# Patient Record
Sex: MALE | Race: WHITE | NOT HISPANIC OR LATINO | Employment: OTHER | URBAN - METROPOLITAN AREA
[De-identification: names, ages, dates, MRNs, and addresses within clinical notes are randomized per-mention and may not be internally consistent; named-entity substitution may affect disease eponyms.]

---

## 2023-03-06 DIAGNOSIS — E11.9 TYPE 2 DIABETES MELLITUS WITHOUT COMPLICATION, UNSPECIFIED WHETHER LONG TERM INSULIN USE (MULTI): ICD-10-CM

## 2023-03-07 RX ORDER — GLIMEPIRIDE 4 MG/1
4 TABLET ORAL 2 TIMES DAILY
Qty: 180 TABLET | Refills: 1 | Status: SHIPPED | OUTPATIENT
Start: 2023-03-07 | End: 2023-07-15 | Stop reason: SDUPTHER

## 2023-03-21 DIAGNOSIS — E03.9 HYPOTHYROIDISM, UNSPECIFIED TYPE: ICD-10-CM

## 2023-03-21 RX ORDER — LEVOTHYROXINE SODIUM 200 UG/1
1 TABLET ORAL DAILY
COMMUNITY
Start: 2016-10-06 | End: 2023-03-21 | Stop reason: SDUPTHER

## 2023-03-22 RX ORDER — LEVOTHYROXINE SODIUM 200 UG/1
200 TABLET ORAL DAILY
Qty: 90 TABLET | Refills: 0 | Status: SHIPPED | OUTPATIENT
Start: 2023-03-22 | End: 2023-07-15 | Stop reason: SDUPTHER

## 2023-03-29 DIAGNOSIS — Z79.4 OTHER SPECIFIED DIABETES MELLITUS WITH OTHER SPECIFIED COMPLICATION, WITH LONG-TERM CURRENT USE OF INSULIN (MULTI): ICD-10-CM

## 2023-03-29 DIAGNOSIS — E13.69 OTHER SPECIFIED DIABETES MELLITUS WITH OTHER SPECIFIED COMPLICATION, WITH LONG-TERM CURRENT USE OF INSULIN (MULTI): ICD-10-CM

## 2023-03-29 RX ORDER — METFORMIN HYDROCHLORIDE 1000 MG/1
1000 TABLET ORAL 2 TIMES DAILY
COMMUNITY
End: 2023-03-29 | Stop reason: SDUPTHER

## 2023-03-31 RX ORDER — METFORMIN HYDROCHLORIDE 1000 MG/1
1000 TABLET ORAL 2 TIMES DAILY
Qty: 180 TABLET | Refills: 0 | Status: SHIPPED | OUTPATIENT
Start: 2023-03-31 | End: 2023-07-15 | Stop reason: SDUPTHER

## 2023-04-03 DIAGNOSIS — R35.0 URINARY FREQUENCY: ICD-10-CM

## 2023-04-03 DIAGNOSIS — F41.9 ANXIETY: ICD-10-CM

## 2023-04-03 DIAGNOSIS — G47.10 HYPERSOMNIA: ICD-10-CM

## 2023-04-03 RX ORDER — SERTRALINE HYDROCHLORIDE 50 MG/1
50 TABLET, FILM COATED ORAL DAILY
COMMUNITY
End: 2023-04-03 | Stop reason: SDUPTHER

## 2023-04-03 RX ORDER — TAMSULOSIN HYDROCHLORIDE 0.4 MG/1
0.4 CAPSULE ORAL DAILY
Qty: 90 CAPSULE | Refills: 0 | Status: SHIPPED | OUTPATIENT
Start: 2023-04-03 | End: 2023-07-15 | Stop reason: SDUPTHER

## 2023-04-03 RX ORDER — TAMSULOSIN HYDROCHLORIDE 0.4 MG/1
0.4 CAPSULE ORAL DAILY
COMMUNITY
End: 2023-04-03 | Stop reason: SDUPTHER

## 2023-04-03 RX ORDER — TRAZODONE HYDROCHLORIDE 50 MG/1
50 TABLET ORAL NIGHTLY
Qty: 90 TABLET | Refills: 0 | Status: SHIPPED | OUTPATIENT
Start: 2023-04-03 | End: 2024-01-31 | Stop reason: HOSPADM

## 2023-04-03 RX ORDER — SERTRALINE HYDROCHLORIDE 50 MG/1
50 TABLET, FILM COATED ORAL DAILY
Qty: 90 TABLET | Refills: 0 | Status: SHIPPED | OUTPATIENT
Start: 2023-04-03 | End: 2023-07-15 | Stop reason: SDUPTHER

## 2023-04-03 RX ORDER — TRAZODONE HYDROCHLORIDE 50 MG/1
50 TABLET ORAL NIGHTLY
COMMUNITY
End: 2023-04-03 | Stop reason: SDUPTHER

## 2023-07-15 ENCOUNTER — OFFICE VISIT (OUTPATIENT)
Dept: PRIMARY CARE | Facility: CLINIC | Age: 73
End: 2023-07-15
Payer: MEDICARE

## 2023-07-15 VITALS
WEIGHT: 232 LBS | BODY MASS INDEX: 37.28 KG/M2 | HEIGHT: 66 IN | DIASTOLIC BLOOD PRESSURE: 64 MMHG | SYSTOLIC BLOOD PRESSURE: 144 MMHG

## 2023-07-15 DIAGNOSIS — J43.8 OTHER EMPHYSEMA (MULTI): ICD-10-CM

## 2023-07-15 DIAGNOSIS — E03.9 HYPOTHYROIDISM, UNSPECIFIED TYPE: ICD-10-CM

## 2023-07-15 DIAGNOSIS — E11.9 CONTROLLED TYPE 2 DIABETES MELLITUS WITHOUT COMPLICATION, WITHOUT LONG-TERM CURRENT USE OF INSULIN (MULTI): ICD-10-CM

## 2023-07-15 DIAGNOSIS — E11.9 TYPE 2 DIABETES MELLITUS WITHOUT COMPLICATION, UNSPECIFIED WHETHER LONG TERM INSULIN USE (MULTI): ICD-10-CM

## 2023-07-15 DIAGNOSIS — E23.0 HYPOPITUITARISM (MULTI): Primary | ICD-10-CM

## 2023-07-15 DIAGNOSIS — F41.9 ANXIETY: ICD-10-CM

## 2023-07-15 DIAGNOSIS — R35.0 URINARY FREQUENCY: ICD-10-CM

## 2023-07-15 DIAGNOSIS — Z79.4 OTHER SPECIFIED DIABETES MELLITUS WITH OTHER SPECIFIED COMPLICATION, WITH LONG-TERM CURRENT USE OF INSULIN (MULTI): ICD-10-CM

## 2023-07-15 DIAGNOSIS — E13.69 OTHER SPECIFIED DIABETES MELLITUS WITH OTHER SPECIFIED COMPLICATION, WITH LONG-TERM CURRENT USE OF INSULIN (MULTI): ICD-10-CM

## 2023-07-15 DIAGNOSIS — E78.00 ELEVATED LDL CHOLESTEROL LEVEL: ICD-10-CM

## 2023-07-15 PROBLEM — H47.293 OTHER OPTIC ATROPHY, BILATERAL: Status: ACTIVE | Noted: 2023-02-28

## 2023-07-15 PROBLEM — J20.9 ACUTE BRONCHITIS: Status: ACTIVE | Noted: 2023-07-15

## 2023-07-15 PROBLEM — M79.604 PAIN IN BOTH LOWER EXTREMITIES: Status: ACTIVE | Noted: 2023-07-15

## 2023-07-15 PROBLEM — M79.605 PAIN IN BOTH LOWER EXTREMITIES: Status: ACTIVE | Noted: 2023-07-15

## 2023-07-15 PROBLEM — K21.9 GERD (GASTROESOPHAGEAL REFLUX DISEASE): Status: ACTIVE | Noted: 2023-07-15

## 2023-07-15 PROBLEM — H53.47 BITEMPORAL HEMIANOPIA: Status: ACTIVE | Noted: 2023-02-28

## 2023-07-15 PROBLEM — Z86.39: Status: ACTIVE | Noted: 2023-07-15

## 2023-07-15 PROBLEM — R21 RASH: Status: ACTIVE | Noted: 2023-07-15

## 2023-07-15 PROBLEM — Z96.1 PSEUDOPHAKIA, BOTH EYES: Status: ACTIVE | Noted: 2023-02-28

## 2023-07-15 PROBLEM — M16.10 ARTHRITIS, HIP: Status: ACTIVE | Noted: 2023-07-15

## 2023-07-15 PROBLEM — F32.A DEPRESSION: Status: ACTIVE | Noted: 2023-07-15

## 2023-07-15 PROBLEM — H54.7 BLIND: Status: ACTIVE | Noted: 2023-07-15

## 2023-07-15 PROBLEM — Z86.711 HISTORY OF PULMONARY EMBOLUS (PE): Status: ACTIVE | Noted: 2019-10-24

## 2023-07-15 PROBLEM — E23.2 DIABETES INSIPIDUS (MULTI): Status: ACTIVE | Noted: 2017-03-07

## 2023-07-15 PROBLEM — T84.59XA: Status: ACTIVE | Noted: 2023-07-15

## 2023-07-15 PROBLEM — J01.90 ACUTE SINUSITIS: Status: ACTIVE | Noted: 2023-07-15

## 2023-07-15 PROBLEM — G47.00 INSOMNIA: Status: ACTIVE | Noted: 2023-07-15

## 2023-07-15 PROBLEM — Z96.649: Status: ACTIVE | Noted: 2023-07-15

## 2023-07-15 PROBLEM — R41.0 CONFUSION: Status: ACTIVE | Noted: 2023-07-15

## 2023-07-15 PROBLEM — I82.409 DVT (DEEP VENOUS THROMBOSIS) (MULTI): Status: ACTIVE | Noted: 2023-07-15

## 2023-07-15 PROBLEM — R06.02 SOB (SHORTNESS OF BREATH): Status: ACTIVE | Noted: 2023-07-15

## 2023-07-15 PROBLEM — M79.606 LEG PAIN: Status: ACTIVE | Noted: 2023-07-15

## 2023-07-15 PROBLEM — D64.9 ANEMIA: Status: ACTIVE | Noted: 2023-07-15

## 2023-07-15 PROBLEM — L85.3 DRY SKIN: Status: ACTIVE | Noted: 2023-07-15

## 2023-07-15 PROBLEM — E27.40: Status: ACTIVE | Noted: 2017-03-07

## 2023-07-15 PROBLEM — G47.30 APNEA, SLEEP: Status: ACTIVE | Noted: 2023-07-15

## 2023-07-15 PROBLEM — N40.0 BPH (BENIGN PROSTATIC HYPERPLASIA): Status: ACTIVE | Noted: 2023-07-15

## 2023-07-15 PROBLEM — R06.83 SNORING: Status: ACTIVE | Noted: 2023-07-15

## 2023-07-15 PROBLEM — R35.1 NOCTURIA: Status: ACTIVE | Noted: 2023-07-15

## 2023-07-15 PROBLEM — M25.559 JOINT PAIN, HIP: Status: ACTIVE | Noted: 2023-07-15

## 2023-07-15 PROBLEM — I10 HTN (HYPERTENSION): Status: ACTIVE | Noted: 2023-07-15

## 2023-07-15 PROBLEM — R53.83 FATIGUE: Status: ACTIVE | Noted: 2023-07-15

## 2023-07-15 PROBLEM — R42 DIZZINESS: Status: ACTIVE | Noted: 2023-07-15

## 2023-07-15 PROBLEM — E03.8 CENTRAL HYPOTHYROIDISM: Status: ACTIVE | Noted: 2017-05-31

## 2023-07-15 PROBLEM — R60.0 EDEMA LEG: Status: ACTIVE | Noted: 2023-07-15

## 2023-07-15 PROBLEM — R09.02 HYPOXIA: Status: ACTIVE | Noted: 2023-07-15

## 2023-07-15 PROCEDURE — 3078F DIAST BP <80 MM HG: CPT | Performed by: INTERNAL MEDICINE

## 2023-07-15 PROCEDURE — 99214 OFFICE O/P EST MOD 30 MIN: CPT | Performed by: INTERNAL MEDICINE

## 2023-07-15 PROCEDURE — 3044F HG A1C LEVEL LT 7.0%: CPT | Performed by: INTERNAL MEDICINE

## 2023-07-15 PROCEDURE — 1159F MED LIST DOCD IN RCRD: CPT | Performed by: INTERNAL MEDICINE

## 2023-07-15 PROCEDURE — 3077F SYST BP >= 140 MM HG: CPT | Performed by: INTERNAL MEDICINE

## 2023-07-15 RX ORDER — LEVOTHYROXINE SODIUM 200 UG/1
200 TABLET ORAL DAILY
Qty: 90 TABLET | Refills: 0 | Status: SHIPPED | OUTPATIENT
Start: 2023-07-15 | End: 2023-12-15 | Stop reason: SDUPTHER

## 2023-07-15 RX ORDER — GLIMEPIRIDE 4 MG/1
4 TABLET ORAL 2 TIMES DAILY
Qty: 180 TABLET | Refills: 0 | Status: SHIPPED | OUTPATIENT
Start: 2023-07-15 | End: 2023-12-15 | Stop reason: SDUPTHER

## 2023-07-15 RX ORDER — TAMSULOSIN HYDROCHLORIDE 0.4 MG/1
0.4 CAPSULE ORAL DAILY
Qty: 90 CAPSULE | Refills: 0 | Status: SHIPPED | OUTPATIENT
Start: 2023-07-15

## 2023-07-15 RX ORDER — SERTRALINE HYDROCHLORIDE 50 MG/1
50 TABLET, FILM COATED ORAL DAILY
Qty: 90 TABLET | Refills: 0 | Status: SHIPPED | OUTPATIENT
Start: 2023-07-15 | End: 2024-01-31 | Stop reason: HOSPADM

## 2023-07-15 RX ORDER — METFORMIN HYDROCHLORIDE 1000 MG/1
1000 TABLET ORAL 2 TIMES DAILY
Qty: 180 TABLET | Refills: 0 | Status: SHIPPED | OUTPATIENT
Start: 2023-07-15 | End: 2023-10-13 | Stop reason: SDUPTHER

## 2023-07-15 NOTE — PROGRESS NOTES
Subjective   Patient ID: Gopal Ness is a 72 y.o. male who presents for Follow-up and Med Refill.    Med Refill    Patient is here for follow-up  Did not do blood work for diabetes  Follow-up on high cholesterol hypertension  He saw the eye doctor few months ago no new recommendations  His family started noticing that few months ago his left eye is going up and the right eye is drooping  Since his last visit here his eye movement is definitely not doing well very pronounced  Continues to smoke    Patient here for follow-up  Follow-up on hypertension high cholesterol diabetes  Patient doing better on sertraline   Having prostate issues frequency of urine at night doing okay with the medications  He has history of pituitary tumor removed has not followed up with the endocrine doctor yet overdue  His vision never came back completely he has not followed up with an eye doctor  He bumps into things  Patient does not sleep well   Refused to do the sleep study  Per family he does snore a lot wakes up tired  He continues to smoke  He does get short of breath on exertion he does wheezes a lot     Patient is here to get handicap placard  It was given to him after the hip surgery  Not he says he needs it because his vision is not better  Is supposed to go for cataract surgery but not able to find the ride as daughter works a lot     past recap  Patient is here for follow-up   Patient here for follow-up on diabetes  He is off the steroids and blood sugars are doing much better  Doing better with his mood  In May it will be one year for anticoagulation for PE and DVT  Patient has appointment with endocrinologist in a month  Patient continues to smoke  Daughter is concerned about discoloration of the feet and the lower leg  So she would like to continue the blood thinners patient wants to take only half the dose  He is sometimes noncompliant with medication and follow-ups      Review of Systems    Objective   /64   Ht  "1.676 m (5' 6\")   Wt 105 kg (232 lb)   BMI 37.45 kg/m²     Physical Exam  Vitals reviewed.   Constitutional:       Appearance: Normal appearance.   HENT:      Head: Normocephalic and atraumatic.      Right Ear: Tympanic membrane, ear canal and external ear normal.      Left Ear: Tympanic membrane, ear canal and external ear normal.      Nose: Nose normal.      Mouth/Throat:      Pharynx: Oropharynx is clear.   Eyes:      Conjunctiva/sclera: Conjunctivae normal.      Pupils: Pupils are equal, round, and reactive to light.      Comments: Right eye ptosis, left eye and right eye movements are not synchronized   Cardiovascular:      Rate and Rhythm: Normal rate and regular rhythm.      Pulses: Normal pulses.      Heart sounds: Normal heart sounds.   Pulmonary:      Effort: Pulmonary effort is normal.      Breath sounds: Normal breath sounds.   Abdominal:      General: Abdomen is flat. Bowel sounds are normal.      Palpations: Abdomen is soft.   Musculoskeletal:      Cervical back: Normal range of motion and neck supple.   Skin:     General: Skin is warm and dry.   Neurological:      General: No focal deficit present.      Mental Status: He is alert and oriented to person, place, and time.   Psychiatric:         Mood and Affect: Mood normal.         Assessment/Plan   Problem List Items Addressed This Visit          Cardiac and Vasculature    Elevated LDL cholesterol level    Relevant Orders    CBC    Comprehensive Metabolic Panel    Hemoglobin A1C    Lipid Panel    Thyroid Stimulating Hormone    CBC    Comprehensive Metabolic Panel    Hemoglobin A1C    Lipid Panel    Thyroid Stimulating Hormone       Endocrine/Metabolic    Controlled type 2 diabetes mellitus without complication, without long-term current use of insulin (CMS/[facility])    Relevant Orders    CBC    Comprehensive Metabolic Panel    Hemoglobin A1C    Lipid Panel    Thyroid Stimulating Hormone    CBC    Comprehensive Metabolic Panel    Hemoglobin A1C    Lipid " Panel    Thyroid Stimulating Hormone    Diabetes mellitus (CMS/HCC)    Relevant Medications    glimepiride (AmaryL) 4 mg tablet    metFORMIN (Glucophage) 1,000 mg tablet    Hypopituitarism (CMS/HCC) - Primary    Relevant Orders    MR brain w and wo IV contrast    Hypothyroidism    Relevant Medications    levothyroxine (Synthroid, Levoxyl) 200 mcg tablet       Pulmonary and Pneumonias    COPD (chronic obstructive pulmonary disease) (CMS/[facility])     Other Visit Diagnoses       Anxiety        Relevant Medications    sertraline (Zoloft) 50 mg tablet    Urinary frequency        Relevant Medications    tamsulosin (Flomax) 0.4 mg 24 hr capsule             # 1 Depression  Doing well on sertraline  #2 PE/DVT  Since patient leads a very sedentary lifestyle family's concern about having another blood clot  Will continue eliquis for now reduce the dose  #3 COPD  Patient is feeling he is not even using the inhaler  Albuterol inhaler prescribed  He does have a nebulizer machine does not use it  #4 smoking  Advised to quit smoking  #5 diabetes  A1c improved to 6.1  #6 hyperlipidemia  under control  Medications refilled  Triglycerides little high and HDL low  Encouraged to walk more  #7 nocturia  Continue Flomax  #7 snoring  Check overnight pulse oximetry results which are not in the chart  Sleep study patient refused  #9 acute bronchitis  Treat with doxycycline and Medrol Dosepak  Follow-up blood work in 6 months  Refills given  Again encourage patient to see the endocrinologist to get his adrenal glands functioning checked       7/15/2023  Daughter says that she did make appointment with  And they recommended seeing a neurologist  She did see a virtual visit with the endocrine doctor and they were supposed to make appointment with the neurologist  We will get the MRI of the brain to evaluate recurrence of pituitary tumor  Blood work reviewed from last visit  Cholesterol okay  Thyroid suppressed because of his secondary  hypothyroidism  Cholesterol okay  Follow-up of the MRI  Medications refills  Blood work ordered

## 2023-07-18 ENCOUNTER — LAB (OUTPATIENT)
Dept: LAB | Facility: LAB | Age: 73
End: 2023-07-18
Payer: MEDICARE

## 2023-07-18 DIAGNOSIS — E78.00 ELEVATED LDL CHOLESTEROL LEVEL: ICD-10-CM

## 2023-07-18 DIAGNOSIS — E11.9 CONTROLLED TYPE 2 DIABETES MELLITUS WITHOUT COMPLICATION, WITHOUT LONG-TERM CURRENT USE OF INSULIN (MULTI): ICD-10-CM

## 2023-07-18 LAB
ALANINE AMINOTRANSFERASE (SGPT) (U/L) IN SER/PLAS: 28 U/L (ref 10–52)
ALBUMIN (G/DL) IN SER/PLAS: 4.1 G/DL (ref 3.4–5)
ALKALINE PHOSPHATASE (U/L) IN SER/PLAS: 59 U/L (ref 33–136)
ANION GAP IN SER/PLAS: 11 MMOL/L (ref 10–20)
ASPARTATE AMINOTRANSFERASE (SGOT) (U/L) IN SER/PLAS: 24 U/L (ref 9–39)
BILIRUBIN TOTAL (MG/DL) IN SER/PLAS: 0.5 MG/DL (ref 0–1.2)
CALCIUM (MG/DL) IN SER/PLAS: 9.4 MG/DL (ref 8.6–10.6)
CARBON DIOXIDE, TOTAL (MMOL/L) IN SER/PLAS: 33 MMOL/L (ref 21–32)
CHLORIDE (MMOL/L) IN SER/PLAS: 103 MMOL/L (ref 98–107)
CHOLESTEROL (MG/DL) IN SER/PLAS: 166 MG/DL (ref 0–199)
CHOLESTEROL IN HDL (MG/DL) IN SER/PLAS: 39 MG/DL
CHOLESTEROL/HDL RATIO: 4.3
CREATININE (MG/DL) IN SER/PLAS: 0.81 MG/DL (ref 0.5–1.3)
ERYTHROCYTE DISTRIBUTION WIDTH (RATIO) BY AUTOMATED COUNT: 13.3 % (ref 11.5–14.5)
ERYTHROCYTE MEAN CORPUSCULAR HEMOGLOBIN CONCENTRATION (G/DL) BY AUTOMATED: 32.8 G/DL (ref 32–36)
ERYTHROCYTE MEAN CORPUSCULAR VOLUME (FL) BY AUTOMATED COUNT: 98 FL (ref 80–100)
ERYTHROCYTES (10*6/UL) IN BLOOD BY AUTOMATED COUNT: 5.18 X10E12/L (ref 4.5–5.9)
ESTIMATED AVERAGE GLUCOSE FOR HBA1C: 126 MG/DL
GFR MALE: >90 ML/MIN/1.73M2
GLUCOSE (MG/DL) IN SER/PLAS: 87 MG/DL (ref 74–99)
HEMATOCRIT (%) IN BLOOD BY AUTOMATED COUNT: 50.9 % (ref 41–52)
HEMOGLOBIN (G/DL) IN BLOOD: 16.7 G/DL (ref 13.5–17.5)
HEMOGLOBIN A1C/HEMOGLOBIN TOTAL IN BLOOD: 6 %
LDL: 102 MG/DL (ref 0–99)
LEUKOCYTES (10*3/UL) IN BLOOD BY AUTOMATED COUNT: 9.2 X10E9/L (ref 4.4–11.3)
NRBC (PER 100 WBCS) BY AUTOMATED COUNT: 0 /100 WBC (ref 0–0)
PLATELETS (10*3/UL) IN BLOOD AUTOMATED COUNT: 159 X10E9/L (ref 150–450)
POTASSIUM (MMOL/L) IN SER/PLAS: 4.6 MMOL/L (ref 3.5–5.3)
PROTEIN TOTAL: 6.8 G/DL (ref 6.4–8.2)
SODIUM (MMOL/L) IN SER/PLAS: 142 MMOL/L (ref 136–145)
THYROTROPIN (MIU/L) IN SER/PLAS BY DETECTION LIMIT <= 0.05 MIU/L: <0.01 MIU/L (ref 0.44–3.98)
TRIGLYCERIDE (MG/DL) IN SER/PLAS: 124 MG/DL (ref 0–149)
UREA NITROGEN (MG/DL) IN SER/PLAS: 10 MG/DL (ref 6–23)
VLDL: 25 MG/DL (ref 0–40)

## 2023-07-18 PROCEDURE — 80053 COMPREHEN METABOLIC PANEL: CPT

## 2023-07-18 PROCEDURE — 85027 COMPLETE CBC AUTOMATED: CPT

## 2023-07-18 PROCEDURE — 84443 ASSAY THYROID STIM HORMONE: CPT

## 2023-07-18 PROCEDURE — 80061 LIPID PANEL: CPT

## 2023-07-18 PROCEDURE — 83036 HEMOGLOBIN GLYCOSYLATED A1C: CPT

## 2023-07-18 PROCEDURE — 36415 COLL VENOUS BLD VENIPUNCTURE: CPT

## 2023-09-27 ENCOUNTER — OFFICE VISIT (OUTPATIENT)
Dept: PRIMARY CARE | Facility: CLINIC | Age: 73
End: 2023-09-27
Payer: MEDICARE

## 2023-09-27 VITALS — BODY MASS INDEX: 38.57 KG/M2 | WEIGHT: 240 LBS | HEIGHT: 66 IN

## 2023-09-27 DIAGNOSIS — E03.8 OTHER SPECIFIED HYPOTHYROIDISM: ICD-10-CM

## 2023-09-27 DIAGNOSIS — E78.00 ELEVATED LDL CHOLESTEROL LEVEL: ICD-10-CM

## 2023-09-27 DIAGNOSIS — I10 ESSENTIAL HYPERTENSION: ICD-10-CM

## 2023-09-27 DIAGNOSIS — H49.9 OPHTHALMOPLEGIA: Primary | ICD-10-CM

## 2023-09-27 DIAGNOSIS — D33.2 BENIGN NEOPLASM OF BRAIN, UNSPECIFIED BRAIN REGION (MULTI): ICD-10-CM

## 2023-09-27 DIAGNOSIS — E23.0 HYPOPITUITARISM (MULTI): ICD-10-CM

## 2023-09-27 DIAGNOSIS — H02.401 PTOSIS OF RIGHT EYELID: ICD-10-CM

## 2023-09-27 DIAGNOSIS — H54.61 VISION LOSS OF RIGHT EYE: ICD-10-CM

## 2023-09-27 PROCEDURE — 99215 OFFICE O/P EST HI 40 MIN: CPT | Performed by: INTERNAL MEDICINE

## 2023-09-27 PROCEDURE — 1159F MED LIST DOCD IN RCRD: CPT | Performed by: INTERNAL MEDICINE

## 2023-09-27 PROCEDURE — 3044F HG A1C LEVEL LT 7.0%: CPT | Performed by: INTERNAL MEDICINE

## 2023-09-27 RX ORDER — APIXABAN 5 MG/1
5 TABLET, FILM COATED ORAL DAILY
COMMUNITY

## 2023-09-27 ASSESSMENT — ENCOUNTER SYMPTOMS
LOSS OF SENSATION IN FEET: 0
OCCASIONAL FEELINGS OF UNSTEADINESS: 0

## 2023-10-08 PROBLEM — D33.2 BENIGN NEOPLASM OF BRAIN (MULTI): Status: ACTIVE | Noted: 2023-10-08

## 2023-10-08 PROBLEM — H49.9 OPHTHALMOPLEGIA: Status: ACTIVE | Noted: 2023-10-08

## 2023-10-08 PROBLEM — H02.401 PTOSIS OF RIGHT EYELID: Status: ACTIVE | Noted: 2023-10-08

## 2023-10-08 PROBLEM — H54.61 VISION LOSS OF RIGHT EYE: Status: ACTIVE | Noted: 2023-10-08

## 2023-10-08 NOTE — PROGRESS NOTES
Subjective   Patient ID: Gopal Ness is a 73 y.o. male who presents for Follow-up.    HPI   Patient is here for follow-up on MRI which she did many months ago but never followed up.  Today he is coming because for last several months his eye muscles are getting weaker but now he cannot move the right eye and right eyelid is drooping more    Patient is here for follow-up  Did not do blood work for diabetes  Follow-up on high cholesterol hypertension  He saw the eye doctor few months ago no new recommendations  His family started noticing that few months ago his left eye is going up and the right eye is drooping  Since his last visit here his eye movement is definitely not doing well very pronounced  Continues to smoke     Patient here for follow-up  Follow-up on hypertension high cholesterol diabetes  Patient doing better on sertraline   Having prostate issues frequency of urine at night doing okay with the medications  He has history of pituitary tumor removed has not followed up with the endocrine doctor yet overdue  His vision never came back completely he has not followed up with an eye doctor  He bumps into things  Patient does not sleep well   Refused to do the sleep study  Per family he does snore a lot wakes up tired  He continues to smoke  He does get short of breath on exertion he does wheezes a lot     Patient is here to get handicap placard  It was given to him after the hip surgery  Not he says he needs it because his vision is not better  Is supposed to go for cataract surgery but not able to find the ride as daughter works a lot     past recap  Patient is here for follow-up   Patient here for follow-up on diabetes  He is off the steroids and blood sugars are doing much better  Doing better with his mood  In May it will be one year for anticoagulation for PE and DVT  Patient has appointment with endocrinologist in a month  Patient continues to smoke  Daughter is concerned about discoloration of the  "feet and the lower leg  So she would like to continue the blood thinners patient wants to take only half the dose  He is sometimes noncompliant with medication and follow-ups   Review of Systems    Objective   Ht 1.676 m (5' 6\")   Wt 109 kg (240 lb)   BMI 38.74 kg/m²     Physical Exam  Vitals reviewed.   Constitutional:       Appearance: Normal appearance.   HENT:      Head: Normocephalic and atraumatic.      Right Ear: Tympanic membrane, ear canal and external ear normal.      Left Ear: Tympanic membrane, ear canal and external ear normal.      Nose: Nose normal.      Mouth/Throat:      Pharynx: Oropharynx is clear.   Eyes:      Conjunctiva/sclera: Conjunctivae normal.      Pupils: Pupils are equal, round, and reactive to light.   Cardiovascular:      Rate and Rhythm: Normal rate and regular rhythm.      Pulses: Normal pulses.      Heart sounds: Normal heart sounds.   Pulmonary:      Effort: Pulmonary effort is normal.      Breath sounds: Normal breath sounds.   Abdominal:      General: Abdomen is flat. Bowel sounds are normal.      Palpations: Abdomen is soft.   Musculoskeletal:      Cervical back: Normal range of motion and neck supple.   Skin:     General: Skin is warm and dry.   Neurological:      General: No focal deficit present.      Mental Status: He is alert and oriented to person, place, and time.   Psychiatric:         Mood and Affect: Mood normal.         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    Relevant Orders    Referral to Neurosurgery     Other Visit Diagnoses         " Codes    Essential hypertension     I10          # 1 Depression  Doing well on sertraline  #2 PE/DVT  Since patient leads a very sedentary lifestyle family's concern about having another blood clot  Will continue eliquis for now reduce the dose  #3 COPD  Patient is feeling he is not even using the inhaler  Albuterol inhaler prescribed  He does have a nebulizer machine does not use it  #4 smoking  Advised to quit smoking  #5 diabetes  A1c improved to 6.1  #6 hyperlipidemia  under control  Medications refilled  Triglycerides little high and HDL low  Encouraged to walk more  #7 nocturia  Continue Flomax  #7 snoring  Check overnight pulse oximetry results which are not in the chart  Sleep study patient refused  #9 acute bronchitis  Treat with doxycycline and Medrol Dosepak  Follow-up blood work in 6 months  Refills given  Again encourage patient to see the endocrinologist to get his adrenal glands functioning checked     7/15/2023  Daughter says that she did make appointment with  And they recommended seeing a neurologist  She did see a virtual visit with the endocrine doctor and they were supposed to make appointment with the neurologist  We will get the MRI of the brain to evaluate recurrence of pituitary tumor  Blood work reviewed from last visit  Cholesterol okay  Thyroid suppressed because of his secondary hypothyroidism  Cholesterol okay  Follow-up of the MRI  Medications refills  Blood work ordered    9/27/2023  MRI done in July 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  New small dural based enhancing masses in right posterior fossa likely meningioma.  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

## 2023-10-13 DIAGNOSIS — E13.69 OTHER SPECIFIED DIABETES MELLITUS WITH OTHER SPECIFIED COMPLICATION, WITH LONG-TERM CURRENT USE OF INSULIN (MULTI): ICD-10-CM

## 2023-10-13 DIAGNOSIS — Z79.4 OTHER SPECIFIED DIABETES MELLITUS WITH OTHER SPECIFIED COMPLICATION, WITH LONG-TERM CURRENT USE OF INSULIN (MULTI): ICD-10-CM

## 2023-10-13 RX ORDER — METFORMIN HYDROCHLORIDE 1000 MG/1
1000 TABLET ORAL 2 TIMES DAILY
Qty: 180 TABLET | Refills: 0 | Status: SHIPPED | OUTPATIENT
Start: 2023-10-13 | End: 2023-12-15 | Stop reason: SDUPTHER

## 2023-12-15 DIAGNOSIS — E11.9 TYPE 2 DIABETES MELLITUS WITHOUT COMPLICATION, UNSPECIFIED WHETHER LONG TERM INSULIN USE (MULTI): ICD-10-CM

## 2023-12-15 DIAGNOSIS — E03.9 HYPOTHYROIDISM, UNSPECIFIED TYPE: ICD-10-CM

## 2023-12-15 DIAGNOSIS — E13.69 OTHER SPECIFIED DIABETES MELLITUS WITH OTHER SPECIFIED COMPLICATION, WITH LONG-TERM CURRENT USE OF INSULIN (MULTI): ICD-10-CM

## 2023-12-15 DIAGNOSIS — Z79.4 OTHER SPECIFIED DIABETES MELLITUS WITH OTHER SPECIFIED COMPLICATION, WITH LONG-TERM CURRENT USE OF INSULIN (MULTI): ICD-10-CM

## 2023-12-15 RX ORDER — METFORMIN HYDROCHLORIDE 1000 MG/1
1000 TABLET ORAL 2 TIMES DAILY
Qty: 180 TABLET | Refills: 0 | Status: SHIPPED | OUTPATIENT
Start: 2023-12-15 | End: 2024-03-14

## 2023-12-15 RX ORDER — GLIMEPIRIDE 4 MG/1
4 TABLET ORAL 2 TIMES DAILY
Qty: 180 TABLET | Refills: 0 | Status: SHIPPED | OUTPATIENT
Start: 2023-12-15 | End: 2024-01-31 | Stop reason: HOSPADM

## 2023-12-15 RX ORDER — LEVOTHYROXINE SODIUM 200 UG/1
200 TABLET ORAL DAILY
Qty: 90 TABLET | Refills: 0 | Status: SHIPPED | OUTPATIENT
Start: 2023-12-15 | End: 2024-01-31 | Stop reason: HOSPADM

## 2024-01-01 ENCOUNTER — APPOINTMENT (OUTPATIENT)
Dept: RADIOLOGY | Facility: HOSPITAL | Age: 74
DRG: 208 | End: 2024-01-01
Payer: MEDICARE

## 2024-01-01 ENCOUNTER — HOSPITAL ENCOUNTER (EMERGENCY)
Facility: HOSPITAL | Age: 74
Discharge: OTHER INSTITUTION | End: 2024-01-01
Attending: EMERGENCY MEDICINE
Payer: MEDICARE

## 2024-01-01 ENCOUNTER — APPOINTMENT (OUTPATIENT)
Dept: NEUROLOGY | Facility: HOSPITAL | Age: 74
DRG: 208 | End: 2024-01-01
Payer: MEDICARE

## 2024-01-01 ENCOUNTER — APPOINTMENT (OUTPATIENT)
Dept: RADIOLOGY | Facility: HOSPITAL | Age: 74
End: 2024-01-01
Payer: MEDICARE

## 2024-01-01 ENCOUNTER — HOSPITAL ENCOUNTER (INPATIENT)
Facility: HOSPITAL | Age: 74
LOS: 30 days | Discharge: NURSING, REHAB OR LONG-TERM CARE | DRG: 208 | End: 2024-01-31
Attending: PSYCHIATRY & NEUROLOGY | Admitting: PSYCHIATRY & NEUROLOGY
Payer: MEDICARE

## 2024-01-01 VITALS
DIASTOLIC BLOOD PRESSURE: 72 MMHG | BODY MASS INDEX: 28.49 KG/M2 | HEIGHT: 72 IN | RESPIRATION RATE: 20 BRPM | WEIGHT: 210.32 LBS | SYSTOLIC BLOOD PRESSURE: 124 MMHG | OXYGEN SATURATION: 92 % | HEART RATE: 113 BPM

## 2024-01-01 DIAGNOSIS — E23.2 DIABETES INSIPIDUS (MULTI): ICD-10-CM

## 2024-01-01 DIAGNOSIS — Z86.39 H/O DIABETES INSIPIDUS: ICD-10-CM

## 2024-01-01 DIAGNOSIS — E23.0 HYPOPITUITARISM (MULTI): ICD-10-CM

## 2024-01-01 DIAGNOSIS — H53.47 BITEMPORAL HEMIANOPIA: ICD-10-CM

## 2024-01-01 DIAGNOSIS — R55 ARRHYTHMIA, VAGAL: ICD-10-CM

## 2024-01-01 DIAGNOSIS — Z96.1 PSEUDOPHAKIA, BOTH EYES: ICD-10-CM

## 2024-01-01 DIAGNOSIS — G93.40 ACUTE ENCEPHALOPATHY: Primary | ICD-10-CM

## 2024-01-01 DIAGNOSIS — E63.9 NUTRITION DISORDER: ICD-10-CM

## 2024-01-01 DIAGNOSIS — E78.00 ELEVATED LDL CHOLESTEROL LEVEL: ICD-10-CM

## 2024-01-01 DIAGNOSIS — M79.605 PAIN IN BOTH LOWER EXTREMITIES: ICD-10-CM

## 2024-01-01 DIAGNOSIS — H54.61 VISION LOSS OF RIGHT EYE: ICD-10-CM

## 2024-01-01 DIAGNOSIS — I47.10 SVT (SUPRAVENTRICULAR TACHYCARDIA) (CMS-HCC): ICD-10-CM

## 2024-01-01 DIAGNOSIS — Z74.09 IMPAIRED MOBILITY: ICD-10-CM

## 2024-01-01 DIAGNOSIS — H02.401 PTOSIS OF RIGHT EYELID: ICD-10-CM

## 2024-01-01 DIAGNOSIS — H53.8 BLURRING OF VISUAL IMAGE: ICD-10-CM
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
## 2024-01-01 DIAGNOSIS — M79.604 PAIN IN BOTH LOWER EXTREMITIES: ICD-10-CM

## 2024-01-01 DIAGNOSIS — E27.40: ICD-10-CM

## 2024-01-01 DIAGNOSIS — R00.0 TACHYCARDIA: ICD-10-CM

## 2024-01-01 DIAGNOSIS — Z79.01 LONG TERM (CURRENT) USE OF ANTICOAGULANTS: ICD-10-CM

## 2024-01-01 DIAGNOSIS — K21.9 GASTROESOPHAGEAL REFLUX DISEASE, UNSPECIFIED WHETHER ESOPHAGITIS PRESENT: ICD-10-CM

## 2024-01-01 DIAGNOSIS — R09.02 HYPOXIA: ICD-10-CM

## 2024-01-01 DIAGNOSIS — R35.1 NOCTURIA: ICD-10-CM

## 2024-01-01 DIAGNOSIS — G93.41 ACUTE METABOLIC ENCEPHALOPATHY: ICD-10-CM

## 2024-01-01 DIAGNOSIS — J69.0: ICD-10-CM

## 2024-01-01 DIAGNOSIS — D35.2 PITUITARY ADENOMA (MULTI): ICD-10-CM

## 2024-01-01 DIAGNOSIS — R21 RASH: ICD-10-CM

## 2024-01-01 DIAGNOSIS — A41.9 SEPSIS, DUE TO UNSPECIFIED ORGANISM, UNSPECIFIED WHETHER ACUTE ORGAN DYSFUNCTION PRESENT (MULTI): ICD-10-CM

## 2024-01-01 DIAGNOSIS — J44.9 CHRONIC OBSTRUCTIVE PULMONARY DISEASE, UNSPECIFIED COPD TYPE (MULTI): ICD-10-CM

## 2024-01-01 DIAGNOSIS — L85.3 DRY SKIN: ICD-10-CM

## 2024-01-01 DIAGNOSIS — R41.82 AMS (ALTERED MENTAL STATUS): Primary | ICD-10-CM

## 2024-01-01 DIAGNOSIS — E11.9 CONTROLLED TYPE 2 DIABETES MELLITUS WITHOUT COMPLICATION, WITHOUT LONG-TERM CURRENT USE OF INSULIN (MULTI): ICD-10-CM

## 2024-01-01 DIAGNOSIS — E03.8 CENTRAL HYPOTHYROIDISM: ICD-10-CM

## 2024-01-01 DIAGNOSIS — M16.10 ARTHRITIS, HIP: ICD-10-CM

## 2024-01-01 DIAGNOSIS — R53.83 OTHER FATIGUE: ICD-10-CM

## 2024-01-01 DIAGNOSIS — H49.9 OPHTHALMOPLEGIA: ICD-10-CM

## 2024-01-01 DIAGNOSIS — R42 DIZZINESS: ICD-10-CM

## 2024-01-01 DIAGNOSIS — J96.00 ACUTE RESPIRATORY FAILURE, UNSPECIFIED WHETHER WITH HYPOXIA OR HYPERCAPNIA (MULTI): ICD-10-CM

## 2024-01-01 DIAGNOSIS — R41.0 CONFUSION: ICD-10-CM

## 2024-01-01 DIAGNOSIS — F32.A DEPRESSION, UNSPECIFIED DEPRESSION TYPE: ICD-10-CM

## 2024-01-01 DIAGNOSIS — R06.83 SNORING: ICD-10-CM

## 2024-01-01 DIAGNOSIS — R06.02 SOB (SHORTNESS OF BREATH): ICD-10-CM

## 2024-01-01 DIAGNOSIS — H47.293 OTHER OPTIC ATROPHY, BILATERAL: ICD-10-CM

## 2024-01-01 DIAGNOSIS — I95.9 HYPOTENSION, UNSPECIFIED HYPOTENSION TYPE: ICD-10-CM

## 2024-01-01 DIAGNOSIS — Z86.711 HISTORY OF PULMONARY EMBOLUS (PE): ICD-10-CM

## 2024-01-01 DIAGNOSIS — N40.1 BENIGN PROSTATIC HYPERPLASIA WITH LOWER URINARY TRACT SYMPTOMS, SYMPTOM DETAILS UNSPECIFIED: ICD-10-CM

## 2024-01-01 DIAGNOSIS — R60.0 EDEMA LEG: ICD-10-CM

## 2024-01-01 LAB
ABO GROUP (TYPE) IN BLOOD: NORMAL
ABO GROUP (TYPE) IN BLOOD: NORMAL
ALBUMIN SERPL BCP-MCNC: 3.6 G/DL (ref 3.4–5)
ALBUMIN SERPL-MCNC: 3.8 G/DL (ref 3.5–5)
ALP BLD-CCNC: 78 U/L (ref 35–125)
ALT SERPL-CCNC: 60 U/L (ref 5–40)
ANION GAP BLDA CALCULATED.4IONS-SCNC: 10 MMO/L (ref 10–25)
ANION GAP SERPL CALC-SCNC: 14 MMOL/L
ANION GAP SERPL CALC-SCNC: 17 MMOL/L (ref 10–20)
ANTIBODY SCREEN: NORMAL
ANTIBODY SCREEN: NORMAL
APPEARANCE UR: CLEAR
APTT PPP: 28 SECONDS (ref 27–38)
AST SERPL-CCNC: 142 U/L (ref 5–40)
BASE EXCESS BLDA CALC-SCNC: -1.6 MMOL/L (ref -2–3)
BASOPHILS # BLD AUTO: 0.03 X10*3/UL (ref 0–0.1)
BASOPHILS NFR BLD AUTO: 0.2 %
BILIRUB SERPL-MCNC: 0.5 MG/DL (ref 0.1–1.2)
BILIRUB UR STRIP.AUTO-MCNC: NEGATIVE MG/DL
BODY TEMPERATURE: ABNORMAL
BUN SERPL-MCNC: 23 MG/DL (ref 8–25)
BUN SERPL-MCNC: 26 MG/DL (ref 6–23)
CA-I BLDA-SCNC: 1.06 MMOL/L (ref 1.1–1.33)
CALCIUM SERPL-MCNC: 8.3 MG/DL (ref 8.6–10.6)
CALCIUM SERPL-MCNC: 9 MG/DL (ref 8.5–10.4)
CARDIAC TROPONIN I PNL SERPL HS: 73 NG/L (ref 0–53)
CHLORIDE BLDA-SCNC: 102 MMOL/L (ref 98–107)
CHLORIDE SERPL-SCNC: 103 MMOL/L (ref 97–107)
CHLORIDE SERPL-SCNC: 106 MMOL/L (ref 98–107)
CHLORIDE UR-SCNC: <15 MMOL/L
CHLORIDE/CREATININE (MMOL/G) IN URINE: NORMAL
CO2 SERPL-SCNC: 23 MMOL/L (ref 21–32)
CO2 SERPL-SCNC: 24 MMOL/L (ref 24–31)
COLOR UR: YELLOW
CREAT SERPL-MCNC: 0.9 MG/DL (ref 0.4–1.6)
CREAT SERPL-MCNC: 1.32 MG/DL (ref 0.5–1.3)
CREAT UR-MCNC: 27.5 MG/DL (ref 20–370)
EOSINOPHIL # BLD AUTO: 0.01 X10*3/UL (ref 0–0.4)
EOSINOPHIL NFR BLD AUTO: 0.1 %
ERYTHROCYTE [DISTWIDTH] IN BLOOD BY AUTOMATED COUNT: 13.8 % (ref 11.5–14.5)
ERYTHROCYTE [DISTWIDTH] IN BLOOD BY AUTOMATED COUNT: 14 % (ref 11.5–14.5)
EST. AVERAGE GLUCOSE BLD GHB EST-MCNC: 114 MG/DL
ETHANOL SERPL-MCNC: <0.01 G/DL
FSH SERPL-ACNC: <0.9 IU/L
GFR SERPL CREATININE-BSD FRML MDRD: 57 ML/MIN/1.73M*2
GFR SERPL CREATININE-BSD FRML MDRD: >90 ML/MIN/1.73M*2
GLUCOSE BLDA-MCNC: 156 MG/DL (ref 74–99)
GLUCOSE SERPL-MCNC: 81 MG/DL (ref 74–99)
GLUCOSE SERPL-MCNC: 83 MG/DL (ref 65–99)
GLUCOSE UR STRIP.AUTO-MCNC: NEGATIVE MG/DL
HBA1C MFR BLD: 5.6 %
HCO3 BLDA-SCNC: 23 MMOL/L (ref 22–26)
HCT VFR BLD AUTO: 51 % (ref 41–52)
HCT VFR BLD AUTO: 54.8 % (ref 41–52)
HCT VFR BLD EST: 48 % (ref 41–52)
HGB BLD-MCNC: 16.9 G/DL (ref 13.5–17.5)
HGB BLD-MCNC: 17.9 G/DL (ref 13.5–17.5)
HGB BLDA-MCNC: 16 G/DL (ref 13.5–17.5)
IMM GRANULOCYTES # BLD AUTO: 0.09 X10*3/UL (ref 0–0.5)
IMM GRANULOCYTES NFR BLD AUTO: 0.5 % (ref 0–0.9)
INHALED O2 CONCENTRATION: 97 %
INR PPP: 1 (ref 0.9–1.2)
INR PPP: 1.1 (ref 0.9–1.1)
KETONES UR STRIP.AUTO-MCNC: NEGATIVE MG/DL
LACTATE BLDA-SCNC: 1.1 MMOL/L (ref 0.4–2)
LACTATE SERPL-SCNC: 1.6 MMOL/L (ref 0.4–2)
LEUKOCYTE ESTERASE UR QL STRIP.AUTO: NEGATIVE
LH SERPL-ACNC: <0.1 IU/L
LIPASE SERPL-CCNC: 30 U/L (ref 16–63)
LYMPHOCYTES # BLD AUTO: 1.43 X10*3/UL (ref 0.8–3)
LYMPHOCYTES NFR BLD AUTO: 8.5 %
MAGNESIUM SERPL-MCNC: 2.32 MG/DL (ref 1.6–2.4)
MCH RBC QN AUTO: 30.7 PG (ref 26–34)
MCH RBC QN AUTO: 31.2 PG (ref 26–34)
MCHC RBC AUTO-ENTMCNC: 32.7 G/DL (ref 32–36)
MCHC RBC AUTO-ENTMCNC: 33.1 G/DL (ref 32–36)
MCV RBC AUTO: 93 FL (ref 80–100)
MCV RBC AUTO: 96 FL (ref 80–100)
MONOCYTES # BLD AUTO: 1.44 X10*3/UL (ref 0.05–0.8)
MONOCYTES NFR BLD AUTO: 8.6 %
MRSA DNA SPEC QL NAA+PROBE: NOT DETECTED
NEUTROPHILS # BLD AUTO: 13.84 X10*3/UL (ref 1.6–5.5)
NEUTROPHILS NFR BLD AUTO: 82.1 %
NITRITE UR QL STRIP.AUTO: NEGATIVE
NRBC BLD-RTO: 0 /100 WBCS (ref 0–0)
NRBC BLD-RTO: 0 /100 WBCS (ref 0–0)
OSMOLALITY SERPL: 339 MOSM/KG (ref 280–300)
OXYHGB MFR BLDA: 96.2 % (ref 94–98)
PCO2 BLDA: 38 MM HG (ref 38–42)
PH BLDA: 7.39 PH (ref 7.38–7.42)
PH UR STRIP.AUTO: 6 [PH]
PHOSPHATE SERPL-MCNC: 5.4 MG/DL (ref 2.5–4.9)
PLATELET # BLD AUTO: 146 X10*3/UL (ref 150–450)
PLATELET # BLD AUTO: 148 X10*3/UL (ref 150–450)
PO2 BLDA: 83 MM HG (ref 85–95)
POTASSIUM BLDA-SCNC: 4.6 MMOL/L (ref 3.5–5.3)
POTASSIUM SERPL-SCNC: 4.6 MMOL/L (ref 3.4–5.1)
POTASSIUM SERPL-SCNC: 4.9 MMOL/L (ref 3.5–5.3)
POTASSIUM UR-SCNC: 10 MMOL/L
POTASSIUM/CREAT UR-RTO: 36 MMOL/G CREAT
PROLACTIN SERPL-MCNC: 1.4 UG/L (ref 2–18)
PROT SERPL-MCNC: 7.3 G/DL (ref 5.9–7.9)
PROT UR STRIP.AUTO-MCNC: NEGATIVE MG/DL
PROTHROMBIN TIME: 10.6 SECONDS (ref 9.3–12.7)
PROTHROMBIN TIME: 12.2 SECONDS (ref 9.8–12.8)
RBC # BLD AUTO: 5.5 X10*6/UL (ref 4.5–5.9)
RBC # BLD AUTO: 5.74 X10*6/UL (ref 4.5–5.9)
RBC # UR STRIP.AUTO: ABNORMAL /UL
RBC #/AREA URNS AUTO: ABNORMAL /HPF
RH FACTOR (ANTIGEN D): NORMAL
RH FACTOR (ANTIGEN D): NORMAL
SAO2 % BLDA: 97 % (ref 94–100)
SODIUM BLDA-SCNC: 130 MMOL/L (ref 136–145)
SODIUM SERPL-SCNC: 141 MMOL/L (ref 133–145)
SODIUM SERPL-SCNC: 141 MMOL/L (ref 136–145)
SODIUM UR-SCNC: <10 MMOL/L
SODIUM/CREAT UR-RTO: NORMAL
SP GR UR STRIP.AUTO: 1.04
T3FREE SERPL-MCNC: 1.8 PG/ML (ref 2.3–4.2)
T4 FREE SERPL-MCNC: 1.03 NG/DL (ref 0.78–1.48)
TSH SERPL-ACNC: 0.02 MIU/L (ref 0.44–3.98)
UROBILINOGEN UR STRIP.AUTO-MCNC: <2 MG/DL
WBC # BLD AUTO: 16.8 X10*3/UL (ref 4.4–11.3)
WBC # BLD AUTO: 18.5 X10*3/UL (ref 4.4–11.3)
WBC #/AREA URNS AUTO: ABNORMAL /HPF

## 2024-01-01 PROCEDURE — 36415 COLL VENOUS BLD VENIPUNCTURE: CPT

## 2024-01-01 PROCEDURE — 87640 STAPH A DNA AMP PROBE: CPT | Mod: 91

## 2024-01-01 PROCEDURE — 2500000004 HC RX 250 GENERAL PHARMACY W/ HCPCS (ALT 636 FOR OP/ED)

## 2024-01-01 PROCEDURE — 85610 PROTHROMBIN TIME: CPT

## 2024-01-01 PROCEDURE — 70450 CT HEAD/BRAIN W/O DYE: CPT | Mod: RSC

## 2024-01-01 PROCEDURE — 36620 INSERTION CATHETER ARTERY: CPT

## 2024-01-01 PROCEDURE — 74018 RADEX ABDOMEN 1 VIEW: CPT

## 2024-01-01 PROCEDURE — 84146 ASSAY OF PROLACTIN: CPT

## 2024-01-01 PROCEDURE — 94799 UNLISTED PULMONARY SVC/PX: CPT | Mod: MUE

## 2024-01-01 PROCEDURE — 96375 TX/PRO/DX INJ NEW DRUG ADDON: CPT | Mod: 59

## 2024-01-01 PROCEDURE — 84484 ASSAY OF TROPONIN QUANT: CPT

## 2024-01-01 PROCEDURE — 93010 ELECTROCARDIOGRAM REPORT: CPT | Performed by: INTERNAL MEDICINE

## 2024-01-01 PROCEDURE — C9113 INJ PANTOPRAZOLE SODIUM, VIA: HCPCS

## 2024-01-01 PROCEDURE — 86900 BLOOD TYPING SEROLOGIC ABO: CPT | Mod: 91

## 2024-01-01 PROCEDURE — 2500000005 HC RX 250 GENERAL PHARMACY W/O HCPCS: Performed by: EMERGENCY MEDICINE

## 2024-01-01 PROCEDURE — 2500000005 HC RX 250 GENERAL PHARMACY W/O HCPCS

## 2024-01-01 PROCEDURE — 94002 VENT MGMT INPAT INIT DAY: CPT

## 2024-01-01 PROCEDURE — 83002 ASSAY OF GONADOTROPIN (LH): CPT

## 2024-01-01 PROCEDURE — 2020000001 HC ICU ROOM DAILY

## 2024-01-01 PROCEDURE — 85027 COMPLETE CBC AUTOMATED: CPT

## 2024-01-01 PROCEDURE — 84481 FREE ASSAY (FT-3): CPT

## 2024-01-01 PROCEDURE — 81001 URINALYSIS AUTO W/SCOPE: CPT

## 2024-01-01 PROCEDURE — 83605 ASSAY OF LACTIC ACID: CPT

## 2024-01-01 PROCEDURE — 85025 COMPLETE CBC W/AUTO DIFF WBC: CPT

## 2024-01-01 PROCEDURE — 31500 INSERT EMERGENCY AIRWAY: CPT

## 2024-01-01 PROCEDURE — 94681 O2 UPTK CO2 OUTP % O2 XTRC: CPT

## 2024-01-01 PROCEDURE — 71045 X-RAY EXAM CHEST 1 VIEW: CPT

## 2024-01-01 PROCEDURE — 82077 ASSAY SPEC XCP UR&BREATH IA: CPT

## 2024-01-01 PROCEDURE — 94760 N-INVAS EAR/PLS OXIMETRY 1: CPT

## 2024-01-01 PROCEDURE — 96374 THER/PROPH/DIAG INJ IV PUSH: CPT | Mod: 59

## 2024-01-01 PROCEDURE — 72125 CT NECK SPINE W/O DYE: CPT

## 2024-01-01 PROCEDURE — 83003 ASSAY GROWTH HORMONE (HGH): CPT

## 2024-01-01 PROCEDURE — 70498 CT ANGIOGRAPHY NECK: CPT

## 2024-01-01 PROCEDURE — 31500 INSERT EMERGENCY AIRWAY: CPT | Performed by: EMERGENCY MEDICINE

## 2024-01-01 PROCEDURE — 84305 ASSAY OF SOMATOMEDIN: CPT

## 2024-01-01 PROCEDURE — 83930 ASSAY OF BLOOD OSMOLALITY: CPT

## 2024-01-01 PROCEDURE — 83735 ASSAY OF MAGNESIUM: CPT

## 2024-01-01 PROCEDURE — 74018 RADEX ABDOMEN 1 VIEW: CPT | Performed by: RADIOLOGY

## 2024-01-01 PROCEDURE — 51702 INSERT TEMP BLADDER CATH: CPT

## 2024-01-01 PROCEDURE — 70450 CT HEAD/BRAIN W/O DYE: CPT

## 2024-01-01 PROCEDURE — G0390 TRAUMA RESPONS W/HOSP CRITI: HCPCS

## 2024-01-01 PROCEDURE — 83036 HEMOGLOBIN GLYCOSYLATED A1C: CPT

## 2024-01-01 PROCEDURE — 84402 ASSAY OF FREE TESTOSTERONE: CPT

## 2024-01-01 PROCEDURE — 83690 ASSAY OF LIPASE: CPT

## 2024-01-01 PROCEDURE — 2500000004 HC RX 250 GENERAL PHARMACY W/ HCPCS (ALT 636 FOR OP/ED): Performed by: EMERGENCY MEDICINE

## 2024-01-01 PROCEDURE — A4217 STERILE WATER/SALINE, 500 ML: HCPCS

## 2024-01-01 PROCEDURE — 71045 X-RAY EXAM CHEST 1 VIEW: CPT | Performed by: RADIOLOGY

## 2024-01-01 PROCEDURE — 99223 1ST HOSP IP/OBS HIGH 75: CPT | Performed by: STUDENT IN AN ORGANIZED HEALTH CARE EDUCATION/TRAINING PROGRAM

## 2024-01-01 PROCEDURE — 82570 ASSAY OF URINE CREATININE: CPT | Mod: 91

## 2024-01-01 PROCEDURE — 80053 COMPREHEN METABOLIC PANEL: CPT

## 2024-01-01 PROCEDURE — 82024 ASSAY OF ACTH: CPT

## 2024-01-01 PROCEDURE — 2550000001 HC RX 255 CONTRASTS: Performed by: EMERGENCY MEDICINE

## 2024-01-01 PROCEDURE — 84439 ASSAY OF FREE THYROXINE: CPT

## 2024-01-01 PROCEDURE — 85730 THROMBOPLASTIN TIME PARTIAL: CPT | Mod: 91

## 2024-01-01 PROCEDURE — 99285 EMERGENCY DEPT VISIT HI MDM: CPT | Performed by: EMERGENCY MEDICINE

## 2024-01-01 PROCEDURE — 80069 RENAL FUNCTION PANEL: CPT

## 2024-01-01 PROCEDURE — 84443 ASSAY THYROID STIM HORMONE: CPT

## 2024-01-01 PROCEDURE — 2550000001 HC RX 255 CONTRASTS: Performed by: PSYCHIATRY & NEUROLOGY

## 2024-01-01 PROCEDURE — 84132 ASSAY OF SERUM POTASSIUM: CPT | Performed by: STUDENT IN AN ORGANIZED HEALTH CARE EDUCATION/TRAINING PROGRAM

## 2024-01-01 PROCEDURE — 74177 CT ABD & PELVIS W/CONTRAST: CPT

## 2024-01-01 RX ORDER — POLYETHYLENE GLYCOL 3350 17 G/17G
17 POWDER, FOR SOLUTION ORAL DAILY
Status: CANCELLED | OUTPATIENT
Start: 2024-01-01

## 2024-01-01 RX ORDER — ROCURONIUM BROMIDE 10 MG/ML
100 INJECTION, SOLUTION INTRAVENOUS ONCE
Status: COMPLETED | OUTPATIENT
Start: 2024-01-01 | End: 2024-01-01

## 2024-01-01 RX ORDER — ETOMIDATE 2 MG/ML
INJECTION INTRAVENOUS
Status: COMPLETED | OUTPATIENT
Start: 2024-01-01 | End: 2024-01-01

## 2024-01-01 RX ORDER — LEVOTHYROXINE SODIUM 200 UG/1
200 TABLET ORAL DAILY
Status: DISCONTINUED | OUTPATIENT
Start: 2024-01-01 | End: 2024-01-06

## 2024-01-01 RX ORDER — INSULIN LISPRO 100 [IU]/ML
0-5 INJECTION, SOLUTION INTRAVENOUS; SUBCUTANEOUS
Status: DISCONTINUED | OUTPATIENT
Start: 2024-01-02 | End: 2024-01-03

## 2024-01-01 RX ORDER — PROPOFOL 10 MG/ML
5-20 INJECTION, EMULSION INTRAVENOUS CONTINUOUS
Status: DISCONTINUED | OUTPATIENT
Start: 2024-01-01 | End: 2024-01-03

## 2024-01-01 RX ORDER — NOREPINEPHRINE BITARTRATE/D5W 8 MG/250ML
.01-1 PLASTIC BAG, INJECTION (ML) INTRAVENOUS CONTINUOUS
Status: DISCONTINUED | OUTPATIENT
Start: 2024-01-01 | End: 2024-01-02

## 2024-01-01 RX ORDER — SERTRALINE HYDROCHLORIDE 50 MG/1
50 TABLET, FILM COATED ORAL DAILY
Status: DISCONTINUED | OUTPATIENT
Start: 2024-01-01 | End: 2024-01-12

## 2024-01-01 RX ORDER — DEXTROSE 50 % IN WATER (D50W) INTRAVENOUS SYRINGE
25
Status: DISCONTINUED | OUTPATIENT
Start: 2024-01-01 | End: 2024-01-31 | Stop reason: HOSPADM

## 2024-01-01 RX ORDER — PANTOPRAZOLE SODIUM 40 MG/10ML
40 INJECTION, POWDER, LYOPHILIZED, FOR SOLUTION INTRAVENOUS DAILY
Status: DISCONTINUED | OUTPATIENT
Start: 2024-01-01 | End: 2024-01-07

## 2024-01-01 RX ORDER — DEXTROSE MONOHYDRATE 100 MG/ML
0.3 INJECTION, SOLUTION INTRAVENOUS ONCE AS NEEDED
Status: DISCONTINUED | OUTPATIENT
Start: 2024-01-01 | End: 2024-01-31 | Stop reason: HOSPADM

## 2024-01-01 RX ORDER — POLYETHYLENE GLYCOL 3350 17 G/17G
17 POWDER, FOR SOLUTION ORAL DAILY
Status: DISCONTINUED | OUTPATIENT
Start: 2024-01-01 | End: 2024-01-31 | Stop reason: HOSPADM

## 2024-01-01 RX ORDER — SODIUM CHLORIDE 9 MG/ML
125 INJECTION, SOLUTION INTRAVENOUS CONTINUOUS
Status: DISCONTINUED | OUTPATIENT
Start: 2024-01-01 | End: 2024-01-01 | Stop reason: HOSPADM

## 2024-01-01 RX ORDER — MANNITOL 20 G/100ML
1 INJECTION, SOLUTION INTRAVENOUS ONCE
Status: COMPLETED | OUTPATIENT
Start: 2024-01-01 | End: 2024-01-01

## 2024-01-01 RX ORDER — LEVETIRACETAM 10 MG/ML
1000 INJECTION INTRAVASCULAR ONCE
Status: COMPLETED | OUTPATIENT
Start: 2024-01-01 | End: 2024-01-01

## 2024-01-01 RX ORDER — ENOXAPARIN SODIUM 100 MG/ML
40 INJECTION SUBCUTANEOUS EVERY 24 HOURS
Status: DISCONTINUED | OUTPATIENT
Start: 2024-01-01 | End: 2024-01-03

## 2024-01-01 RX ORDER — LEVETIRACETAM 10 MG/ML
1000 INJECTION INTRAVASCULAR EVERY 12 HOURS
Status: DISCONTINUED | OUTPATIENT
Start: 2024-01-01 | End: 2024-01-02

## 2024-01-01 RX ORDER — TAMSULOSIN HYDROCHLORIDE 0.4 MG/1
0.4 CAPSULE ORAL DAILY
Status: DISCONTINUED | OUTPATIENT
Start: 2024-01-01 | End: 2024-01-01

## 2024-01-01 RX ORDER — PROPOFOL 10 MG/ML
5-20 INJECTION, EMULSION INTRAVENOUS CONTINUOUS
Status: DISCONTINUED | OUTPATIENT
Start: 2024-01-01 | End: 2024-01-01 | Stop reason: HOSPADM

## 2024-01-01 RX ORDER — SODIUM CHLORIDE 9 MG/ML
75 INJECTION, SOLUTION INTRAVENOUS CONTINUOUS
Status: DISCONTINUED | OUTPATIENT
Start: 2024-01-01 | End: 2024-01-03

## 2024-01-01 RX ORDER — SENNOSIDES 8.6 MG/1
2 TABLET ORAL 2 TIMES DAILY
Status: DISCONTINUED | OUTPATIENT
Start: 2024-01-01 | End: 2024-01-31 | Stop reason: HOSPADM

## 2024-01-01 RX ORDER — MANNITOL 20 G/100ML
INJECTION, SOLUTION INTRAVENOUS
Status: COMPLETED
Start: 2024-01-01 | End: 2024-01-01

## 2024-01-01 RX ADMIN — LEVETIRACETAM 1000 MG: 10 INJECTION INTRAVENOUS at 14:23

## 2024-01-01 RX ADMIN — SODIUM CHLORIDE 125 ML/HR: 900 INJECTION, SOLUTION INTRAVENOUS at 15:15

## 2024-01-01 RX ADMIN — PROPOFOL 5 MCG/KG/MIN: 10 INJECTION, EMULSION INTRAVENOUS at 13:00

## 2024-01-01 RX ADMIN — Medication: at 13:00

## 2024-01-01 RX ADMIN — IOHEXOL 80 ML: 350 INJECTION, SOLUTION INTRAVENOUS at 18:09

## 2024-01-01 RX ADMIN — PANTOPRAZOLE SODIUM 40 MG: 40 INJECTION, POWDER, FOR SOLUTION INTRAVENOUS at 19:30

## 2024-01-01 RX ADMIN — CEFEPIME 2 G: 2 INJECTION, POWDER, FOR SOLUTION INTRAVENOUS at 15:10

## 2024-01-01 RX ADMIN — SODIUM CHLORIDE 75 ML/HR: 9 INJECTION, SOLUTION INTRAVENOUS at 20:00

## 2024-01-01 RX ADMIN — MANNITOL 100 G: 20 INJECTION, SOLUTION INTRAVENOUS at 17:45

## 2024-01-01 RX ADMIN — ROCURONIUM BROMIDE 100 MG: 10 INJECTION, SOLUTION INTRAVENOUS at 13:18

## 2024-01-01 RX ADMIN — ETOMIDATE 9.54 MG: 2 INJECTION, SOLUTION INTRAVENOUS at 13:17

## 2024-01-01 RX ADMIN — IOHEXOL 75 ML: 350 INJECTION, SOLUTION INTRAVENOUS at 13:57

## 2024-01-01 RX ADMIN — PROPOFOL 20 MCG/KG/MIN: 10 INJECTION, EMULSION INTRAVENOUS at 17:15

## 2024-01-01 RX ADMIN — VANCOMYCIN HYDROCHLORIDE 1500 MG: 5 INJECTION, POWDER, LYOPHILIZED, FOR SOLUTION INTRAVENOUS at 20:31

## 2024-01-01 RX ADMIN — PROPOFOL 20 MCG/KG/MIN: 10 INJECTION, EMULSION INTRAVENOUS at 15:54

## 2024-01-01 RX ADMIN — MEROPENEM 2 G: 1 INJECTION, POWDER, FOR SOLUTION INTRAVENOUS at 20:30

## 2024-01-01 RX ADMIN — Medication 0.05 MCG/KG/MIN: at 17:15

## 2024-01-01 RX ADMIN — SODIUM CHLORIDE 1000 ML: 9 INJECTION, SOLUTION INTRAVENOUS at 13:15

## 2024-01-01 SDOH — SOCIAL STABILITY: SOCIAL INSECURITY: WERE YOU ABLE TO COMPLETE ALL THE BEHAVIORAL HEALTH SCREENINGS?: NO

## 2024-01-01 SDOH — SOCIAL STABILITY: SOCIAL NETWORK: HOW OFTEN DO YOU ATTEND CHURCH OR RELIGIOUS SERVICES?: PATIENT UNABLE TO ANSWER

## 2024-01-01 SDOH — SOCIAL STABILITY: SOCIAL NETWORK: IN A TYPICAL WEEK, HOW MANY TIMES DO YOU TALK ON THE PHONE WITH FAMILY, FRIENDS, OR NEIGHBORS?: PATIENT UNABLE TO ANSWER

## 2024-01-01 SDOH — SOCIAL STABILITY: SOCIAL NETWORK: HOW OFTEN DO YOU GET TOGETHER WITH FRIENDS OR RELATIVES?: PATIENT UNABLE TO ANSWER

## 2024-01-01 SDOH — ECONOMIC STABILITY: TRANSPORTATION INSECURITY
IN THE PAST 12 MONTHS, HAS THE LACK OF TRANSPORTATION KEPT YOU FROM MEDICAL APPOINTMENTS OR FROM GETTING MEDICATIONS?: PATIENT UNABLE TO ANSWER

## 2024-01-01 SDOH — SOCIAL STABILITY: SOCIAL INSECURITY
WITHIN THE LAST YEAR, HAVE TO BEEN RAPED OR FORCED TO HAVE ANY KIND OF SEXUAL ACTIVITY BY YOUR PARTNER OR EX-PARTNER?: PATIENT UNABLE TO ANSWER

## 2024-01-01 SDOH — SOCIAL STABILITY: SOCIAL NETWORK
DO YOU BELONG TO ANY CLUBS OR ORGANIZATIONS SUCH AS CHURCH GROUPS UNIONS, FRATERNAL OR ATHLETIC GROUPS, OR SCHOOL GROUPS?: PATIENT UNABLE TO ANSWER

## 2024-01-01 SDOH — ECONOMIC STABILITY: INCOME INSECURITY
HOW HARD IS IT FOR YOU TO PAY FOR THE VERY BASICS LIKE FOOD, HOUSING, MEDICAL CARE, AND HEATING?: PATIENT UNABLE TO ANSWER

## 2024-01-01 SDOH — HEALTH STABILITY: MENTAL HEALTH
STRESS IS WHEN SOMEONE FEELS TENSE, NERVOUS, ANXIOUS, OR CAN'T SLEEP AT NIGHT BECAUSE THEIR MIND IS TROUBLED. HOW STRESSED ARE YOU?: PATIENT UNABLE TO ANSWER

## 2024-01-01 SDOH — SOCIAL STABILITY: SOCIAL NETWORK: ARE YOU MARRIED, WIDOWED, DIVORCED, SEPARATED, NEVER MARRIED, OR LIVING WITH A PARTNER?: PATIENT UNABLE TO ANSWER

## 2024-01-01 SDOH — HEALTH STABILITY: PHYSICAL HEALTH
ON AVERAGE, HOW MANY DAYS PER WEEK DO YOU ENGAGE IN MODERATE TO STRENUOUS EXERCISE (LIKE A BRISK WALK)?: PATIENT UNABLE TO ANSWER

## 2024-01-01 SDOH — HEALTH STABILITY: MENTAL HEALTH: HOW MANY STANDARD DRINKS CONTAINING ALCOHOL DO YOU HAVE ON A TYPICAL DAY?: PATIENT UNABLE TO ANSWER

## 2024-01-01 SDOH — ECONOMIC STABILITY: TRANSPORTATION INSECURITY
IN THE PAST 12 MONTHS, HAS LACK OF TRANSPORTATION KEPT YOU FROM MEETINGS, WORK, OR FROM GETTING THINGS NEEDED FOR DAILY LIVING?: PATIENT UNABLE TO ANSWER

## 2024-01-01 SDOH — HEALTH STABILITY: MENTAL HEALTH: HOW OFTEN DO YOU HAVE A DRINK CONTAINING ALCOHOL?: PATIENT UNABLE TO ANSWER

## 2024-01-01 SDOH — SOCIAL STABILITY: SOCIAL NETWORK: HOW OFTEN DO YOU ATTENT MEETINGS OF THE CLUB OR ORGANIZATION YOU BELONG TO?: PATIENT UNABLE TO ANSWER

## 2024-01-01 SDOH — SOCIAL STABILITY: SOCIAL INSECURITY
WITHIN THE LAST YEAR, HAVE YOU BEEN HUMILIATED OR EMOTIONALLY ABUSED IN OTHER WAYS BY YOUR PARTNER OR EX-PARTNER?: PATIENT UNABLE TO ANSWER

## 2024-01-01 SDOH — SOCIAL STABILITY: SOCIAL INSECURITY
WITHIN THE LAST YEAR, HAVE YOU BEEN KICKED, HIT, SLAPPED, OR OTHERWISE PHYSICALLY HURT BY YOUR PARTNER OR EX-PARTNER?: PATIENT UNABLE TO ANSWER

## 2024-01-01 SDOH — ECONOMIC STABILITY: INCOME INSECURITY
IN THE PAST 12 MONTHS, HAS THE ELECTRIC, GAS, OIL, OR WATER COMPANY THREATENED TO SHUT OFF SERVICE IN YOUR HOME?: PATIENT UNABLE TO ANSWER

## 2024-01-01 SDOH — HEALTH STABILITY: MENTAL HEALTH: HOW OFTEN DO YOU HAVE 6 OR MORE DRINKS ON ONE OCCASION?: PATIENT UNABLE TO ANSWER

## 2024-01-01 SDOH — SOCIAL STABILITY: SOCIAL INSECURITY: WITHIN THE LAST YEAR, HAVE YOU BEEN AFRAID OF YOUR PARTNER OR EX-PARTNER?: PATIENT UNABLE TO ANSWER

## 2024-01-01 SDOH — HEALTH STABILITY: PHYSICAL HEALTH: ON AVERAGE, HOW MANY MINUTES DO YOU ENGAGE IN EXERCISE AT THIS LEVEL?: PATIENT UNABLE TO ANSWER

## 2024-01-01 SDOH — ECONOMIC STABILITY: HOUSING INSECURITY
IN THE LAST 12 MONTHS, WAS THERE A TIME WHEN YOU DID NOT HAVE A STEADY PLACE TO SLEEP OR SLEPT IN A SHELTER (INCLUDING NOW)?: PATIENT UNABLE TO ANSWER

## 2024-01-01 SDOH — ECONOMIC STABILITY: INCOME INSECURITY
IN THE LAST 12 MONTHS, WAS THERE A TIME WHEN YOU WERE NOT ABLE TO PAY THE MORTGAGE OR RENT ON TIME?: PATIENT UNABLE TO ANSWER

## 2024-01-01 ASSESSMENT — ACTIVITIES OF DAILY LIVING (ADL)
DRESSING YOURSELF: DEPENDENT
HEARING - RIGHT EAR: UNABLE TO ASSESS
TOILETING: DEPENDENT
PATIENT'S MEMORY ADEQUATE TO SAFELY COMPLETE DAILY ACTIVITIES?: NO
GROOMING: DEPENDENT
LACK_OF_TRANSPORTATION: PATIENT UNABLE TO ANSWER
ADEQUATE_TO_COMPLETE_ADL: UNABLE TO ASSESS
FEEDING YOURSELF: DEPENDENT
BATHING: DEPENDENT
WALKS IN HOME: DEPENDENT
HEARING - LEFT EAR: UNABLE TO ASSESS
JUDGMENT_ADEQUATE_SAFELY_COMPLETE_DAILY_ACTIVITIES: NO

## 2024-01-01 ASSESSMENT — COGNITIVE AND FUNCTIONAL STATUS - GENERAL
STANDING UP FROM CHAIR USING ARMS: TOTAL
PATIENT BASELINE BEDBOUND: NO
DRESSING REGULAR UPPER BODY CLOTHING: TOTAL
MOBILITY SCORE: 6
DRESSING REGULAR LOWER BODY CLOTHING: TOTAL
EATING MEALS: TOTAL
WALKING IN HOSPITAL ROOM: TOTAL
HELP NEEDED FOR BATHING: TOTAL
TOILETING: TOTAL
DAILY ACTIVITIY SCORE: 6
MOVING TO AND FROM BED TO CHAIR: TOTAL
PERSONAL GROOMING: TOTAL
TURNING FROM BACK TO SIDE WHILE IN FLAT BAD: TOTAL
CLIMB 3 TO 5 STEPS WITH RAILING: TOTAL
MOVING FROM LYING ON BACK TO SITTING ON SIDE OF FLAT BED WITH BEDRAILS: TOTAL

## 2024-01-01 ASSESSMENT — LIFESTYLE VARIABLES
AUDIT-C TOTAL SCORE: -1
SKIP TO QUESTIONS 9-10: 0
AUDIT-C TOTAL SCORE: -1
SKIP TO QUESTIONS 9-10: 0
HOW MANY STANDARD DRINKS CONTAINING ALCOHOL DO YOU HAVE ON A TYPICAL DAY: PATIENT UNABLE TO ANSWER
HOW OFTEN DO YOU HAVE A DRINK CONTAINING ALCOHOL: PATIENT UNABLE TO ANSWER
AUDIT-C TOTAL SCORE: -1
HOW OFTEN DO YOU HAVE 6 OR MORE DRINKS ON ONE OCCASION: PATIENT UNABLE TO ANSWER

## 2024-01-01 ASSESSMENT — PAIN - FUNCTIONAL ASSESSMENT
PAIN_FUNCTIONAL_ASSESSMENT: 0-10
PAIN_FUNCTIONAL_ASSESSMENT: CPOT (CRITICAL CARE PAIN OBSERVATION TOOL)

## 2024-01-01 ASSESSMENT — COLUMBIA-SUICIDE SEVERITY RATING SCALE - C-SSRS
6. HAVE YOU EVER DONE ANYTHING, STARTED TO DO ANYTHING, OR PREPARED TO DO ANYTHING TO END YOUR LIFE?: NO
2. HAVE YOU ACTUALLY HAD ANY THOUGHTS OF KILLING YOURSELF?: NO
1. IN THE PAST MONTH, HAVE YOU WISHED YOU WERE DEAD OR WISHED YOU COULD GO TO SLEEP AND NOT WAKE UP?: NO

## 2024-01-01 ASSESSMENT — PAIN SCALES - GENERAL: PAINLEVEL_OUTOF10: 0 - NO PAIN

## 2024-01-01 NOTE — PROGRESS NOTES
Patient arrived via EMS- GCS 3. Pt intubated w BLBS and capnography and placed on Ventilator. Brought to CT scan [facility].

## 2024-01-01 NOTE — CONSULTS
Assessment/Plan     Inpatient consult to [facility])  Consult performed by: Natalie Ibrahim MD  Consult ordered by: Kerri Lynch PA-C  Reason for consult: Full trauma team alert-fall  Assessment/Recommendations: 73-year-old male on Eliquis with a fall yesterday and having seizures today.  He was intubated on arrival to the ER, now GCS 3.  Will plan for jurado scan including CT of the head C-spine chest abdomen and pelvis to evaluate for acute pathology.  Keppra to be given for seizures.  Currently in critical condition. Further recs pending CT scans        Subjective     Alert paged: [id number]  Called back: 1306  Arrived in ER: 1328    73-year-old male presents as a full trauma team alert after a fall.  He is on Eliquis.  He fell yesterday and was found to have seizure-like activity today and was brought to the ER.  In the ER, his GCS was 6 and he was intubated.  Upon my arrival, he was already intubated and getting ready for CT.  Unable to provide any additional history        Review of Systems  Review of Systems   Unable to perform ROS: Intubated       Objective     Vital signs for last 24 hours:  Heart Rate:  [128] 128  Resp:  [15] 15  BP: (158)/(98) 158/98  FiO2 (%):  [100 %] 100 %    Intake/Output this shift:  No intake/output data recorded.    Physical Exam  Physical Exam  Vitals and nursing note reviewed.   Constitutional:       Appearance: He is obese.      Comments: GCS3   HENT:      Head: Normocephalic and atraumatic.      Mouth/Throat:      Mouth: Mucous membranes are dry.      Pharynx: Oropharynx is clear.   Cardiovascular:      Rate and Rhythm: Tachycardia present.      Pulses: Normal pulses.   Pulmonary:      Comments: Intubated  Abdominal:      General: There is no distension.      Palpations: Abdomen is soft.   Genitourinary:     Comments: Moisture related skin changes to the scrotum and inner thighs, condyloma  Musculoskeletal:      Cervical back: Neck supple.       Comments: Abrasion to the right forearm, ecchymoses to bilateral knees   Skin:     General: Skin is warm and dry.         Labs  Results for orders placed or performed during the hospital encounter of 01/01/24 (from the past 24 hour(s))   CBC and Auto Differential   Result Value Ref Range    WBC 16.8 (H) 4.4 - 11.3 x10*3/uL    nRBC 0.0 0.0 - 0.0 /100 WBCs    RBC 5.74 4.50 - 5.90 x10*6/uL    Hemoglobin 17.9 (H) 13.5 - 17.5 g/dL    Hematocrit 54.8 (H) 41.0 - 52.0 %    MCV 96 80 - 100 fL    MCH 31.2 26.0 - 34.0 pg    MCHC 32.7 32.0 - 36.0 g/dL    RDW 14.0 11.5 - 14.5 %    Platelets 146 (L) 150 - 450 x10*3/uL    Neutrophils % 82.1 40.0 - 80.0 %    Immature Granulocytes %, Automated 0.5 0.0 - 0.9 %    Lymphocytes % 8.5 13.0 - 44.0 %    Monocytes % 8.6 2.0 - 10.0 %    Eosinophils % 0.1 0.0 - 6.0 %    Basophils % 0.2 0.0 - 2.0 %    Neutrophils Absolute 13.84 (H) 1.60 - 5.50 x10*3/uL    Immature Granulocytes Absolute, Automated 0.09 0.00 - 0.50 x10*3/uL    Lymphocytes Absolute 1.43 0.80 - 3.00 x10*3/uL    Monocytes Absolute 1.44 (H) 0.05 - 0.80 x10*3/uL    Eosinophils Absolute 0.01 0.00 - 0.40 x10*3/uL    Basophils Absolute 0.03 0.00 - 0.10 x10*3/uL     Imaging pending

## 2024-01-01 NOTE — PROGRESS NOTES
"Vancomycin Dosing by Pharmacy- INITIAL    Gopal Ness is a 73 y.o. year old male who Pharmacy has been consulted for vancomycin dosing for pneumonia. Based on the patient's indication and renal status this patient will be dosed based on a goal trough/random level of 15-20.     Renal function is currently declining.    There were no vitals taken for this visit.     Lab Results   Component Value Date    CREATININE 1.32 (H) 01/01/2024    CREATININE 0.90 01/01/2024    CREATININE 0.81 07/18/2023    CREATININE 0.82 01/24/2023    CREATININE 0.92 09/07/2022    CREATININE 0.88 01/14/2022        Patient weight is 95.4 kg    No intake/output data recorded.  [unfilled]    No results found for: \"PATIENTTEMP\"       Assessment/Plan     Patient will not be given a loading dose.  Will initiate vancomycin maintenance, a one time dose of 1500 mg.  Follow-up level will be ordered on 1/2 PM, 24H after dose is given, unless clinically indicated sooner.  Will continue to monitor renal function daily while on vancomycin and order serum creatinine at least every 48 hours if not already ordered.  Follow for continued vancomycin needs, clinical response, and signs/symptoms of toxicity.       Golden Syed, PharmD       "

## 2024-01-01 NOTE — H&P
History Of Present Illness  Gopal Ness is a 73 y.o. male with a h/o pituitary adenoma s/p resection at [facility] (2016), DVT/PE on Eliquis, COPD (noncompliant with medications)/tobacco use, depresssion, DMII, HLD presenting after being found down i/s/o fall on 12/31 (LKN 1100 on 12/31). Patient called his family to let them know he fell, family went to his house to help him up, and patient had reported some facial pain but refused to go to ER for evaluation. This morning, 1/1, family was unable to reach him, so they went to his house and found him lying on the couch around emesis. They called EMS, who found him only responsive to painful stimuli, with snoring respirations, placed on non-rebreather with SpO2 91%, tachycardia, and BG 60. Pt was brought to OSH, negative CTH, intubated for airway protection. Neurology and trauma surgery at OSH recommended starting Keppra and transferring pt for cvEEG monitoring as there was some concern for seizure. EMS reported witnessing possible seizure activity(?). At OSH, full trauma activation occurred d/t fall on apixaban, CT CAP showed R sided infiltrates and there was concern for aspiration pneumonia, with leukocytosis to 16.8 and persistent tachycardia in 100s, so patient started on vanc and cefepime. Patient then transferred to [facility] for cvEEG monitoring and further care.     Upon arrival to [facility], patient found to have fixed and dilated R pupil not previously documented at OSH. Momentarily started on mannitol, however CTH obtained stat which ruled out acute problems, including herniation. CTA H&N obtained to r/o dissection as patient recently had a fall with new onset, unexplained unilateral mydriasis. CTA H&N showed no LVO, dissection, or aneurysm, with mild atherosclerosis of cervical vessels. 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     EPILEPSY/SEIZURE  HISTORY:  Progression     Age and circumstances of first event:  74yo, after fall      History of generalized seizures? no     History of status epilepticus? no    Semiology  Description of Spells     Prodrome (if any): none     Description: no clear description     Post ictal: intubated and sedated      Frequency: once     Last episode:  at OSH     Age of onset: 74yo    Workup/Monitoring     Last MRI?: 2023 - MRI sella w/wo - residual enhancing tissue in the cavernous sinuses and suprasellar cistern, likely representing residual adenoma, with associated mass effect on the optic apparatus. New small dural based enhancing masses in the R posterior fossa     Last EEG?: none    Etiology     Presumptive Lesion: unclear    Comorbidities  Epilepsy Risk Factors     History of  hypoxia - No     History of birth complications - No     History of abnormal development or intellectual disability - No     History of CNS infection (meningitis/encephalitis) - No      History of febrile seizures - No     History of moderate/severe head trauma - No     History of stroke/ICH - No     Family history of seizures - No     History of drug / alcohol use - No     History of neurosurgical procedure - yes    Past Medical History  No past medical history on file.  Surgical History  Past Surgical History:   Procedure Laterality Date    TONSILLECTOMY  2014    Tonsillectomy    TOTAL HIP ARTHROPLASTY  2014    Hip Replacement     Social History  Social History     Tobacco Use    Smoking status: Every Day     Types: Cigarettes    Smokeless tobacco: Never   Substance Use Topics    Alcohol use: Never    Drug use: Never     Allergies  Codeine and Penicillins  Medications Prior to Admission   Medication Sig Dispense Refill Last Dose    Eliquis 5 mg tablet Take 1 tablet (5 mg) by mouth once daily.       glimepiride (AmaryL) 4 mg tablet Take 1 tablet (4 mg) by mouth 2 times a day. 180 tablet 0     levothyroxine (Synthroid,  Levoxyl) 200 mcg tablet Take 1 tablet (200 mcg) by mouth once daily. 90 tablet 0     metFORMIN (Glucophage) 1,000 mg tablet Take 1 tablet (1,000 mg) by mouth 2 times a day. 180 tablet 0     sertraline (Zoloft) 50 mg tablet Take 1 tablet (50 mg) by mouth once daily. 90 tablet 0     tamsulosin (Flomax) 0.4 mg 24 hr capsule Take 1 capsule (0.4 mg) by mouth once daily. 90 capsule 0     traZODone (Desyrel) 50 mg tablet Take 1 tablet (50 mg) by mouth once daily at bedtime. 90 tablet 0        Review of Systems   Unable to perform ROS: Acuity of condition     PHYSICAL EXAM:   CARDIOVASCULAR: Tachycardic rate and regular rhythm   ABDOMINAL: Soft abdomen, distended. No grimacing to palpation    Neurological Exam:  MENTAL STATUS:  General appearance: intubated and sedated with propofol. No acute distress.  Not following any commands. Not opening eyes to voice.     CRANIAL NERVES:  no BTT bilaterally  L pupil 3mm->2mm; R pupil 6mm and non-reactive  No obvious facial asymmetry, although limited with ETT  Cough and gag intact    MOTOR: Tone and bulk normal in all extremities  No fasciculations, tremor or other abnormal movements were present.   WD to nox stim in all 4 extremities     REFLEXES:  R  L  Biceps   2  2  Brachioradialis  2  2  Patellar   2  2  Achilles 1  1  Plantar  Down Down      Last Recorded Vitals  Blood pressure (!) 121/95, pulse 106, resp. rate 23, SpO2 99 %.    Relevant Results                                      I have personally reviewed the following imaging results CT head wo IV contrast    Result Date: 1/1/2024  Interpreted By:  Esau Avalos, STUDY: CT HEAD WO IV CONTRAST;  1/1/2024 6:06 pm   INDICATION: Signs/Symptoms:blown pupils.   COMPARISON: 01/01/2024 head CT   ACCESSION NUMBER(S): [id number]   ORDERING CLINICIAN: HEVER KYLE   TECHNIQUE: Noncontrast axial CT scan of head was performed. Angled reformats in brain and bone windows were generated. The images were reviewed in bone, brain, blood and  soft tissue windows.   FINDINGS: CSF Spaces: The sulci and ventricles are mildly prominent similar to the prior exam. There is no extraaxial fluid collection.   Parenchyma: Resection of pituitary adenoma has been performed with thin band of residual tissue up to 4 mm in thickness noted along the right and left sides of the sella turcica. The floor and anterior wall of the sella as well as the planum sphenoidale have been resected with ethmoidectomy and sphenoidotomy as well.     The white matter has a few vague hypodensities similar to the prior exam. No hemorrhage, mass or large acute infarct is noted.   Calvarium: The calvarium is otherwise unremarkable.       No acute infarct, hemorrhage or mass is noted.   The parenchymal hypodensities may be secondary to chronic microvascular ischemic changes among others.   Prior trans-sphenoidal pituitary tumor resection.   MACRO: None   Signed by: Esau Avalos 1/1/2024 6:20 PM Dictation workstation:   [id number]    XR chest 1 view    Result Date: 1/1/2024  Interpreted By:  Hermilo Mckeon, STUDY: XR CHEST 1 VIEW; 1/1/2024 3:02 pm   INDICATION: Post intubation.   COMPARISON: None.   ACCESSION NUMBER(S): [id number]   ORDERING CLINICIAN: RULA PEREZ   TECHNIQUE: AP view of the chest.   FINDINGS: Lines and tubes:ETT in adequate position. Cardiac silhouette: Normal. Mediastinum: Unremarkable. Pulmonary vascularity: Normal. Lungs: Multi segmental hazy-confluence opacification right lower lobe and scattered infiltrates in left upper lobe.. Pleura: No effusion. Bony structures: Unremarkable.       Right lower lobe consolidation, appreciated to better advantage on CT, compatible with pneumonia. There are also scattered infiltrates in the left upper lobe.   MACRO: None   Signed by: Hermilo Mckeon 1/1/2024 3:08 PM Dictation workstation:   [id number]    CT chest abdomen pelvis w IV contrast    Result Date: 1/1/2024  Interpreted By:  Hermilo Mckeon, STUDY: CT CHEST ABDOMEN PELVIS W IV  CONTRAST; 1/1/2024 2:09 pm   INDICATION: Signs/Symptoms:FULL TRAUMA. 72 y/o   M with  fall, on Eliquis.   COMPARISON: None.   ACCESSION NUMBER(S): [id number]   ORDERING CLINICIAN: RODNEY CEVALLOS   TECHNIQUE: Axial CT images through the chest, abdomen, and pelvis with intravenous contrast. Coronal and sagittal reconstruction images generated and submitted for review.   FINDINGS: Chest: Lung and Large Airways: Endotracheal tube in place. Frothy filling defect throughout the right lower lobe bronchi. Consolidation of multiple segments in right lower lobe and patchy confluence opacities and tree-in-bud opacities in dependent portions of right upper lobe. Pleura: within normal limits. Vessels: within normal limits. Heart: Normal size. No pericardial effusion. Mediastinum and Sveta: within normal limits. Chest Wall and Lower Neck: within normal limits.   Abdomen: Liver: Cirrhotic morphology. Bile Ducts: Normal caliber. Gallbladder: No calcified gallstones. Normal caliber wall. Pancreas: within normal limits. Spleen: within normal limits. Adrenals: within normal limits. Kidneys: Small calculi in both kidneys not sure 4.7 cm cyst on the right at 4.1 cm cyst on the left   Pelvis: Reproductive Organs: Obscured by streak artifact arising from bilateral hip replacement Bladder: within normal limits.   Bowel: Normal caliber. Mesenteric Lymph Nodes: No adenopathy. Peritoneum: No ascites or free air, no fluid collection. Vessels: Atherosclerosis abdominal aorta and major branches.. Retroperitoneum: within normal limits. Abdominal Wall: within normal limits. Bones: within normal limits.       Multi segmental consolidation right lower lobe and infiltrates in right upper lobe. Findings most compatible with pneumonia. Lung contusion/hemorrhage may have a similar appearance. Clinical correlation for hemoptysis requested.   Liver cirrhosis. Bilateral renal calculi.     Signed by: Hermilo Mckeon 1/1/2024 2:21 PM Dictation workstation:    [id number]    CT cervical spine wo IV contrast    Result Date: 1/1/2024  Interpreted By:  Junior Portillo, STUDY: CT CERVICAL SPINE WO IV CONTRAST; 1/1/2024 1:57 pm   INDICATION: Signs/Symptoms:fall injury   COMPARISON: None.   ACCESSION NUMBER(S): [id number]   ORDERING CLINICIAN: RODNEY CEVALLOS   TECHNIQUE: Axial unenhanced images were obtained through the cervical spine. Sagittal and coronal post processing reconstruction images were obtained.   All CT examinations are performed with one or more of the following dose reduction techniques: Automated Exposure Control, adjustment of mA and/or kV according to patient size, or use of iterative reconstruction techniques.   FINDINGS: Endotracheal and orogastric tubes are in position. No fracture is seen. The vertebral body heights are maintained. The vertebral alignment is anatomic; this is confirmed on the sagittal reconstruction images. Degenerative changes involve the articulation between the odontoid process and anterior arch of the C1 vertebra. There is narrowing of the C4-C5 and mostly the C5-C6, C6-C7 and C7-T1 discs with marginal osteophytes. There are also hypertrophic changes of facet joints bilaterally with resultant bilateral foraminal stenosis. Images from the thoracic inlet demonstrate atelectatic changes/infiltrates in the right upper lobe posteriorly.       Cervical spondylosis without acute fracture or facet subluxation.   Signed by: Junior Portillo 1/1/2024 2:05 PM Dictation workstation:   [id number]    CT head W O contrast trauma protocol    Result Date: 1/1/2024  Interpreted By:  Junior Portillo, STUDY: CT HEAD W/O CONTRAST TRAUMA PROTOCOL; 1/1/2024 1:57 pm   INDICATION: Signs/Symptoms:fall, head injury   COMPARISON: October 2016 and July 2023   ACCESSION NUMBER(S): [id number]   ORDERING CLINICIAN: RODNEY CEVALLOS   TECHNIQUE: Axial images were obtained through the brain. No IV contrast was administered.   All CT examinations are performed with  one or more of the following dose reduction techniques: Automated Exposure Control, adjustment of mA and/or kV according to patient size, or use of iterative reconstruction techniques.   FINDINGS: The ventricles are mildly prominent but midline in position, consistent with generalized atrophy. Chronic ischemic changes also again noted.. There is no  intracranial hemorrhage. There is interval trans-sphenoidal resection of the previously described large pituitary mass centrally and on the left side. No new masses are identified. The osseous structures are unremarkable.       Age related and postsurgical changes as above without acute intracranial process.   Signed by: Junior Portillo 1/1/2024 2:02 PM Dictation workstation:   [id number]       Assessment/Plan   Principal Problem:    AMS (altered mental status)    73M h/o pituitary adenoma s/p resection at [facility], DVT/PE on Eliquis, COPD (noncompliant with medications)/tobacco use, depresssion, DMII, HLD presenting after being found down i/s/o fall on 12/31 (LKN 1100 on 12/31). Patient's children checked on him after his fall on 12/31 and he was reportedly okay, however was found down and unresponsive the following morning with emesis around him. Brought to OSH, negative CTH, intubated for airway protection. EMS reported witnessing possible seizure activity(?). Also with persistent tachycardia, leukocytosis and infiltrate on chest CT, so treating presumed aspiration pneumonia with antibiotics. Started on Keppra 1g BID and transferred to [facility] for cvEEG and further monitoring.    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 New small dural based enhancing  masses in the R posterior fossa  - Q1h neuro checks  - cvEEG  - Continue Keppra 1000mg BID  - Maintain propofol until negative EEG, then can transition to precedex  - Hold home trazadone 50mg at bedtime  - Continue home sertraline 50mg daily     CV:   #Tachycardia, likely I/s/o infection  - Cardiac Rhythm: sinus tachycardia  - Tele monitoring    PULM:  #acute hypoxic respiratory failure  #COPD  :: CT chest (1/1) shows multi segmental consolidation right lower lobe and infiltrates in right upper lobe   - maintain ventilator overnight  - infectious plan below  - Continuous pulse ox   - Maintain O2>92%    RENAL:    #LEEANNE   - BUN/Creatinine on admission: 26/1.32 (from 23/0.90 earlier in day at OSH)  - Lomeli in   - Continue daily RFP  - Monitor UOP  - UA and urine electrolytes ordered  - Hold home tamsulosin 0.4mg daily while NPO    FEN/GI:  - No acute issues   - Last BM: PTA  - Bowel regimen: Colace/senna    ENDO:   #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  #hypothyroidism s/p adenoma resection ([facility])  #diabetes mellitus  - Baseline endocrine labs pending  - Glucose: 81  - A1C pending  - Mild corrective SSI   - Hypoglycemia protocol  - Continue home levothyroxine 200mcg daily  - Holding home glimepiride 4mg BID, metformin 1000mg BID    HEME:    #hx of DVT/PE  - H/H: 16.9/51  - PLT: 148  - Daily CBC    - Hold home Eliquis at this time pending stabilization    ID:   #Aspiration Pneumonia  #Leukocytosis   :: CT chest (1/1) shows multi segmental consolidation right lower lobe and infiltrates in right upper lobe   :: s/p 1 dose cefepime at OSH  - WBC: 18.5  - Vanc and Meropenem (1/1- )  - Sputum culture ordered  - Continue Daily CBC  - Pan culture for Temp>38.4C    MSK/SKIN:  ::moisture related skin changes to scrotum and inner thighs  ::abrasion to R forearm  ::ecchymoses to bl knees  - will consult wound care     F: 75ml/hr  E: Replete PRN  N: NPO  A: PIV  Lomeli: Yes  GI ppx: PPI  DVT ppx: Lovenox  Oxygen:  Ventilator  Abx: Vanc and Meropenem (1/1- )  Last BM: PTA  Bowel: Doc-Senna/Miralax  Dispo: pending further work-up  Pain: propofol  Med Drips: levophed, propofol    DNAR, confirmed on admission with daughter   NOK/Surrogate: Donya Emerson (Daughter) [phone]     Cheryl Rubalcava MD  Neurology PGY2

## 2024-01-01 NOTE — ED PROVIDER NOTES
EMERGENCY DEPARTMENT ENCOUNTER      Pt Name: Gopal Ness  MRN: [id number]  Birthdate 1950  Date of evaluation: 1/1/2024  ED Provider: Tiana Rollins DO     CHIEF COMPLAINT       Chief Complaint   Patient presents with    Altered Mental Status       HISTORY OF PRESENT ILLNESS    Gopal Ness is a 73 y.o. who presents to the emergency department via EMS unresponsive.  He was found laying on the sofa with emesis.  There was question of seizure-like activity.  EMS noted that he was breathing spontaneously and transported here for further evaluation.  Per the report there was a known fall yesterday.  The patient is on Eliquis.    After initial stabilization family presented at the bedside.  The patient was by himself and was last seen at 11 AM yesterday by the family.  The patient had called them saying that he had fallen around 9 AM.  They went to his house and assisted him up.  He complained of some facial pain after the fall but refused to go to the emergency department for evaluation.  They left the house around 11 and did not talk to him yesterday evening.  When they were unable to reach him today they went to the house where they found him laying on the sofa.  They note that it did not appear that he had eaten dinner or taken his evening medications.  They immediately called EMS when he did not awake.  He has a history remotely of a pituitary tumor and was recently diagnosed with 2 small peripheral tumors and they are waiting to be seen by neurology.  No history of seizure disorder.  No evidence of other apparent trauma.    Nursing Notes were reviewed.    Outside historians: EMS  REVIEW OF SYSTEMS     Focused ROS performed and negative other than as listed in HPI    PAST MEDICAL HISTORY   No past medical history on file.    SURGICAL HISTORY       Past Surgical History:   Procedure Laterality Date    TONSILLECTOMY  08/01/2014    Tonsillectomy    TOTAL HIP ARTHROPLASTY  08/01/2014    Hip Replacement        CURRENT MEDICATIONS       Discharge Medication List as of 1/1/2024  4:24 PM        CONTINUE these medications which have NOT CHANGED    Details   Eliquis 5 mg tablet Take 1 tablet (5 mg) by mouth once daily., Historical Med      glimepiride (AmaryL) 4 mg tablet Take 1 tablet (4 mg) by mouth 2 times a day., Starting Fri 12/15/2023, Until Wed 6/12/2024, Normal      levothyroxine (Synthroid, Levoxyl) 200 mcg tablet Take 1 tablet (200 mcg) by mouth once daily., Starting Fri 12/15/2023, Until Thu 3/14/2024, Normal      metFORMIN (Glucophage) 1,000 mg tablet Take 1 tablet (1,000 mg) by mouth 2 times a day., Starting Fri 12/15/2023, Until Thu 3/14/2024, Normal      sertraline (Zoloft) 50 mg tablet Take 1 tablet (50 mg) by mouth once daily., Starting Sat 7/15/2023, Normal      tamsulosin (Flomax) 0.4 mg 24 hr capsule Take 1 capsule (0.4 mg) by mouth once daily., Starting Sat 7/15/2023, Normal      traZODone (Desyrel) 50 mg tablet Take 1 tablet (50 mg) by mouth once daily at bedtime., Starting Mon 4/3/2023, Normal             ALLERGIES     Codeine and Penicillins    FAMILY HISTORY     No family history on file.     SOCIAL HISTORY       Social History     Socioeconomic History    Marital status:      Spouse name: Not on file    Number of children: Not on file    Years of education: Not on file    Highest education level: Not on file   Occupational History    Not on file   Tobacco Use    Smoking status: Every Day     Types: Cigarettes    Smokeless tobacco: Never   Substance and Sexual Activity    Alcohol use: Never    Drug use: Never    Sexual activity: Not on file   Other Topics Concern    Not on file   Social History Narrative    Not on file     Social Determinants of Health     Financial Resource Strain: Not on file   Food Insecurity: Not on file   Transportation Needs: Not on file   Physical Activity: Not on file   Stress: Not on file   Social Connections: Not on file   Intimate Partner Violence: Not on file    Housing Stability: Not on file       PHYSICAL EXAM       ED Triage Vitals   Temp Heart Rate Resp BP   -- 01/01/24 1321 01/01/24 1300 01/01/24 1310    (!) 128 15 (!) 158/98      SpO2 Temp src Heart Rate Source Patient Position   01/01/24 1300 -- -- --   100 %         BP Location FiO2 (%)     -- 01/01/24 1300      100 %        General: Appears obtunded, tachypneic with shallow respirations, emesis in airway  Head: Head atraumatic; normocephalic  Eyes: normal inspection; no icterus; pupils 5 mm and sluggish bilaterally  ENT: mucosa moist without lesion  Neck: Normal inspection, no meningeal signs  Resp: Rhonchorous on the right, shallow respirations  Chest Wall: no tenderness or deformity  Heart: Heart rate tachycardic and rhythm regular; No Murmurs  Abdomen: Soft, Non-tender; No distention, guarding, rigidity, or rebound  MSK: Normal appearance; Moves all extremities;   Neuro: Obtunded, GCS 6 (1 1 4),  nonpurposeful movement in all 4 extremities, withdraws to vigorous sternal rub but not to noxious stimuli on individual extremities, upward Babinski bilaterally  Skin: Color appropriate; warm; Dry    DIAGNOSTIC RESULTS   Lab and radiology results are independently interpreted unless noted below.  RADIOLOGY (Per Emergency Physician):     Interpretation per the Radiologist below, if available at the time of this note:  XR chest 1 view   Final Result   Right lower lobe consolidation, appreciated to better advantage on   CT, compatible with pneumonia. There are also scattered infiltrates   in the left upper lobe.        MACRO:   None        Signed by: Hermilo Mckeon 1/1/2024 3:08 PM   Dictation workstation:   [id number]      CT chest abdomen pelvis w IV contrast   Final Result   Multi segmental consolidation right lower lobe and infiltrates in   right upper lobe. Findings most compatible with pneumonia. Lung   contusion/hemorrhage may have a similar appearance. Clinical   correlation for hemoptysis requested.         Liver cirrhosis.   Bilateral renal calculi.             Signed by: Hermilo Mckeon 1/1/2024 2:21 PM   Dictation workstation:   [id number]      CT head W O contrast trauma protocol   Final Result   Age related and postsurgical changes as above without acute   intracranial process.        Signed by: Junior Portillo 1/1/2024 2:02 PM   Dictation workstation:   [id number]      CT cervical spine wo IV contrast   Final Result   Cervical spondylosis without acute fracture or facet subluxation.        Signed by: Junior Portillo 1/1/2024 2:05 PM   Dictation workstation:   [id number]          LABS:  Abnormal Labs Reviewed   CBC WITH AUTO DIFFERENTIAL - Abnormal; Notable for the following components:       Result Value    WBC 16.8 (*)     Hemoglobin 17.9 (*)     Hematocrit 54.8 (*)     Platelets 146 (*)     Neutrophils Absolute 13.84 (*)     Monocytes Absolute 1.44 (*)     All other components within normal limits   COMPREHENSIVE METABOLIC PANEL - Abnormal; Notable for the following components:     (*)     ALT 60 (*)     All other components within normal limits       All other labs were within normal range or not returned as of this dictation.    EKG:  Personally interpreted by Tiana Rollins, DO  1405: Sinus tachycardia with a ventricular rate 1/30/2018 normal axis and intervals no acute ischemic changes    EMERGENCY DEPARTMENT COURSE/MDM:   Patient presents emergency departments obtunded with report of a fall yesterday on Eliquis.  Given this unresponsive nature and known fall on anticoagulation full trauma activation is called.  Preparations were made at the bedside.  Upon arrival the patient was given a GCS of 6 with neuroexam as above.  Concern for significantly airway protection but as well as his respirations and therefore the patient was emergently intubated with etomidate and rocuronium.  He was successfully intubated on the first attempt however to became dislodged when the stylette was pulled, therefore  patient was bagged again and reintubated.  There were no oxygen desaturations during this.  Patient was taken emergently over to CT scan.  Workup is initiated and patient is initiated on propofol for sedation.      ED Course as of 01/01/24 1650 Mon Jan 01, 2024   1433 Head CT is personally reviewed at the time it was performed and shows no evidence of intracranial bleed.  This is confirmed on formal radiology review.  Case discussed with neurology Dr. Burden, who advised putting the patient on Keppra which had already been recommended per trauma surgery and transfer as the patient needs an emergent and continuous EEG with concern for seizures.  Calls placed to the transfer center and case discussed with trauma surgery Dr. Avina who noted that as the patient has no external evidence of trauma and has negative trauma imaging the patient did not need to be transferred to the trauma service but did agree with plan for transfer for likely neurology evaluation and EEG.  Case will be discussed with the [facility] attending.  Family including children, niece, sister present at the bedside and updated.  Chest abdomen pelvis did indicate right-sided infiltrates consistent with pneumonia.  This is consistent with clinical picture and concern for aspiration.  He does have a white count.  Therefore he will be initiated on antibiotics. [EF]   1445 Case discussed with Dr Zabala, [facility] attending, who accepted the patient in transfer.  Transfer patient will be transferred by critical critical care ground.  Family updated and agreeable [EF]   154 Critical care team presents for transfer.  Bedside report is given.  Patient has been starting to hernandez the ventilator, sedation is increased.  There is also noted to be secretions in the end-tidal and inline suction was utilized.  Patient is transferred in critical condition. [EF]      ED Course User Index  [EF] Tiana Rollins DO         Diagnoses as of 01/01/24 1650   Acute encephalopathy    Acute respiratory failure, unspecified whether with hypoxia or hypercapnia (CMS/[facility])   Fall, initial encounter   Long term (current) use of anticoagulants   Aspiration pneumonia of right lung due to vomit, unspecified part of lung (CMS/[facility])   Sepsis, due to unspecified organism, unspecified whether acute organ dysfunction present (CMS/[facility])     Meds Administered:  Medications   oxygen (O2) therapy ( inhalation Start 1/1/24 1300)   propofol (Diprivan) infusion (20 mcg/kg/min × 95.4 kg intravenous New Bag 1/1/24 1554)   oxygen (O2) therapy (has no administration in time range)   sodium chloride 0.9% infusion (125 mL/hr intravenous New Bag 1/1/24 1515)   sodium chloride 0.9 % bolus 1,000 mL (0 mL intravenous Stopped 1/1/24 1415)   rocuronium (ZeMuron) injection 100 mg (100 mg intravenous Given 1/1/24 1318)   levETIRAcetam in NaCl (iso-os) (Keppra) IV 1,000 mg (0 mg intravenous Stopped 1/1/24 1438)   iohexol (OMNIPaque) 350 mg iodine/mL solution 75 mL (75 mL intravenous Given 1/1/24 1357)   cefepime (Maxipime) 2 g in dextrose 5 % 100 mL IV (2 g intravenous New Bag 1/1/24 1510)   etomidate (Amidate) injection (9.54 mg intravenous New Bag 1/1/24 1317)     PROCEDURES:  Unless otherwise noted below, none  Intubation    Performed by: Tiana Rollins DO  Authorized by: Tiana Rollins DO    Consent:     Consent obtained:  Emergent situation  Pre-procedure details:     Indications: airway protection, altered consciousness and respiratory failure      Patient status:  Unresponsive    Look externally: facial hair and large tongue      Obstruction: none      Neck mobility: reduced      C-collar present: yes      Pharmacologic strategy: RSI      Induction agents:  Etomidate    Paralytics:  Rocuronium  Procedure details:     Preoxygenation:  Bag valve mask    CPR in progress: no      Number of attempts:  2  Successful intubation attempt details:     Intubation method:  Oral    Intubation technique: video assisted      Laryngoscope  blade:  Hypercurved    Bougie used: no      Grade view: I      Tube size (mm):  8.0    Tube type:  Cuffed    Tube visualized through cords: yes    First unsuccessful intubation attempt details:     Intubation method:  Oral    Intubation technique:  Video assisted    Laryngoscope blade:  Hypercurved    Grade view: I      Tube size (mm):  8.0    Tube type:  Cuffed    Ventilation between 1st and 2nd attempt: yes with mask      Tube visualized through cords: yes    Placement assessment:     ETT at teeth/gumline (cm):  23    Tube secured with:  ETT gomez    Breath sounds:  Equal and absent over the epigastrium    Placement verification: chest rise, direct visualization, endoscopic and equal breath sounds    Post-procedure details:     Procedure completion:  Tolerated  Comments:      Successful intubation on the first attempt, however tube dislodged after stylet removed. No oxygen desaturations between attempts.         FINAL IMPRESSION      1. Acute encephalopathy    2. Acute respiratory failure, unspecified whether with hypoxia or hypercapnia (CMS/HCC)    3. Fall, initial encounter    4. Long term (current) use of anticoagulants    5. Aspiration pneumonia of right lung due to vomit, unspecified part of lung (CMS/HCC)    6. Sepsis, due to unspecified organism, unspecified whether acute organ dysfunction present (CMS/[facility])          DISPOSITION    Transfer To Another Facility 01/01/2024 02:52:38 PM    Critical Care Time   TOTAL CRITICAL CARE TIME (EXCLUDING SEPARATE BILLABLE PROCEDURES): 67 min  CC time assessed for complex medical decision making, coordination of care between providers and specialists, discussion with family (if patient unable to contribute), documentation of findings, and interpretation of labwork and imaging.      (Comment: Please note this report has been produced using speech recognition software and may contain errors related to that system including errors in grammar, punctuation, and spelling, as  well as words and phrases that may be inappropriate.  If there are any questions or concerns please feel free to contact the dictating provider for clarification.)    Tiana Rollins DO (electronically signed)  Emergency Medicine Physician    Medical Decision Making             Tiana Rollins DO  01/01/24 8846

## 2024-01-02 ENCOUNTER — APPOINTMENT (OUTPATIENT)
Dept: RADIOLOGY | Facility: HOSPITAL | Age: 74
DRG: 208 | End: 2024-01-02
Payer: MEDICARE

## 2024-01-02 ENCOUNTER — APPOINTMENT (OUTPATIENT)
Dept: CARDIOLOGY | Facility: HOSPITAL | Age: 74
DRG: 208 | End: 2024-01-02
Payer: MEDICARE

## 2024-01-02 LAB
ALBUMIN SERPL BCP-MCNC: 3 G/DL (ref 3.4–5)
ALP SERPL-CCNC: 52 U/L (ref 33–136)
ALT SERPL W P-5'-P-CCNC: 40 U/L (ref 10–52)
ANION GAP BLDA CALCULATED.4IONS-SCNC: 13 MMO/L (ref 10–25)
ANION GAP BLDA CALCULATED.4IONS-SCNC: 13 MMO/L (ref 10–25)
ANION GAP SERPL CALC-SCNC: 15 MMOL/L (ref 10–20)
APTT PPP: 30 SECONDS (ref 27–38)
AST SERPL W P-5'-P-CCNC: 76 U/L (ref 9–39)
ATRIAL RATE: 103 BPM
BASE EXCESS BLDA CALC-SCNC: -3.8 MMOL/L (ref -2–3)
BASE EXCESS BLDA CALC-SCNC: -5.8 MMOL/L (ref -2–3)
BILIRUB SERPL-MCNC: 0.9 MG/DL (ref 0–1.2)
BODY TEMPERATURE: ABNORMAL
BODY TEMPERATURE: ABNORMAL
BUN SERPL-MCNC: 32 MG/DL (ref 6–23)
CA-I BLDA-SCNC: 1.07 MMOL/L (ref 1.1–1.33)
CA-I BLDA-SCNC: 1.07 MMOL/L (ref 1.1–1.33)
CALCIUM SERPL-MCNC: 7.9 MG/DL (ref 8.6–10.6)
CHLORIDE BLDA-SCNC: 103 MMOL/L (ref 98–107)
CHLORIDE BLDA-SCNC: 107 MMOL/L (ref 98–107)
CHLORIDE SERPL-SCNC: 104 MMOL/L (ref 98–107)
CO2 SERPL-SCNC: 22 MMOL/L (ref 21–32)
CORTIS AM PEAK SERPL-MSCNC: 67.6 UG/DL (ref 5–20)
CREAT SERPL-MCNC: 1.87 MG/DL (ref 0.5–1.3)
ERYTHROCYTE [DISTWIDTH] IN BLOOD BY AUTOMATED COUNT: 14.4 % (ref 11.5–14.5)
GFR SERPL CREATININE-BSD FRML MDRD: 38 ML/MIN/1.73M*2
GLUCOSE BLD MANUAL STRIP-MCNC: 157 MG/DL (ref 74–99)
GLUCOSE BLD MANUAL STRIP-MCNC: 164 MG/DL (ref 74–99)
GLUCOSE BLD MANUAL STRIP-MCNC: 165 MG/DL (ref 74–99)
GLUCOSE BLDA-MCNC: 147 MG/DL (ref 74–99)
GLUCOSE BLDA-MCNC: 188 MG/DL (ref 74–99)
GLUCOSE SERPL-MCNC: 144 MG/DL (ref 74–99)
HCO3 BLDA-SCNC: 20.8 MMOL/L (ref 22–26)
HCO3 BLDA-SCNC: 21.5 MMOL/L (ref 22–26)
HCT VFR BLD AUTO: 46.4 % (ref 41–52)
HCT VFR BLD EST: 46 % (ref 41–52)
HCT VFR BLD EST: 47 % (ref 41–52)
HGB BLD-MCNC: 15.2 G/DL (ref 13.5–17.5)
HGB BLDA-MCNC: 15.2 G/DL (ref 13.5–17.5)
HGB BLDA-MCNC: 15.6 G/DL (ref 13.5–17.5)
INHALED O2 CONCENTRATION: 60 %
INHALED O2 CONCENTRATION: 96 %
INR PPP: 1.1 (ref 0.9–1.1)
LACTATE BLDA-SCNC: 1 MMOL/L (ref 0.4–2)
LACTATE BLDA-SCNC: 1.2 MMOL/L (ref 0.4–2)
MAGNESIUM SERPL-MCNC: 2.04 MG/DL (ref 1.6–2.4)
MCH RBC QN AUTO: 31.1 PG (ref 26–34)
MCHC RBC AUTO-ENTMCNC: 32.8 G/DL (ref 32–36)
MCV RBC AUTO: 95 FL (ref 80–100)
NRBC BLD-RTO: 0 /100 WBCS (ref 0–0)
OXYHGB MFR BLDA: 96.5 % (ref 94–98)
OXYHGB MFR BLDA: 98.1 % (ref 94–98)
P AXIS: 9 DEGREES
P OFFSET: 192 MS
P ONSET: 104 MS
PCO2 BLDA: 36 MM HG (ref 38–42)
PCO2 BLDA: 48 MM HG (ref 38–42)
PH BLDA: 7.26 PH (ref 7.38–7.42)
PH BLDA: 7.37 PH (ref 7.38–7.42)
PHOSPHATE SERPL-MCNC: 3.6 MG/DL (ref 2.5–4.9)
PLATELET # BLD AUTO: 130 X10*3/UL (ref 150–450)
PO2 BLDA: 144 MM HG (ref 85–95)
PO2 BLDA: 88 MM HG (ref 85–95)
POTASSIUM BLDA-SCNC: 5 MMOL/L (ref 3.5–5.3)
POTASSIUM BLDA-SCNC: 5.5 MMOL/L (ref 3.5–5.3)
POTASSIUM SERPL-SCNC: 4.8 MMOL/L (ref 3.5–5.3)
PR INTERVAL: 236 MS
PROT SERPL-MCNC: 5.8 G/DL (ref 6.4–8.2)
PROTHROMBIN TIME: 12.5 SECONDS (ref 9.8–12.8)
Q ONSET: 222 MS
QRS COUNT: 17 BEATS
QRS DURATION: 72 MS
QT INTERVAL: 344 MS
QTC CALCULATION(BAZETT): 450 MS
QTC FREDERICIA: 412 MS
R AXIS: 27 DEGREES
RBC # BLD AUTO: 4.88 X10*6/UL (ref 4.5–5.9)
SAO2 % BLDA: 97 % (ref 94–100)
SAO2 % BLDA: 99 % (ref 94–100)
SODIUM BLDA-SCNC: 132 MMOL/L (ref 136–145)
SODIUM BLDA-SCNC: 136 MMOL/L (ref 136–145)
SODIUM SERPL-SCNC: 136 MMOL/L (ref 136–145)
T AXIS: 118 DEGREES
T OFFSET: 394 MS
VANCOMYCIN TROUGH SERPL-MCNC: 10.5 UG/ML (ref 5–20)
VENTRICULAR RATE: 103 BPM
WBC # BLD AUTO: 18.1 X10*3/UL (ref 4.4–11.3)

## 2024-01-02 PROCEDURE — 85027 COMPLETE CBC AUTOMATED: CPT

## 2024-01-02 PROCEDURE — 71045 X-RAY EXAM CHEST 1 VIEW: CPT

## 2024-01-02 PROCEDURE — 2500000004 HC RX 250 GENERAL PHARMACY W/ HCPCS (ALT 636 FOR OP/ED): Performed by: STUDENT IN AN ORGANIZED HEALTH CARE EDUCATION/TRAINING PROGRAM

## 2024-01-02 PROCEDURE — 83735 ASSAY OF MAGNESIUM: CPT

## 2024-01-02 PROCEDURE — 94640 AIRWAY INHALATION TREATMENT: CPT

## 2024-01-02 PROCEDURE — 84132 ASSAY OF SERUM POTASSIUM: CPT

## 2024-01-02 PROCEDURE — 5A1945Z RESPIRATORY VENTILATION, 24-96 CONSECUTIVE HOURS: ICD-10-PCS

## 2024-01-02 PROCEDURE — 2500000005 HC RX 250 GENERAL PHARMACY W/O HCPCS

## 2024-01-02 PROCEDURE — 95716 VEEG EA 12-26HR CONT MNTR: CPT

## 2024-01-02 PROCEDURE — 84132 ASSAY OF SERUM POTASSIUM: CPT | Performed by: REGISTERED NURSE

## 2024-01-02 PROCEDURE — 95700 EEG CONT REC W/VID EEG TECH: CPT

## 2024-01-02 PROCEDURE — 99223 1ST HOSP IP/OBS HIGH 75: CPT

## 2024-01-02 PROCEDURE — 2500000002 HC RX 250 W HCPCS SELF ADMINISTERED DRUGS (ALT 637 FOR MEDICARE OP, ALT 636 FOR OP/ED): Mod: MUE

## 2024-01-02 PROCEDURE — 93005 ELECTROCARDIOGRAM TRACING: CPT

## 2024-01-02 PROCEDURE — 96372 THER/PROPH/DIAG INJ SC/IM: CPT

## 2024-01-02 PROCEDURE — 80202 ASSAY OF VANCOMYCIN: CPT

## 2024-01-02 PROCEDURE — 2500000004 HC RX 250 GENERAL PHARMACY W/ HCPCS (ALT 636 FOR OP/ED)

## 2024-01-02 PROCEDURE — 85610 PROTHROMBIN TIME: CPT

## 2024-01-02 PROCEDURE — 94003 VENT MGMT INPAT SUBQ DAY: CPT

## 2024-01-02 PROCEDURE — 84100 ASSAY OF PHOSPHORUS: CPT

## 2024-01-02 PROCEDURE — 87205 SMEAR GRAM STAIN: CPT

## 2024-01-02 PROCEDURE — 2500000005 HC RX 250 GENERAL PHARMACY W/O HCPCS: Performed by: STUDENT IN AN ORGANIZED HEALTH CARE EDUCATION/TRAINING PROGRAM

## 2024-01-02 PROCEDURE — 71045 X-RAY EXAM CHEST 1 VIEW: CPT | Performed by: RADIOLOGY

## 2024-01-02 PROCEDURE — 2020000001 HC ICU ROOM DAILY

## 2024-01-02 PROCEDURE — C9113 INJ PANTOPRAZOLE SODIUM, VIA: HCPCS

## 2024-01-02 PROCEDURE — 99233 SBSQ HOSP IP/OBS HIGH 50: CPT

## 2024-01-02 PROCEDURE — 37799 UNLISTED PX VASCULAR SURGERY: CPT

## 2024-01-02 PROCEDURE — 95720 EEG PHY/QHP EA INCR W/VEEG: CPT | Performed by: STUDENT IN AN ORGANIZED HEALTH CARE EDUCATION/TRAINING PROGRAM

## 2024-01-02 PROCEDURE — 82947 ASSAY GLUCOSE BLOOD QUANT: CPT

## 2024-01-02 PROCEDURE — 82533 TOTAL CORTISOL: CPT

## 2024-01-02 PROCEDURE — 3E033XZ INTRODUCTION OF VASOPRESSOR INTO PERIPHERAL VEIN, PERCUTANEOUS APPROACH: ICD-10-PCS | Performed by: STUDENT IN AN ORGANIZED HEALTH CARE EDUCATION/TRAINING PROGRAM

## 2024-01-02 PROCEDURE — 82947 ASSAY GLUCOSE BLOOD QUANT: CPT | Mod: MUE

## 2024-01-02 PROCEDURE — 99291 CRITICAL CARE FIRST HOUR: CPT | Performed by: PSYCHIATRY & NEUROLOGY

## 2024-01-02 PROCEDURE — 2500000002 HC RX 250 W HCPCS SELF ADMINISTERED DRUGS (ALT 637 FOR MEDICARE OP, ALT 636 FOR OP/ED)

## 2024-01-02 RX ORDER — FUROSEMIDE 10 MG/ML
40 INJECTION INTRAMUSCULAR; INTRAVENOUS ONCE
Status: COMPLETED | OUTPATIENT
Start: 2024-01-02 | End: 2024-01-02

## 2024-01-02 RX ORDER — CEFTRIAXONE 2 G/50ML
2 INJECTION, SOLUTION INTRAVENOUS DAILY
Status: DISCONTINUED | OUTPATIENT
Start: 2024-01-02 | End: 2024-01-02

## 2024-01-02 RX ORDER — FUROSEMIDE 10 MG/ML
INJECTION INTRAMUSCULAR; INTRAVENOUS
Status: COMPLETED
Start: 2024-01-02 | End: 2024-01-02

## 2024-01-02 RX ORDER — IPRATROPIUM BROMIDE AND ALBUTEROL SULFATE 2.5; .5 MG/3ML; MG/3ML
3 SOLUTION RESPIRATORY (INHALATION)
Status: DISCONTINUED | OUTPATIENT
Start: 2024-01-02 | End: 2024-01-05

## 2024-01-02 RX ORDER — CEFTRIAXONE 2 G/50ML
2 INJECTION, SOLUTION INTRAVENOUS DAILY
Status: COMPLETED | OUTPATIENT
Start: 2024-01-02 | End: 2024-01-08

## 2024-01-02 RX ORDER — NOREPINEPHRINE BITARTRATE/D5W 8 MG/250ML
.01-1 PLASTIC BAG, INJECTION (ML) INTRAVENOUS CONTINUOUS
Status: DISCONTINUED | OUTPATIENT
Start: 2024-01-02 | End: 2024-01-03

## 2024-01-02 RX ORDER — VANCOMYCIN HYDROCHLORIDE 1 G/20ML
INJECTION, POWDER, LYOPHILIZED, FOR SOLUTION INTRAVENOUS DAILY PRN
Status: DISCONTINUED | OUTPATIENT
Start: 2024-01-02 | End: 2024-01-04

## 2024-01-02 RX ADMIN — VANCOMYCIN HYDROCHLORIDE 2000 MG: 5 INJECTION, POWDER, LYOPHILIZED, FOR SOLUTION INTRAVENOUS at 21:19

## 2024-01-02 RX ADMIN — FUROSEMIDE 40 MG: 10 INJECTION, SOLUTION INTRAMUSCULAR; INTRAVENOUS at 14:31

## 2024-01-02 RX ADMIN — SODIUM CHLORIDE 500 ML: 9 INJECTION, SOLUTION INTRAVENOUS at 00:43

## 2024-01-02 RX ADMIN — Medication 0.03 MCG/KG/MIN: at 12:25

## 2024-01-02 RX ADMIN — MEROPENEM 2 G: 1 INJECTION, POWDER, FOR SOLUTION INTRAVENOUS at 04:37

## 2024-01-02 RX ADMIN — LEVETIRACETAM 1000 MG: 10 INJECTION INTRAVENOUS at 09:28

## 2024-01-02 RX ADMIN — PROPOFOL 20 MCG/KG/MIN: 10 INJECTION, EMULSION INTRAVENOUS at 00:14

## 2024-01-02 RX ADMIN — ENOXAPARIN SODIUM 40 MG: 100 INJECTION SUBCUTANEOUS at 20:27

## 2024-01-02 RX ADMIN — IPRATROPIUM BROMIDE AND ALBUTEROL SULFATE 3 ML: .5; 3 SOLUTION RESPIRATORY (INHALATION) at 15:04

## 2024-01-02 RX ADMIN — INSULIN LISPRO 1 UNITS: 100 INJECTION, SOLUTION INTRAVENOUS; SUBCUTANEOUS at 12:00

## 2024-01-02 RX ADMIN — NOREPINEPHRINE BITARTRATE 0.05 MCG/KG/MIN: 8 INJECTION, SOLUTION INTRAVENOUS at 21:18

## 2024-01-02 RX ADMIN — INSULIN LISPRO 1 UNITS: 100 INJECTION, SOLUTION INTRAVENOUS; SUBCUTANEOUS at 17:00

## 2024-01-02 RX ADMIN — Medication 40 PERCENT: at 10:26

## 2024-01-02 RX ADMIN — PANTOPRAZOLE SODIUM 40 MG: 40 INJECTION, POWDER, FOR SOLUTION INTRAVENOUS at 09:28

## 2024-01-02 RX ADMIN — SODIUM CHLORIDE 1000 ML: 9 INJECTION, SOLUTION INTRAVENOUS at 12:22

## 2024-01-02 RX ADMIN — FUROSEMIDE 40 MG: 10 INJECTION INTRAMUSCULAR; INTRAVENOUS at 14:31

## 2024-01-02 RX ADMIN — CEFTRIAXONE SODIUM 2 G: 2 INJECTION, SOLUTION INTRAVENOUS at 14:31

## 2024-01-02 RX ADMIN — Medication 60 PERCENT: at 15:06

## 2024-01-02 SDOH — ECONOMIC STABILITY: HOUSING INSECURITY
IN THE LAST 12 MONTHS, WAS THERE A TIME WHEN YOU DID NOT HAVE A STEADY PLACE TO SLEEP OR SLEPT IN A SHELTER (INCLUDING NOW)?: NO

## 2024-01-02 SDOH — ECONOMIC STABILITY: INCOME INSECURITY: IN THE LAST 12 MONTHS, WAS THERE A TIME WHEN YOU WERE NOT ABLE TO PAY THE MORTGAGE OR RENT ON TIME?: NO

## 2024-01-02 SDOH — ECONOMIC STABILITY: INCOME INSECURITY: IN THE PAST 12 MONTHS, HAS THE ELECTRIC, GAS, OIL, OR WATER COMPANY THREATENED TO SHUT OFF SERVICE IN YOUR HOME?: NO

## 2024-01-02 SDOH — ECONOMIC STABILITY: FOOD INSECURITY: WITHIN THE PAST 12 MONTHS, THE FOOD YOU BOUGHT JUST DIDN'T LAST AND YOU DIDN'T HAVE MONEY TO GET MORE.: NEVER TRUE

## 2024-01-02 SDOH — ECONOMIC STABILITY: FOOD INSECURITY: WITHIN THE PAST 12 MONTHS, YOU WORRIED THAT YOUR FOOD WOULD RUN OUT BEFORE YOU GOT MONEY TO BUY MORE.: NEVER TRUE

## 2024-01-02 SDOH — ECONOMIC STABILITY: TRANSPORTATION INSECURITY
IN THE PAST 12 MONTHS, HAS LACK OF TRANSPORTATION KEPT YOU FROM MEETINGS, WORK, OR FROM GETTING THINGS NEEDED FOR DAILY LIVING?: NO

## 2024-01-02 SDOH — ECONOMIC STABILITY: TRANSPORTATION INSECURITY
IN THE PAST 12 MONTHS, HAS THE LACK OF TRANSPORTATION KEPT YOU FROM MEDICAL APPOINTMENTS OR FROM GETTING MEDICATIONS?: NO

## 2024-01-02 ASSESSMENT — COGNITIVE AND FUNCTIONAL STATUS - GENERAL
TURNING FROM BACK TO SIDE WHILE IN FLAT BAD: TOTAL
TOILETING: TOTAL
STANDING UP FROM CHAIR USING ARMS: TOTAL
CLIMB 3 TO 5 STEPS WITH RAILING: TOTAL
WALKING IN HOSPITAL ROOM: TOTAL
DRESSING REGULAR UPPER BODY CLOTHING: TOTAL
MOVING FROM LYING ON BACK TO SITTING ON SIDE OF FLAT BED WITH BEDRAILS: TOTAL
MOVING TO AND FROM BED TO CHAIR: TOTAL
EATING MEALS: TOTAL
HELP NEEDED FOR BATHING: TOTAL
DRESSING REGULAR LOWER BODY CLOTHING: TOTAL
MOBILITY SCORE: 6
DAILY ACTIVITIY SCORE: 6
PERSONAL GROOMING: TOTAL

## 2024-01-02 ASSESSMENT — PAIN - FUNCTIONAL ASSESSMENT
PAIN_FUNCTIONAL_ASSESSMENT: CPOT (CRITICAL CARE PAIN OBSERVATION TOOL)
PAIN_FUNCTIONAL_ASSESSMENT: 0-10
PAIN_FUNCTIONAL_ASSESSMENT: CPOT (CRITICAL CARE PAIN OBSERVATION TOOL)

## 2024-01-02 ASSESSMENT — PAIN SCALES - GENERAL: PAINLEVEL_OUTOF10: 0 - NO PAIN

## 2024-01-02 NOTE — PROGRESS NOTES
Communication Note    Patient Name: Gopal Ness  MRN: [id number]  Today's Date: 1/2/2024     Discipline: Occupational Therapy      Missed Visit: Yes  Missed Visit Reason:  (Per RN, pt intubated, sedated, and not following commands, will defer OT at this time and reattempt as appropriate/able.)      01/02/24 at 9:10 AM   Kemi Lopez OT   Rehab Office: [phone]

## 2024-01-02 NOTE — PROGRESS NOTES
72 yo M w h/o pituitary adenoma, DVT/PE on Eliquis, COPD, DM, HLD, found on couch by family, vomitted, unresponsive, after fall earlier in the day.    Hypotension, suspect sepsis, on levophed gtt, will give fluids.  MAP goal > 65.    Encephalopathy, brought here to rule out seizures, EEG negative.  Received keppra at OSH, will DC if EEG negative.    Considering MRI for focal weakness of R arm.    Possible metabolic encephalopathy due to infection.    Acute respiratory failure, intubated, [facility]. CPAP as tolerated.  RLL pneumonia, started on abx.    LEEANNE, monitoring.    ID: Will narrow to CTX for community/aspiration pna.  On Vanc until GPC spec/sens.    Statement of Acuity: I have reviewed and evaluated all relevant data of the last 24 hours, personally examined the patient and formulated the plan of care.  This patient was critically ill and required continued critical care treatment.  Total critical care time: 35min.

## 2024-01-02 NOTE — SIGNIFICANT EVENT
Preliminary EEG read showed severe diffuse encephalopathy.No epileptiform discharges or  seizures. This report is until 2045    Final report will be given tomorrow.Please call with questions.       Cesar Carpio MD   Epilepsy Fellow

## 2024-01-02 NOTE — CARE PLAN
Problem: Diabetes  Goal: Achieve decreasing blood glucose levels by end of shift  Outcome: Progressing  Goal: Increase stability of blood glucose readings by end of shift  Outcome: Progressing  Goal: Decrease in ketones present in urine by end of shift  Outcome: Progressing  Goal: Maintain electrolyte levels within acceptable range throughout shift  Outcome: Progressing  Goal: Maintain glucose levels >70mg/dl to <250mg/dl throughout shift  Outcome: Progressing  Goal: No changes in neurological exam by end of shift  Outcome: Progressing  Goal: Learn about and adhere to nutrition recommendations by end of shift  Outcome: Progressing  Goal: Vital signs within normal range for age by end of shift  Outcome: Progressing  Goal: Increase self care and/or family involovement by end of shift  Outcome: Progressing  Goal: Receive DSME education by end of shift  Outcome: Progressing   The patient's goals for the shift include      The clinical goals for the shift include remain neurologically stable through end of shift    Over the shift, the patient did not make progress toward the following goals. Barriers to progression include ***. Recommendations to address these barriers include ***.

## 2024-01-02 NOTE — PROGRESS NOTES
Subjective   HPI:  Gopal Ness is a 73 y.o. male with a h/o pituitary adenoma s/p resection at [facility] (2016), DVT/PE on Eliquis, COPD (noncompliant with medications)/tobacco use, depresssion, DMII, HLD presenting after being found down unresponsive i/s/o fall on 12/31 (LKN 1100 on 12/31) and concern for seizure. At OSH, CTH negative and patient intubated for airway protection. Admitted to NSU on 1/1/2024 with AMS (altered mental status) for cvEEG monitoring and further care. Upon arrival, patient noted to have fixed and dilated R pupil and started on mannitol.     Significant Events:  1/1: Preliminary EEG read showed severe diffuse encephalopathy. No epileptiform discharges or seizures. CTH negative, CTA H&N negative for LVO, dissection, or aneurysm.  1/2: Tolerating CPAP, plan to wean to extubate    Interval Events: No acute day events. Discontinued keppra - EEG negative. 1L bolus given. Tolerating CPAP. Extubated successfully around 1245.     Objective   Vitals 24 hour ranges:  Heart Rate:  []   Temp:  [35.9 °C (96.6 °F)-36.8 °C (98.2 °F)]   Resp:  [15-27]   BP: ()/()   Height:  [182.9 cm (6')]   Weight:  [95.4 kg (210 lb 5.1 oz)-99.1 kg (218 lb 7.6 oz)]   SpO2:  [90 %-100 %]         Intake/Output for last 24 hours:    Intake/Output Summary (Last 24 hours) at 1/2/2024 1201  Last data filed at 1/2/2024 1000  Gross per 24 hour   Intake 3197.45 ml   Output 1370 ml   Net 1827.45 ml     Physical Exam:  NEURO:  Opens eyes to voice, follows simple commands - sedation held for exam  Right pupil 6 mm and sluggish, left pupil 3 mm and brisk, left gaze preference  Right arm slightly weaker than left  CV:  RRR on telemetry, NSR-ST  Right radial arterial line in place  RESP:  Regular, unlabored  Oxygen: NC  :  Indwelling catheter in place  GI:  Abdomen NT/ND, soft  SKIN:  Intact    Medications  Scheduled:  cefTRIAXone, 2 g, intravenous, Daily  enoxaparin, 40 mg, subcutaneous, q24h  insulin lispro, 0-5 Units,  subcutaneous, TID with meals  levothyroxine, 200 mcg, oral, Daily  pantoprazole, 40 mg, intravenous, Daily  polyethylene glycol, 17 g, oral, Daily  sennosides, 2 tablet, oral, BID  sertraline, 50 mg, oral, Daily  sodium chloride, 1,000 mL, intravenous, Once    PRN:  PRN medications: dextrose 10 % in water (D10W), dextrose, glucagon, oxygen, vancomycin     Continuous:  norepinephrine, 0.01-1 mcg/kg/min  propofol, 5-20 mcg/kg/min, Last Rate: Stopped (01/02/24 1000)  sodium chloride 0.9%, 75 mL/hr, Last Rate: 75 mL/hr (01/02/24 0800)      Lab Results  Results from last 72 hours   Lab Units 01/02/24  0431 01/01/24  1726   GLUCOSE mg/dL 144* 81   SODIUM mmol/L 136 141   POTASSIUM mmol/L 4.8 4.9   CHLORIDE mmol/L 104 106   CO2 mmol/L 22 23   ANION GAP mmol/L 15 17   BUN mg/dL 32* 26*   CREATININE mg/dL 1.87* 1.32*   EGFR mL/min/1.73m*2 38* 57*   CALCIUM mg/dL 7.9* 8.3*   PHOSPHORUS mg/dL 3.6 5.4*   ALBUMIN g/dL 3.0* 3.6   MAGNESIUM mg/dL 2.04 2.32      Results from last 72 hours   Lab Units 01/02/24  0431 01/01/24  1726   WBC AUTO x10*3/uL 18.1* 18.5*   NRBC AUTO /100 WBCs 0.0 0.0   RBC AUTO x10*6/uL 4.88 5.50   HEMOGLOBIN g/dL 15.2 16.9   HEMATOCRIT % 46.4 51.0   MCV fL 95 93   MCH pg 31.1 30.7   MCHC g/dL 32.8 33.1   RDW % 14.4 13.8   PLATELETS AUTO x10*3/uL 130* 148*      Results from last 72 hours   Lab Units 01/02/24  0431 01/01/24  1726 01/01/24  1320   WBC AUTO x10*3/uL 18.1* 18.5* 16.8*   NRBC AUTO /100 WBCs 0.0 0.0 0.0   RBC AUTO x10*6/uL 4.88 5.50 5.74   HEMOGLOBIN g/dL 15.2 16.9 17.9*   HEMATOCRIT % 46.4 51.0 54.8*   MCV fL 95 93 96   MCH pg 31.1 30.7 31.2   MCHC g/dL 32.8 33.1 32.7   RDW % 14.4 13.8 14.0   PLATELETS AUTO x10*3/uL 130* 148* 146*   NEUTROS PCT AUTO %  --   --  82.1   IG PCT AUTO %  --   --  0.5   LYMPHS PCT AUTO %  --   --  8.5   MONOS PCT AUTO %  --   --  8.6   EOS PCT AUTO %  --   --  0.1   BASOS PCT AUTO %  --   --  0.2   NEUTROS ABS x10*3/uL  --   --  13.84*   IG AUTO x10*3/uL  --   --  0.09    LYMPHS ABS AUTO x10*3/uL  --   --  1.43   MONOS ABS AUTO x10*3/uL  --   --  1.44*   EOS ABS AUTO x10*3/uL  --   --  0.01   BASOS ABS AUTO x10*3/uL  --   --  0.03      Results from last 7 days   Lab Units 01/02/24  0441 01/01/24  2233   POCT PH, ARTERIAL pH 7.37* 7.39   POCT PCO2, ARTERIAL mm Hg 36* 38   POCT PO2, ARTERIAL mm Hg 88 83*   POCT SO2, ARTERIAL % 97 97   POCT OXY HEMOGLOBIN, ARTERIAL % 96.5 96.2   POCT BASE EXCESS, ARTERIAL mmol/L -3.8* -1.6   POCT HCO3 CALCULATED, ARTERIAL mmol/L 20.8* 23.0   POCT HEMATOCRIT CALCULATED, ARTERIAL % 47.0 48.0   POCT SODIUM, ARTERIAL mmol/L 132* 130*   POCT POTASSIUM, ARTERIAL mmol/L 5.0 4.6   POCT CHLORIDE, ARTERIAL mmol/L 103 102   POCT IONIZED CALCIUM, ARTERIAL mmol/L 1.07* 1.06*   POCT GLUCOSE, ARTERIAL mg/dL 147* 156*   POCT LACTATE, ARTERIAL mmol/L 1.2 1.1   POCT HEMOGLOBIN, ARTERIAL g/dL 15.6 16.0   POCT ANION GAP, ARTERIAL mmo/L 13 10        Imaging Results  XR abdomen 1 view   Final Result   1.  Slight interval worsening of multifocal patchy and consolidative   opacities throughout the right lung. Correlate with concern for   multifocal infection   2. Possible small right pleural effusion. No pneumothorax.   3.  Nonobstructive bowel gas pattern.   4. Medical devices as above.        I personally reviewed the images/study with Joaquín Benton MD (Radiology   Resident) and I agree with the findings as stated. This study was   interpreted at [place], [place].        MACRO:   None        Signed by: Carmelita Alas 1/2/2024 6:19 AM   Dictation workstation:   [id number]      XR chest 1 view   Final Result   1.  Slight interval worsening of multifocal patchy and consolidative   opacities throughout the right lung. Correlate with concern for   multifocal infection   2. Possible small right pleural effusion. No pneumothorax.   3.  Nonobstructive bowel gas pattern.   4. Medical devices as above.        I personally reviewed  the images/study with Joaquín Benton MD (Radiology   Resident) and I agree with the findings as stated. This study was   interpreted at [place], [place].        MACRO:   None        Signed by: Waylongage Da Silvamontana Alas 1/2/2024 6:19 AM   Dictation workstation:   [id number]      CT angio head and neck w and wo IV contrast   Final Result   Noncalcified atherosclerosis of the distal petrosal segment of the   left internal carotid artery resulting in mild stenosis. No evidence   for large branch vessel cut off, dissection, or pseudoaneurysm of the   intracranial vessels.        Mild atherosclerosis without significant stenosis of the cervical   vessels.        I personally reviewed the images/study and I agree with the findings   as stated by Gena Watts MD. This study was interpreted at   [place], [place].        MACRO:   None        Signed by: Esau Avalos 1/1/2024 6:47 PM   Dictation workstation:   [id number]      CT head wo IV contrast   Final Result   No acute infarct, hemorrhage or mass is noted.        The parenchymal hypodensities may be secondary to chronic   microvascular ischemic changes among others.        Prior trans-sphenoidal pituitary tumor resection.        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 No epileptiform discharges or seizures.  Plan:  NSU  Q1H neuro checks  Continue EEG - keppra discontinued  MR brain, MR  sella w/wo IV contrast to evaluate for weakness of right arm  Pain: none  Sedation: propofol on hold  Continue home sertraline 50mg daily    Nausea: none  PT/OT    CARDIOVASCULAR:  #tachycardia likely 2/2 to sepsis  Assessment:  1/2: HR   Plan:  Continue to monitor on telemetry  Maintain normotension  Wean levophed to MAP > 65, 1L bolus given for hypotension    RESPIRATORY:  #respiratory insufficiency  #COPD  Assessment:  CT chest (1/1): multi segmental consolidation right lower lobe and infiltrates in right upper lobe   Plan:  Continuous pulse oximetry   Wean oxygen as indicated  [x] Follow-up CXR    RENAL/:  #LEEANNE  Assessment:  Admission BUN/Cr: 26/1.32   Results from last 72 hours   Lab Units 01/02/24  0431 01/01/24  1726   BUN mg/dL 32* 26*   CREATININE mg/dL 1.87* 1.32*       Plan:  Monitor with daily RFP  Maintain mondragon catheter   [x] Follow-up UA and urine electrolytes  Hold home tamsulosin 0.4mg daily while NPO     FEN/GI:  #No acute issues  Assessment:  Last BM: PTA  Plan:  Monitor and replace electrolytes per protocol  IVF: NS @ 75 mL/hour  Diet: NPO  Bowel Regimen: Docusate-Senna, Miralax    ENDOCRINE:  #diabetes mellitus  #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  #hypothyroidism s/p adenoma resection  Assessment:  Results from last 7 days   Lab Units 01/02/24  0803 01/02/24  0431 01/01/24  1726   POCT GLUCOSE mg/dL 157*  --   --    GLUCOSE mg/dL  --  144* 81    Hemoglobin A1C 1/1: 5.6  Plan:  Accuchecks & ISS before meals and HS  Monitor for signs of hypoglycemia  Continue home levothyroxine 200mcg daily  Holding home glimepiride 4mg BID, metformin 1000mg BID    HEMATOLOGY:  #hx of DVT/PE  Assessment:  Results from last 7 days   Lab Units 01/02/24  0431 01/01/24  1726   HEMOGLOBIN g/dL 15.2 16.9   HEMATOCRIT % 46.4 51.0   PLATELETS AUTO x10*3/uL 130* 148*     Plan:  Continue to monitor with daily CBC and Coag panel  Holding home Eliquis during acute period    INFECTIOUS  DISEASE:  #Leukocytosis  #Aspiration PNA  Assessment:  Results from last 7 days   Lab Units 24  0431 24  1726   WBC AUTO x10*3/uL 18.1* 18.5*     Temp (24hrs), Av.4 °C (97.6 °F), Min:35.9 °C (96.6 °F), Max:36.8 °C (98.2 °F)   CT chest (): multi segmental consolidation right lower lobe and infiltrates in right upper lobe  1 dose of cefepime given at OSH  Plan:  Continue to monitor for s/sx of infection  Pan culture for temperature > 38.4 C  Started on ceftriaxone, continue vancomycin    MUSCULOSKELETAL:  No acute issues    SKIN:  No acute issues  Turns and skin care per NSU protocol    ACCESS:  PIVs    PROPHYLAXIS:  DVT/VTE: SCDs, lovenox  GI: PPI    RESTRAINTS:  I agree with nursing assessment of the patient's need for restraints to protect the patient from injury and facilitate healing. The patient is unable to cooperate with the plan of care and at risk for disrupting critical therapy (i.e., removing medical devices, lines, tubes and/or dressings).  Please see order for specifics. Restraints can be removed when the patient is able to cooperate with plan of care and allow healing to occur, or the medical devices at risk are discontinued by the medical team.     Pio Martinez, APRN-CNP  Neuroscience Intensive Care       Total critical care time of 60 minutes, with > 50% of time spent in direct contact with patient/family for education, counseling and coordination of care.

## 2024-01-02 NOTE — PROGRESS NOTES
Patient is discussed during interdisciplinary round today.     Plan per medical team : Pt is not medically ready to discharge currently.  Planned disposition : TBD  Discharge destination : TBD    SW met with pt's daughter at bedside to complete initial assessment for offering support and obtaining information for pt's discharge plan.  Pt is intubated and sedated.   Pt's daughter, Donya reported she is pt's HCPOA, and pt lives alone in his mobile house independently. Pt is not able to drive due to his vision, and Donya has been assisting pt with transportation. Pt utilizes a cane as needed, but independent with his ADLs. Donya stated pt has completed HCPOA paperwork years ago, but she does not know where it is now. Donya requests assist for HCPOA, and SW will assist when pt is alert and oriented. BUD made Donya aware that SW is available for issues or questions on social work. Donya denied any further issues or questions on social work at the moment of assessment. SW will continue to follow and assist with discharge plan.     Nusrat Rehman, SHAUNA, LSW

## 2024-01-02 NOTE — SIGNIFICANT EVENT
Spoke with patient's daughter, Donya (HCPOA), regarding code status. Patient's family was given inaccurate information at OSH regarding the patient's current condition and DNR was placed. After updating the family, Donya reported that she would like the patient to be full code.    Pio Martinez, CHEMA-CNP  Neuroscience Intensive Care Unit   ADMIT

## 2024-01-02 NOTE — HOSPITAL COURSE
Gopal Ness is a 73 y.o. male with a h/o pituitary adenoma s/p resection at [facility] (2016), DVT/PE on Eliquis, COPD (noncompliant with medications)/tobacco use, depresssion, DMII, HLD presenting after being found down unresponsive i/s/o fall on 12/31 (LKN 1100 on 12/31) and concern for seizure. At OSH, CTH negative and patient intubated for airway protection. Admitted to NSU on 1/1/2024 with AMS (altered mental status) for cvEEG monitoring and further care. Upon arrival, patient noted to have fixed and dilated R pupil and started on mannitol. Preliminary EEG read showed severe diffuse encephalopathy. No epileptiform discharges or seizures. CTH negative, CTA H&N negative for LVO, dissection, or aneurysm. He was extubated to 6L NC, episodes of tachypnea (30s)/desaturation (88-90%), 40 mg IV lasix given, CPAP 10/60%. Was started on hydrocortisone in the NSU. Given his history of pituitary adenoma s/p resection endocrinology was consulted on need for steroid replacement therapy. Was found to have active cushing disease and steroids were stopped with plan for outpatient follow up with Dr. Yvonne Lofton in 4 weeks. Dobhoff was placed on 1/4 by ENT due to prior transsphenoidal surgery. Was found to be COVID + on 1/4. Was started on decadron 6mg (1/5 -1/10) and remdesivir. Over his course, had several episodes of SVT, and thus cardiology was consulted and metoprolol 25 mg BID was recommended. RFP also showed gradually worsening hypernatremia, which was gradually corrected. Given his continuous RUE weakness that is worse distally, a MRI of the cervical spine was ordered which showed right-sided foraminal narrowing d/t uncovertebral joint hypertrophy at C4-C5 and C5-C6.  Will set up EMG as an outpatient. Throughout his hospital stay, he has as been persistently Aox1-2. Repeat CT head did not demonstate any acute processes. Extensive conversations were held with the family due to concern that this level of lethargy is his new  baseline given that all other causes of encephalopathy were corrected. Concern that because he was found down, he may have suffered anoxic brain injury, although exact underlying etiology for his encephalopathy has been uncertain. Code status was changed to DNR/DNI on 1/17 and palliative care was consulted. He remains with a dobhoff tube for nutrition. PEG was discussed with family, however, they wish to keep the dobhoff and re-evaluate need for PEG later on, likely while at LTAC in hopes of further neurologic recovery.

## 2024-01-02 NOTE — PROGRESS NOTES
Physical Therapy                 Therapy Communication Note    Patient Name: Gopal Ness  MRN: [id number]  Today's Date: 1/2/2024     Discipline: Physical Therapy    Missed Visit Reason: Missed Visit Reason:  (Per RN, pt presently intubated/sedated and not following commands. Plan to hold PT at this time.)    Missed Time: Attempt

## 2024-01-02 NOTE — CONSULTS
Wound Care Consult     Visit Date: 1/2/2024      Patient Name: Gopal Ness         MRN: [id number]           YOB: 1950     Reason for Consult: R knee and R lateral hip/buttock       Wound History: Patient found down at home prior to admission     Pertinent Labs:   Albumin   Date Value Ref Range Status   01/02/2024 3.0 (L) 3.4 - 5.0 g/dL Final       Wound Assessment:  Wound 01/02/24 Pressure Injury Knee Right;Anterior (Active)   Wound Image   01/02/24 1505   Site Assessment Non-blanchable erythema;Pink 01/02/24 1507   Pressure Injury Stage DTPI 01/02/24 1507   Wound Length (cm) 10 cm 01/02/24 1507   Wound Width (cm) 5 cm 01/02/24 1507   Wound Surface Area (cm^2) 50 cm^2 01/02/24 1507   Drainage Description None 01/02/24 1507   Drainage Amount None 01/02/24 1507   Dressing Foam 01/02/24 1507   Dressing Changed Changed 01/02/24 1507   Dressing Status Clean;Dry 01/02/24 1507       Wound 01/02/24 Pressure Injury Leg Dorsal;Proximal;Right;Upper (Active)   Wound Image   01/02/24 1506   Zaria-Wound Assessment [place] 01/02/24 1507   Pressure Injury Stage DTPI 01/02/24 1507   Wound Length (cm) 5 cm 01/02/24 1507   Wound Width (cm) 5 cm 01/02/24 1507   Wound Surface Area (cm^2) 25 cm^2 01/02/24 1507   Dressing Open to air 01/02/24 1507   Dressing Status Other (Comment) 01/02/24 1507       Wound Team Summary Assessment: Clean areas with normal saline.  Knee left open to air due to no risk of pressure to area at this time.  If knee starts to open up, xeroform and mepilex border dressing may be applied.  On R hip, mepilex border dressing applied.  Change daily and as needed.  If wound starts to evolve/open up, xeroform may be placed on wound, then covered with mepilex.  Turn and reposition at least every 2 hours.  Place an OB air mattress under patient.  Please limit linen layers to one fitted sheet, one draw sheet and 1 disposable chux.  No briefs while in bed.      Provider: If you agree with recommendations  above, please write orders for care.     Wound Team Plan: Wound care to sign off at this time.  Please reconsult wound care if current wounds worsen or new areas of concern occur.     Shirley Ag, MSN, RN, CWCN, COCN   1/2/2024  6:47 PM

## 2024-01-02 NOTE — PROGRESS NOTES
Gopal Ness is a 73 y.o. male on day 1 of admission presenting with AMS (altered mental status).      Subjective   Overnight: The patient was admitted  AM: The patient was intubated and sedated     Objective     Last Recorded Vitals  Blood pressure 117/76, pulse 99, temperature 36.6 °C (97.9 °F), temperature source Temporal, resp. rate 23, weight 99.1 kg (218 lb 7.6 oz), SpO2 93 %.    Physical Exam  Neurological Exam    Pertinent findings:  NEURO:  Opens eyes to nox stim, follows simple commands  Right pupil 6 mm and sluggish, left pupil 3 mm and brisk, exotropia Rt eye.   Follow command to move all limbs    Relevant Results  Results for orders placed or performed during the hospital encounter of 01/01/24 (from the past 24 hour(s))   Renal Function Panel   Result Value Ref Range    Glucose 81 74 - 99 mg/dL    Sodium 141 136 - 145 mmol/L    Potassium 4.9 3.5 - 5.3 mmol/L    Chloride 106 98 - 107 mmol/L    Bicarbonate 23 21 - 32 mmol/L    Anion Gap 17 10 - 20 mmol/L    Urea Nitrogen 26 (H) 6 - 23 mg/dL    Creatinine 1.32 (H) 0.50 - 1.30 mg/dL    eGFR 57 (L) >60 mL/min/1.73m*2    Calcium 8.3 (L) 8.6 - 10.6 mg/dL    Phosphorus 5.4 (H) 2.5 - 4.9 mg/dL    Albumin 3.6 3.4 - 5.0 g/dL   CBC   Result Value Ref Range    WBC 18.5 (H) 4.4 - 11.3 x10*3/uL    nRBC 0.0 0.0 - 0.0 /100 WBCs    RBC 5.50 4.50 - 5.90 x10*6/uL    Hemoglobin 16.9 13.5 - 17.5 g/dL    Hematocrit 51.0 41.0 - 52.0 %    MCV 93 80 - 100 fL    MCH 30.7 26.0 - 34.0 pg    MCHC 33.1 32.0 - 36.0 g/dL    RDW 13.8 11.5 - 14.5 %    Platelets 148 (L) 150 - 450 x10*3/uL   Magnesium   Result Value Ref Range    Magnesium 2.32 1.60 - 2.40 mg/dL   Coagulation Screen   Result Value Ref Range    Protime 12.2 9.8 - 12.8 seconds    INR 1.1 0.9 - 1.1    aPTT 28 27 - 38 seconds   TSH   Result Value Ref Range    Thyroid Stimulating Hormone 0.02 (L) 0.44 - 3.98 mIU/L   T4, free   Result Value Ref Range    Thyroxine, Free 1.03 0.78 - 1.48 ng/dL   Type and screen   Result Value Ref  Range    ABO TYPE A     Rh TYPE POS     ANTIBODY SCREEN NEG    Lactate   Result Value Ref Range    Lactate 1.6 0.4 - 2.0 mmol/L   Osmolality   Result Value Ref Range    Osmolality, Serum 339 (H) 280 - 300 mOsm/kg   Troponin I, High Sensitivity   Result Value Ref Range    Troponin I, High Sensitivity 73 (H) 0 - 53 ng/L   Prolactin   Result Value Ref Range    Prolactin 1.4 (L) 2.0 - 18.0 ug/L   FSH & LH   Result Value Ref Range    Follicle Stimulating Hormone <0.9 IU/L    Luteinizing Hormone <0.1 IU/L   Hemoglobin A1C   Result Value Ref Range    Hemoglobin A1C 5.6 see below %    Estimated Average Glucose 114 Not Established mg/dL   T3, free   Result Value Ref Range    Triiodothyronine, Free 1.8 (L) 2.3 - 4.2 pg/mL   Urinalysis with Reflex Microscopic   Result Value Ref Range    Color, Urine Yellow Straw, Yellow    Appearance, Urine Clear Clear    Specific Gravity, Urine 1.039 (N) 1.005 - 1.035    pH, Urine 6.0 5.0, 5.5, 6.0, 6.5, 7.0, 7.5, 8.0    Protein, Urine NEGATIVE NEGATIVE mg/dL    Glucose, Urine NEGATIVE NEGATIVE mg/dL    Blood, Urine MODERATE (2+) (A) NEGATIVE    Ketones, Urine NEGATIVE NEGATIVE mg/dL    Bilirubin, Urine NEGATIVE NEGATIVE    Urobilinogen, Urine <2.0 <2.0 mg/dL    Nitrite, Urine NEGATIVE NEGATIVE    Leukocyte Esterase, Urine NEGATIVE NEGATIVE   Urine electrolytes   Result Value Ref Range    Sodium, Urine Random <10 mmol/L    Sodium/Creatinine Ratio      Potassium, Urine Random 10 mmol/L    Potassium/Creatinine Ratio 36 Not established mmol/g Creat    Chloride, Urine Random <15 mmol/L    Chloride/Creatinine Ratio      Creatinine, Urine Random 27.5 20.0 - 370.0 mg/dL   Microscopic Only, Urine   Result Value Ref Range    WBC, Urine 1-5 1-5, NONE /HPF    RBC, Urine 6-10 (A) NONE, 1-2, 3-5 /HPF   MRSA Surveillance for Vancomycin De-escalation, PCR    Specimen: Anterior Nares; Swab   Result Value Ref Range    MRSA PCR Not Detected Not Detected   Blood Gas Arterial Full Panel   Result Value Ref Range     POCT pH, Arterial 7.39 7.38 - 7.42 pH    POCT pCO2, Arterial 38 38 - 42 mm Hg    POCT pO2, Arterial 83 (L) 85 - 95 mm Hg    POCT SO2, Arterial 97 94 - 100 %    POCT Oxy Hemoglobin, Arterial 96.2 94.0 - 98.0 %    POCT Hematocrit Calculated, Arterial 48.0 41.0 - 52.0 %    POCT Sodium, Arterial 130 (L) 136 - 145 mmol/L    POCT Potassium, Arterial 4.6 3.5 - 5.3 mmol/L    POCT Chloride, Arterial 102 98 - 107 mmol/L    POCT Ionized Calcium, Arterial 1.06 (L) 1.10 - 1.33 mmol/L    POCT Glucose, Arterial 156 (H) 74 - 99 mg/dL    POCT Lactate, Arterial 1.1 0.4 - 2.0 mmol/L    POCT Base Excess, Arterial -1.6 -2.0 - 3.0 mmol/L    POCT HCO3 Calculated, Arterial 23.0 22.0 - 26.0 mmol/L    POCT Hemoglobin, Arterial 16.0 13.5 - 17.5 g/dL    POCT Anion Gap, Arterial 10 10 - 25 mmo/L    Patient Temperature      FiO2 97 %   Respiratory Culture/Smear    Specimen: SPUTUM; Fluid   Result Value Ref Range    Gram Stain (A)      Gram stain indicates specimen consists of lower respiratory tract secretions.    Gram Stain Predominant organism = Gram positive cocci (A)    Coagulation Screen   Result Value Ref Range    Protime 12.5 9.8 - 12.8 seconds    INR 1.1 0.9 - 1.1    aPTT 30 27 - 38 seconds   CBC   Result Value Ref Range    WBC 18.1 (H) 4.4 - 11.3 x10*3/uL    nRBC 0.0 0.0 - 0.0 /100 WBCs    RBC 4.88 4.50 - 5.90 x10*6/uL    Hemoglobin 15.2 13.5 - 17.5 g/dL    Hematocrit 46.4 41.0 - 52.0 %    MCV 95 80 - 100 fL    MCH 31.1 26.0 - 34.0 pg    MCHC 32.8 32.0 - 36.0 g/dL    RDW 14.4 11.5 - 14.5 %    Platelets 130 (L) 150 - 450 x10*3/uL   Magnesium   Result Value Ref Range    Magnesium 2.04 1.60 - 2.40 mg/dL   Comprehensive metabolic panel   Result Value Ref Range    Glucose 144 (H) 74 - 99 mg/dL    Sodium 136 136 - 145 mmol/L    Potassium 4.8 3.5 - 5.3 mmol/L    Chloride 104 98 - 107 mmol/L    Bicarbonate 22 21 - 32 mmol/L    Anion Gap 15 10 - 20 mmol/L    Urea Nitrogen 32 (H) 6 - 23 mg/dL    Creatinine 1.87 (H) 0.50 - 1.30 mg/dL    eGFR 38  (L) >60 mL/min/1.73m*2    Calcium 7.9 (L) 8.6 - 10.6 mg/dL    Albumin 3.0 (L) 3.4 - 5.0 g/dL    Alkaline Phosphatase 52 33 - 136 U/L    Total Protein 5.8 (L) 6.4 - 8.2 g/dL    AST 76 (H) 9 - 39 U/L    Bilirubin, Total 0.9 0.0 - 1.2 mg/dL    ALT 40 10 - 52 U/L   Cortisol AM   Result Value Ref Range    Cortisol  A.M. 67.6 (H) 5.0 - 20.0 ug/dL   Phosphorus   Result Value Ref Range    Phosphorus 3.6 2.5 - 4.9 mg/dL   Blood Gas Arterial Full Panel   Result Value Ref Range    POCT pH, Arterial 7.37 (L) 7.38 - 7.42 pH    POCT pCO2, Arterial 36 (L) 38 - 42 mm Hg    POCT pO2, Arterial 88 85 - 95 mm Hg    POCT SO2, Arterial 97 94 - 100 %    POCT Oxy Hemoglobin, Arterial 96.5 94.0 - 98.0 %    POCT Hematocrit Calculated, Arterial 47.0 41.0 - 52.0 %    POCT Sodium, Arterial 132 (L) 136 - 145 mmol/L    POCT Potassium, Arterial 5.0 3.5 - 5.3 mmol/L    POCT Chloride, Arterial 103 98 - 107 mmol/L    POCT Ionized Calcium, Arterial 1.07 (L) 1.10 - 1.33 mmol/L    POCT Glucose, Arterial 147 (H) 74 - 99 mg/dL    POCT Lactate, Arterial 1.2 0.4 - 2.0 mmol/L    POCT Base Excess, Arterial -3.8 (L) -2.0 - 3.0 mmol/L    POCT HCO3 Calculated, Arterial 20.8 (L) 22.0 - 26.0 mmol/L    POCT Hemoglobin, Arterial 15.6 13.5 - 17.5 g/dL    POCT Anion Gap, Arterial 13 10 - 25 mmo/L    Patient Temperature      FiO2 96 %   POCT GLUCOSE   Result Value Ref Range    POCT Glucose 157 (H) 74 - 99 mg/dL   Electrocardiogram, 12-lead PRN ACS symptoms   Result Value Ref Range    Ventricular Rate 103 BPM    Atrial Rate 103 BPM    AR Interval 236 ms    QRS Duration 72 ms    QT Interval 344 ms    QTC Calculation(Bazett) 450 ms    P Axis 9 degrees    R Axis 27 degrees    T Axis 118 degrees    QRS Count 17 beats    Q Onset 222 ms    P Onset 104 ms    P Offset 192 ms    T Offset 394 ms    QTC Fredericia 412 ms   POCT GLUCOSE   Result Value Ref Range    POCT Glucose 164 (H) 74 - 99 mg/dL     Preliminary EEG (1/1/24) read showed severe diffuse encephalopathy. No  epileptiform discharges or  seizures.     Scheduled medications  cefTRIAXone, 2 g, intravenous, Daily  enoxaparin, 40 mg, subcutaneous, q24h  insulin lispro, 0-5 Units, subcutaneous, TID with meals  levothyroxine, 200 mcg, oral, Daily  pantoprazole, 40 mg, intravenous, Daily  polyethylene glycol, 17 g, oral, Daily  sennosides, 2 tablet, oral, BID  sertraline, 50 mg, oral, Daily      Continuous medications  norepinephrine, 0.01-1 mcg/kg/min, Last Rate: 0.02 mcg/kg/min (01/02/24 1130)  propofol, 5-20 mcg/kg/min, Last Rate: Stopped (01/02/24 1000)  sodium chloride 0.9%, 75 mL/hr, Last Rate: 75 mL/hr (01/02/24 0800)      PRN medications  PRN medications: dextrose 10 % in water (D10W), dextrose, glucagon, oxygen, vancomycin                [place] Coma Scale  Best Eye Response: To pain  Best Verbal Response: None  Best Motor Response: Follows commands  [place] Coma Scale Score: 9        Assessment/Plan      Principal Problem:    AMS (altered mental status)    73M h/o pituitary adenoma s/p resection at [facility], DVT/PE on Eliquis, COPD (noncompliant with medications)/tobacco use, depresssion, DMII, HLD presenting after being found down i/s/o fall on 12/31 (LKN 1100 on 12/31). Patient's children checked on him after his fall on 12/31 and he was reportedly okay, however was found down and unresponsive the following morning with emesis around him. Brought to OSH, negative CTH, intubated for airway protection. EMS reported witnessing possible seizure activity(?). Also with persistent tachycardia, leukocytosis and infiltrate on chest CT, so treating presumed aspiration pneumonia with antibiotics. Started on Keppra 1g BID and transferred to [facility] for cvEEG and further monitoring.    CTH (1/1/24) showed prior trans-sphenoidal pituitary tumor resection and concern for new meningiomas. Preliminary EEG showed no epileptic episode.     Given history of unclear unwitnessed seizure with laboratory c/f aspiration PNA and EEG showed no  epileptic episodes, we are concerning for metabolic encephalopathy more than epilepsy.    -----  Update 1/2/24  - Please discontinue Keppra  - Agree with MRI brain w/ and w/o contrast  - Will discuss the case with general neurology service  -----             Hiral Low MD

## 2024-01-02 NOTE — CARE PLAN
Problem: Diabetes  Goal: Vital signs within normal range for age by end of shift  1/2/2024 0634 by Ramón Sebastian RN  Outcome: Progressing  1/2/2024 0633 by Ramón Sebastian RN  Outcome: Progressing     Problem: Fall/Injury  Goal: Not fall by end of shift  1/2/2024 0634 by Ramón Sebastian RN  Outcome: Progressing  1/2/2024 0633 by Ramón Sebastian RN  Outcome: Progressing     Problem: Pain - Adult  Goal: Verbalizes/displays adequate comfort level or baseline comfort level  Outcome: Progressing     Problem: Diabetes  Goal: Achieve decreasing blood glucose levels by end of shift  Outcome: Progressing

## 2024-01-02 NOTE — PROCEDURES
Arterial Line Insertion    Date/Time: 1/1/2024 9:25 PM    Performed by: Adi Serrano MD  Authorized by: Gómez Zabala MD    Consent:     Consent obtained:  Written    Consent given by:  Guardian    Risks, benefits, and alternatives were discussed: yes      Risks discussed:  Bleeding, infection, pain and ischemia  Universal protocol:     Patient identity confirmed:  Arm band  Indications:     Indications: hemodynamic monitoring    Pre-procedure details:     Skin preparation:  Chlorhexidine  Procedure details:     Location:  R radial    Placement technique:  Seldinger    Number of attempts:  1    Transducer: waveform confirmed    Post-procedure details:     Procedure completion:  Tolerated  Comments:      The patient was prepped and draped in the usual sterile manner using chlorhexidine scrub. Approx 3cc of 1% lidocaine was infiltrated under the skin for local anesthesia. The right radial artery was palpated and a 20g angiocath was used to cannulate the vessel. Pulsatile, arterial blood was visualized and a wire was passed through the catheter into the lumen of the vessel. The angiocath was then advanced over the wire. A pressure transducer was then put in line that revealed a good wave-form. The patient tolerated the procedure well without any immediate complications. The area was cleaned and a transparent dressing was applied.    Adi Serrano MD  PGY-2 Neurosurgery  9:26 PM

## 2024-01-02 NOTE — CONSULTS
Nutrition Initial Assessment:   Nutrition Assessment    Reason for Assessment: Admission Nursing Screening    Patient is a 73-year-old male initially presenting to OSH via EMS after being found down with surrounding emesis. Patient had witnessed seizure by EMS, CT CAP at OSH with concern for aspiration PNA, started on Vanc + Cefepime. Patient was transferred to [facility] for cvEEG monitoring + further management.    RDN met with patient for MST Score of 2, indicated by unsure weight loss. Patient remains intubated on CPAP with OGT in place. Propofol hanging but not running, Norepinephrine running @ 5.4 mL/hour + IVF @ 75 mL/hour.     Nutrition History:  Energy Intake:  (unknown.)  Food and Nutrient History: unable to assess as patient is intubated.  Vitamin/Herbal Supplement Use: unknown.  Food Allergies/Intolerances:   NKFA per chart.  GI Symptoms:  unknown.  Oral Problems:  intubated.      Anthropometrics:      Weight: 99.1 kg (218 lb 7.6 oz)   BMI (Calculated): 29.62  IBW/kg (Dietitian Calculated): 80.9 kg  Percent of IBW: 118 % (assessed using weight from 1/1/2024 (95.4 kg))     Weight History:   Wt Readings from Last 6 Encounters:   01/02/24 99.1 kg (218 lb 7.6 oz)   01/01/24 95.4 kg (210 lb 5.1 oz)   09/28/23 108 kg (238 lb)   09/27/23 109 kg (240 lb)   07/15/23 105 kg (232 lb)   01/27/23 107 kg (236 lb)     Weight Change %:  Weight History / % Weight Change: 12.6% weight loss x 3 months  Significant Weight Loss: Yes  Interpretation of Weight Loss: >7.5% in 3 months    Nutrition Focused Physical Exam Findings:  defer: RRT assessing patient; visually no losses/wasting noted.   Subcutaneous Fat Loss:   Orbital Fat Pads: Well nourshed (slightly bulging fat pads)  Buccal Fat Pads: Well nourished (full, rounded cheeks)  Triceps: Well nourished (ample fat tissue)  Muscle Wasting:  Temporalis: Well nourished (well-defined muscle)  Pectoralis (Clavicular Region): Well nourished (clavicle not visible)  Edema:  Edema  [facility]: [facility]  Physical Findings:  Hair: Negative  Nails: Negative  Skin: Negative    Nutrition Significant Labs:  CBC Trend:   Results from last 7 days   Lab Units 01/02/24  0431 01/01/24  1726 01/01/24  1320   WBC AUTO x10*3/uL 18.1* 18.5* 16.8*   RBC AUTO x10*6/uL 4.88 5.50 5.74   HEMOGLOBIN g/dL 15.2 16.9 17.9*   HEMATOCRIT % 46.4 51.0 54.8*   MCV fL 95 93 96   PLATELETS AUTO x10*3/uL 130* 148* 146*   BMP Trend:   Results from last 7 days   Lab Units 01/02/24  0431 01/01/24  1726 01/01/24  1320   GLUCOSE mg/dL 144* 81 83   CALCIUM mg/dL 7.9* 8.3* 9.0   SODIUM mmol/L 136 141 141   POTASSIUM mmol/L 4.8 4.9 4.6   CO2 mmol/L 22 23 24   CHLORIDE mmol/L 104 106 103   BUN mg/dL 32* 26* 23   CREATININE mg/dL 1.87* 1.32* 0.90   A1C:  Lab Results   Component Value Date    HGBA1C 5.6 01/01/2024   , BG POCT trend:   Results from last 7 days   Lab Units 01/02/24  0803   POCT GLUCOSE mg/dL 157*   Renal Lab Trend:   Results from last 7 days   Lab Units 01/02/24 0431 01/01/24  1726 01/01/24  1320 01/01/24  1320   POTASSIUM mmol/L 4.8 4.9  --  4.6   PHOSPHORUS mg/dL 3.6 5.4*   < >  --    SODIUM mmol/L 136 141  --  141   MAGNESIUM mg/dL 2.04 2.32   < >  --    EGFR mL/min/1.73m*2 38* 57*  --  >90   BUN mg/dL 32* 26*  --  23   CREATININE mg/dL 1.87* 1.32*  --  0.90    < > = values in this interval not displayed.      Nutrition Specific Medications:  Scheduled Medications  enoxaparin, 40 mg, subcutaneous, q24h  insulin lispro, 0-5 Units, subcutaneous, TID with meals  levETIRAcetam, 1,000 mg, intravenous, q12h  levothyroxine, 200 mcg, oral, Daily  meropenem, 2 g, intravenous, q8h  pantoprazole, 40 mg, intravenous, Daily  polyethylene glycol, 17 g, oral, Daily  sennosides, 2 tablet, oral, BID  sertraline, 50 mg, oral, Daily  sodium chloride, 1,000 mL, intravenous, Once    Continuous Medications  norepinephrine, 0.01-1 mcg/kg/min  propofol, 5-20 mcg/kg/min, Last Rate: Stopped (01/02/24 1000)  sodium chloride 0.9%, 75 mL/hr, Last Rate:  75 mL/hr (01/02/24 0800)    I/O:    ;      Dietary Orders (From admission, onward)       Start     Ordered    01/01/24 1711  NPO Diet; Effective now  Diet effective now         01/01/24 1713                  Estimated Needs:   Total Energy Estimated Needs (calories): 3113-1984 calories/day (extubated); 8290-5247 calories/day (intubated)   Method for Estimating Needs: 28-30 calories/kg ideal body weight; [place] = 1871 calories (MVe = 9.63)  Total Protein Estimated Needs (g):  g/day  Method for Estimating Needs: 1.2-1.4 g/kg ideal body weight  Total Fluid Estimated Needs (mL): 1 mL/calorie or per team     Nutrition Diagnosis   Malnutrition Diagnosis  Patient has Malnutrition Diagnosis: No (patient with significant weight loss, but unable to fully determine malnutrition status at this time given limited intake information)    Nutrition Diagnosis  Patient has Nutrition Diagnosis: Yes  Diagnosis Status (1): New  Nutrition Diagnosis 1: Increased nutrient needs  Related to (1): increased metabolic demand  As Evidenced by (1): critical illness s/p being found down + PNA infection.     Nutrition Interventions/Recommendations         Nutrition Prescription:  Individualized Nutrition Prescription Provided for : PO vs. TF  If patient fails extubation trial, would recommend initiating enteral feeding.  Re-consult for TF recommendations.   If patient able to be extubated, consult SLP to assess tolerable diet for patient.       Nutrition Interventions:   Food and/or Nutrient Delivery Interventions  Interventions: Enteral intake, Meals and snacks    Coordination of Nutrition Care by a Nutrition Professional  Collaboration and Referral of Nutrition Care: Collaboration by nutrition professional with other providers    Nutrition Education:   Not applicable, patient intubated.      Nutrition Monitoring and Evaluation   Food/Nutrient Related History Monitoring  Monitoring and Evaluation Plan: Energy intake, Enteral and  parenteral nutrition intake    Body Composition/Growth/Weight History  Monitoring and Evaluation Plan: Weight  Criteria: maintain    Biochemical Data, Medical Tests and Procedures  Monitoring and Evaluation Plan: Electrolyte/renal panel, Glucose/endocrine profile  Electrolyte and Renal Panel: Magnesium, Phosphorus, Potassium  Glucose/Endocrine Profile: Glucose, casual  Criteria: ICU BG Control 140-180 mg/dL        Time Spent/Follow-up Reminder:   Time Spent (min): 90 minutes  Last Date of Nutrition Visit: 01/02/24  Nutrition Follow-Up Needed?: Dietitian to reassess per policy  Follow up Comment: TF vs PO pending extubation

## 2024-01-03 ENCOUNTER — APPOINTMENT (OUTPATIENT)
Dept: CARDIOLOGY | Facility: HOSPITAL | Age: 74
DRG: 208 | End: 2024-01-03
Payer: MEDICARE

## 2024-01-03 ENCOUNTER — APPOINTMENT (OUTPATIENT)
Dept: RADIOLOGY | Facility: HOSPITAL | Age: 74
DRG: 208 | End: 2024-01-03
Payer: MEDICARE

## 2024-01-03 LAB
ACTH PLAS-MCNC: 100 PG/ML (ref 7.2–63.3)
ALBUMIN SERPL BCP-MCNC: 2.9 G/DL (ref 3.4–5)
ALBUMIN SERPL BCP-MCNC: 2.9 G/DL (ref 3.4–5)
ANION GAP BLDA CALCULATED.4IONS-SCNC: 13 MMO/L (ref 10–25)
ANION GAP SERPL CALC-SCNC: 19 MMOL/L (ref 10–20)
ANION GAP SERPL CALC-SCNC: 20 MMOL/L (ref 10–20)
AORTIC VALVE PEAK VELOCITY: 1.02
APTT PPP: 30 SECONDS (ref 27–38)
AV PEAK GRADIENT: 4.2
AVA (PEAK VEL): 3.07
BASE EXCESS BLDA CALC-SCNC: -3.9 MMOL/L (ref -2–3)
BASE EXCESS BLDA CALC-SCNC: -5.4 MMOL/L (ref -2–3)
BASE EXCESS BLDA CALC-SCNC: -6.6 MMOL/L (ref -2–3)
BODY TEMPERATURE: 37 DEGREES CELSIUS
BODY TEMPERATURE: ABNORMAL
BODY TEMPERATURE: ABNORMAL
BUN SERPL-MCNC: 55 MG/DL (ref 6–23)
BUN SERPL-MCNC: 67 MG/DL (ref 6–23)
CA-I BLDA-SCNC: 1.04 MMOL/L (ref 1.1–1.33)
CALCIUM SERPL-MCNC: 7.5 MG/DL (ref 8.6–10.6)
CALCIUM SERPL-MCNC: 7.7 MG/DL (ref 8.6–10.6)
CHLORIDE BLDA-SCNC: 108 MMOL/L (ref 98–107)
CHLORIDE SERPL-SCNC: 105 MMOL/L (ref 98–107)
CHLORIDE SERPL-SCNC: 108 MMOL/L (ref 98–107)
CHLORIDE UR-SCNC: 21 MMOL/L
CHLORIDE/CREATININE (MMOL/G) IN URINE: 29 MMOL/G CREAT (ref 23–275)
CO2 SERPL-SCNC: 20 MMOL/L (ref 21–32)
CO2 SERPL-SCNC: 22 MMOL/L (ref 21–32)
CREAT SERPL-MCNC: 3.22 MG/DL (ref 0.5–1.3)
CREAT SERPL-MCNC: 3.93 MG/DL (ref 0.5–1.3)
CREAT UR-MCNC: 71.2 MG/DL (ref 20–370)
ERYTHROCYTE [DISTWIDTH] IN BLOOD BY AUTOMATED COUNT: 14.6 % (ref 11.5–14.5)
GFR SERPL CREATININE-BSD FRML MDRD: 15 ML/MIN/1.73M*2
GFR SERPL CREATININE-BSD FRML MDRD: 20 ML/MIN/1.73M*2
GH SERPL-MCNC: <0.05 NG/ML (ref 0.05–3)
GLUCOSE BLD MANUAL STRIP-MCNC: 101 MG/DL (ref 74–99)
GLUCOSE BLD MANUAL STRIP-MCNC: 102 MG/DL (ref 74–99)
GLUCOSE BLD MANUAL STRIP-MCNC: 125 MG/DL (ref 74–99)
GLUCOSE BLD MANUAL STRIP-MCNC: 126 MG/DL (ref 74–99)
GLUCOSE BLD MANUAL STRIP-MCNC: 162 MG/DL (ref 74–99)
GLUCOSE BLD MANUAL STRIP-MCNC: 168 MG/DL (ref 74–99)
GLUCOSE BLD MANUAL STRIP-MCNC: 231 MG/DL (ref 74–99)
GLUCOSE BLD MANUAL STRIP-MCNC: 231 MG/DL (ref 74–99)
GLUCOSE BLDA-MCNC: 153 MG/DL (ref 74–99)
GLUCOSE SERPL-MCNC: 234 MG/DL (ref 74–99)
GLUCOSE SERPL-MCNC: 98 MG/DL (ref 74–99)
HCO3 BLDA-SCNC: 20.2 MMOL/L (ref 22–26)
HCO3 BLDA-SCNC: 20.6 MMOL/L (ref 22–26)
HCO3 BLDA-SCNC: 20.8 MMOL/L (ref 22–26)
HCT VFR BLD AUTO: 45.8 % (ref 41–52)
HCT VFR BLD EST: 44 % (ref 41–52)
HGB BLD-MCNC: 14.3 G/DL (ref 13.5–17.5)
HGB BLDA-MCNC: 14.6 G/DL (ref 13.5–17.5)
IGF-I SERPL-MCNC: 31 NG/ML (ref 25–242)
IGF-I Z-SCORE SERPL: -2.8
INHALED O2 CONCENTRATION: 50 %
INHALED O2 CONCENTRATION: 50 %
INHALED O2 CONCENTRATION: 60 %
INR PPP: 1.1 (ref 0.9–1.1)
LACTATE BLDA-SCNC: 0.8 MMOL/L (ref 0.4–2)
LEFT VENTRICULAR OUTFLOW TRACT DIAMETER: 2.2
MAGNESIUM SERPL-MCNC: 2.31 MG/DL (ref 1.6–2.4)
MCH RBC QN AUTO: 30.9 PG (ref 26–34)
MCHC RBC AUTO-ENTMCNC: 31.2 G/DL (ref 32–36)
MCV RBC AUTO: 99 FL (ref 80–100)
MITRAL VALVE E/A RATIO: 0.83
MITRAL VALVE E/E' RATIO: 11.09
NRBC BLD-RTO: 0 /100 WBCS (ref 0–0)
OXYHGB MFR BLDA: 95.1 % (ref 94–98)
OXYHGB MFR BLDA: 95.8 % (ref 94–98)
OXYHGB MFR BLDA: 96.6 % (ref 94–98)
PCO2 BLDA: 36 MM HG (ref 38–42)
PCO2 BLDA: 41 MM HG (ref 38–42)
PCO2 BLDA: 44 MM HG (ref 38–42)
PH BLDA: 7.27 PH (ref 7.38–7.42)
PH BLDA: 7.31 PH (ref 7.38–7.42)
PH BLDA: 7.37 PH (ref 7.38–7.42)
PHOSPHATE SERPL-MCNC: 6.7 MG/DL (ref 2.5–4.9)
PHOSPHATE SERPL-MCNC: 6.9 MG/DL (ref 2.5–4.9)
PLATELET # BLD AUTO: 133 X10*3/UL (ref 150–450)
PO2 BLDA: 77 MM HG (ref 85–95)
PO2 BLDA: 78 MM HG (ref 85–95)
PO2 BLDA: 97 MM HG (ref 85–95)
POTASSIUM BLDA-SCNC: 5 MMOL/L (ref 3.5–5.3)
POTASSIUM SERPL-SCNC: 5 MMOL/L (ref 3.5–5.3)
POTASSIUM SERPL-SCNC: 5.2 MMOL/L (ref 3.5–5.3)
POTASSIUM UR-SCNC: 37 MMOL/L
POTASSIUM/CREAT UR-RTO: 52 MMOL/G CREAT
PROTHROMBIN TIME: 12.1 SECONDS (ref 9.8–12.8)
RBC # BLD AUTO: 4.63 X10*6/UL (ref 4.5–5.9)
RIGHT VENTRICLE FREE WALL PEAK S': 12.2
RIGHT VENTRICLE PEAK SYSTOLIC PRESSURE: 23.8
SAO2 % BLDA: 96 % (ref 94–100)
SAO2 % BLDA: 97 % (ref 94–100)
SAO2 % BLDA: 97 % (ref 94–100)
SODIUM BLDA-SCNC: 137 MMOL/L (ref 136–145)
SODIUM SERPL-SCNC: 140 MMOL/L (ref 136–145)
SODIUM SERPL-SCNC: 144 MMOL/L (ref 136–145)
SODIUM UR-SCNC: 18 MMOL/L
SODIUM/CREAT UR-RTO: 25 MMOL/G CREAT
TRICUSPID ANNULAR PLANE SYSTOLIC EXCURSION: 2.2
WBC # BLD AUTO: 13.5 X10*3/UL (ref 4.4–11.3)

## 2024-01-03 PROCEDURE — 2500000005 HC RX 250 GENERAL PHARMACY W/O HCPCS: Performed by: STUDENT IN AN ORGANIZED HEALTH CARE EDUCATION/TRAINING PROGRAM

## 2024-01-03 PROCEDURE — 82805 BLOOD GASES W/O2 SATURATION: CPT | Mod: 91 | Performed by: REGISTERED NURSE

## 2024-01-03 PROCEDURE — C9113 INJ PANTOPRAZOLE SODIUM, VIA: HCPCS

## 2024-01-03 PROCEDURE — 93306 TTE W/DOPPLER COMPLETE: CPT

## 2024-01-03 PROCEDURE — 95716 VEEG EA 12-26HR CONT MNTR: CPT

## 2024-01-03 PROCEDURE — 70553 MRI BRAIN STEM W/O & W/DYE: CPT

## 2024-01-03 PROCEDURE — 2500000004 HC RX 250 GENERAL PHARMACY W/ HCPCS (ALT 636 FOR OP/ED)

## 2024-01-03 PROCEDURE — 37799 UNLISTED PX VASCULAR SURGERY: CPT | Performed by: STUDENT IN AN ORGANIZED HEALTH CARE EDUCATION/TRAINING PROGRAM

## 2024-01-03 PROCEDURE — 2500000002 HC RX 250 W HCPCS SELF ADMINISTERED DRUGS (ALT 637 FOR MEDICARE OP, ALT 636 FOR OP/ED): Performed by: REGISTERED NURSE

## 2024-01-03 PROCEDURE — 93306 TTE W/DOPPLER COMPLETE: CPT | Performed by: INTERNAL MEDICINE

## 2024-01-03 PROCEDURE — 85730 THROMBOPLASTIN TIME PARTIAL: CPT

## 2024-01-03 PROCEDURE — 2500000004 HC RX 250 GENERAL PHARMACY W/ HCPCS (ALT 636 FOR OP/ED): Performed by: STUDENT IN AN ORGANIZED HEALTH CARE EDUCATION/TRAINING PROGRAM

## 2024-01-03 PROCEDURE — 94660 CPAP INITIATION&MGMT: CPT

## 2024-01-03 PROCEDURE — 95720 EEG PHY/QHP EA INCR W/VEEG: CPT | Performed by: STUDENT IN AN ORGANIZED HEALTH CARE EDUCATION/TRAINING PROGRAM

## 2024-01-03 PROCEDURE — 37799 UNLISTED PX VASCULAR SURGERY: CPT

## 2024-01-03 PROCEDURE — 2020000001 HC ICU ROOM DAILY

## 2024-01-03 PROCEDURE — 71045 X-RAY EXAM CHEST 1 VIEW: CPT

## 2024-01-03 PROCEDURE — 94640 AIRWAY INHALATION TREATMENT: CPT | Mod: MUE

## 2024-01-03 PROCEDURE — 71045 X-RAY EXAM CHEST 1 VIEW: CPT | Performed by: RADIOLOGY

## 2024-01-03 PROCEDURE — 84132 ASSAY OF SERUM POTASSIUM: CPT | Performed by: STUDENT IN AN ORGANIZED HEALTH CARE EDUCATION/TRAINING PROGRAM

## 2024-01-03 PROCEDURE — A9575 INJ GADOTERATE MEGLUMI 0.1ML: HCPCS | Performed by: PSYCHIATRY & NEUROLOGY

## 2024-01-03 PROCEDURE — 5A09357 ASSISTANCE WITH RESPIRATORY VENTILATION, LESS THAN 24 CONSECUTIVE HOURS, CONTINUOUS POSITIVE AIRWAY PRESSURE: ICD-10-PCS | Performed by: STUDENT IN AN ORGANIZED HEALTH CARE EDUCATION/TRAINING PROGRAM

## 2024-01-03 PROCEDURE — 83735 ASSAY OF MAGNESIUM: CPT

## 2024-01-03 PROCEDURE — 2550000001 HC RX 255 CONTRASTS: Performed by: PSYCHIATRY & NEUROLOGY

## 2024-01-03 PROCEDURE — 92610 EVALUATE SWALLOWING FUNCTION: CPT | Mod: GN

## 2024-01-03 PROCEDURE — 96372 THER/PROPH/DIAG INJ SC/IM: CPT | Performed by: STUDENT IN AN ORGANIZED HEALTH CARE EDUCATION/TRAINING PROGRAM

## 2024-01-03 PROCEDURE — 82570 ASSAY OF URINE CREATININE: CPT | Mod: 91 | Performed by: STUDENT IN AN ORGANIZED HEALTH CARE EDUCATION/TRAINING PROGRAM

## 2024-01-03 PROCEDURE — 82374 ASSAY BLOOD CARBON DIOXIDE: CPT

## 2024-01-03 PROCEDURE — 82947 ASSAY GLUCOSE BLOOD QUANT: CPT

## 2024-01-03 PROCEDURE — 85027 COMPLETE CBC AUTOMATED: CPT

## 2024-01-03 PROCEDURE — 70553 MRI BRAIN STEM W/O & W/DYE: CPT | Performed by: RADIOLOGY

## 2024-01-03 PROCEDURE — 2500000002 HC RX 250 W HCPCS SELF ADMINISTERED DRUGS (ALT 637 FOR MEDICARE OP, ALT 636 FOR OP/ED): Mod: MUE

## 2024-01-03 RX ORDER — NOREPINEPHRINE BITARTRATE/D5W 8 MG/250ML
.01-1 PLASTIC BAG, INJECTION (ML) INTRAVENOUS CONTINUOUS
Status: DISCONTINUED | OUTPATIENT
Start: 2024-01-03 | End: 2024-01-04

## 2024-01-03 RX ORDER — NOREPINEPHRINE BITARTRATE 1 MG/ML
INJECTION, SOLUTION INTRAVENOUS
Status: COMPLETED
Start: 2024-01-03 | End: 2024-01-03

## 2024-01-03 RX ORDER — NOREPINEPHRINE BITARTRATE 0.03 MG/ML
.01-1 INJECTION, SOLUTION INTRAVENOUS CONTINUOUS
Status: DISCONTINUED | OUTPATIENT
Start: 2024-01-03 | End: 2024-01-03

## 2024-01-03 RX ORDER — INSULIN LISPRO 100 [IU]/ML
0-10 INJECTION, SOLUTION INTRAVENOUS; SUBCUTANEOUS EVERY 4 HOURS
Status: DISCONTINUED | OUTPATIENT
Start: 2024-01-03 | End: 2024-01-03

## 2024-01-03 RX ORDER — LORAZEPAM 2 MG/ML
INJECTION INTRAMUSCULAR
Status: COMPLETED
Start: 2024-01-03 | End: 2024-01-03

## 2024-01-03 RX ORDER — HEPARIN SODIUM 5000 [USP'U]/ML
5000 INJECTION, SOLUTION INTRAVENOUS; SUBCUTANEOUS EVERY 8 HOURS SCHEDULED
Status: DISCONTINUED | OUTPATIENT
Start: 2024-01-03 | End: 2024-01-20

## 2024-01-03 RX ORDER — SODIUM CHLORIDE, SODIUM LACTATE, POTASSIUM CHLORIDE, CALCIUM CHLORIDE 600; 310; 30; 20 MG/100ML; MG/100ML; MG/100ML; MG/100ML
100 INJECTION, SOLUTION INTRAVENOUS CONTINUOUS
Status: DISCONTINUED | OUTPATIENT
Start: 2024-01-03 | End: 2024-01-04

## 2024-01-03 RX ORDER — INSULIN LISPRO 100 [IU]/ML
0-20 INJECTION, SOLUTION INTRAVENOUS; SUBCUTANEOUS EVERY 4 HOURS
Status: DISCONTINUED | OUTPATIENT
Start: 2024-01-03 | End: 2024-01-04

## 2024-01-03 RX ORDER — LORAZEPAM 2 MG/ML
1 INJECTION INTRAMUSCULAR ONCE
Status: COMPLETED | OUTPATIENT
Start: 2024-01-03 | End: 2024-01-03

## 2024-01-03 RX ORDER — GADOTERATE MEGLUMINE 376.9 MG/ML
20 INJECTION INTRAVENOUS
Status: COMPLETED | OUTPATIENT
Start: 2024-01-03 | End: 2024-01-03

## 2024-01-03 RX ADMIN — SODIUM CHLORIDE, POTASSIUM CHLORIDE, SODIUM LACTATE AND CALCIUM CHLORIDE 100 ML/HR: 600; 310; 30; 20 INJECTION, SOLUTION INTRAVENOUS at 12:37

## 2024-01-03 RX ADMIN — INSULIN LISPRO 4 UNITS: 100 INJECTION, SOLUTION INTRAVENOUS; SUBCUTANEOUS at 10:14

## 2024-01-03 RX ADMIN — PERFLUTREN 10 ML OF DILUTION: 6.52 INJECTION, SUSPENSION INTRAVENOUS at 09:32

## 2024-01-03 RX ADMIN — NOREPINEPHRINE BITARTRATE 0.05 MCG/KG/MIN: 0.03 INJECTION, SOLUTION INTRAVENOUS at 03:30

## 2024-01-03 RX ADMIN — Medication 0.03 MCG/KG/MIN: at 12:00

## 2024-01-03 RX ADMIN — NOREPINEPHRINE BITARTRATE: 1 INJECTION INTRAVENOUS at 03:30

## 2024-01-03 RX ADMIN — IPRATROPIUM BROMIDE AND ALBUTEROL SULFATE 3 ML: .5; 3 SOLUTION RESPIRATORY (INHALATION) at 12:33

## 2024-01-03 RX ADMIN — LORAZEPAM 1 MG: 2 INJECTION INTRAMUSCULAR at 22:15

## 2024-01-03 RX ADMIN — CEFTRIAXONE SODIUM 2 G: 2 INJECTION, SOLUTION INTRAVENOUS at 08:30

## 2024-01-03 RX ADMIN — INSULIN LISPRO 4 UNITS: 100 INJECTION, SOLUTION INTRAVENOUS; SUBCUTANEOUS at 02:30

## 2024-01-03 RX ADMIN — INSULIN LISPRO 4 UNITS: 100 INJECTION, SOLUTION INTRAVENOUS; SUBCUTANEOUS at 05:52

## 2024-01-03 RX ADMIN — GADOTERATE MEGLUMINE 20 ML: 376.9 INJECTION INTRAVENOUS at 22:21

## 2024-01-03 RX ADMIN — HYDROCORTISONE SODIUM SUCCINATE 50 MG: 100 INJECTION, POWDER, FOR SOLUTION INTRAMUSCULAR; INTRAVENOUS at 12:37

## 2024-01-03 RX ADMIN — IPRATROPIUM BROMIDE AND ALBUTEROL SULFATE 3 ML: .5; 3 SOLUTION RESPIRATORY (INHALATION) at 20:26

## 2024-01-03 RX ADMIN — HEPARIN SODIUM 5000 UNITS: 5000 INJECTION INTRAVENOUS; SUBCUTANEOUS at 16:14

## 2024-01-03 RX ADMIN — LORAZEPAM 1 MG: 2 INJECTION, SOLUTION INTRAMUSCULAR; INTRAVENOUS at 22:15

## 2024-01-03 RX ADMIN — Medication 6 L/MIN: at 20:26

## 2024-01-03 RX ADMIN — HYDROCORTISONE SODIUM SUCCINATE 50 MG: 100 INJECTION, POWDER, FOR SOLUTION INTRAMUSCULAR; INTRAVENOUS at 20:43

## 2024-01-03 RX ADMIN — HEPARIN SODIUM 5000 UNITS: 5000 INJECTION INTRAVENOUS; SUBCUTANEOUS at 21:00

## 2024-01-03 RX ADMIN — PANTOPRAZOLE SODIUM 40 MG: 40 INJECTION, POWDER, FOR SOLUTION INTRAVENOUS at 08:30

## 2024-01-03 ASSESSMENT — COGNITIVE AND FUNCTIONAL STATUS - GENERAL
EATING MEALS: TOTAL
TOILETING: TOTAL
DAILY ACTIVITIY SCORE: 6
HELP NEEDED FOR BATHING: TOTAL
PERSONAL GROOMING: TOTAL
DRESSING REGULAR LOWER BODY CLOTHING: TOTAL
STANDING UP FROM CHAIR USING ARMS: TOTAL
MOVING FROM LYING ON BACK TO SITTING ON SIDE OF FLAT BED WITH BEDRAILS: TOTAL
WALKING IN HOSPITAL ROOM: TOTAL
TURNING FROM BACK TO SIDE WHILE IN FLAT BAD: TOTAL
CLIMB 3 TO 5 STEPS WITH RAILING: TOTAL
MOBILITY SCORE: 6
DRESSING REGULAR UPPER BODY CLOTHING: TOTAL
MOVING TO AND FROM BED TO CHAIR: TOTAL

## 2024-01-03 ASSESSMENT — PAIN - FUNCTIONAL ASSESSMENT
PAIN_FUNCTIONAL_ASSESSMENT: CPOT (CRITICAL CARE PAIN OBSERVATION TOOL)

## 2024-01-03 NOTE — DOCUMENTATION CLARIFICATION NOTE
"    PATIENT:               GOYO BETANCOURT  ACCT #:                  [id number]  MRN:                       [id number]  :                       1950  ADMIT DATE:       2024 5:09 PM  DISCH DATE:  RESPONDING PROVIDER #:        [id number]          PROVIDER RESPONSE TEXT:    Initially intubated for airway protection and progressed to acute hypoxic respiratory failure as evidenced by other He was intubated for both airway protection and respiratory failure as evidenced by hypoxia in the field.    CDI QUERY TEXT:    [facility]_Airway Protection    Instruction:    Based on your assessment of the patient and the clinical information, please provide the requested documentation by clicking on the appropriate radio button and enter any additional information if prompted.    Question: Please further clarify the respiratory status related to airway protection    When answering this query, please exercise your independent professional judgment. The fact that a question is being asked, does not imply that any particular answer is desired or expected.    The patient's clinical indicators include:  Clinical Information: 73M h/o pituitary adenoma s/p resection at [facility], DVT/PE on Eliquis, COPD    Clinical Indicators: ABGs   7.37/36/88 FIO2 96      7.26/48/144 FIO2 60     1/3 7.31/41/77 FIO2 50     HP by Dr. Leyva \" found him lying on the couch around emesis. They called EMS, who found him only responsive to painful stimuli, with snoring respirations, placed on non-rebreather with SpO2 91%, tachycardia, and BG 60. Pt was brought to OSH, negative CTH, intubated for airway protection \"  \"acute hypoxic respiratory failure\"     1620 Nursing flow sheet   RR 23 SPO2 99 percent FIO2 100 percent     0411 Nursing flow sheet HR 95  RR 20 SPO2 98 percent on FIO2 70  Percent    Treatment: ABG, Mechanical Ventilation FIO2 100 percent, FIO2 70 percent    Risk Factors: Encephalopathy, Snoring respirations  Options provided:  -- " Initially intubated for airway protection and progressed to acute hypoxic respiratory failure as evidenced by prolonged mechanical ventilation or failure to wean  -- Initially intubated for airway protection and progressed to acute hypoxic respiratory failure as evidenced by other, Please specify additional information below  -- Intubated for airway protection with no progression to respiratory failure  -- Other - I will add my own diagnosis  -- Refer to Clinical Documentation Reviewer    Query created by: Lina Wright on 1/3/2024 11:10 AM      Electronically signed by:  SHILA PRAJAPATI MD 1/3/2024 12:25 PM

## 2024-01-03 NOTE — CARE PLAN
Interprofessional Rounds    Summary:  Metabolic encephalopathy s/p intubation.  Now extubated.  Daughter is his decision maker.    Participants: Advance Practice Provider, Ethicist, Occupational Therapist, Pharmacist, Physical Therapist, Physician, RN, and     Care Plan Reviewed with:  Interdisciplinary Team

## 2024-01-03 NOTE — PROGRESS NOTES
Pharmacy to Dose Vancomycin:  Gopal Ness is a 73 y.o. male ordered pharmacy to dose vancomycin for pneumonia. Today is day 2 of therapy.  Goal: Trough 15-20mg/L  Results from last 7 days   Lab Units 01/02/24  1850 01/02/24  0431 01/01/24  1726 01/01/24  1320   BUN mg/dL  --  32* 26* 23   CREATININE mg/dL  --  1.87* 1.32* 0.90   WBC AUTO x10*3/uL  --  18.1* 18.5* 16.8*   VANCOMYCIN TR ug/mL 10.5  --   --   --       Gram Stain   Date/Time Value Ref Range Status   01/02/2024 12:38 AM (A)  Preliminary    Gram stain indicates specimen consists of lower respiratory tract secretions.   01/02/2024 12:38 AM Predominant organism = Gram positive cocci (A)  Preliminary      Renal function is declining. Level 10.5 drawn roughly 23hrs post 1.5G dose.     Plan:  Give 2G x 1 dose today.  Follow-up level ordered for 1/3 @ 2000 (for roughly 24hr dose) unless clinically indicated sooner.  Pharmacy will continue daily vancomycin monitoring. Please contact the pharmacy at extension [phone] with any questions.    Thank you,  Magdiel Farooq [facility]

## 2024-01-03 NOTE — PROGRESS NOTES
Speech-Language Pathology        Inpatient Speech-Language Pathology Clinical Swallow Evaluation    Patient Name: Gopal Ness  MRN: [id number]  Today's Date: 1/3/2024   Time Calculation  Start Time: 1405  Stop Time: 1420  Time Calculation (min): 15 min       Recommendations:   NPO   May consider alternative means nutrition/hydration  Frequent oral care  May allow moist oral swabs when alert    Assessment:   Clinical swallow evaluation completed revealing high concern for pharyngeal dysphagia/aspiration d/t lethargy, compromised respiratory status, and altered mental status. Repeat assessment as mentation/alertness/respiratory status improves/stabilizes.     Plan:  SLP Plan: Skilled SLP  SLP Frequency: 1x per week  Duration: 3 weeks  SLP Discharge Recommendations:  (Unlcear, defer to updated notes)  Discussed POC: Patient, Nursing, Physician  Discussed Risks/Benefits: Yes  Patient/Caregiver Agreeable: Yes  SLP - OK to Discharge: Yes        Subjective     History and Physical:    Mr. Gopal Ness is a 74 y/o man with h/o pituitary adenoma s/p resection at [facility], DVT/PE on Eliquis, COPD (noncompliant with medications)/tobacco use, depresssion, DMII, HLD presenting after being found down i/s/o fall on 12/31 (LKN 1100 on 12/31). Patient's children checked on him after his fall on 12/31 and he was reportedly okay, however was found down and unresponsive the following morning with emesis around him. Brought to OSH, negative CTH, intubated for airway protection. EMS reported witnessing possible seizure activity(?). Also with persistent tachycardia, leukocytosis and infiltrate on chest CT, so treating presumed aspiration pneumonia with antibiotics. Started on Keppra 1g BID and transferred to [facility] for cvEEG and further monitoring. Extubated 1/2. Intermittent BiPAP.        Baseline Assessment:  Respiratory Status: Oxygen via nasal cannula  History of Intubation: Yes  Date extubated: 01/02/24  Behavior/Cognition:  (Lethargic,  oriented x2 requiring max cues to alert/participate)  Patient Positioning: Upright in Bed    Objective     Oral/Motor Assessment:  Oral Hygiene: xerostomia  Dentition: Adequate/Natural  Labial Symmetry: Within Functional Limits  Lingual ROM: Within Functional Limits  Vocal Quality: Exceptions to WFL  Vocal Quality Impairment: Hoarse  Intelligibility: Intelligibility reduced  Intelligibility Ratin%-50%      Clinical Observations:     Given max cues for alertness pt accepted x3 ice chip trials and x2 tsp of water. Pt required cues to attend to ice chips in mouth. Pt with immediate weak/wet coughing given second tsp of water requiring significant suctioning via yankauer; pt desatted from 92% to 88%, improving after suctioning and given time to stop coughing/take deep breaths. RN aware and present for portion of suctioning.     Inpatient Education:  Pt educated on result and recommendations. Pt did not indicated understanding and will likely benefit from repeat education when more alert.       Goals: Patient/Family will verbalize/demonstrate comprehension of dysphagia education, strategies, recommendations and POC.

## 2024-01-03 NOTE — PROGRESS NOTES
Communication Note    Patient Name: Gopal Ness  MRN: [id number]  Today's Date: 1/3/2024     Discipline: Occupational Therapy      Missed Visit: Yes  Missed Visit Reason:  (Per RN, pt with tenuous respiratory status with desaturation episodes and periods of tachycardia with hypotension with position changes in the bed. Will defer OT at this time and reattempt at a later time as appropriate/able.)      01/03/24 at 9:02 AM   Kemi Lopez OT   Rehab Office: [phone]

## 2024-01-03 NOTE — PROGRESS NOTES
Physical Therapy                 Therapy Communication Note    Patient Name: Gopal Ness  MRN: [id number]  Today's Date: 1/3/2024     Discipline: Physical Therapy    Missed Visit Reason: Missed Visit Reason:  (Per RN, pt with tachycardia/worsening respiratory status with any movement in bed. Plan to hold PT at this time.)    Missed Time: Attempt

## 2024-01-03 NOTE — PROGRESS NOTES
"Subjective   Extubated to BiPAP. No other complaints. Improving clinically.      Objective   Vitals 24 hour ranges:  Heart Rate:  []   Temp:  [36.1 °C (97 °F)-36.6 °C (97.9 °F)]   Resp:  [18-34]   BP: (102-138)/(59-79)   Weight:  [103 kg (227 lb 15.3 oz)]   SpO2:  [93 %-99 %]      Vent Mode: Pressure support  FiO2 (%):  [50 %-60 %] 50 %  S RR:  [20] 20     Intake/Output for last 24 hours:    Intake/Output Summary (Last 24 hours) at 1/3/2024 0842  Last data filed at 1/3/2024 0700  Gross per 24 hour   Intake 2510.78 ml   Output 1085 ml   Net 1425.78 ml        Physical Exam:  NEURO:  Alert, oriented to self, \"hospital\" but city or year.  EOS, PERRL, EOMI, VFF  RUE 1/5. LUE 3/5 and FC. BLE 2/5 and FC.  CV:  RRR on telemetry, NSR  Arterial line in place  RESP:  On BiPAP  :  Lomeli in place  :  Abdomen NT/ND, soft  SKIN:  Scattered ecchymoses    Medications  Scheduled:  cefTRIAXone, 2 g, intravenous, Daily  enoxaparin, 40 mg, subcutaneous, q24h  insulin lispro, 0-20 Units, subcutaneous, q4h  ipratropium-albuteroL, 3 mL, nebulization, q6h  levothyroxine, 200 mcg, oral, Daily  pantoprazole, 40 mg, intravenous, Daily  perflutren lipid microspheres, 0.5-10 mL of dilution, intravenous, Once in imaging  perflutren protein A microsphere, 0.5 mL, intravenous, Once in imaging  polyethylene glycol, 17 g, oral, Daily  sennosides, 2 tablet, oral, BID  sertraline, 50 mg, oral, Daily  sulfur hexafluoride microsphr, 2 mL, intravenous, Once in imaging    PRN:  PRN medications: dextrose 10 % in water (D10W), dextrose, glucagon, oxygen, vancomycin     Continuous:  norepinephrine, 0.01-1 mcg/kg/min, Last Rate: 0.05 mcg/kg/min (01/03/24 0330)  propofol, 5-20 mcg/kg/min, Last Rate: Stopped (01/02/24 1000)  sodium chloride 0.9%, 75 mL/hr, Last Rate: 75 mL/hr (01/02/24 0800)      Lab Results  Results from last 72 hours   Lab Units 01/03/24  0121 01/02/24  0431   GLUCOSE mg/dL 234* 144*   SODIUM mmol/L 140 136   POTASSIUM mmol/L 5.2 4.8 "   CHLORIDE mmol/L 105 104   CO2 mmol/L 20* 22   ANION GAP mmol/L 20 15   BUN mg/dL 55* 32*   CREATININE mg/dL 3.22* 1.87*   EGFR mL/min/1.73m*2 20* 38*   CALCIUM mg/dL 7.5* 7.9*   PHOSPHORUS mg/dL 6.7* 3.6   ALBUMIN g/dL 2.9* 3.0*   MAGNESIUM mg/dL 2.31 2.04      Results from last 72 hours   Lab Units 01/03/24  0121 01/02/24  0431   WBC AUTO x10*3/uL 13.5* 18.1*   NRBC AUTO /100 WBCs 0.0 0.0   RBC AUTO x10*6/uL 4.63 4.88   HEMOGLOBIN g/dL 14.3 15.2   HEMATOCRIT % 45.8 46.4   MCV fL 99 95   MCH pg 30.9 31.1   MCHC g/dL 31.2* 32.8   RDW % 14.6* 14.4   PLATELETS AUTO x10*3/uL 133* 130*        Imaging Results  XR chest 1 view   Final Result   1.  Status post extubation with interval improvement of multifocal   patchy and consolidative opacities throughout the right lung,   particularly within the right lower lung.   2. Left perihilar prominence of the basilar atelectasis, slightly   worsened when compared to prior.   3. There is interval enlargement of the superior cardiomediastinal   silhouette, which may be secondary to patient rotation/film technique.        I personally reviewed the images/study and I agree with the findings   as stated by Tyler Richard. This study was   interpreted at [place], [place].        MACRO:   None        Signed by: Saul Holt 1/2/2024 4:58 PM   Dictation workstation:   [id number]      XR abdomen 1 view   Final Result   1.  Slight interval worsening of multifocal patchy and consolidative   opacities throughout the right lung. Correlate with concern for   multifocal infection   2. Possible small right pleural effusion. No pneumothorax.   3.  Nonobstructive bowel gas pattern.   4. Medical devices as above.        I personally reviewed the images/study with Joaquín Benton MD (Radiology   Resident) and I agree with the findings as stated. This study was   interpreted at [facility],    [place], [place].        MACRO:   None        Signed by: Carmelita Alas 1/2/2024 6:19 AM   Dictation workstation:   [id number]      XR chest 1 view   Final Result   1.  Slight interval worsening of multifocal patchy and consolidative   opacities throughout the right lung. Correlate with concern for   multifocal infection   2. Possible small right pleural effusion. No pneumothorax.   3.  Nonobstructive bowel gas pattern.   4. Medical devices as above.        I personally reviewed the images/study with Joaquín Benton MD (Radiology   Resident) and I agree with the findings as stated. This study was   interpreted at [place], [place].        MACRO:   None        Signed by: Carmelita Alas 1/2/2024 6:19 AM   Dictation workstation:   [id number]      CT angio head and neck w and wo IV contrast   Final Result   Noncalcified atherosclerosis of the distal petrosal segment of the   left internal carotid artery resulting in mild stenosis. No evidence   for large branch vessel cut off, dissection, or pseudoaneurysm of the   intracranial vessels.        Mild atherosclerosis without significant stenosis of the cervical   vessels.        I personally reviewed the images/study and I agree with the findings   as stated by Gena Watts MD. This study was interpreted at   [place], [place].        MACRO:   None        Signed by: Esau Avalos 1/1/2024 6:47 PM   Dictation workstation:   [id number]      CT head wo IV contrast   Final Result   No acute infarct, hemorrhage or mass is noted.        The parenchymal hypodensities may be secondary to chronic   microvascular ischemic changes among others.        Prior trans-sphenoidal pituitary tumor resection.        MACRO:   None        Signed by: Esau Avalos 1/1/2024 6:20 PM   Dictation workstation:   [id number]      MR brain w and wo IV contrast    (Results Pending)   MR sella w and  wo IV contrast    (Results Pending)   Transthoracic Echo (TTE) Complete    (Results Pending)         Assessment/Plan   Mr. Gopal Ness is a 72 y/o man with h/o pituitary adenoma s/p resection at [facility], DVT/PE on Eliquis, COPD (noncompliant with medications)/tobacco use, depresssion, DMII, HLD presenting after being found down i/s/o fall on 12/31 (LKN 1100 on 12/31). Patient's children checked on him after his fall on 12/31 and he was reportedly okay, however was found down and unresponsive the following morning with emesis around him. Brought to OSH, negative CTH, intubated for airway protection. EMS reported witnessing possible seizure activity(?). Also with persistent tachycardia, leukocytosis and infiltrate on chest CT, so treating presumed aspiration pneumonia with antibiotics. Started on Keppra 1g BID and transferred to [facility] for cvEEG and further monitoring. Extubated 1/2. Intermittent BiPAP.     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 New small dural based enhancing masses in the R posterior fossa  - Q1h neuro checks  - cvEEG  - Keppra 1000mg BID discontinued  - Pending MRI Brain and MRI Sella  - Hold home trazadone 50mg at bedtime  - Continue home sertraline 50mg daily when taking PO     CV:   #Tachycardia, likely I/s/o infection  - Cardiac Rhythm: sinus tachycardia  - Tele monitoring  - Wean off of levo (currently 0.05)     PULM:  #Acute hypoxic respiratory failure  #COPD  :: CT chest (1/1) shows multi segmental consolidation right lower lobe and  infiltrates in right upper lobe   - Extubated to CPAP/BiPAP on 1/2  - Infectious plan below  - Continuous pulse ox   - Maintain O2>92%  - Wean off BiPAP as able  - rCXR today     RENAL:    #LEEANNE   - BUN/Creatinine on admission: 26/1.32 (from 23/0.90 earlier in day at OSH)  - Lomeli in   - Continue daily RFP  - Monitor UOP  - Cr 1.87-> 3.2  - BUN 32-> 55  - Starting fluids  - Renal ultrasound  - Urine electrolytes  - UA and urine electrolytes ordered  - Hold home tamsulosin 0.4mg daily while NPO     FEN/GI:  - No acute issues   - Last BM: PTA  - Bowel regimen: Colace/senna     ENDO:   #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  #hypothyroidism s/p adenoma resection ([facility])  #diabetes mellitus  - Baseline endocrine labs  - A1C pending  - Mild corrective SSI   - Hypoglycemia protocol  - Continue home levothyroxine 200mcg daily  - Holding home glimepiride 4mg BID, metformin 1000mg BID  - Starting Hydrocort given pituitary adenoma     HEME:    #hx of DVT/PE  - H/H: 16.9/51 -> 14.3/45.8  - PLT: 148 -> 133  - Daily CBC    - Hold home Eliquis at this time pending stabilization     ID:   #Aspiration Pneumonia  #Leukocytosis   :: CT chest (1/1) shows multi segmental consolidation right lower lobe and infiltrates in right upper lobe   :: s/p 1 dose cefepime at OSH  - WBC: 18.5 -> 13.5  - Vanc and Meropenem (1/1- ) narrowed to Vanc Ceftriaxone  - Sputum culture with gram positive cocci -> pending speciation  - Continue Daily CBC  - Pan culture for Temp>38.4C     MSK/SKIN:  ::moisture related skin changes to scrotum and inner thighs  ::abrasion to R forearm  ::ecchymoses to bl knees  - will consult wound care      F: 100cc LR  E: Replete PRN  N: NPO  A: PIVs  Lomeli: Yes  GI ppx: PPI  DVT ppx: SQH  Med Drips: levophed     Full Code  NOK/Surrogate: Donya Emerson (Daughter) [phone]     RESTRAINTS:  Not indicated/Patient does not meet criteria for restraints    Max Guerra DO  Neuroscience Intensive  Care    Attending Attestation:   I saw and evaluated the patient. I personally obtained the key and critical portions of the history and physical exam or was physically present for key and critical portions performed by the resident/fellow. I reviewed the resident/fellow's documentation and discussed the patient with the resident/fellow. I agree with the resident/fellow's medical decision making as documented in the note with the exception/addition of the followin yo M w h/o pituitary adenoma, DVT/PE on Eliquis, COPD, DM, HLD, found on couch by family, vomitted, unresponsive, after fall earlier in the day.     Encephalopathy, improved, brought here to rule out seizures, EEG negative.  Off keppra.     Plan MRI for focal weakness of R arm.  Possible metabolic encephalopathy due to infection.    Hypotension, suspect sepsis, on levophed gtt, will give fluids.  MAP goal > 65.  Start stress dose steroids given h;o pituitary resection.     Acute respiratory failure, intubated, on mech ventilation. CPAP as tolerated. RLL pneumonia, started on abx.     LEEANNE, will give maintenance fluids, check Urine lytes, renal US.     ID: Will narrow to CTX for community/aspiration pna.      SCHeparin.    Statement of Acuity: I have reviewed and evaluated all relevant data of the last 24 hours, personally examined the patient and formulated the plan of care.  This patient was critically ill and required continued critical care treatment.  Total critical care time: 35min.     Gómez Zabala M.D.

## 2024-01-04 ENCOUNTER — APPOINTMENT (OUTPATIENT)
Dept: RADIOLOGY | Facility: HOSPITAL | Age: 74
DRG: 208 | End: 2024-01-04
Payer: MEDICARE

## 2024-01-04 LAB
ALBUMIN SERPL BCP-MCNC: 2.8 G/DL (ref 3.4–5)
ALBUMIN SERPL BCP-MCNC: 3 G/DL (ref 3.4–5)
ANION GAP SERPL CALC-SCNC: 18 MMOL/L (ref 10–20)
ANION GAP SERPL CALC-SCNC: 19 MMOL/L (ref 10–20)
BUN SERPL-MCNC: 77 MG/DL (ref 6–23)
BUN SERPL-MCNC: 82 MG/DL (ref 6–23)
CALCIUM SERPL-MCNC: 7.8 MG/DL (ref 8.6–10.6)
CALCIUM SERPL-MCNC: 8 MG/DL (ref 8.6–10.6)
CHLORIDE SERPL-SCNC: 109 MMOL/L (ref 98–107)
CHLORIDE SERPL-SCNC: 113 MMOL/L (ref 98–107)
CO2 SERPL-SCNC: 20 MMOL/L (ref 21–32)
CO2 SERPL-SCNC: 23 MMOL/L (ref 21–32)
CREAT SERPL-MCNC: 3.56 MG/DL (ref 0.5–1.3)
CREAT SERPL-MCNC: 3.68 MG/DL (ref 0.5–1.3)
ERYTHROCYTE [DISTWIDTH] IN BLOOD BY AUTOMATED COUNT: 14.3 % (ref 11.5–14.5)
ERYTHROCYTE [DISTWIDTH] IN BLOOD BY AUTOMATED COUNT: 14.4 % (ref 11.5–14.5)
FSH SERPL-ACNC: <0.9 IU/L
GFR SERPL CREATININE-BSD FRML MDRD: 17 ML/MIN/1.73M*2
GFR SERPL CREATININE-BSD FRML MDRD: 17 ML/MIN/1.73M*2
GLUCOSE BLD MANUAL STRIP-MCNC: 105 MG/DL (ref 74–99)
GLUCOSE BLD MANUAL STRIP-MCNC: 116 MG/DL (ref 74–99)
GLUCOSE BLD MANUAL STRIP-MCNC: 137 MG/DL (ref 74–99)
GLUCOSE BLD MANUAL STRIP-MCNC: 160 MG/DL (ref 74–99)
GLUCOSE BLD MANUAL STRIP-MCNC: 170 MG/DL (ref 74–99)
GLUCOSE SERPL-MCNC: 131 MG/DL (ref 74–99)
GLUCOSE SERPL-MCNC: 144 MG/DL (ref 74–99)
HCT VFR BLD AUTO: 42.2 % (ref 41–52)
HCT VFR BLD AUTO: 42.4 % (ref 41–52)
HGB BLD-MCNC: 13.9 G/DL (ref 13.5–17.5)
HGB BLD-MCNC: 13.9 G/DL (ref 13.5–17.5)
LH SERPL-ACNC: <0.1 IU/L
MAGNESIUM SERPL-MCNC: 2.55 MG/DL (ref 1.6–2.4)
MCH RBC QN AUTO: 31 PG (ref 26–34)
MCH RBC QN AUTO: 32.5 PG (ref 26–34)
MCHC RBC AUTO-ENTMCNC: 32.8 G/DL (ref 32–36)
MCHC RBC AUTO-ENTMCNC: 32.9 G/DL (ref 32–36)
MCV RBC AUTO: 94 FL (ref 80–100)
MCV RBC AUTO: 99 FL (ref 80–100)
NRBC BLD-RTO: 0 /100 WBCS (ref 0–0)
NRBC BLD-RTO: 0 /100 WBCS (ref 0–0)
PHOSPHATE SERPL-MCNC: 6 MG/DL (ref 2.5–4.9)
PHOSPHATE SERPL-MCNC: 7.6 MG/DL (ref 2.5–4.9)
PLATELET # BLD AUTO: 101 X10*3/UL (ref 150–450)
PLATELET # BLD AUTO: 98 X10*3/UL (ref 150–450)
POTASSIUM SERPL-SCNC: 4.8 MMOL/L (ref 3.5–5.3)
POTASSIUM SERPL-SCNC: 5.1 MMOL/L (ref 3.5–5.3)
PROLACTIN SERPL-MCNC: 2.8 UG/L (ref 2–18)
RBC # BLD AUTO: 4.28 X10*6/UL (ref 4.5–5.9)
RBC # BLD AUTO: 4.49 X10*6/UL (ref 4.5–5.9)
SARS-COV-2 RNA RESP QL NAA+PROBE: DETECTED
SODIUM SERPL-SCNC: 143 MMOL/L (ref 136–145)
SODIUM SERPL-SCNC: 149 MMOL/L (ref 136–145)
T4 FREE SERPL-MCNC: 0.71 NG/DL (ref 0.78–1.48)
VANCOMYCIN SERPL-MCNC: 24.7 UG/ML (ref 5–20)
WBC # BLD AUTO: 5.2 X10*3/UL (ref 4.4–11.3)
WBC # BLD AUTO: 6.8 X10*3/UL (ref 4.4–11.3)

## 2024-01-04 PROCEDURE — 83001 ASSAY OF GONADOTROPIN (FSH): CPT

## 2024-01-04 PROCEDURE — 80202 ASSAY OF VANCOMYCIN: CPT

## 2024-01-04 PROCEDURE — 2500000001 HC RX 250 WO HCPCS SELF ADMINISTERED DRUGS (ALT 637 FOR MEDICARE OP): Performed by: REGISTERED NURSE

## 2024-01-04 PROCEDURE — 99232 SBSQ HOSP IP/OBS MODERATE 35: CPT

## 2024-01-04 PROCEDURE — 2500000002 HC RX 250 W HCPCS SELF ADMINISTERED DRUGS (ALT 637 FOR MEDICARE OP, ALT 636 FOR OP/ED)

## 2024-01-04 PROCEDURE — 2500000004 HC RX 250 GENERAL PHARMACY W/ HCPCS (ALT 636 FOR OP/ED)

## 2024-01-04 PROCEDURE — 92526 ORAL FUNCTION THERAPY: CPT | Mod: GN

## 2024-01-04 PROCEDURE — 74018 RADEX ABDOMEN 1 VIEW: CPT

## 2024-01-04 PROCEDURE — 37799 UNLISTED PX VASCULAR SURGERY: CPT

## 2024-01-04 PROCEDURE — 97530 THERAPEUTIC ACTIVITIES: CPT | Mod: GO

## 2024-01-04 PROCEDURE — 87635 SARS-COV-2 COVID-19 AMP PRB: CPT

## 2024-01-04 PROCEDURE — 97166 OT EVAL MOD COMPLEX 45 MIN: CPT | Mod: GO

## 2024-01-04 PROCEDURE — C9113 INJ PANTOPRAZOLE SODIUM, VIA: HCPCS

## 2024-01-04 PROCEDURE — 74018 RADEX ABDOMEN 1 VIEW: CPT | Performed by: STUDENT IN AN ORGANIZED HEALTH CARE EDUCATION/TRAINING PROGRAM

## 2024-01-04 PROCEDURE — 74018 RADEX ABDOMEN 1 VIEW: CPT | Performed by: RADIOLOGY

## 2024-01-04 PROCEDURE — 82024 ASSAY OF ACTH: CPT

## 2024-01-04 PROCEDURE — 94660 CPAP INITIATION&MGMT: CPT

## 2024-01-04 PROCEDURE — 94640 AIRWAY INHALATION TREATMENT: CPT

## 2024-01-04 PROCEDURE — 83735 ASSAY OF MAGNESIUM: CPT

## 2024-01-04 PROCEDURE — 85027 COMPLETE CBC AUTOMATED: CPT

## 2024-01-04 PROCEDURE — 71045 X-RAY EXAM CHEST 1 VIEW: CPT

## 2024-01-04 PROCEDURE — 99221 1ST HOSP IP/OBS SF/LOW 40: CPT | Performed by: OTOLARYNGOLOGY

## 2024-01-04 PROCEDURE — 94640 AIRWAY INHALATION TREATMENT: CPT | Mod: MUE

## 2024-01-04 PROCEDURE — 2060000001 HC INTERMEDIATE ICU ROOM DAILY

## 2024-01-04 PROCEDURE — 71045 X-RAY EXAM CHEST 1 VIEW: CPT | Performed by: RADIOLOGY

## 2024-01-04 PROCEDURE — 84439 ASSAY OF FREE THYROXINE: CPT

## 2024-01-04 PROCEDURE — 82947 ASSAY GLUCOSE BLOOD QUANT: CPT

## 2024-01-04 PROCEDURE — 84146 ASSAY OF PROLACTIN: CPT

## 2024-01-04 PROCEDURE — 84305 ASSAY OF SOMATOMEDIN: CPT

## 2024-01-04 PROCEDURE — 80069 RENAL FUNCTION PANEL: CPT | Performed by: STUDENT IN AN ORGANIZED HEALTH CARE EDUCATION/TRAINING PROGRAM

## 2024-01-04 PROCEDURE — 80069 RENAL FUNCTION PANEL: CPT | Mod: MUE

## 2024-01-04 PROCEDURE — 96372 THER/PROPH/DIAG INJ SC/IM: CPT | Performed by: STUDENT IN AN ORGANIZED HEALTH CARE EDUCATION/TRAINING PROGRAM

## 2024-01-04 PROCEDURE — 2500000004 HC RX 250 GENERAL PHARMACY W/ HCPCS (ALT 636 FOR OP/ED): Performed by: STUDENT IN AN ORGANIZED HEALTH CARE EDUCATION/TRAINING PROGRAM

## 2024-01-04 PROCEDURE — 97162 PT EVAL MOD COMPLEX 30 MIN: CPT | Mod: GP

## 2024-01-04 PROCEDURE — 99223 1ST HOSP IP/OBS HIGH 75: CPT

## 2024-01-04 RX ORDER — INSULIN LISPRO 100 [IU]/ML
0-5 INJECTION, SOLUTION INTRAVENOUS; SUBCUTANEOUS
Status: DISCONTINUED | OUTPATIENT
Start: 2024-01-04 | End: 2024-01-06

## 2024-01-04 RX ORDER — INSULIN GLARGINE 100 [IU]/ML
5 INJECTION, SOLUTION SUBCUTANEOUS NIGHTLY
Status: DISCONTINUED | OUTPATIENT
Start: 2024-01-04 | End: 2024-01-06

## 2024-01-04 RX ORDER — DICLOFENAC SODIUM 10 MG/G
4 GEL TOPICAL 2 TIMES DAILY PRN
Status: DISCONTINUED | OUTPATIENT
Start: 2024-01-04 | End: 2024-01-31 | Stop reason: HOSPADM

## 2024-01-04 RX ADMIN — INSULIN LISPRO 4 UNITS: 100 INJECTION, SOLUTION INTRAVENOUS; SUBCUTANEOUS at 14:24

## 2024-01-04 RX ADMIN — IPRATROPIUM BROMIDE AND ALBUTEROL SULFATE 3 ML: .5; 3 SOLUTION RESPIRATORY (INHALATION) at 21:12

## 2024-01-04 RX ADMIN — HEPARIN SODIUM 5000 UNITS: 5000 INJECTION INTRAVENOUS; SUBCUTANEOUS at 21:48

## 2024-01-04 RX ADMIN — HEPARIN SODIUM 5000 UNITS: 5000 INJECTION INTRAVENOUS; SUBCUTANEOUS at 05:00

## 2024-01-04 RX ADMIN — HEPARIN SODIUM 5000 UNITS: 5000 INJECTION INTRAVENOUS; SUBCUTANEOUS at 14:34

## 2024-01-04 RX ADMIN — HYDROCORTISONE SODIUM SUCCINATE 50 MG: 100 INJECTION, POWDER, FOR SOLUTION INTRAMUSCULAR; INTRAVENOUS at 13:17

## 2024-01-04 RX ADMIN — INSULIN GLARGINE 5 UNITS: 100 INJECTION, SOLUTION SUBCUTANEOUS at 21:49

## 2024-01-04 RX ADMIN — PHENOL 1 SPRAY: 1.4 SPRAY ORAL at 21:50

## 2024-01-04 RX ADMIN — HYDROCORTISONE SODIUM SUCCINATE 50 MG: 100 INJECTION, POWDER, FOR SOLUTION INTRAMUSCULAR; INTRAVENOUS at 04:50

## 2024-01-04 RX ADMIN — IPRATROPIUM BROMIDE AND ALBUTEROL SULFATE 3 ML: .5; 3 SOLUTION RESPIRATORY (INHALATION) at 07:54

## 2024-01-04 RX ADMIN — IPRATROPIUM BROMIDE AND ALBUTEROL SULFATE 3 ML: .5; 3 SOLUTION RESPIRATORY (INHALATION) at 16:00

## 2024-01-04 RX ADMIN — IPRATROPIUM BROMIDE AND ALBUTEROL SULFATE 3 ML: .5; 3 SOLUTION RESPIRATORY (INHALATION) at 01:34

## 2024-01-04 RX ADMIN — SODIUM CHLORIDE, POTASSIUM CHLORIDE, SODIUM LACTATE AND CALCIUM CHLORIDE 100 ML/HR: 600; 310; 30; 20 INJECTION, SOLUTION INTRAVENOUS at 02:17

## 2024-01-04 RX ADMIN — PANTOPRAZOLE SODIUM 40 MG: 40 INJECTION, POWDER, FOR SOLUTION INTRAVENOUS at 10:58

## 2024-01-04 RX ADMIN — CEFTRIAXONE SODIUM 2 G: 2 INJECTION, SOLUTION INTRAVENOUS at 10:58

## 2024-01-04 ASSESSMENT — COGNITIVE AND FUNCTIONAL STATUS - GENERAL
STANDING UP FROM CHAIR USING ARMS: TOTAL
DRESSING REGULAR LOWER BODY CLOTHING: TOTAL
DRESSING REGULAR UPPER BODY CLOTHING: A LOT
WALKING IN HOSPITAL ROOM: TOTAL
MOVING TO AND FROM BED TO CHAIR: TOTAL
TOILETING: TOTAL
HELP NEEDED FOR BATHING: A LOT
EATING MEALS: TOTAL
TURNING FROM BACK TO SIDE WHILE IN FLAT BAD: A LOT
CLIMB 3 TO 5 STEPS WITH RAILING: TOTAL
DAILY ACTIVITIY SCORE: 9
MOBILITY SCORE: 8
PERSONAL GROOMING: A LOT
MOVING FROM LYING ON BACK TO SITTING ON SIDE OF FLAT BED WITH BEDRAILS: A LOT

## 2024-01-04 ASSESSMENT — ACTIVITIES OF DAILY LIVING (ADL)
ADL_ASSISTANCE: INDEPENDENT
ADL_ASSISTANCE: INDEPENDENT
BATHING_ASSISTANCE: MAXIMAL

## 2024-01-04 ASSESSMENT — PAIN - FUNCTIONAL ASSESSMENT
PAIN_FUNCTIONAL_ASSESSMENT: CPOT (CRITICAL CARE PAIN OBSERVATION TOOL)
PAIN_FUNCTIONAL_ASSESSMENT: CPOT (CRITICAL CARE PAIN OBSERVATION TOOL)
PAIN_FUNCTIONAL_ASSESSMENT: 0-10
PAIN_FUNCTIONAL_ASSESSMENT: 0-10
PAIN_FUNCTIONAL_ASSESSMENT: CPOT (CRITICAL CARE PAIN OBSERVATION TOOL)

## 2024-01-04 ASSESSMENT — PAIN SCALES - GENERAL: PAINLEVEL_OUTOF10: 0 - NO PAIN

## 2024-01-04 NOTE — PROGRESS NOTES
"Subjective   NAEO, more alert and awake this AM. Mild dysarthric speech, but will respond to questions and follow simple commands. Denies any pain currently. On 5L NC.     Objective   Vitals 24 hour ranges:  Heart Rate:  []   Temp:  [36.1 °C (97 °F)-36.4 °C (97.5 °F)]   Resp:  [14-32]   BP: (110-130)/(65-78)   Weight:  [104 kg (229 lb 8 oz)]   SpO2:  [89 %-99 %]      FiO2 (%):  [35 %-50 %] 35 %  S RR:  [20] 20     Intake/Output for last 24 hours:    Intake/Output Summary (Last 24 hours) at 1/4/2024 0837  Last data filed at 1/4/2024 0700  Gross per 24 hour   Intake 1879.88 ml   Output 1250 ml   Net 629.88 ml        Physical Exam:  NEURO:  Alert, oriented to self, \"hospital\", but not city or year  R pupil 5mm and unreactive, L pupil 4mm -> 2mm  Hand  L 4/5, R 3/5  RUE flexion 3/5, extension 3+/5, LUE flexion 4-/5, extenion 4+/5,   BLE 2/5    CV:  RRR on telemetry  Arterial line in place  RESP:  On 6L NC  :  Lomeli in place  :  Abdomen NT/ND, soft  SKIN:  Scattered ecchymoses    Medications  Scheduled:  cefTRIAXone, 2 g, intravenous, Daily  heparin (porcine), 5,000 Units, subcutaneous, q8h KENDRA  hydrocortisone sodium succinate, 50 mg, intravenous, q8h  insulin lispro, 0-20 Units, subcutaneous, q4h  ipratropium-albuteroL, 3 mL, nebulization, q6h  levothyroxine, 200 mcg, oral, Daily  pantoprazole, 40 mg, intravenous, Daily  perflutren protein A microsphere, 0.5 mL, intravenous, Once in imaging  polyethylene glycol, 17 g, oral, Daily  sennosides, 2 tablet, oral, BID  sertraline, 50 mg, oral, Daily  sulfur hexafluoride microsphr, 2 mL, intravenous, Once in imaging    PRN:  PRN medications: dextrose 10 % in water (D10W), dextrose, glucagon, oxygen, phenoL     Continuous:  lactated Ringer's, 100 mL/hr, Last Rate: 100 mL/hr (01/04/24 0217)      Lab Results  Results from last 72 hours   Lab Units 01/04/24  0137 01/03/24  1617 01/03/24  0121   GLUCOSE mg/dL 131* 98 234*   SODIUM mmol/L 143 144 140   POTASSIUM mmol/L " 5.1 5.0 5.2   CHLORIDE mmol/L 109* 108* 105   CO2 mmol/L 20* 22 20*   ANION GAP mmol/L 19 19 20   BUN mg/dL 77* 67* 55*   CREATININE mg/dL 3.68* 3.93* 3.22*   EGFR mL/min/1.73m*2 17* 15* 20*   CALCIUM mg/dL 8.0* 7.7* 7.5*   PHOSPHORUS mg/dL 7.6* 6.9* 6.7*   ALBUMIN g/dL 3.0* 2.9* 2.9*   MAGNESIUM mg/dL 2.55*  --  2.31      Results from last 72 hours   Lab Units 01/04/24  0137 01/03/24  0121   WBC AUTO x10*3/uL 6.8 13.5*   NRBC AUTO /100 WBCs 0.0 0.0   RBC AUTO x10*6/uL 4.49* 4.63   HEMOGLOBIN g/dL 13.9 14.3   HEMATOCRIT % 42.4 45.8   MCV fL 94 99   MCH pg 31.0 30.9   MCHC g/dL 32.8 31.2*   RDW % 14.3 14.6*   PLATELETS AUTO x10*3/uL 98* 133*        Imaging Results  MR sella w and wo IV contrast         MR brain w and wo IV contrast         XR chest 1 view   Final Result   1.  Slightly increased multifocal patchy and consolidative opacities   throughout the right lung which may suggest worsening of pulmonary   edema or developing pneumonia. Correlate clinically.        I personally reviewed the images/study and I agree with the findings   as stated by Tyler Richard. This study was   interpreted at [place], [place].        MACRO:   None        Signed by: Saul Holt 1/3/2024 3:18 PM   Dictation workstation:   [id number]      Transthoracic Echo (TTE) Complete   Final Result      XR chest 1 view   Final Result   1.  Status post extubation with interval improvement of multifocal   patchy and consolidative opacities throughout the right lung,   particularly within the right lower lung.   2. Left perihilar prominence of the basilar atelectasis, slightly   worsened when compared to prior.   3. There is interval enlargement of the superior cardiomediastinal   silhouette, which may be secondary to patient rotation/film technique.        I personally reviewed the images/study and I agree with the findings   as stated by Tyler Richard. This  study was   interpreted at [employer], [place].        MACRO:   None        Signed by: Saul Holt 1/2/2024 4:58 PM   Dictation workstation:   [id number]      XR abdomen 1 view   Final Result   1.  Slight interval worsening of multifocal patchy and consolidative   opacities throughout the right lung. Correlate with concern for   multifocal infection   2. Possible small right pleural effusion. No pneumothorax.   3.  Nonobstructive bowel gas pattern.   4. Medical devices as above.        I personally reviewed the images/study with Joaquín Benton MD (Radiology   Resident) and I agree with the findings as stated. This study was   interpreted at [employer], [place].        MACRO:   None        Signed by: Carmelita Alas 1/2/2024 6:19 AM   Dictation workstation:   [id number]      XR chest 1 view   Final Result   1.  Slight interval worsening of multifocal patchy and consolidative   opacities throughout the right lung. Correlate with concern for   multifocal infection   2. Possible small right pleural effusion. No pneumothorax.   3.  Nonobstructive bowel gas pattern.   4. Medical devices as above.        I personally reviewed the images/study with Joaquín Benton MD (Radiology   Resident) and I agree with the findings as stated. This study was   interpreted at [employer], [place].        MACRO:   None        Signed by: Carmelita Alas 1/2/2024 6:19 AM   Dictation workstation:   [id number]      CT angio head and neck w and wo IV contrast   Final Result   Noncalcified atherosclerosis of the distal petrosal segment of the   left internal carotid artery resulting in mild stenosis. No evidence   for large branch vessel cut off, dissection, or pseudoaneurysm of the   intracranial vessels.        Mild atherosclerosis without significant stenosis of the cervical   vessels.        I  personally reviewed the images/study and I agree with the findings   as stated by Gena Watts MD. This study was interpreted at   [facility], [place], [place].        MACRO:   None        Signed by: Esau Avalos 1/1/2024 6:47 PM   Dictation workstation:   [id number]      CT head wo IV contrast   Final Result   No acute infarct, hemorrhage or mass is noted.        The parenchymal hypodensities may be secondary to chronic   microvascular ischemic changes among others.        Prior trans-sphenoidal pituitary tumor resection.        MACRO:   None        Signed by: Esau Avalos 1/1/2024 6:20 PM   Dictation workstation:   [id number]      XR chest 1 view    (Results Pending)         Assessment/Plan   Mr. Gopal Ness is a 74 y/o man with h/o pituitary adenoma s/p resection at [facility], DVT/PE on Eliquis, COPD (noncompliant with medications)/tobacco use, depresssion, DMII, HLD presenting after being found down i/s/o fall on 12/31 (LKN 1100 on 12/31). Patient's children checked on him after his fall on 12/31 and he was reportedly okay, however was found down and unresponsive the following morning with emesis around him. Brought to OSH, negative CTH, intubated for airway protection. EMS reported witnessing possible seizure activity(?). Also with persistent tachycardia, leukocytosis and infiltrate on chest CT, so treating presumed aspiration pneumonia with antibiotics. Started on Keppra 1g BID and transferred to [facility] for cvEEG and further monitoring. Extubated 1/2. CPAP at night.     Updates 1/4:   - Continue with CPAP at night  - Ceftriaxone for aspiration pneumonia until 1/6  - 1L bolus for LEEANNE   - ENT consulted for dobhoff placement given previous transphenoidal surgery.   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 New small dural based enhancing masses in the R posterior fossa  - Q1h neuro checks  - cvEEG  - Keppra 1000mg BID discontinued  - MRI Brain and MRI Sella - residual adenoma unchanged from prior examination and similar to slightly increased size of small dural-based enhancing masses of small dural-based enhancing masses within the R posterior fossa favored to represent meningiomas.  - Hold home trazadone 50mg at bedtime  - Continue home sertraline 50mg daily when taking PO     #Acute hypoxic respiratory failure  #Aspiration Pneumonia  #COPD  :: CT chest (1/1) shows multi segmental consolidation right lower lobe and infiltrates in right upper lobe   :: s/p 1 dose cefepime at OSH  - Extubated to CPAP/BiPAP on 1/2  - Infectious plan below  - Continuous pulse ox   - Maintain O2>92%  - Cpap at night  - rCXR - slightly increased multifocal patchy and consolidative opacities throughout the R lunch which may suggest worsening of pulm edema or developing pneumonia   - WBC: 18.5 -> 13.5 -> 6.8  - Vanc and Meropenem (1/1- ) narrowed to Ceftriaxone  - Sputum culture with gram positive cocci -> Strep pneumoniae  - Continue Daily CBC  - Pan culture for Temp>38.4C     #LEEANNE (baseline Cr 0.8-0.9)  - BUN/Creatinine on admission: 26/1.32 (from 23/0.90 earlier in day at OSH)  - Lomeli in   - Continue daily RFP  - Monitor UOP  - Hold home tamsulosin 0.4mg daily while NPO  - Baseline Cr 0.8-0.9, Cr peaked at 3.93 > 3.68  - 1L bolus LR ordered  - Renal ultrasound pending      #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  #hypothyroidism s/p adenoma resection ([facility])  #diabetes mellitus  - Baseline endocrine labs  - A1C pending  - Mild corrective SSI   - Hypoglycemia protocol  - Continue home levothyroxine 200mcg daily  - Holding home  glimepiride 4mg BID, metformin 1000mg BID  - Starting Hydrocort given pituitary adenoma, follow up endocrine consult    #hx of DVT/PE  - H/H: 16.9/51 -> 14.3/45.8  - PLT: 148 -> 133 -> 98  - 4T score: 3, low probability <5%  - Daily CBC    - Hold home Eliquis at this time pending stabilization    F: 1L LR  E: Replete PRN  N: NPO - dobhoff placed  A: PIVs  Lomeli: Yes  GI ppx: PPI  DVT ppx: SQH     Full Code  NOK/Surrogate: Donya Emerson (Daughter) [phone]     RESTRAINTS:  Not indicated/Patient does not meet criteria for restraints    Fatmata Kumar MD

## 2024-01-04 NOTE — CONSULTS
ENT DEPARTMENT CONSULTATION NOTE  Name: Gopal Ness  MRN: [id number]  : 1950  Consulting Team: NS ICU  Reason for Consult: Dobbhoff placement    History of Present Illness  The patient is a 73 y.o. male with a past medical history of  pituitary adenoma s/p resection at [facility], DVT/PE on Eliquis, COPD (noncompliant with medications)/tobacco use, depresssion, DMII, HLD who presented to [facility] on 23 after being found unconscious by family.      ENT was consulted for Dobbhoff placement.    Previous attempts: Nursing none because of previous COVID surgery  Imaging review for skull base fractures: None      Review of Systems  14 point review of systems completed and all negative except as noted in HPI.    Past Medical History  No past medical history on file.    Past Surgical History  Past Surgical History:   Procedure Laterality Date    TONSILLECTOMY  2014    Tonsillectomy    TOTAL HIP ARTHROPLASTY  2014    Hip Replacement       Allergies  Allergies   Allergen Reactions    Codeine Hives    Penicillins Hives       Medications    Current Facility-Administered Medications:     cefTRIAXone (Rocephin) 2 g IV in dextrose 5% 50 mL, 2 g, intravenous, Daily, CHEMA Darby-CNP, Stopped at 24 1128    dextrose 10 % in water (D10W) infusion, 0.3 g/kg/hr, intravenous, Once PRN, Cheryl Rubalcava MD    dextrose 50 % injection 25 g, 25 g, intravenous, q15 min PRN, Cheryl Rubalcava MD    glucagon (Glucagen) injection 1 mg, 1 mg, intramuscular, q15 min PRN, Cheryl Rubalcava MD    heparin (porcine) injection 5,000 Units, 5,000 Units, subcutaneous, q8h KENDRA, Tc Esquivel MD, 5,000 Units at 24 0500    hydrocortisone sod succ (PF) (Solu-CORTEF) injection 50 mg, 50 mg, intravenous, q8h, Tc Esquivel MD, 50 mg at 24 0450    insulin lispro (HumaLOG) injection 0-20 Units, 0-20 Units, subcutaneous, q4h, CHEMA Delgadillo-CNP, 4 Units at 24 1014    ipratropium-albuteroL (Duo-Neb) 0.5-2.5 mg/3  mL nebulizer solution 3 mL, 3 mL, nebulization, q6h, CHANTAL Darby, 3 mL at 01/04/24 0754    lactated Ringer's bolus 1,000 mL, 1,000 mL, intravenous, Once, Fatmata Kumar MD    levothyroxine (Synthroid, Levoxyl) tablet 200 mcg, 200 mcg, oral, Daily, Cheryl Rubalcava MD    oxygen (O2) therapy, , inhalation, Continuous PRN - O2/gases, Tc Esquivel MD, 5 L/min at 01/04/24 0754    pantoprazole (ProtoNix) injection 40 mg, 40 mg, intravenous, Daily, Cheryl Rubalcava MD, 40 mg at 01/04/24 1058    perflutren protein A microsphere (Optison) injection 0.5 mL, 0.5 mL, intravenous, Once in imaging, CHANTAL Darby    phenoL (Chloraseptic) 1.4 % mouth/throat spray 1 spray, 1 spray, Mouth/Throat, q2h PRN, CHANTAL Delgadillo    polyethylene glycol (Glycolax, Miralax) packet 17 g, 17 g, oral, Daily, Xander Cardenas MD    sennosides (Senokot) tablet 17.2 mg, 2 tablet, oral, BID, Xander Cardenas MD    sertraline (Zoloft) tablet 50 mg, 50 mg, oral, Daily, Cheryl Rubalcava MD    sulfur hexafluoride microsphr (Lumason) injection 24.28 mg, 2 mL, intravenous, Once in imaging, CHANTAL Darby    Family History  No family history on file.    Social History  Social History     Socioeconomic History    Marital status:      Spouse name: Not on file    Number of children: Not on file    Years of education: Not on file    Highest education level: Not on file   Occupational History    Not on file   Tobacco Use    Smoking status: Every Day     Types: Cigarettes    Smokeless tobacco: Never   Substance and Sexual Activity    Alcohol use: Never    Drug use: Never    Sexual activity: Not on file   Other Topics Concern    Not on file   Social History Narrative    Not on file     Social Determinants of Health     Financial Resource Strain: Patient Unable To Answer (1/1/2024)    Overall Financial Resource Strain (CARDIA)     Difficulty of Paying Living Expenses: Patient unable to answer   Food Insecurity: No Food  Insecurity (1/2/2024)    Hunger Vital Sign     Worried About Running Out of Food in the Last Year: Never true     Ran Out of Food in the Last Year: Never true   Transportation Needs: No Transportation Needs (1/2/2024)    PRAPARE - Transportation     Lack of Transportation (Medical): No     Lack of Transportation (Non-Medical): No   Physical Activity: Patient Unable To Answer (1/1/2024)    Exercise Vital Sign     Days of Exercise per Week: Patient unable to answer     Minutes of Exercise per Session: Patient unable to answer   Stress: Patient Unable To Answer (1/1/2024)    [employer] - Occupational Stress Questionnaire     Feeling of Stress : Patient unable to answer   Social Connections: Patient Unable To Answer (1/1/2024)    Social Connection and Isolation Panel [NHANES]     Frequency of Communication with Friends and Family: Patient unable to answer     Frequency of Social Gatherings with Friends and Family: Patient unable to answer     Attends [employer] Services: Patient unable to answer     Active Member of Clubs or Organizations: Patient unable to answer     Attends Club or Organization Meetings: Patient unable to answer     Marital Status: Patient unable to answer   Intimate Partner Violence: Patient Unable To Answer (1/1/2024)    Humiliation, Afraid, Rape, and Kick questionnaire     Fear of Current or Ex-Partner: Patient unable to answer     Emotionally Abused: Patient unable to answer     Physically Abused: Patient unable to answer     Sexually Abused: Patient unable to answer   Housing Stability: Low Risk  (1/2/2024)    Housing Stability Vital Sign     Unable to Pay for Housing in the Last Year: No     Number of Places Lived in the Last Year: 1     Unstable Housing in the Last Year: No       Vital Signs  Vitals:    01/04/24 1200   BP:    Pulse:    Resp:    Temp: 36 °C (96.8 °F)   SpO2:        Physical Examination  GEN: The patient appears stated age in no acute distress  VOICE:  No dysphonia, volume is appropriate, no pitch/voice breaks   RESP: Unlabored on 5L NC  EYES: EOM grossly intact with no scleral icterus with right sided exophthalmos  NEURO: Alert and oriented   HEAD: Scalp is normocephalic and atraumatic  FACE: No abrasions or lacerations, no maxillary or mandibular stepoffs  SAL: No parotid or submandibular masses or tenderness to palpation and clear saliva expressed from ducts  EARS: Normal external ears, normal hearing to spoken voice  NOSE: External nose appears normal  OC: Dry lips/mucosa, normal alveolar ridge, normal floor of mouth, normal tongue, normal hard palate, normal retromolar trigone  OP: normal pharyngeal walls, normal soft palate  NECK: Trachea midline, no significant lymphadenopathy    Laboratory and Data  Results for orders placed or performed during the hospital encounter of 01/01/24 (from the past 24 hour(s))   POCT GLUCOSE   Result Value Ref Range    POCT Glucose 102 (H) 74 - 99 mg/dL   Renal Function Panel   Result Value Ref Range    Glucose 98 74 - 99 mg/dL    Sodium 144 136 - 145 mmol/L    Potassium 5.0 3.5 - 5.3 mmol/L    Chloride 108 (H) 98 - 107 mmol/L    Bicarbonate 22 21 - 32 mmol/L    Anion Gap 19 10 - 20 mmol/L    Urea Nitrogen 67 (H) 6 - 23 mg/dL    Creatinine 3.93 (H) 0.50 - 1.30 mg/dL    eGFR 15 (L) >60 mL/min/1.73m*2    Calcium 7.7 (L) 8.6 - 10.6 mg/dL    Phosphorus 6.9 (H) 2.5 - 4.9 mg/dL    Albumin 2.9 (L) 3.4 - 5.0 g/dL   Urine electrolytes   Result Value Ref Range    Sodium, Urine Random 18 mmol/L    Sodium/Creatinine Ratio 25 Not established. mmol/g Creat    Potassium, Urine Random 37 mmol/L    Potassium/Creatinine Ratio 52 Not established mmol/g Creat    Chloride, Urine Random 21 mmol/L    Chloride/Creatinine Ratio 29 23 - 275 mmol/g creat    Creatinine, Urine Random 71.2 20.0 - 370.0 mg/dL   POCT GLUCOSE   Result Value Ref Range    POCT Glucose 101 (H) 74 - 99 mg/dL   POCT GLUCOSE   Result Value Ref Range    POCT Glucose 125 (H) 74 - 99  mg/dL   CBC   Result Value Ref Range    WBC 6.8 4.4 - 11.3 x10*3/uL    nRBC 0.0 0.0 - 0.0 /100 WBCs    RBC 4.49 (L) 4.50 - 5.90 x10*6/uL    Hemoglobin 13.9 13.5 - 17.5 g/dL    Hematocrit 42.4 41.0 - 52.0 %    MCV 94 80 - 100 fL    MCH 31.0 26.0 - 34.0 pg    MCHC 32.8 32.0 - 36.0 g/dL    RDW 14.3 11.5 - 14.5 %    Platelets 98 (L) 150 - 450 x10*3/uL   Magnesium   Result Value Ref Range    Magnesium 2.55 (H) 1.60 - 2.40 mg/dL   Vancomycin   Result Value Ref Range    Vancomycin 24.7 (H) 5.0 - 20.0 ug/mL   Renal function panel   Result Value Ref Range    Glucose 131 (H) 74 - 99 mg/dL    Sodium 143 136 - 145 mmol/L    Potassium 5.1 3.5 - 5.3 mmol/L    Chloride 109 (H) 98 - 107 mmol/L    Bicarbonate 20 (L) 21 - 32 mmol/L    Anion Gap 19 10 - 20 mmol/L    Urea Nitrogen 77 (H) 6 - 23 mg/dL    Creatinine 3.68 (H) 0.50 - 1.30 mg/dL    eGFR 17 (L) >60 mL/min/1.73m*2    Calcium 8.0 (L) 8.6 - 10.6 mg/dL    Phosphorus 7.6 (H) 2.5 - 4.9 mg/dL    Albumin 3.0 (L) 3.4 - 5.0 g/dL   POCT GLUCOSE   Result Value Ref Range    POCT Glucose 137 (H) 74 - 99 mg/dL   POCT GLUCOSE   Result Value Ref Range    POCT Glucose 160 (H) 74 - 99 mg/dL       PROCEDURE NOTE:  Nasopharyngolaryngoscopy and placement of Dobbhoff tube    For better visualization because of need for Dobbhoff tube placement, a flexible fiberoptic nasopharyngolaryngoscopy was performed. Patient was correctly identified and verbal consent obtained.  After topical anesthesia with atomized 4% Lidocaine with Afrin, the flexible scope was introduced into the right naris.  The Dobbhoff tube was then placed in the left naris and visualized entering the upper esophageal inlet and advanced to 55 cm and bridled in place.    The following areas were visualized:  Nasal septum, turbinates, nasopharynx, oropharynx, hypopharynx, soft palate, base of tongue, epiglottis, and larynx.    These structures were found to be normal with the following notable findings:     -Evidence of posterior septum  resection      Assessment  The patient is a 73 y.o. male who presented to [facility] p[facility] 12/31/23 after being found unconscious by family..  ENT was consulted for Dobbhoff tube placement and is status post placement.     Recommendations  -Primary team to follow-up KUB and determine proper placement in the stomach/postpyloric  -Primary team to advance as needed  -Primary team to determine when starting tube feeds or medications through feeding tube  -Patient will be staffed by ENT attending      Staffing update: 1/4/24 @ 1500  - Staffed with Dr. Causey. He agrees with the plan above. NG tube was advanced to 70cm. All other NG management is to be completed by the primary team    Note not final until signed by an attending.     Kobe Quiñonez, DO PGY-2

## 2024-01-04 NOTE — PROGRESS NOTES
Vancomycin Dosing by Pharmacy- Cessation of Therapy    Consult to pharmacy for vancomycin dosing has been discontinued by the prescriber, pharmacy will sign off at this time.    Please call pharmacy if there are further questions or re-enter a consult if vancomycin is resumed.     Sachin Calle, PharmD, WES

## 2024-01-04 NOTE — PROGRESS NOTES
Physical Therapy    Physical Therapy Evaluation    Patient Name: Gopal Ness  MRN: [id number]  Today's Date: 1/4/2024   Time Calculation  Start Time: 0916  Stop Time: 0942  Time Calculation (min): 26 min    Assessment/Plan   PT Assessment  PT Assessment Results: Decreased strength, Decreased endurance, Impaired balance, Decreased mobility, Decreased coordination, Decreased cognition  Rehab Prognosis: Good  Evaluation/Treatment Tolerance: Patient tolerated treatment well  End of Session Communication: Bedside nurse  Assessment Comment: Patient to benefit from ongoing PT services at this time and at discharge to continue addressing the above limitations and to prepare patient for safe and timely return to prior level of function.  End of Session Patient Position: Bed, 3 rail up, Alarm on  IP OR SWING BED PT PLAN  Inpatient or Swing Bed: Inpatient  PT Plan  Treatment/Interventions: Bed mobility, Transfer training, Gait training, Balance training, Neuromuscular re-education, Stair training, Neurodevelopmental intervention, Strengthening, Endurance training, Therapeutic exercise, Therapeutic activity, Home exercise program, Positioning, Postural re-education  PT Plan: Skilled PT  PT Frequency: 5 times per week  PT Discharge Recommendations: High intensity level of continued care  Equipment Recommended upon Discharge: Wheeled walker  PT Recommended Transfer Status: Assist x2  PT - OK to Discharge: Yes (PT evaluation has been completed and discharge recommendations have been made.)      Subjective   General Visit Information:  General  Reason for Referral: Initially presented after family found pt down and unresponsive i/s/o fall on 12/31. Negative CTH, intubated for airway protection. Transferred to [facility] for cvEEG monitoring after EMS reported witnessing possible seizure activity. Extubated 1/2, now on 4L via NC. EEG negative.  Past Medical History Relevant to Rehab: Pituitary adenoma s/p resection at [facility], DVT/PE on  Eliquis, COPD (noncompliant with medications)/tobacco use, depresssion, DMII, HLD  Missed Visit: No  Missed Visit Reason:  (n/a)  Family/Caregiver Present: No  Caregiver Feedback: n/a  Co-Treatment:  (Utilized rehab aide during session for line management and increased safety with mobility activities requiring assist of 2.)  Prior to Session Communication: Bedside nurse  Patient Position Received: Bed, 3 rail up, Alarm on  Home Living:  Home Living  Type of Home: House  Lives With: Alone  Home Adaptive Equipment: None  Home Layout: One level  Home Access: Level entry  Bathroom Shower/Tub: Tub/shower unit  Bathroom Equipment: Grab bars in shower  Prior Level of Function:  Prior Function Per Pt/Caregiver Report  Level of [place]: Independent with ADLs and functional transfers, Independent with homemaking with ambulation  ADL Assistance: Independent  Homemaking Assistance: Independent  Ambulatory Assistance: Independent  Vocational:  (Not presently working)  Leisure: Denies leisure activities  Hand Dominance: Right  Prior Function Comments: Denies history of falling. (-) drives.  Precautions:  Precautions  Hearing/Visual Limitations: Hearing WFL, vision difficult to assess 2/2 limited command following.  Medical Precautions: Fall precautions  Precautions Comment: BP goal normotensive per RN.  Vital Signs:  Vital Signs  Heart Rate: 79 (87 during, 85 end)  SpO2: 91 % (90-93% during, 92% end)  BP: 137/64 (162/79 (107) during, 143/66 (94) end)  MAP (mmHg): 91  BP Method: Arterial line    Objective   Pain:  Pain Assessment  Pain Assessment: 0-10  Pain Score: 0 - No pain  Cognition:  Cognition  Overall Cognitive Status: Impaired  Arousal/Alertness:  (Eyes closed throughout most of session. Able to open fully with verbal cues.)  Orientation Level:  (Oriented to person, place, and time. Cues and options for situation.)  Following Commands: Follows one step commands with repetition (75% accuracy)  Insight: Mild  Impulsive:  Mildly  Processing Speed: Delayed    General Assessments:      Activity Tolerance  Endurance: Tolerates 10 - 20 min exercise with multiple rests  Early Mobility/Exercise Safety Screen: Proceed with mobilization - No exclusion criteria met    Sensation  Light Touch: No apparent deficits        Perception  Inattention/Neglect: Appears intact      Coordination  Movements are Fluid and Coordinated: No (Resting tremor observed BLE; Dissipates with active movement.)    Postural Control  Postural Control: Impaired  Head Control: WFL  Trunk Control: Heavy posterior lean  Righting Reactions: Intact  Protective Responses: Intact  Posture Comment: Rounded shoulders, forward head posture    Static Sitting Balance  Static Sitting-Balance Support: Bilateral upper extremity supported, Feet supported  Static Sitting-Level of Assistance:  (Initially MOD A x1 progressing to MIN A x1)  Static Sitting-Comment/Number of Minutes: 10 min; Verbal/tactile cues for anterior pelvic tilt, isometric trunk muscle activation, and proper posture .  Dynamic Sitting Balance  Dynamic Sitting-Balance Support: Bilateral upper extremity supported, Feet supported  Dynamic Sitting-Comments: MAX A x1 to scoot toward edge of bed.    Static Standing Balance  Static Standing-Balance Support: Bilateral upper extremity supported  Static Standing-Comment/Number of Minutes: MOD A x2  Dynamic Standing Balance  Dynamic Standing-Balance Support:  (Deferred dynamic out of bed mobility 2/2 limited command following.)  Dynamic Standing-Comments: MOD A x2  Functional Assessments:  Bed Mobility  Bed Mobility: Yes  Bed Mobility 1  Bed Mobility 1:  (scooting/boosting)  Level of Assistance 1: Maximum assistance (x2)  Bed Mobility Comments 1: HOB flat, used draw sheet. Verbal cues for proper hand placement.  Bed Mobility 2  Bed Mobility  2: Supine to sitting  Level of Assistance 2: Maximum assistance (x1)  Bed Mobility Comments 2: HOB elevated, used draw sheet.  Verbal/tactile cues for proper task sequencing.  Bed Mobility 3  Bed Mobility 3: Sitting to supine  Level of Assistance 3: Moderate assistance (x2)  Bed Mobility Comments 3: HOB flat, verbal/tactile cues for proper task sequencing.    Transfers  Transfer: Yes  Transfer 1  Technique 1: Sit to stand, Stand to sit  Transfer Device 1:  (B arm in arm assist, R knee blocked)  Transfer Level of Assistance 1: Moderate assistance (x2)  Trials/Comments 1: Verbal/tactile cues for proper hand placement and using trunk momentum to facilitate task.  Extremity/Trunk Assessments:  RUE   RUE :  (Increased edema volume compared to RUE. ROM WFL, strength >/= 3-/5 based on observation.)  LUE   LUE:  (ROM WFL, strength >/= 3/5 based on observation.)  RLE   RLE : Exceptions to WFL (ROM WFL)  Strength RLE  R Hip Flexion: 3-/5  R Knee Flexion: 3/5  R Knee Extension: 3+/5  R Ankle Dorsiflexion: 3/5  R Ankle Plantar Flexion: 3/5  LLE   LLE : Exceptions to WFL (ROM WFL)  Strength LLE  L Hip Flexion: 3-/5  L Knee Flexion: 4/5  L Knee Extension: 4/5  L Ankle Dorsiflexion: 4/5  L Ankle Plantar Flexion: 4/5  Outcome Measures:  [facility] Basic Mobility  Turning from your back to your side while in a flat bed without using bedrails: A lot  Moving from lying on your back to sitting on the side of a flat bed without using bedrails: A lot  Moving to and from bed to chair (including a wheelchair): Total  Standing up from a chair using your arms (e.g. wheelchair or bedside chair): Total  To walk in hospital room: Total  Climbing 3-5 steps with railing: Total  Basic Mobility - Total Score: 8    Confusion Assessment Method-ICU (CAM-ICU)  Feature 1: Acute Onset or Fluctuating Course: Positive  Feature 2: Inattention: Positive  Feature 4: Disorganized Thinking: Positive  Overall CAM-ICU: Positive    FSS-ICU  Ambulation: Unable to attempt due to weakness  Rolling: Moderate assistance (performs 50 - 74% of task)  Sitting: Minimal assistance (performs 75% or more  of task)  Transfer Sit-to-Stand: Total assistance (performs 25% or requires another person)  Transfer Supine-to-Sit: Total assistance (performs 25% or requires another person)  Total Score: 9      ICU Mobility Screen  Early Mobility/Exercise Safety Screen: Proceed with mobilization - No exclusion criteria met  E = Exercise and Early Mobility  Early Mobility/Exercise Safety Screen: Proceed with mobilization - No exclusion criteria met  Current Activity: Standing  Documentation of an Acceptable Level of Exercise/Mobilization Performed: Stand at side of bed    Encounter Problems       Encounter Problems (Active)       Balance       Patient will stand with UE support of LRD and MOD A x1 at least 5 min to improve balance required for self-care tasks.        Start:  01/04/24    Expected End:  01/18/24               Mobility       Patient will ambulate at least 10 ft. with </= MAX A x1 and LRD to improve tolerance of household distances.        Start:  01/04/24    Expected End:  01/18/24            Patient will perform bed mobility with </= MIN A x1 to reduce risk of developing decubitus ulcers.        Start:  01/04/24    Expected End:  01/18/24             LLE >/= 4+/5, RLE >/= 3+/5        Start:  01/04/24    Expected End:  01/18/24               Pain - Adult          Transfers       Patient will perform sit to stand and stand to sit transfers with </= MAX A x1 and LRD to increase functional strength.        Start:  01/04/24    Expected End:  01/18/24                   Education Documentation  Precautions, taught by Madeleine Meza PT at 1/4/2024 10:06 AM.  Learner: Patient  Readiness: Acceptance  Method: Explanation  Response: Needs Reinforcement, Verbalizes Understanding    Body Mechanics, taught by Madeleine Meza PT at 1/4/2024 10:06 AM.  Learner: Patient  Readiness: Acceptance  Method: Explanation  Response: Needs Reinforcement, Verbalizes Understanding    Mobility Training, taught by Madeleine  Jayleen Meza, PT at 1/4/2024 10:06 AM.  Learner: Patient  Readiness: Acceptance  Method: Explanation  Response: Needs Reinforcement, Verbalizes Understanding    Education Comments  No comments found.

## 2024-01-04 NOTE — PROGRESS NOTES
Occupational Therapy    Evaluation/Treatment    Patient Name: Gopal Ness  MRN: [id number]  : 1950  Today's Date: 24  Time Calculation  Start Time: 1122  Stop Time: 1151  Time Calculation (min): 29 min       Assessment:  Prognosis: Good  Evaluation/Treatment Tolerance: Patient tolerated treatment well  Medical Staff Made Aware: Yes  End of Session Communication: Bedside nurse  End of Session Patient Position: Bed, 3 rail up, Alarm on  OT Assessment Results: Decreased ADL status, Decreased upper extremity range of motion, Decreased upper extremity strength, Decreased safe judgment during ADL, Decreased cognition, Decreased endurance, Visual deficit, Decreased fine motor control, Decreased functional mobility, Decreased gross motor control, Decreased IADLs  Prognosis: Good  Evaluation/Treatment Tolerance: Patient tolerated treatment well  Medical Staff Made Aware: Yes  Plan:  Treatment Interventions: ADL retraining, Visual perceptual retraining, Functional transfer training, UE strengthening/ROM, Endurance training, Cognitive reorientation, Equipment evaluation/education, Neuromuscular reeducation, Fine motor coordination activities, Compensatory technique education  OT Frequency: 3 times per week  OT Discharge Recommendations: High intensity level of continued care  Equipment Recommended upon Discharge: Wheeled walker  OT - OK to Discharge: Yes  Treatment Interventions: ADL retraining, Visual perceptual retraining, Functional transfer training, UE strengthening/ROM, Endurance training, Cognitive reorientation, Equipment evaluation/education, Neuromuscular reeducation, Fine motor coordination activities, Compensatory technique education    Subjective   Current Problem:  1. AMS (altered mental status)  EEG    Insert arterial line    Arterial Line Insertion      2. Tachycardia        3. Acute metabolic encephalopathy  Transthoracic Echo (TTE) Complete    Transthoracic Echo (TTE) Complete      4.  Hypotension, unspecified hypotension type  Transthoracic Echo (TTE) Complete    Transthoracic Echo (TTE) Complete      5. Impaired mobility        6. Pituitary adenoma (CMS/HCC)  Referral to Neurosurgery        General:   OT Received On: 01/04/24  General  Reason for Referral: Initially presented after family found pt down and unresponsive i/s/o fall on 12/31. Negative CTH, intubated for airway protection. Transferred to [facility] for cvEEG monitoring after EMS reported witnessing possible seizure activity. Extubated 1/2, now on 4L via NC. EEG negative.  Past Medical History Relevant to Rehab: Pituitary adenoma s/p resection at [facility], DVT/PE on Eliquis, COPD (noncompliant with medications)/tobacco use, depresssion, DMII, HLD  Missed Visit: No  Family/Caregiver Present: No  Prior to Session Communication: Bedside nurse  Patient Position Received: Bed, 3 rail up, Alarm on  General Comment: Pt lethargic but arousable with stimulation, required repeated stimulation to maintain arousal. Pt completed sitting EOB x 10 min with Mod A-CGA.  Precautions:  Hearing/Visual Limitations: Hearing WFL, vision appears impaired, pt unable to read clock on wall. dysconjugate gaze  Medical Precautions: Fall precautions  Precautions Comment: BP goal normotensive per RN.  Vital Signs:  Heart Rate: 78 (post 85)  Resp: 19 (post 20)  SpO2: 94 % (post 93)  BP: 127/62 (post 133/62)  MAP (mmHg): 84 (post 87)  Pain:  Pain Assessment  Pain Assessment: 0-10  Pain Score:  (pt expressed pain with R UE with movement but did not rate. RN made aware.)    Objective   Cognition:  Overall Cognitive Status: Impaired  Orientation Level:  (oriented to self and hospital only)  Following Commands:  (pt followed 50-75% simple 1-step commands with repetition and cueing)  Safety Judgment: Decreased awareness of need for assistance  Problem Solving: Assistance required to identify errors made  Cognition Comments: Pt confused but pleasant during session. Difficulty  maintaining arousal, requiring repeated stimulation to maintain arousal for participation in session.  Attention:  (impaired; distractible, perseverating on wanting to go to the bathroom despite being told repeatedly that he had a mondragon)  Problem Solving:  (impaired)  Safety/Judgement:  (poor safety awareness)        [place] Agitation Sedation Scale  Maguire Agitation Sedation Scale (RASS): Drowsy  Home Living:  Type of Home: House  Lives With: Alone  Home Adaptive Equipment: None  Home Layout: One level  Home Access:  (pt reported 2-3 ANNABELLA)  Bathroom Shower/Tub: Tub/shower unit  Bathroom Equipment: Grab bars in shower  Prior Function:  Level of [place]: Independent with ADLs and functional transfers, Independent with homemaking with ambulation  ADL Assistance: Independent  Homemaking Assistance: Independent  Ambulatory Assistance: Independent  Leisure: watching TV  Hand Dominance: Right  Prior Function Comments: Denies history of falling. (-) drives.  IADL History:     ADL:  Eating Assistance: Total  Grooming Assistance: Maximal  Bathing Assistance: Maximal  UE Dressing Assistance: Maximal  LE Dressing Assistance: Total  Toileting Assistance with Device: Total  Activities of Daily Living:                                  Activity Tolerance:  Endurance: Tolerates 10 - 20 min exercise with multiple rests  Early Mobility/Exercise Safety Screen: Proceed with mobilization - No exclusion criteria met  Functional Standing Tolerance:     Bed Mobility/Transfers: Bed Mobility  Bed Mobility: Yes  Bed Mobility 1  Bed Mobility 1: Supine to sitting, Sitting to supine  Level of Assistance 1: Maximum assistance (x 1)  Bed Mobility Comments 1: HOB elevated, use of drawsheet, cues for initiaiton, sequencing and UE/LE placement         Sitting Balance:  Static Sitting Balance  Static Sitting-Level of Assistance:  (Min-Mod A, progressed briefly to CGA)       Therapy/Activity: Therapeutic Activity  Therapeutic Activity Performed:  Yes  Therapeutic Activity 1: Pt sat EOB x 10-12 min with Mod A initially with R posterior lean, progressed to Min A with brief periods of CGA with cues for proper hand placement and for upright, midline trunk positioning. Pt used L UE for support at EOB, cues for safety.    Vision:Vision - Basic Assessment  Current Vision: Wears glasses all the time (per pt report, glasses not present)   and Vision - Complex Assessment  Vision Comments: pt with dysconjugate gaze with exotropia noted in B eyes. Pt unable to track appropriately and unable to read clock on wall.  Sensation:  Light Touch: No apparent deficits  Strength:     Other Activity:     Home Environment:     Perception:  Inattention/Neglect: Appears intact  Coordination:  Movements are Fluid and Coordinated: No   Hand Function:  Hand Function  Gross Grasp:  (R  3-/5, L  4/5)  Extremities: RUE   RUE : Exceptions to WFL  RUE AROM (degrees)  RUE AROM Comment: R shld ~0-20 degrees AROM, R elbow AAROM WFL, forearm sup/pro ~ 1/2 AAROM  RUE Strength  RUE Overall Strength:  (R shld 2-/5, R elbow flex 2/5, ext 2-/5, R forearm sup/pro 2-/5), LUE   LUE: Exceptions to WFL  LUE AROM (degrees)  LUE AROM Comment: L UE AROM WFL  LUE Strength  LUE Overall Strength:  (L shld 4/5, L elbow flex/ext 5/5), RLE   RLE :  (R LE AROM WFL, strength >/= 3-/5), and LLE   LLE :  (L LE AROM WFL, strength >/= 3-/5)      Outcome Measures: [facility] Daily Activity  Putting on and taking off regular lower body clothing: Total  Bathing (including washing, rinsing, drying): A lot  Putting on and taking off regular upper body clothing: A lot  Toileting, which includes using toilet, bedpan or urinal: Total  Taking care of personal grooming such as brushing teeth: A lot  Eating Meals: Total  Daily Activity - Total Score: 9         and Confusion Assessment Method-ICU (CAM-ICU)  Feature 1: Acute Onset or Fluctuating Course: Positive  Feature 2: Inattention: Positive  Feature 3: Altered Level of  Consciousness: Positive  Feature 4: Disorganized Thinking: Positive  Overall CAM-ICU: Positive    Education Documentation  Precautions, taught by Kemi Lopez OT at 1/4/2024  2:04 PM.  Learner: Patient  Readiness: Nonacceptance  Method: Explanation, Demonstration  Response: Needs Reinforcement    ADL Training, taught by Kemi Lopez OT at 1/4/2024  2:04 PM.  Learner: Patient  Readiness: Nonacceptance  Method: Explanation, Demonstration  Response: Needs Reinforcement    Education Comments  No comments found.        OP EDUCATION:       Goals:  Encounter Problems       Encounter Problems (Active)       ADLs       Patient will perform UB and LB bathing with Mod A using AE and adaptive tech as needed.       Start:  01/04/24    Expected End:  01/18/24            Patient with complete upper body dressing with Min A.       Start:  01/04/24    Expected End:  01/18/24            Patient with complete lower body dressing with Mod A using AE and adaptive tech as needed.       Start:  01/04/24    Expected End:  01/18/24            Patient will complete daily grooming tasks with Min A using AE as needed.       Start:  01/04/24    Expected End:  01/18/24            Patient will complete toileting including hygiene clothing management/hygiene with Mod A.       Start:  01/04/24    Expected End:  01/18/24               BALANCE       Pt will maintain tolerate sitting EOB >15 min with SBA without LOB during ADLs.       Start:  01/04/24    Expected End:  01/18/24                       COGNITION/SAFETY       Pt will be alert and oriented x 4 and follow 100% multi-step commands and be CAM (-).       Start:  01/04/24    Expected End:  01/18/24                       TRANSFERS       Patient will perform bed mobility with Mod A.       Start:  01/04/24    Expected End:  01/18/24            Patient will complete functional transfers with Mod A using LRAD.       Start:  01/04/24    Expected End:  01/18/24

## 2024-01-04 NOTE — CONSULTS
Reason For Consult  Endocrinology consulted given history of pituitary adenoma resection and whether patient needed to be on steroid replacement therapy.     Patient history:  When seen at bedside, patient was very sleepy and had to continually try to wake him up to answer questions. Did follow commands and answered limited questions though noted to have slurring. History limited and patient unable to provide much information. Per nursing, patient very sleepy after therapy sessions today and she confirmed slurring of voice was not new change. Patient confirmed vision poor though endorsed that his vision has been unchanged for the past several months. He denied headaches.     Attempted to call daughter for collateral given unable to obtain much information from patient regarding recent symptoms however when called number listed in chart states that “subscriber you have dialed is not in service.”    History Of Present Illness (from chart)  Gopal Ness is a 73 y.o. male with a h/o pituitary adenoma s/p transphenoidal resection at [facility] (2016) c/b panhypopituitarism (adrenal insufficiency-resolved, hypogonadism, diabetes insipidis reportedly), hypothyroidism on levothyroxine);  DVT/PE on Eliquis, COPD (noncompliant with medications)/tobacco use, depresssion, DMII, HLD presenting after being found down i/s/o fall on 12/31 (LKN 1100 on 12/31). Patient's children checked on him after his fall on 12/31 and he was reportedly okay, however was found down and unresponsive the following morning with emesis around him. Brought to OSH, negative CTH, intubated for airway protection. EMS reported witnessing possible seizure activity(?). Also with persistent tachycardia, leukocytosis and infiltrate on chest CT, so treating presumed aspiration pneumonia with antibiotics. Started on Keppra 1g BID and transferred to [facility] for cvEEG and further monitoring. Extubated 1/2. Intermittent BiPAP. Keppra discontinued. Hypotensive on levophed  drip, weaned off pressor on 1/3 afternoon. Course complicated by LEEANNE.     Endocrine history (from chart):  He has a history of a sellar mass on MRI 10/28/11, and later presented In 2016 with vision changes and found to have progression to involve sellar/suprasellar/third ventricular region on MRI 10/4/16. Endocrine eval revealed central hypothyroidism and hypogonadism. He had high ACTH and high cortisol Pre op and 2 LDDST where the cortisol level did not suppress. Seen by Dr. Ramos and it was though that the high ACTH was either due to the macroadenoma overproduction of ACTH or from necrosis of the mass and spilling of ACTH from tumor. He is s/p expanded endoscopic endonasal approach for resection on 10/13/16 (pathology pituitary adenoma, ACTH cell-type, Ki-67 focally 12%). Then with MRI evidence of tumor recurrence (the right greater than left parasellar regions and involving the right cavernous sinus )with hypercortisolism urine free cortisol from 8/8/18 of 116.6 now s/p 5220 cGy in 29 fx, completed 10/29/2018 with Radiation oncology Dr. Diogo Hill. No imaging between 2019 and 2023.     Diagnosed with diabetes in September 2017 and has an HbA1c of 14.9%. His PCP started him on medication metformin 1000 mg twice daily, glimeperide 4 mg twice daily and januvia 100 mg once daily (but Januvia was too expensive to continue).  Diabetes now under good control.    He continued to follow with Dr. Everardo Hood of endocrinology at [facility] for panhypopituitarism, adrenal insufficiency (not on treatment), hypothyroidism (taking Synthroid 200mcg six days a week), and secondary hypogonadism with ongoing fatigue (declined testosterone), but at last visit Dr Mcgee indicated that leaving the clinic.  No brain imaging in the interim from 0722-8143.     Patient with progression of right eye ptosis and underwent repeat MRI on 07/31/23 which demonstrated changes of the transsphenoidal mass resection with residual enhancing tissue  in the cavernous sinuses and suprasellar cistern consistent with residual adenoma. He has mass effect on the OC. There is a new small dural-based enhancing lesion of the right posterior fossa measuring 1 x 1.2cm.     He was seen on 9/28/2023 by neurosurgery who confirmed residual however no comparison images available.  Neurosurgery felt that grossly by report the residual is similar in size or perhaps slightly increased - on reports approximately 1.5 cm in maximal dimension, and on the current imaging measuring measuring similar. Neurosurgery discussed that progressive ophthalmoplegia would be consistent with the location of the right cavernous tumor. Given he has had prior surgery and radiation, and given the location of the residual, neurosurgery discussed with him that additional surgery is not warranted/feasible, and would not result in improvement of the ophthalmoplegia.     Neurosurgery commented that the additional likely meningiomas which are the dural based lesions in the posterior fossa warrant monitoring, and are not mentioned on prior imaging. For monitoring of both the residual disease and these findings neurosurgery recommended an MRI in nomor that 6 months since the last, formal neuro-ophthalmological evaluation, and referral to neuroendocrinologist.     Current inpatient diabetes regimen: lispro corrective scale #4  Current inpatient thyroid regimen: levothyroxine 200mcg daily (not currently receiving as NPO)    Current home diabetes regimen: glimepiride 4mg BID, metformin 100 BID  Current home thyroid regimen: levothyroxine 200mcg daily  Current home steroid use: none except for short course most recently 01/2023 (methylprednisolon therapy pack)     Past Medical History  Per above    Surgical History  He has a past surgical history that includes Total hip arthroplasty (08/01/2014) and Tonsillectomy (08/01/2014). Per above.      Social History  He reports that he has been smoking cigarettes. He has  never used smokeless tobacco. He reports that he does not drink alcohol and does not use drugs.    Family History  No family history on file.     Allergies  Codeine and Penicillins    Review of Systems  Unable to obtain due to patient clinical status     Physical Exam  General appearance: NAD. Able to state he is in NSU. Following commands. Able to  fingers, wiggle toes, open and close eyes.  Eyes: Intermittently opens. Right eye no pupillary response to light. Grossly able to count fingers at least a foot from his face. Unable to count fingers peripherally bilaterally.   Cardiac: Regular rhythm  Abdomen: Soft, distended, NT +BS. Scratches across abdomen. No striae.   Extremities: scattered ecchymoses, has full facial beard     Last Recorded Vitals  Blood pressure 137/64, pulse 79, temperature 36 °C (96.8 °F), resp. rate 20, weight 104 kg (229 lb 8 oz), SpO2 91 %.    Relevant Results    Labs (started on hydrocortisone 50mg injections 01/03 noon, no other steroids charted even from OSH admission)  01/01/2024:  FSH <0.9 wnl  HgbA1c 5.6  Free T4 1.03 wnl  LH <0.1 wnl  Prolactin 1.4L  TSH 0.02L  Growth hormone <0.05L  T3 1.8L    01/02/23:  AM cortisol 67.6H   ACTH 100.0H  IGF 31 wnl    03/01/2023:  Prolactin 4.5 wnl    10/14/2020:  Cortisol 4.7  ACTH 38.5  T4 8.5  T4 uptake 1.12  FTI 7.6  Free T4 1.3  ILGF1 28L  HgbA1c 6.0      Imaging:    MRI sella/MRI brain 1/3/24 WET READ:  Similar changes of trans-sphenoidal mass resection with residual enhancing soft tissue within the lateral sella with extension into the cavernous sinuses and suprasellar cisterns bilaterally again favored to represent residual adenoma. This residual tumor is unchanged compared to prior examination and results in mass effect on the bilateral optic nerves as described above.  2. Similar to slightly increased size of small dural-based enhancing masses within the right posterior fossa again favored to represent meningiomas.    MRI sella  7/31/2023:  1. Changes of transsphenoidal mass resection with residual enhancing tissue in the cavernous sinuses and suprasellar cistern, likely residual adenoma. There is associated mass effect on the optic  apparatus as described above.  2. New small dural-based enhancing masses in the right posterior fossa, likely meningiomas.    Pathology:    10/13/2016    FINAL DIAGNOSIS  1. Sellar region, mass, biopsy and resection (A, C) - Pituitary adenoma,  ACTH-cell-type; see comment.  2. Sellar region mass #2, biopsy (B) - Anterior pituitary tissue.    COMMENT  1. The tumor consists of monotonous cells with neuroendocrine nuclei,  occasional nucleoli and rare mitotic activity. Immunohistochemistry to  further characterize the tumor (C1) shows the following in tumor cells:  ACTH positive at the exclusion of other hormone markers (GH, prolactin,  FSH, TSH, LH); CAM5.2 strong diffuse cytoplasmic positivity; Ki-67  proliferative index 6%.    (Part A) Additional review of slides for brain tumor board on 10/24/2016 is  performed. In some areas, the Ki-67 reaches an elevated proliferative  index of 12%, and there are 3 mitotic figures per 10 high power fields.    Scheduled medications  cefTRIAXone, 2 g, intravenous, Daily  heparin (porcine), 5,000 Units, subcutaneous, q8h KENDRA  hydrocortisone sodium succinate, 50 mg, intravenous, q8h  insulin lispro, 0-20 Units, subcutaneous, q4h  ipratropium-albuteroL, 3 mL, nebulization, q6h  lactated Ringer's, 1,000 mL, intravenous, Once  levothyroxine, 200 mcg, oral, Daily  pantoprazole, 40 mg, intravenous, Daily  perflutren protein A microsphere, 0.5 mL, intravenous, Once in imaging  polyethylene glycol, 17 g, oral, Daily  sennosides, 2 tablet, oral, BID  sertraline, 50 mg, oral, Daily  sulfur hexafluoride microsphr, 2 mL, intravenous, Once in imaging      Continuous medications     PRN medications  PRN medications: dextrose 10 % in water (D10W), dextrose, glucagon, oxygen, phenoL        Assessment/Plan   Gopal Ness is a 73 y.o. male with a h/o pituitary adenoma s/p transphenoidal resection at [facility] (2016) c/b panhypopituitarism (adrenal insufficiency-resolved, hypogonadism, diabetes insipidis reportedly), hypothyroidism on levothyroxine);  DVT/PE on Eliquis, COPD (noncompliant with medications)/tobacco use, depresssion, DMII, HLD presenting after being found down unresponsive on 1/1/24 and subsequently transferred to [facility] on 1/2 with course complicated by intubation for airway protection (now extubated on intermittent BiPAP), concern for possible seizure activity (initially placed on keppra, now off keppra), presumed aspiration pneumonia on antibiotics, hypotension initially on pressors- now resolved, and LEEANNE.     Endocrinology consulted given history of pituitary adenoma resection and whether patient needed to be on steroid replacement therapy.     Patient with known history Cushing disease confirmed biochemically and with biopsy with prior recurrence. Now patient presenting with active Cushing disease again with high AM cortisol and ACTH likely 2/2 residual adenoma. He has panhypopituitarism s/p transphenoidal resection of pituitary adenoma (2016) and radiation (2018), and no recovery expected of pituitary based on current labs and s/p radiation. Per neurosurgery in 09/2023, additional surgery of residual is not warranted/feasible, and would not result in improvement of ophthalmoplegia. Residual on 01/2024 MRI appears grossly the same as residual on 07/2023 MRI. Unlikely to be cause of seizures.     #Cushing disease  #Panhypopituitarism  ::Patient with sellar mass dx 2011 progressing to vision changes 2016 with Cushing disease now s/p resection 10/2016 (pathology pituitary adenoma, ACTH cell-type, Ki-67 focally 12%) with MRI and biochemical recurrence of Cushing in 2018 now s/p 5220 cGy in 29 fx now with recent worsening ophthalmoplegia with repeat MRI 07/2023 thought to be likely unchanged  from prior in size based on report (known mass effect on OC). Had been followed at [facility] by endocrinology (see consult note for additional endocrine history).   ::1/1/24 FSH <0.9, LH <0.1, prolactin 1.4L, Growth hormone <0.05, IGF1 31 (Z score -2.8)  ::1/1/24 ACTH 100.0H, 1/2/24 AM cortisol 67.6  ::Per chart review, did not start inpatient steroids until 1/3/2024  Recommendations:  -stop steroids given active Cushing disease  -recheck ACTH and AM cortisol tomorrow 1/5/2024  -no further inpatient treatment of Cushing disease  -follow up outpatient with endocrinologist Dr. Yvonne Lofton in 4 weeks (our coordinator will schedule this appointment)    #Diabetes  ::HgbA1c 5.6 on 1/1/24  ::home metformin 1000 mg twice daily, glimeperide 4 mg twice daily   Recommendations:  -stop lispro sliding scale #4  -start glargine 5 units daily  -start lispro sliding scale #1  -POCT glucose q4hrs while NPO  -hypoglycemia protocol    #Hypothyroidism  ::1/1/24 TSH 0.02L, free T4 0.71L, free T3 1.8L  Recommendations:  -if not able to give po medication, please give IV levothyroxine 100mcg daily (once able to take po meds, please return to home oral levothyroxine 200 mcg daily)      Irene Wang MD  Plan was communicated to the primary team.   Pager [phone]  Patient seen by, examined by, and discussed with Dr. Brandon

## 2024-01-04 NOTE — PROGRESS NOTES
Speech-Language Pathology    Inpatient SLP Swallow Treatment     Patient Name: Gopal Ness  MRN: [id number]  Today's Date: 1/4/2024  Time Calculation  Start Time: 1043  Stop Time: 1100  Time Calculation (min): 17 min         Recommendations:   NPO   May consider alternative means nutrition/hydration  Frequent oral care  May allow moist oral swabs when alert    SLP Assessment:   Repeat clinical swallow evaluation completed revealing continued high concern for pharyngeal dysphagia/aspiration d/t lethargy, compromised respiratory status, and altered mental status. Repeat assessment as mentation/alertness/respiratory status improves/stabilizes.         Plan:  SLP Frequency: 1x per week  Duration: 3 weeks  SLP Discharge Recommendations:  (Unlcear, defer to updated notes)  Discussed POC: Patient, Nursing, Physician  Discussed Risks/Benefits: Yes  Patient/Caregiver Agreeable: Yes  SLP - OK to Discharge: Yes      Subjective   Respiratory Status: Oxygen via nasal cannula  Behavior/Cognition:  Lethargic, slightly more alert than previous date requiring only mod cues to alert/participate (as compared to max previous date), oriented x2 independently.   Patient Positioning: Upright in Bed   Speech severely dysarthric, noted mild lingual deviation to Rt as identified by RN      Objective       Therapeutic Swallow Therapy:   Given mod cues for alertness pt accepted x2 ice chip trials, x4 tsp of water and x1 straw sip of water. No s/s aspiration noted with ice chips and tsps of water this date however pt with immediate weak/wet coughing given single straw sip, requiring significant suctioning via yankauer; pt desatted to 89%, improving after suctioning and given time to stop coughing/take deep breaths. RN aware and assisted with suctioning.        Inpatient Education:  Pt educated on result and recommendations. Pt did not indicated understanding and will likely benefit from repeat education when more alert.       Goals:  Patient/Family will verbalize/demonstrate comprehension of dysphagia education, strategies, recommendations and POC.

## 2024-01-04 NOTE — CONSULTS
Nutrition Note:   Nutrition Assessment    Reason for Assessment: Tube feeding recommendations    This service reconsulted for tube feed recommendations.  Pt extubated 1/2.  Noted LEEANNE.    Nutrition Significant Labs:  CBC Trend:   Results from last 7 days   Lab Units 01/04/24 0137 01/03/24 0121 01/02/24  0431 01/01/24  1726   WBC AUTO x10*3/uL 6.8 13.5* 18.1* 18.5*   RBC AUTO x10*6/uL 4.49* 4.63 4.88 5.50   HEMOGLOBIN g/dL 13.9 14.3 15.2 16.9   HEMATOCRIT % 42.4 45.8 46.4 51.0   MCV fL 94 99 95 93   PLATELETS AUTO x10*3/uL 98* 133* 130* 148*   BMP Trend:   Results from last 7 days   Lab Units 01/04/24 0137 01/03/24 1617 01/03/24 0121 01/02/24  0431   GLUCOSE mg/dL 131* 98 234* 144*   CALCIUM mg/dL 8.0* 7.7* 7.5* 7.9*   SODIUM mmol/L 143 144 140 136   POTASSIUM mmol/L 5.1 5.0 5.2 4.8   CO2 mmol/L 20* 22 20* 22   CHLORIDE mmol/L 109* 108* 105 104   BUN mg/dL 77* 67* 55* 32*   CREATININE mg/dL 3.68* 3.93* 3.22* 1.87*    , Renal Lab Trend:   Results from last 7 days   Lab Units 01/04/24 0137 01/03/24 1617 01/03/24 0121 01/02/24  0431   POTASSIUM mmol/L 5.1 5.0 5.2 4.8   PHOSPHORUS mg/dL 7.6* 6.9* 6.7* 3.6   SODIUM mmol/L 143 144 140 136   MAGNESIUM mg/dL 2.55*  --  2.31 2.04   EGFR mL/min/1.73m*2 17* 15* 20* 38*   BUN mg/dL 77* 67* 55* 32*   CREATININE mg/dL 3.68* 3.93* 3.22* 1.87*        Nutrition Specific Medications:  Scheduled medications  cefTRIAXone, 2 g, intravenous, Daily  hydrocortisone sodium succinate, 50 mg, intravenous, q8h  insulin lispro, 0-20 Units, subcutaneous, q4h  levothyroxine, 200 mcg, oral, Daily  pantoprazole, 40 mg, intravenous, Daily  polyethylene glycol, 17 g, oral, Daily  sennosides, 2 tablet, oral, BID        Dietary Orders (From admission, onward)       Start     Ordered    01/01/24 1711  NPO Diet; Effective now  Diet effective now         01/01/24 1713                     Estimated Needs:   Total Energy Estimated Needs (calories): 4968-8262 calories/day  Method for Estimating Needs:  28-30 calories/kg ideal body weight  Total Protein Estimated Needs (g):  g/day  Method for Estimating Needs: 1.2-1.4 g/kg ideal body weight  Total Fluid Estimated Needs (mL): 1 mL/calorie or per team         Nutrition Interventions/Recommendations         Nutrition Prescription:  Individualized Nutrition Prescription Provided for : TF recs: Start Nepro @ 10ml/hr, increase by 10mls every 8hrs to reach goal of 50ml/hr        Nutrition Interventions:   Food and/or Nutrient Delivery Interventions  Interventions: Enteral intake  Goal: Nepro @ 50ml/hr = 1200mls, 2160kcals, 97g pro, 827mls free H2O       Nutrition Monitoring and Evaluation   Food/Nutrient Related History Monitoring  Monitoring and Evaluation Plan: Enteral and parenteral nutrition intake    Body Composition/Growth/Weight History  Monitoring and Evaluation Plan: Weight    Biochemical Data, Medical Tests and Procedures  Monitoring and Evaluation Plan: Electrolyte/renal panel, Glucose/endocrine profile            Time Spent/Follow-up Reminder:   Time Spent (min): 30 minutes  Last Date of Nutrition Visit: 01/04/24  Nutrition Follow-Up Needed?: Dietitian to reassess per policy  Follow up Comment: Provided TF recs now that pt is extubated

## 2024-01-05 ENCOUNTER — APPOINTMENT (OUTPATIENT)
Dept: CARDIOLOGY | Facility: HOSPITAL | Age: 74
DRG: 208 | End: 2024-01-05
Payer: MEDICARE

## 2024-01-05 ENCOUNTER — APPOINTMENT (OUTPATIENT)
Dept: RADIOLOGY | Facility: HOSPITAL | Age: 74
DRG: 208 | End: 2024-01-05
Payer: MEDICARE

## 2024-01-05 LAB
ALBUMIN SERPL BCP-MCNC: 2.6 G/DL (ref 3.4–5)
ALBUMIN SERPL BCP-MCNC: 2.8 G/DL (ref 3.4–5)
ALBUMIN SERPL BCP-MCNC: 2.9 G/DL (ref 3.4–5)
ALBUMIN SERPL BCP-MCNC: 3 G/DL (ref 3.4–5)
ALP SERPL-CCNC: 48 U/L (ref 33–136)
ALT SERPL W P-5'-P-CCNC: 24 U/L (ref 10–52)
ANION GAP SERPL CALC-SCNC: 13 MMOL/L (ref 10–20)
ANION GAP SERPL CALC-SCNC: 13 MMOL/L (ref 10–20)
ANION GAP SERPL CALC-SCNC: 17 MMOL/L (ref 10–20)
AST SERPL W P-5'-P-CCNC: 35 U/L (ref 9–39)
ATRIAL RATE: 87 BPM
ATRIAL RATE: 99 BPM
BACTERIA SPEC RESP CULT: ABNORMAL
BASE EXCESS BLDA CALC-SCNC: -1.3 MMOL/L (ref -2–3)
BILIRUB DIRECT SERPL-MCNC: 0.1 MG/DL (ref 0–0.3)
BILIRUB SERPL-MCNC: 0.3 MG/DL (ref 0–1.2)
BODY TEMPERATURE: ABNORMAL
BUN SERPL-MCNC: 60 MG/DL (ref 6–23)
BUN SERPL-MCNC: 67 MG/DL (ref 6–23)
BUN SERPL-MCNC: 82 MG/DL (ref 6–23)
CALCIUM SERPL-MCNC: 7.9 MG/DL (ref 8.6–10.6)
CALCIUM SERPL-MCNC: 8.2 MG/DL (ref 8.6–10.6)
CALCIUM SERPL-MCNC: 8.3 MG/DL (ref 8.6–10.6)
CARDIAC TROPONIN I PNL SERPL HS: 21 NG/L (ref 0–53)
CARDIAC TROPONIN I PNL SERPL HS: 22 NG/L (ref 0–53)
CHLORIDE SERPL-SCNC: 115 MMOL/L (ref 98–107)
CHLORIDE SERPL-SCNC: 117 MMOL/L (ref 98–107)
CHLORIDE SERPL-SCNC: 120 MMOL/L (ref 98–107)
CHLORIDE UR-SCNC: 70 MMOL/L
CHLORIDE/CREATININE (MMOL/G) IN URINE: 194 MMOL/G CREAT (ref 23–275)
CO2 SERPL-SCNC: 24 MMOL/L (ref 21–32)
CO2 SERPL-SCNC: 26 MMOL/L (ref 21–32)
CO2 SERPL-SCNC: 26 MMOL/L (ref 21–32)
CORTIS AM PEAK SERPL-MSCNC: 58.2 UG/DL (ref 5–20)
CREAT SERPL-MCNC: 2.25 MG/DL (ref 0.5–1.3)
CREAT SERPL-MCNC: 2.62 MG/DL (ref 0.5–1.3)
CREAT SERPL-MCNC: 3.27 MG/DL (ref 0.5–1.3)
CREAT UR-MCNC: 36 MG/DL (ref 20–370)
CRP SERPL-MCNC: 6.53 MG/DL
D DIMER PPP FEU-MCNC: 1360 NG/ML FEU
ERYTHROCYTE [DISTWIDTH] IN BLOOD BY AUTOMATED COUNT: 14.2 % (ref 11.5–14.5)
FERRITIN SERPL-MCNC: 354 NG/ML (ref 20–300)
GFR SERPL CREATININE-BSD FRML MDRD: 19 ML/MIN/1.73M*2
GFR SERPL CREATININE-BSD FRML MDRD: 25 ML/MIN/1.73M*2
GFR SERPL CREATININE-BSD FRML MDRD: 30 ML/MIN/1.73M*2
GLUCOSE BLD MANUAL STRIP-MCNC: 101 MG/DL (ref 74–99)
GLUCOSE BLD MANUAL STRIP-MCNC: 122 MG/DL (ref 74–99)
GLUCOSE BLD MANUAL STRIP-MCNC: 123 MG/DL (ref 74–99)
GLUCOSE BLD MANUAL STRIP-MCNC: 125 MG/DL (ref 74–99)
GLUCOSE BLD MANUAL STRIP-MCNC: 188 MG/DL (ref 74–99)
GLUCOSE SERPL-MCNC: 116 MG/DL (ref 74–99)
GLUCOSE SERPL-MCNC: 127 MG/DL (ref 74–99)
GLUCOSE SERPL-MCNC: 168 MG/DL (ref 74–99)
GRAM STN SPEC: ABNORMAL
GRAM STN SPEC: ABNORMAL
HCO3 BLDA-SCNC: 23.7 MMOL/L (ref 22–26)
HCT VFR BLD AUTO: 43.8 % (ref 41–52)
HGB BLD-MCNC: 13.9 G/DL (ref 13.5–17.5)
INHALED O2 CONCENTRATION: 35 %
LDH SERPL L TO P-CCNC: 297 U/L (ref 84–246)
MAGNESIUM SERPL-MCNC: 2.68 MG/DL (ref 1.6–2.4)
MCH RBC QN AUTO: 30.7 PG (ref 26–34)
MCHC RBC AUTO-ENTMCNC: 31.7 G/DL (ref 32–36)
MCV RBC AUTO: 97 FL (ref 80–100)
NRBC BLD-RTO: 0 /100 WBCS (ref 0–0)
OSMOLALITY SERPL: 343 MOSM/KG (ref 280–300)
OSMOLALITY UR: 355 MOSM/KG (ref 200–1200)
OXYHGB MFR BLDA: 92.7 % (ref 94–98)
P AXIS: 52 DEGREES
P AXIS: 62 DEGREES
P OFFSET: 186 MS
P OFFSET: 187 MS
P ONSET: 138 MS
P ONSET: 142 MS
PCO2 BLDA: 40 MM HG (ref 38–42)
PH BLDA: 7.38 PH (ref 7.38–7.42)
PHOSPHATE SERPL-MCNC: 3.3 MG/DL (ref 2.5–4.9)
PHOSPHATE SERPL-MCNC: 3.5 MG/DL (ref 2.5–4.9)
PHOSPHATE SERPL-MCNC: 5.2 MG/DL (ref 2.5–4.9)
PLATELET # BLD AUTO: 100 X10*3/UL (ref 150–450)
PO2 BLDA: 67 MM HG (ref 85–95)
POTASSIUM SERPL-SCNC: 4.2 MMOL/L (ref 3.5–5.3)
POTASSIUM SERPL-SCNC: 4.4 MMOL/L (ref 3.5–5.3)
POTASSIUM SERPL-SCNC: 4.4 MMOL/L (ref 3.5–5.3)
POTASSIUM UR-SCNC: 16 MMOL/L
POTASSIUM/CREAT UR-RTO: 44 MMOL/G CREAT
PR INTERVAL: 170 MS
PR INTERVAL: 176 MS
PROT SERPL-MCNC: 5.6 G/DL (ref 6.4–8.2)
Q ONSET: 226 MS
Q ONSET: 227 MS
QRS COUNT: 15 BEATS
QRS COUNT: 16 BEATS
QRS DURATION: 72 MS
QRS DURATION: 72 MS
QT INTERVAL: 340 MS
QT INTERVAL: 364 MS
QTC CALCULATION(BAZETT): 436 MS
QTC CALCULATION(BAZETT): 438 MS
QTC FREDERICIA: 401 MS
QTC FREDERICIA: 411 MS
R AXIS: -2 DEGREES
R AXIS: 13 DEGREES
RBC # BLD AUTO: 4.53 X10*6/UL (ref 4.5–5.9)
SAO2 % BLDA: 94 % (ref 94–100)
SODIUM SERPL-SCNC: 152 MMOL/L (ref 136–145)
SODIUM SERPL-SCNC: 152 MMOL/L (ref 136–145)
SODIUM SERPL-SCNC: 155 MMOL/L (ref 136–145)
SODIUM UR-SCNC: 66 MMOL/L
SODIUM/CREAT UR-RTO: 183 MMOL/G CREAT
T AXIS: 36 DEGREES
T AXIS: 60 DEGREES
T OFFSET: 397 MS
T OFFSET: 408 MS
TESTOSTERONE FREE (CHAN): 11.3 PG/ML (ref 30–135)
TESTOSTERONE,TOTAL,LC-MS/MS: 73 NG/DL (ref 250–1100)
VENTRICULAR RATE: 87 BPM
VENTRICULAR RATE: 99 BPM
WBC # BLD AUTO: 5 X10*3/UL (ref 4.4–11.3)

## 2024-01-05 PROCEDURE — 2500000004 HC RX 250 GENERAL PHARMACY W/ HCPCS (ALT 636 FOR OP/ED)

## 2024-01-05 PROCEDURE — 99222 1ST HOSP IP/OBS MODERATE 55: CPT | Performed by: PHYSICAL MEDICINE & REHABILITATION

## 2024-01-05 PROCEDURE — 83935 ASSAY OF URINE OSMOLALITY: CPT

## 2024-01-05 PROCEDURE — 83735 ASSAY OF MAGNESIUM: CPT

## 2024-01-05 PROCEDURE — 82436 ASSAY OF URINE CHLORIDE: CPT

## 2024-01-05 PROCEDURE — 84484 ASSAY OF TROPONIN QUANT: CPT

## 2024-01-05 PROCEDURE — 3E0G76Z INTRODUCTION OF NUTRITIONAL SUBSTANCE INTO UPPER GI, VIA NATURAL OR ARTIFICIAL OPENING: ICD-10-PCS

## 2024-01-05 PROCEDURE — 84484 ASSAY OF TROPONIN QUANT: CPT | Performed by: STUDENT IN AN ORGANIZED HEALTH CARE EDUCATION/TRAINING PROGRAM

## 2024-01-05 PROCEDURE — 80069 RENAL FUNCTION PANEL: CPT | Mod: CCI,MUE

## 2024-01-05 PROCEDURE — 82728 ASSAY OF FERRITIN: CPT

## 2024-01-05 PROCEDURE — 86140 C-REACTIVE PROTEIN: CPT

## 2024-01-05 PROCEDURE — 36415 COLL VENOUS BLD VENIPUNCTURE: CPT

## 2024-01-05 PROCEDURE — 94640 AIRWAY INHALATION TREATMENT: CPT

## 2024-01-05 PROCEDURE — 99232 SBSQ HOSP IP/OBS MODERATE 35: CPT

## 2024-01-05 PROCEDURE — 74018 RADEX ABDOMEN 1 VIEW: CPT

## 2024-01-05 PROCEDURE — 85379 FIBRIN DEGRADATION QUANT: CPT

## 2024-01-05 PROCEDURE — 2500000001 HC RX 250 WO HCPCS SELF ADMINISTERED DRUGS (ALT 637 FOR MEDICARE OP)

## 2024-01-05 PROCEDURE — 85027 COMPLETE CBC AUTOMATED: CPT

## 2024-01-05 PROCEDURE — 2060000001 HC INTERMEDIATE ICU ROOM DAILY

## 2024-01-05 PROCEDURE — 82533 TOTAL CORTISOL: CPT

## 2024-01-05 PROCEDURE — 2500000002 HC RX 250 W HCPCS SELF ADMINISTERED DRUGS (ALT 637 FOR MEDICARE OP, ALT 636 FOR OP/ED)

## 2024-01-05 PROCEDURE — 37799 UNLISTED PX VASCULAR SURGERY: CPT

## 2024-01-05 PROCEDURE — 74018 RADEX ABDOMEN 1 VIEW: CPT | Performed by: STUDENT IN AN ORGANIZED HEALTH CARE EDUCATION/TRAINING PROGRAM

## 2024-01-05 PROCEDURE — 93005 ELECTROCARDIOGRAM TRACING: CPT

## 2024-01-05 PROCEDURE — C9113 INJ PANTOPRAZOLE SODIUM, VIA: HCPCS

## 2024-01-05 PROCEDURE — 82040 ASSAY OF SERUM ALBUMIN: CPT

## 2024-01-05 PROCEDURE — 80069 RENAL FUNCTION PANEL: CPT

## 2024-01-05 PROCEDURE — 2500000004 HC RX 250 GENERAL PHARMACY W/ HCPCS (ALT 636 FOR OP/ED): Mod: MUE

## 2024-01-05 PROCEDURE — 84075 ASSAY ALKALINE PHOSPHATASE: CPT

## 2024-01-05 PROCEDURE — 74018 RADEX ABDOMEN 1 VIEW: CPT | Performed by: RADIOLOGY

## 2024-01-05 PROCEDURE — 99222 1ST HOSP IP/OBS MODERATE 55: CPT

## 2024-01-05 PROCEDURE — 82947 ASSAY GLUCOSE BLOOD QUANT: CPT

## 2024-01-05 PROCEDURE — 2500000005 HC RX 250 GENERAL PHARMACY W/O HCPCS

## 2024-01-05 PROCEDURE — 82024 ASSAY OF ACTH: CPT

## 2024-01-05 PROCEDURE — 37799 UNLISTED PX VASCULAR SURGERY: CPT | Performed by: STUDENT IN AN ORGANIZED HEALTH CARE EDUCATION/TRAINING PROGRAM

## 2024-01-05 PROCEDURE — 83930 ASSAY OF BLOOD OSMOLALITY: CPT

## 2024-01-05 PROCEDURE — 5A0955A ASSISTANCE WITH RESPIRATORY VENTILATION, GREATER THAN 96 CONSECUTIVE HOURS, HIGH NASAL FLOW/VELOCITY: ICD-10-PCS

## 2024-01-05 PROCEDURE — 2500000004 HC RX 250 GENERAL PHARMACY W/ HCPCS (ALT 636 FOR OP/ED): Performed by: STUDENT IN AN ORGANIZED HEALTH CARE EDUCATION/TRAINING PROGRAM

## 2024-01-05 PROCEDURE — 82805 BLOOD GASES W/O2 SATURATION: CPT

## 2024-01-05 PROCEDURE — 83615 LACTATE (LD) (LDH) ENZYME: CPT

## 2024-01-05 PROCEDURE — 94660 CPAP INITIATION&MGMT: CPT

## 2024-01-05 RX ORDER — LEVOTHYROXINE SODIUM ANHYDROUS 200 UG/5ML
100 INJECTION, POWDER, LYOPHILIZED, FOR SOLUTION INTRAVENOUS ONCE
Status: DISCONTINUED | OUTPATIENT
Start: 2024-01-06 | End: 2024-01-05

## 2024-01-05 RX ORDER — DEXTROSE MONOHYDRATE 50 MG/ML
100 INJECTION, SOLUTION INTRAVENOUS CONTINUOUS
Status: DISCONTINUED | OUTPATIENT
Start: 2024-01-05 | End: 2024-01-08

## 2024-01-05 RX ORDER — IPRATROPIUM BROMIDE AND ALBUTEROL SULFATE 2.5; .5 MG/3ML; MG/3ML
3 SOLUTION RESPIRATORY (INHALATION) EVERY 6 HOURS PRN
Status: DISCONTINUED | OUTPATIENT
Start: 2024-01-05 | End: 2024-01-05

## 2024-01-05 RX ORDER — DEXAMETHASONE SODIUM PHOSPHATE 100 MG/10ML
6 INJECTION INTRAMUSCULAR; INTRAVENOUS ONCE
Status: COMPLETED | OUTPATIENT
Start: 2024-01-05 | End: 2024-01-05

## 2024-01-05 RX ORDER — LEVOTHYROXINE SODIUM ANHYDROUS 200 UG/5ML
100 INJECTION, POWDER, LYOPHILIZED, FOR SOLUTION INTRAVENOUS DAILY
Status: DISCONTINUED | OUTPATIENT
Start: 2024-01-06 | End: 2024-01-06

## 2024-01-05 RX ORDER — DEXTROSE MONOHYDRATE 50 MG/ML
75 INJECTION, SOLUTION INTRAVENOUS CONTINUOUS
Status: DISCONTINUED | OUTPATIENT
Start: 2024-01-05 | End: 2024-01-05

## 2024-01-05 RX ORDER — LEVOTHYROXINE SODIUM ANHYDROUS 200 UG/5ML
100 INJECTION, POWDER, LYOPHILIZED, FOR SOLUTION INTRAVENOUS ONCE
Status: COMPLETED | OUTPATIENT
Start: 2024-01-05 | End: 2024-01-05

## 2024-01-05 RX ORDER — METOPROLOL TARTRATE 25 MG/1
12.5 TABLET, FILM COATED ORAL 2 TIMES DAILY
Status: DISCONTINUED | OUTPATIENT
Start: 2024-01-05 | End: 2024-01-06

## 2024-01-05 RX ADMIN — STANDARDIZED SENNA CONCENTRATE 17.2 MG: 8.6 TABLET ORAL at 08:17

## 2024-01-05 RX ADMIN — POLYETHYLENE GLYCOL 3350 17 G: 17 POWDER, FOR SOLUTION ORAL at 08:17

## 2024-01-05 RX ADMIN — PANTOPRAZOLE SODIUM 40 MG: 40 INJECTION, POWDER, FOR SOLUTION INTRAVENOUS at 08:17

## 2024-01-05 RX ADMIN — SERTRALINE HYDROCHLORIDE 50 MG: 50 TABLET ORAL at 08:17

## 2024-01-05 RX ADMIN — DEXTROSE MONOHYDRATE 75 ML/HR: 50 INJECTION, SOLUTION INTRAVENOUS at 04:39

## 2024-01-05 RX ADMIN — HEPARIN SODIUM 5000 UNITS: 5000 INJECTION INTRAVENOUS; SUBCUTANEOUS at 06:12

## 2024-01-05 RX ADMIN — SODIUM CHLORIDE, POTASSIUM CHLORIDE, SODIUM LACTATE AND CALCIUM CHLORIDE 1000 ML: 600; 310; 30; 20 INJECTION, SOLUTION INTRAVENOUS at 08:44

## 2024-01-05 RX ADMIN — DICLOFENAC SODIUM TOPICAL GEL, 1% 1 APPLICATION: 10 GEL TOPICAL at 00:54

## 2024-01-05 RX ADMIN — METOPROLOL TARTRATE 12.5 MG: 25 TABLET, FILM COATED ORAL at 08:17

## 2024-01-05 RX ADMIN — DEXTROSE MONOHYDRATE 500 ML: 50 INJECTION, SOLUTION INTRAVENOUS at 12:33

## 2024-01-05 RX ADMIN — LEVOTHYROXINE SODIUM ANHYDROUS 100 MCG: 200 INJECTION, POWDER, LYOPHILIZED, FOR SOLUTION INTRAVENOUS at 04:29

## 2024-01-05 RX ADMIN — HEPARIN SODIUM 5000 UNITS: 5000 INJECTION INTRAVENOUS; SUBCUTANEOUS at 14:00

## 2024-01-05 RX ADMIN — DEXAMETHASONE SODIUM PHOSPHATE 6 MG: 10 INJECTION INTRAMUSCULAR; INTRAVENOUS at 16:58

## 2024-01-05 RX ADMIN — SODIUM CHLORIDE, POTASSIUM CHLORIDE, SODIUM LACTATE AND CALCIUM CHLORIDE 500 ML: 600; 310; 30; 20 INJECTION, SOLUTION INTRAVENOUS at 06:21

## 2024-01-05 RX ADMIN — IPRATROPIUM BROMIDE AND ALBUTEROL SULFATE 3 ML: .5; 3 SOLUTION RESPIRATORY (INHALATION) at 00:45

## 2024-01-05 RX ADMIN — SODIUM CHLORIDE, POTASSIUM CHLORIDE, SODIUM LACTATE AND CALCIUM CHLORIDE 500 ML: 600; 310; 30; 20 INJECTION, SOLUTION INTRAVENOUS at 05:00

## 2024-01-05 RX ADMIN — HEPARIN SODIUM 5000 UNITS: 5000 INJECTION INTRAVENOUS; SUBCUTANEOUS at 21:35

## 2024-01-05 RX ADMIN — CEFTRIAXONE SODIUM 2 G: 2 INJECTION, SOLUTION INTRAVENOUS at 08:43

## 2024-01-05 ASSESSMENT — PAIN - FUNCTIONAL ASSESSMENT
PAIN_FUNCTIONAL_ASSESSMENT: CPOT (CRITICAL CARE PAIN OBSERVATION TOOL)

## 2024-01-05 NOTE — CARE PLAN
Problem: Diabetes  Goal: Achieve decreasing blood glucose levels by end of shift  Outcome: Progressing  Goal: Increase stability of blood glucose readings by end of shift  Outcome: Progressing  Goal: Maintain electrolyte levels within acceptable range throughout shift  Outcome: Progressing  Goal: Maintain glucose levels >70mg/dl to <250mg/dl throughout shift  Outcome: Progressing  Goal: No changes in neurological exam by end of shift  Outcome: Progressing  Goal: Learn about and adhere to nutrition recommendations by end of shift  Outcome: Progressing  Goal: Vital signs within normal range for age by end of shift  Outcome: Progressing  Goal: Increase self care and/or family involovement by end of shift  Outcome: Progressing  Goal: Receive DSME education by end of shift  Outcome: Progressing     Problem: Fall/Injury  Goal: Not fall by end of shift  Outcome: Progressing  Goal: Be free from injury by end of the shift  Outcome: Progressing  Goal: Verbalize understanding of personal risk factors for fall in the hospital  Outcome: Progressing  Goal: Verbalize understanding of risk factor reduction measures to prevent injury from fall in the home  Outcome: Progressing  Goal: Use assistive devices by end of the shift  Outcome: Progressing  Goal: Pace activities to prevent fatigue by end of the shift  Outcome: Progressing     Problem: Skin  Goal: Decreased wound size/increased tissue granulation at next dressing change  Outcome: Progressing  Goal: Participates in plan/prevention/treatment measures  Outcome: Progressing  Goal: Prevent/manage excess moisture  Outcome: Progressing  Goal: Prevent/minimize sheer/friction injuries  Outcome: Progressing  Goal: Promote/optimize nutrition  Outcome: Progressing  Goal: Promote skin healing  Outcome: Progressing     Problem: Pain - Adult  Goal: Verbalizes/displays adequate comfort level or baseline comfort level  Outcome: Progressing     Problem: Safety - Adult  Goal: Free from fall  injury  Outcome: Progressing     Problem: Discharge Planning  Goal: Discharge to home or other facility with appropriate resources  Outcome: Progressing     Problem: Chronic Conditions and Co-morbidities  Goal: Patient's chronic conditions and co-morbidity symptoms are monitored and maintained or improved  Outcome: Progressing     Problem: Nutrition  Goal: BG  mg/dL  Outcome: Progressing  Goal: Lab values WNL  Outcome: Progressing  Goal: Electrolytes WNL  Outcome: Progressing     Problem: Knowledge Deficit  Goal: Patient/family/caregiver demonstrates understanding of disease process, treatment plan, medications, and discharge instructions  Outcome: Progressing

## 2024-01-05 NOTE — PROGRESS NOTES
Patient is discussed during interdisciplinary round today.     Plan per medical team : Pt is not medically ready discharge currently. Pt has more complexed medical issues that expected.   Planned disposition : [place]  Discharge destination : TBD    BUD consult discharge plan with pt's daughter, Donya vis phone, and she made a choice to  [facility]. Referral was made to [facility], they are reviewing.     [UPDATE] 1:30pm, Per medical team, pt is anticipated to be ready on next Monday. BUD discussed with team that [facility] requests updated PT/OT notes on Sunday or Monday, and PM&R consult would be helpful for pt's admission.   SW and PCN will continue to follow and assist with discharge plan.     Nusrat Rehman, MSSA, LSW

## 2024-01-05 NOTE — PROGRESS NOTES
"Subjective   See clinical event note from Dr. Torre for full details:     Overnight, had multiple episodes of SVT, initially 15 seconds then later on lasting 1-2 minutes. Has had episodes of SVT during the previous days, in the setting in of respiratory discomfort. HR in the 190s with BP dropping to 50/30s. SpO2 ranging from 90-95 while on CPAP. Dobhoff was placed yesterday, but requires repositioning. IV levothyroxine 100 mcg was given this AM since he did not receive levothyroxine while intubated/NPO. ABG was notable for pO2 67, CPAP increased to 45%. Troponin 21. Cardiology did bedside echo showing hyperdynamic EF, recommended fluids. He received 500mL LR x2 overnight and 1 L LR in the AM. Metoprolol 12.5mg BID was recommended once dobhoff is appropriately adjusted.     Objective   Vitals 24 hour ranges:  Heart Rate:  []   Temp:  [36.3 °C (97.3 °F)-36.8 °C (98.2 °F)]   Resp:  [12-24]   BP: ()/(49-89)   Weight:  [101 kg (223 lb 12.3 oz)]   SpO2:  [89 %-100 %]      FiO2 (%):  [35 %-45 %] 45 %     Intake/Output for last 24 hours:    Intake/Output Summary (Last 24 hours) at 1/5/2024 1513  Last data filed at 1/5/2024 1400  Gross per 24 hour   Intake 676.25 ml   Output 2825 ml   Net -2148.75 ml        Physical Exam:  NEURO:  Alert, oriented to self, \"hospital\", but not city or year  R pupil 5mm and unreactive, L pupil 4mm -> 2mm  Hand  L 4/5, R 2/5  RUE flexion 2/5, extension 3/5, LUE flexion 4-/5, extension 4+/5,   BL hip flexors 2/5  Dorsiflexion 4/5, plantarflexion 4/5    CV:  RRR on telemetry    RESP:  On 8L NC, mild crackles in anterior lung fields  :  Lomeli in place  :  Abdomen NT/ND, soft  SKIN:  Scattered ecchymoses    Medications  Scheduled:  cefTRIAXone, 2 g, intravenous, Daily  heparin (porcine), 5,000 Units, subcutaneous, q8h KENDRA  insulin glargine, 5 Units, subcutaneous, Nightly  insulin lispro, 0-5 Units, subcutaneous, TID with meals  [Held by provider] levothyroxine, 200 mcg, oral, " Daily  metoprolol tartrate, 12.5 mg, nasogastric tube, BID  pantoprazole, 40 mg, intravenous, Daily  perflutren protein A microsphere, 0.5 mL, intravenous, Once in imaging  polyethylene glycol, 17 g, oral, Daily  sennosides, 2 tablet, oral, BID  sertraline, 50 mg, oral, Daily  sulfur hexafluoride microsphr, 2 mL, intravenous, Once in imaging    PRN:  PRN medications: dextrose 10 % in water (D10W), dextrose, diclofenac sodium, glucagon, oxygen, phenoL     Continuous:  dextrose 5 % in water (D5W), 100 mL/hr, Last Rate: 100 mL/hr (01/05/24 1445)        Lab Results  Results from last 72 hours   Lab Units 01/05/24  1102 01/05/24  0055 01/04/24  1519 01/04/24  0137   GLUCOSE mg/dL 116* 127*   < > 131*   SODIUM mmol/L 155* 152*   < > 143   POTASSIUM mmol/L 4.2 4.4   < > 5.1   CHLORIDE mmol/L 120* 115*   < > 109*   CO2 mmol/L 26 24   < > 20*   ANION GAP mmol/L 13 17   < > 19   BUN mg/dL 67* 82*   < > 77*   CREATININE mg/dL 2.62* 3.27*   < > 3.68*   EGFR mL/min/1.73m*2 25* 19*   < > 17*   CALCIUM mg/dL 8.3* 8.2*   < > 8.0*   PHOSPHORUS mg/dL 3.5 5.2*   < > 7.6*   ALBUMIN g/dL 2.8* 3.0*   < > 3.0*   MAGNESIUM mg/dL  --  2.68*  --  2.55*    < > = values in this interval not displayed.      Results from last 72 hours   Lab Units 01/05/24 0055 01/04/24  1519   WBC AUTO x10*3/uL 5.0 5.2   NRBC AUTO /100 WBCs 0.0 0.0   RBC AUTO x10*6/uL 4.53 4.28*   HEMOGLOBIN g/dL 13.9 13.9   HEMATOCRIT % 43.8 42.2   MCV fL 97 99   MCH pg 30.7 32.5   MCHC g/dL 31.7* 32.9   RDW % 14.2 14.4   PLATELETS AUTO x10*3/uL 100* 101*        Imaging Results  XR abdomen 1 view   Final Result   1.  Dobbhoff enteric tube again overlies the expected position of the   gastric cardia/proximal body; consider advancement.   2. Bibasilar atelectasis.        I personally reviewed the images/study with Joaquín Benton MD (Radiology   Resident) and I agree with the findings as stated. This study was   interpreted at [facility],    [place], [place].        MACRO:   None        Signed by: Jamaal Cooper 1/5/2024 8:10 AM   Dictation workstation:   [id number]      XR abdomen 1 view   Final Result   1.  Dobbhoff tube overlies the GE junction.        Signed by: Saul Holt 1/4/2024 5:05 PM   Dictation workstation:   [id number]      XR abdomen 1 view   Final Result   1.  Enteric tube coursing below level of the diaphragm with the   internal tip projecting in the expected region of the gastric fundus.        I personally reviewed the images/study and I agree with the findings   as stated by Tyler Richard MD (Radiology Resident).   This study was interpreted at [place], [place].        MACRO:   None        Signed by: Saul Holt 1/4/2024 3:44 PM   Dictation workstation:   [id number]      XR chest 1 view   Final Result   1.  Continued right perihilar and retrocardiac infiltrate/pneumonia.   Continued follow-up to resolution recommended.             Signed by: Saul Holt 1/4/2024 11:29 AM   Dictation workstation:   [id number]      MR sella w and wo IV contrast   Final Result   Addendum (preliminary) 1 of 1   Interpreted By:  Deion Oates,    ADDENDUM:   The limited extracranial images again demonstrate postoperative   changes compatible with a previous right parotidectomy.        Signed by: Deion Oates 1/4/2024 1:46 PM        -------- ORIGINAL REPORT --------   Dictation workstation:   [id number]      Final   Postoperative changes are again identified compatible with a previous   trans-sphenoidal surgery.        There is again evidence of irregular abnormal enhancing soft tissue   along the lateral margins of the sella most suspicious for residual   neoplasm without significant interval change in size and   configuration when compared with the prior study dated 07/31/2023 as   described above.        There is again evidence of lobulated foci of extra-axial  dural-based   enhancement noted along the inferior right cerebellopontine angle   cistern similar in appearance when compared with the prior study   dated 07/31/2023 as described above. The findings remain suspicious   for underlying meningioma.        I personally reviewed the images/study with Joaquín Benton MD (Radiology   Resident) and I agree with the findings as stated. This study was   interpreted at [place], [place].        MACRO:   None.        Signed by: Deion Oates 1/4/2024 1:43 PM   Dictation workstation:   [id number]      MR brain w and wo IV contrast   Final Result   Addendum (preliminary) 1 of 1   Interpreted By:  Deion Oates,    ADDENDUM:   The limited extracranial images again demonstrate postoperative   changes compatible with a previous right parotidectomy.        Signed by: Deion Oates 1/4/2024 1:46 PM        -------- ORIGINAL REPORT --------   Dictation workstation:   [id number]      Final   Postoperative changes are again identified compatible with a previous   trans-sphenoidal surgery.        There is again evidence of irregular abnormal enhancing soft tissue   along the lateral margins of the sella most suspicious for residual   neoplasm without significant interval change in size and   configuration when compared with the prior study dated 07/31/2023 as   described above.        There is again evidence of lobulated foci of extra-axial dural-based   enhancement noted along the inferior right cerebellopontine angle   cistern similar in appearance when compared with the prior study   dated 07/31/2023 as described above. The findings remain suspicious   for underlying meningioma.        I personally reviewed the images/study with Joaquín Benton MD (Radiology   Resident) and I agree with the findings as stated. This study was   interpreted at [place], [place].        MACRO:   None.        Signed by:  Deion Oates 1/4/2024 1:43 PM   Dictation workstation:   [id number]      XR chest 1 view   Final Result   1.  Slightly increased multifocal patchy and consolidative opacities   throughout the right lung which may suggest worsening of pulmonary   edema or developing pneumonia. Correlate clinically.        I personally reviewed the images/study and I agree with the findings   as stated by Tyler Richard. This study was   interpreted at [place], [place].        MACRO:   None        Signed by: Saul Holt 1/3/2024 3:18 PM   Dictation workstation:   [id number]      Transthoracic Echo (TTE) Complete   Final Result      XR chest 1 view   Final Result   1.  Status post extubation with interval improvement of multifocal   patchy and consolidative opacities throughout the right lung,   particularly within the right lower lung.   2. Left perihilar prominence of the basilar atelectasis, slightly   worsened when compared to prior.   3. There is interval enlargement of the superior cardiomediastinal   silhouette, which may be secondary to patient rotation/film technique.        I personally reviewed the images/study and I agree with the findings   as stated by Tyler Richard. This study was   interpreted at [place], [place].        MACRO:   None        Signed by: Saul Hlot 1/2/2024 4:58 PM   Dictation workstation:   [id number]      XR abdomen 1 view   Final Result   1.  Slight interval worsening of multifocal patchy and consolidative   opacities throughout the right lung. Correlate with concern for   multifocal infection   2. Possible small right pleural effusion. No pneumothorax.   3.  Nonobstructive bowel gas pattern.   4. Medical devices as above.        I personally reviewed the images/study with Joaquín Benton MD (Radiology   Resident) and I agree with the findings as stated. This study  was   interpreted at [place], [place].        MACRO:   None        Signed by: Carmelita Alas 1/2/2024 6:19 AM   Dictation workstation:   [id number]      XR chest 1 view   Final Result   1.  Slight interval worsening of multifocal patchy and consolidative   opacities throughout the right lung. Correlate with concern for   multifocal infection   2. Possible small right pleural effusion. No pneumothorax.   3.  Nonobstructive bowel gas pattern.   4. Medical devices as above.        I personally reviewed the images/study with Joaquín Benton MD (Radiology   Resident) and I agree with the findings as stated. This study was   interpreted at [place], [place].        MACRO:   None        Signed by: Carmelita Alas 1/2/2024 6:19 AM   Dictation workstation:   [id number]      CT angio head and neck w and wo IV contrast   Final Result   Noncalcified atherosclerosis of the distal petrosal segment of the   left internal carotid artery resulting in mild stenosis. No evidence   for large branch vessel cut off, dissection, or pseudoaneurysm of the   intracranial vessels.        Mild atherosclerosis without significant stenosis of the cervical   vessels.        I personally reviewed the images/study and I agree with the findings   as stated by Gena Watts MD. This study was interpreted at   [place], [place].        MACRO:   None        Signed by: Esau Avalos 1/1/2024 6:47 PM   Dictation workstation:   [id number]      CT head wo IV contrast   Final Result   No acute infarct, hemorrhage or mass is noted.        The parenchymal hypodensities may be secondary to chronic   microvascular ischemic changes among others.        Prior trans-sphenoidal pituitary tumor resection.        MACRO:   None        Signed by: Esau Avalos 1/1/2024 6:20 PM   Dictation workstation:   [id number]       MR cervical spine wo IV contrast    (Results Pending)   XR abdomen 1 view    (Results Pending)         Assessment/Plan   Mr. Gopal Ness is a 72 y/o man with h/o pituitary adenoma s/p resection at [facility], DVT/PE on Eliquis, COPD (noncompliant with medications)/tobacco use, depresssion, DMII, HLD presenting after being found down i/s/o fall on 12/31 (LKN 1100 on 12/31). Patient's children checked on him after his fall on 12/31 and he was reportedly okay, however was found down and unresponsive the following morning with emesis around him. Brought to OSH, negative CTH, intubated for airway protection. EMS reported witnessing possible seizure activity(?). Also with persistent tachycardia, leukocytosis and infiltrate on chest CT, so treating presumed aspiration pneumonia with antibiotics. Started on Keppra 1g BID and transferred to [facility] for cvEEG and further monitoring. Extubated 1/2. cvEEG did not demonstrate any seizures. Course complicated by covid pnuemonia, LEEANNE, SVT likely in the setting of infection, and RUE weakness in the setting of a fall.     Updates 1/5:   - Continue with CPAP at night  - Ceftriaxone for aspiration pneumonia until 1/6  - Covid positive - will start on remdesivir and dexamethasone   - s/p 2L LR and D5W 75ml/hr D5W -> Switched to 500mL bolus of D5W, then maintenance 100ml/hr   - Cervical MRI given R arm weakness  - Dobhoff readjusted, will start on nepro   - Follow up with cardiology recommendations given SVT overnight, start metoprolol 12.5 BID after dobhoff is established    #Acute hypoxic respiratory failure  #CAP  #COVID pneumonia   #COPD  :: CT chest (1/1) shows multi segmental consolidation right lower lobe and infiltrates in right upper lobe   :: s/p 1 dose cefepime at OSH  - rCXR - slightly increased multifocal patchy and consolidative opacities throughout the R lunch which may suggest worsening of pulm edema or developing pneumonia   - Extubated to CPAP/BiPAP on 1/2  Plan:  -  Continuous pulse ox   - Maintain O2- 88-92%, currently on 8L HFNC  - Cpap at night  - WBC: 18.5 -> 13.5 -> 6.8  - Sputum culture with gram positive cocci -> Strep pneumoniae  - Continue Daily CBC  - Pan culture for Temp>38.4C  - Vanc and Meropenem (1/1-1/6) narrowed to Ceftriaxone  - Ordered CRP, LDH, Ferritin, d-dimer, LFTs  - Will plan to start remdesivir, dexamethasone   - Consider baricitinib if oxygen requirements do not improve     #LEEANNE (baseline Cr 0.8-0.9)  #Hypernatremia   #Hx of DI   - BUN/Creatinine on admission: 26/1.32 (from 23/0.90 earlier in day at OSH) >> 3.93  - Lomeli in, strict I/Os  - Renal ultrasound pending   - Hold home tamsulosin 0.4mg daily while NPO  - Cr improving, 2.62  - S/p 2L LR, 500ml bolus D5W, will start D5W 100ml/hr   - RFP BID    #SVT  - Several episodes of SVT overnight, terminated spontaneously, several associated with hypotension down to 80/50s.  - Likely in the setting of infection   - Consulted cardiology, pending recs.   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 New small dural based enhancing masses in the R posterior fossa  - MRI Brain and MRI Sella - residual adenoma unchanged from prior examination and similar to slightly increased size of small dural-based enhancing masses of small dural-based enhancing masses within the R posterior fossa favored to represent meningiomas.  - EEG - mod diffuse encephalopathy. Improvement in degree of encephalopathy compared to the previous day  - Keppra 1000mg BID discontinued  Plan:  - Q4h neuro checks  - Hold home trazadone 50mg at bedtime  -  Continue home sertraline 50mg daily when taking PO  - Outpatient neurosurgery/neuro-ophthalmology follow up ordered  - Ordered MRI cervical spine given isolated RUE weakness. Can consider EMG in the future due to possible brachial plexus injury in the setting of a fall     #panhypothyroidism s/p adenoma resection ([facility])  #Cushing disease  #diabetes mellitus  - Holding home glimepiride 4mg BID, metformin 1000mg BID  - A1C 5.6  Plan:  - Mild corrective SSI #1  - Start glargine 5u daily  - POCT glucose q4hrs while NPO, Hypoglycemia protocol  - Continue home levothyroxine 200mcg daily -> IV 100mcg while NPO  - AM cortisol, ACTH   - follow up outpatient with endocrinologist Dr. Yvonne Lofton in 4 weeks (scheduled by endocrine)    #hx of DVT/PE  - H/H: 16.9/51 -> 14.3/45.8  - PLT: 148 -> 133 -> 98 > 100  - 4T score: 3, low probability <5%  - Daily CBC    - Hold home Eliquis at this time pending stabilization  - Follow up on d-dimer for anticoagulation in the setting of covid    F: LR, D5W  E: Replete PRN  N: NPO - dobhoff placed  A: PIVs  Lomeli: Yes  GI ppx: PPI  DVT ppx: SQH     Full Code  NOK/Surrogate: Donya Emerson (Daughter) [phone]     RESTRAINTS:  Not indicated/Patient does not meet criteria for restraints    Fatmata Kumar MD

## 2024-01-05 NOTE — SIGNIFICANT EVENT
Neurology night team updates: SVT runs with hypotension  -Overnight, the patient was noted to have multiple episodes (5 total at the time of this writing) of SVT lasting from 1 to 2 minutes, with a heart rate in the 190s, accompanied by blood pressure drops as low as 50/30 each time.  These would happen while the patient was resting in bed without movement, with normal SpO2 ranging from 90-95 while on CPAP.  -Since the patient continued to have ongoing issues with his Dobbhoff placement, with ENT reportedly deferring to GI (who will be called in the morning), his levothyroxine was administered as a 100 mcg dose intravenously.  Additionally, an ABG was obtained and notable for a low pO2 of 67, with normal CO2 levels, for which his FiO2 settings on his CPAP were increased from 35% to 45%.,  And troponin levels have downtrended to 21  -Cardiology was called who performed a bedside echo which was notable for a hyperdynamic EF, and recommended optimizing his hydration status in addition to starting metoprolol 12.5 mg twice daily (will need Dobhoff adjustment first).  He was given an initial slow bolus of 500 cc of LR over 2 hours due to a reported concern of flash pulmonary edema earlier in his hospital course, followed by additional bolus of 500 cc over 1 hour (for a total of 1 L overnight) after he had his fifth episode of SVT this morning around 6 AM  -His neurological examination continues to remain unchanged.  He later pulled off his CPAP mask and refused to keep it on, however continues to saturate well in the low 90s (within his COPD oxygenation close) on 5 to 6 L of nasal cannula  -Upon confirming with the NICU team who have been taking care of him over the past few days, he has had similar episodes of SVT runs, particularly in the setting of respiratory discomfort.

## 2024-01-05 NOTE — NURSING NOTE
Page received to advance feeding tube. Enteral feeding tube placed 1/4/24 by ENT and primary team asking for advancement-post pyloric. Pt with dobhoff/Iris technology. Wire re inserted and tube attached to Iris machine- pulled back and readvanced with addition of air bolus. Appears to be post pyloric per imaging. Tube bridled at 83 cm L nostril. Wire removed. KUB ordered and pending for final confirmation.

## 2024-01-05 NOTE — CONSULTS
Reason For Consult  Assessment for acute rehab    History Of Present Illness  Gopal Ness is a 73 y.o. male with past medical history of pituitary adenoma s/p resection at [facility], DVT/PE on Eliquis, COPD, DMII, HLD, who presented after being found down after a fall 2/2 seizures vs new meningiomas. Hospital course was c/b aspiration pneumonia d/t initial LOC. Currently on ceftriaxone. Patient was also COVID positive. Patient developed multiple episodes of SVTs overnight. PM&R was consulted for acute rehab appropriateness.      SLP (1/4): NPO  PT (1/4): Follows one step commands with repetition (75% accuracy), Max A bed mobility, Mod A transfers    OT (1/4): Max A bed mobility, Min to mod A static sitting   Social: Lives in house alone  PLOF: Independent of all ADLs    Patient was seen and examined at bedside. His eyes are closed, but he occasionally responds to some commands. Patient was unable to provide any additional history.      Past Medical History  He has no past medical history on file.    Surgical History  He has a past surgical history that includes Total hip arthroplasty (08/01/2014) and Tonsillectomy (08/01/2014).     Social History  He reports that he has been smoking cigarettes. He has never used smokeless tobacco. He reports that he does not drink alcohol and does not use drugs.    Family History  No family history on file.     Allergies  Codeine and Penicillins    Review of Systems  Unable to obtain        Physical Exam  GENERAL:  No apparent distress, eyes closed, follows commands intermittently  EAR, NOSE, THROAT:  Normocephalic, atraumatic, moist membranes, anicteric sclera, NG tube in place  LUNG: Nonlabored breathing, on 6L NC  HEART: No clubbing or cyanosis  ABDOMEN: Soft, nontender, distended   SKIN: No increased erythema, warmth, rashes, or concerning skin lesions  NEURO: Altered mental status, 4/5 strength in LUE, unable to properly assess other extremities, but patient was able to move  spontaneously          Last Recorded Vitals  Blood pressure 137/89, pulse 99, temperature 36.4 °C (97.5 °F), temperature source Temporal, resp. rate 19, weight 101 kg (223 lb 12.3 oz), SpO2 90 %.         Assessment/Plan   Gopal Ness is a 73 y.o. male with past medical history of pituitary adenoma s/p resection at [facility], DVT/PE on Eliquis, COPD, DMII, HLD, who presented after being found down after a fall 2/2 seizures vs new meningiomas. Hospital course was c/b aspiration pneumonia d/t initial LOC. Currently on ceftriaxone. Patient was also COVID positive. Patient developed multiple episodes of SVTs overnight. PM&R was consulted for acute rehab appropriateness.      #Acute encephalopathy  #c/f seizure  - Neurology following  - MRI Brain and MRI Sella - residual adenoma unchanged from prior examination and similar to slightly increased size of small dural-based enhancing masses of small dural-based enhancing masses within the R posterior fossa favored to represent meningiomas.     # Immobility   - Prevent secondary complications   - Skin protection: low air loss mattress, turn q 2 hours in bed, maintain bowel and bladder continence/containment  - Prevention of pulmonary complications: incentive spirometry and pulmonary toileting  - Maintain adequate nutrition and hydration  - Q shift PROM of upper and lower extremities to prevent contracture    # Impaired mobility and Impaired independence with ADLs and I/ADLs  - Continue PT, working on bed mobility, transfers, balance, endurance, strength, gait, eval for appropriate assistive device  - Continue OT, working on functional cognition, functional mobility, upper limb strength/coordination, balance, endurance, eval for appropriate adaptive equipment, ADLs, and I/ADLs.    #Dispo  - Patient was confused and on followed some commands when repeated multiple times.   Patient is early on in acute hospital course. Will need rehabilitation upon discharge, but at this time we are  unable to formally recommend the level of rehab that is most appropriate. We will continue to follow and adjust recommendations as her clinical condition changes.  - For questions, please contact via Micha Clayton DO  Physical Medicine and Rehabilitation PGY-4  [facility]     Supervisory note:  Seen with Dr Clayton, resident.  I saw and evaluated the patient. I personally obtained the key and critical portions of the history and physical exam. I reviewed the resident's documentation and discussed the patient with the resident. I agree with the resident's medical decision making as documented in the resident's note.       Lexus Blanco MD     Division of PM&R

## 2024-01-05 NOTE — PROGRESS NOTES
Gopal Ness is a 73 y.o. male on day 4 of admission presenting with AMS (altered mental status).    Subjective   Overnight patient tested positive for COVID. ENT consulted for dobhoff placement however having difficulty with dobhoff placement reportedly. started on lantus 5 nightly and lispro SSI #1. Overnight patient had multiple episodes of SVT with HR 190s at a time with hypotension (BP 50/30) while on CPAP. Pt having issues with dobhoff placement so ENT deferred to GI who will be called in 1/5 AM.  Cardiology was called to [facility] for echo and noted hyperdynamc EF so he was given slow bolus of 500cc LR over 2 hours then additional dose 500cc over 1 hour. Metoprolol will be started once dobhoff adjusted. Neuro status unchanged and patient refusing to keep CPAP (saturating well in low 90s on 5-6L NC). He was ordered a single dose of levothyroxine 100mcg which was given 1/5 at 0430.     Developing hypernatremia.     Per primary team later in the afternoon, planning on starting steroids for Covid.        Objective     Physical Exam  General appearance: NAD. Sleeping  Eyes: Sleeping  Cardiac: regular rate on telemetry  Resp: on nasal cannula, appears comfortable  Extremities: scattered ecchymoses, has full facial beard    Last Recorded Vitals  Blood pressure 137/89, pulse 99, temperature 36.4 °C (97.5 °F), temperature source Temporal, resp. rate 19, weight 101 kg (223 lb 12.3 oz), SpO2 90 %.  Intake/Output last 3 Shifts:  I/O last 3 completed shifts:  In: 2551.3 (25.1 mL/kg) [I.V.:2001.3 (19.7 mL/kg); IV Piggyback:550]  Out: 2575 (25.4 mL/kg) [Urine:2575 (0.7 mL/kg/hr)]  Weight: 101.5 kg     Scheduled medications  cefTRIAXone, 2 g, intravenous, Daily  [MAR Hold] heparin (porcine), 5,000 Units, subcutaneous, q8h KENDRA  [MAR Hold] insulin glargine, 5 Units, subcutaneous, Nightly  [MAR Hold] insulin lispro, 0-5 Units, subcutaneous, TID with meals  [MAR Hold] levothyroxine, 100 mcg, intravenous, Once  [Held by provider]  levothyroxine, 200 mcg, oral, Daily  [MAR Hold] metoprolol tartrate, 12.5 mg, nasogastric tube, BID  [MAR Hold] pantoprazole, 40 mg, intravenous, Daily  [MAR Hold] perflutren protein A microsphere, 0.5 mL, intravenous, Once in imaging  [MAR Hold] polyethylene glycol, 17 g, oral, Daily  [MAR Hold] sennosides, 2 tablet, oral, BID  [MAR Hold] sertraline, 50 mg, oral, Daily  [MAR Hold] sulfur hexafluoride microsphr, 2 mL, intravenous, Once in imaging      Continuous medications  dextrose 5 % in water (D5W), 100 mL/hr, Last Rate: 100 mL/hr (01/05/24 1445)      PRN medications  PRN medications: dextrose 10 % in water (D10W), dextrose, [MAR Hold] diclofenac sodium, glucagon, oxygen, [MAR Hold] phenoL     Relevant Results  Results for orders placed or performed during the hospital encounter of 01/01/24 (from the past 24 hour(s))   POCT GLUCOSE   Result Value Ref Range    POCT Glucose 105 (H) 74 - 99 mg/dL   POCT GLUCOSE   Result Value Ref Range    POCT Glucose 101 (H) 74 - 99 mg/dL   CBC   Result Value Ref Range    WBC 5.0 4.4 - 11.3 x10*3/uL    nRBC 0.0 0.0 - 0.0 /100 WBCs    RBC 4.53 4.50 - 5.90 x10*6/uL    Hemoglobin 13.9 13.5 - 17.5 g/dL    Hematocrit 43.8 41.0 - 52.0 %    MCV 97 80 - 100 fL    MCH 30.7 26.0 - 34.0 pg    MCHC 31.7 (L) 32.0 - 36.0 g/dL    RDW 14.2 11.5 - 14.5 %    Platelets 100 (L) 150 - 450 x10*3/uL   Magnesium   Result Value Ref Range    Magnesium 2.68 (H) 1.60 - 2.40 mg/dL   Renal Function Panel   Result Value Ref Range    Glucose 127 (H) 74 - 99 mg/dL    Sodium 152 (H) 136 - 145 mmol/L    Potassium 4.4 3.5 - 5.3 mmol/L    Chloride 115 (H) 98 - 107 mmol/L    Bicarbonate 24 21 - 32 mmol/L    Anion Gap 17 10 - 20 mmol/L    Urea Nitrogen 82 (H) 6 - 23 mg/dL    Creatinine 3.27 (H) 0.50 - 1.30 mg/dL    eGFR 19 (L) >60 mL/min/1.73m*2    Calcium 8.2 (L) 8.6 - 10.6 mg/dL    Phosphorus 5.2 (H) 2.5 - 4.9 mg/dL    Albumin 3.0 (L) 3.4 - 5.0 g/dL   Cortisol AM   Result Value Ref Range    Cortisol  A.M. 58.2 (H)  5.0 - 20.0 ug/dL   Troponin I, High Sensitivity   Result Value Ref Range    Troponin I, High Sensitivity 22 0 - 53 ng/L   POCT GLUCOSE   Result Value Ref Range    POCT Glucose 122 (H) 74 - 99 mg/dL   Blood Gas Arterial   Result Value Ref Range    POCT pH, Arterial 7.38 7.38 - 7.42 pH    POCT pCO2, Arterial 40 38 - 42 mm Hg    POCT pO2, Arterial 67 (L) 85 - 95 mm Hg    POCT SO2, Arterial 94 94 - 100 %    POCT Oxy Hemoglobin, Arterial 92.7 (L) 94.0 - 98.0 %    POCT Base Excess, Arterial -1.3 -2.0 - 3.0 mmol/L    POCT HCO3 Calculated, Arterial 23.7 22.0 - 26.0 mmol/L    Patient Temperature      FiO2 35 %   Troponin I, High Sensitivity   Result Value Ref Range    Troponin I, High Sensitivity 21 0 - 53 ng/L   Electrocardiogram, 12-lead PRN ACS symptoms   Result Value Ref Range    Ventricular Rate 99 BPM    Atrial Rate 99 BPM    VA Interval 170 ms    QRS Duration 72 ms    QT Interval 340 ms    QTC Calculation(Bazett) 436 ms    P Axis 62 degrees    R Axis 13 degrees    T Axis 60 degrees    QRS Count 16 beats    Q Onset 227 ms    P Onset 142 ms    P Offset 187 ms    T Offset 397 ms    QTC Fredericia 401 ms   Electrocardiogram, 12-lead PRN ACS symptoms   Result Value Ref Range    Ventricular Rate 87 BPM    Atrial Rate 87 BPM    VA Interval 176 ms    QRS Duration 72 ms    QT Interval 364 ms    QTC Calculation(Bazett) 438 ms    P Axis 52 degrees    R Axis -2 degrees    T Axis 36 degrees    QRS Count 15 beats    Q Onset 226 ms    P Onset 138 ms    P Offset 186 ms    T Offset 408 ms    QTC Fredericia 411 ms   Renal function panel   Result Value Ref Range    Glucose 116 (H) 74 - 99 mg/dL    Sodium 155 (H) 136 - 145 mmol/L    Potassium 4.2 3.5 - 5.3 mmol/L    Chloride 120 (H) 98 - 107 mmol/L    Bicarbonate 26 21 - 32 mmol/L    Anion Gap 13 10 - 20 mmol/L    Urea Nitrogen 67 (H) 6 - 23 mg/dL    Creatinine 2.62 (H) 0.50 - 1.30 mg/dL    eGFR 25 (L) >60 mL/min/1.73m*2    Calcium 8.3 (L) 8.6 - 10.6 mg/dL    Phosphorus 3.5 2.5 - 4.9  mg/dL    Albumin 2.8 (L) 3.4 - 5.0 g/dL   POCT GLUCOSE   Result Value Ref Range    POCT Glucose 125 (H) 74 - 99 mg/dL   C-reactive protein   Result Value Ref Range    C-Reactive Protein 6.53 (H) <1.00 mg/dL   D-dimer, Non VTE   Result Value Ref Range    D-Dimer Non VTE, Quant (ng/mL FEU) 1,360 (H) <=500 ng/mL FEU   Lactate Dehydrogenase   Result Value Ref Range     (H) 84 - 246 U/L   Ferritin   Result Value Ref Range    Ferritin 354 (H) 20 - 300 ng/mL   Hepatic function panel   Result Value Ref Range    Albumin 2.9 (L) 3.4 - 5.0 g/dL    Bilirubin, Total 0.3 0.0 - 1.2 mg/dL    Bilirubin, Direct 0.1 0.0 - 0.3 mg/dL    Alkaline Phosphatase 48 33 - 136 U/L    ALT 24 10 - 52 U/L    AST 35 9 - 39 U/L    Total Protein 5.6 (L) 6.4 - 8.2 g/dL   POCT GLUCOSE   Result Value Ref Range    POCT Glucose 123 (H) 74 - 99 mg/dL          Assessment/Plan   Principal Problem:    AMS (altered mental status)    Gopal Ness is a 73 y.o. male with a h/o pituitary adenoma s/p transphenoidal resection at [facility] (2016) c/b panhypopituitarism (adrenal insufficiency-resolved, hypogonadism, diabetes insipidis reportedly), hypothyroidism on levothyroxine);  DVT/PE on Eliquis, COPD (noncompliant with medications)/tobacco use, depresssion, DMII, HLD presenting after being found down unresponsive on 1/1/24 and subsequently transferred to [facility] on 1/2 with course complicated by intubation for airway protection (now extubated on intermittent BiPAP), concern for possible seizure activity (initially placed on keppra, now off keppra), presumed aspiration pneumonia on antibiotics, hypotension initially on pressors- now resolved, and LEEANNE.      Endocrinology consulted given history of pituitary adenoma resection and whether patient needed to be on steroid replacement therapy.      Patient with known history Cushing disease confirmed biochemically and with biopsy with prior recurrence. Now patient presenting with active Cushing disease again with high  AM cortisol and ACTH likely 2/2 residual adenoma. He has panhypopituitarism s/p transphenoidal resection of pituitary adenoma (2016) and radiation (2018), and no recovery expected of pituitary based on current labs and s/p radiation. Per neurosurgery in 2023, additional surgery of residual is not warranted/feasible, and would not result in improvement of ophthalmoplegia. Residual on 2024 MRI appears grossly the same as residual on 2023 MRI. Unlikely to be cause of seizures.      #Cushing disease  #Panhypopituitarism  ::Patient with sellar mass dx  progressing to vision changes  with Cushing disease now s/p resection 10/2016 (pathology pituitary adenoma, ACTH cell-type, Ki-67 focally 12%) with MRI and biochemical recurrence of Cushing in 2018 now s/p 5220 cGy in 29 fx now with recent worsening ophthalmoplegia with repeat MRI 2023 thought to be likely unchanged from prior in size based on report (known mass effect on OC). Had been followed at [facility] by endocrinology (see consult note for additional endocrine history).   ::24 FSH <0.9, LH <0.1, prolactin 1.4L, Growth hormone <0.05, IGF1 31 (Z score -2.8)  ::24 LH <0.1, prolactin 2.8,   ::24 ACTH 100.0H, 24 AM cortisol 67.6  ::Per chart review, did not start inpatient steroids until 1/3/2024  ::24 AM cortisol 58.2 confirms cushing disease as >10+ hours from prior steroid dose  ::Collected labs pendin24 testosterone (free, total),  ACTH and ILGF    Recommendations:  -no steroids indicated from endocrinology standpoint (active Cushing disease), okay for steroids for other indications  -hypernatremia most likely 2/2 dehydration given that urine still light yellow in color (per medicine consult team)and mostly low urine output up until today, however given history, to exclude DI please obtain 2024 serum osm, urine osm, and urine electrolytes.   -no further inpatient treatment of Cushing disease  -follow up outpatient with  endocrinologist Dr. Yvonne Lofton in 4 weeks (our coordinator will schedule this appointment)     #Diabetes  ::HgbA1c 5.6 on 1/1/24  ::home metformin 1000 mg twice daily, glimeperide 4 mg twice daily   Recommendations:  -continue glargine 5 units daily  -continue lispro sliding scale #1  -POCT glucose q4hrs while NPO  -hypoglycemia protocol  -anticipate needing to increase regimen once starts tube feeds and steroids for Covid     #Hypothyroidism  ::1/4/24 TSH 0.02L, free T4 0.71L, free T3 1.8L  ::1/4/24 free T4 0.71L  Recommendations:  -if not able to give po medication, please give IV levothyroxine 100mcg daily (once able to take po meds, please return to home oral levothyroxine 200 mcg daily)       Irene Wang MD  Plan was communicated to the primary team.   Pager [phone]  Patient discussed with Dr. Brandon

## 2024-01-05 NOTE — CONSULTS
Reason For Consult  SVT    History Of Present Illness  Gopal Ness is a 73 y.o. male with PMH of pituitary adenoma s/p resection at [facility], DVT/PE on Eliquis, COPD, DMII, HLD  presenting after being found down after a fall 2/2 seizures vs new meningiomas. Cardiology consulted for runs of SVT with HD instability which started last night.     Per chart review, patient was noted to have 5 runs of SVT lasting approximately 1-2 minutes each with heart rates in the 190s accompanied by blood pressures as low as 50/30. Cardiology saw the patient overnight, and bedside ECHO showed hyperdynamic EF. Patient was given 1 L total overnight and started on metoprolol 12.5 mg BID, with recurrent runs of SVT. Of note, patient has had runs of SVTs earlier during admission, most commonly in the setting of respiratory distress 2/2 COVID PNA. Unable to obtain history from patient as he remains altered.     Cardiac Hx:     EKG 01/05/24:  Normal sinus rhythm  Low voltage QRS  Borderline ECG  When compared with ECG of 01-JAN-2024 17:40,  PA interval has decreased  T wave inversion no longer evident in Lateral leads    ECHO 1/02/24:   1. Poorly visualized anatomical structures due to suboptimal image quality.   2. Left ventricular systolic function is normal with a 55-60% estimated ejection fraction.    ECHO 09/01/21:   1. The left ventricular systolic function is normal with a 60-65% estimated ejection fraction.   2. Spectral Doppler shows an impaired relaxation pattern of left ventricular diastolic filling.    ECHO 11/04/16:   IMPRESSION:         The left ventricular chamber size is normal.   Global left ventricular wall motion and contractility are within  normal limits.   There is normal left ventricular systolic function.   Estimated ejection fraction is 55-59%.   The right ventricular cavity size is mild to moderately dilated.   The right ventricular global systolic function is mildly reduced.   Aortic root is mildly enlarged.    Ascending aorta is mildly enlarged.   There is mild aortic root calcification noted.   Inferior vena cava is severely enlarged.   A trace of tricuspid regurgitation.   The right ventricular systolic pressure is calculated at 30mmHg.   There is no pulmonary hypertension.     Past Medical History  He has no past medical history on file.    Surgical History  He has a past surgical history that includes Total hip arthroplasty (08/01/2014) and Tonsillectomy (08/01/2014).     Social History  He reports that he has been smoking cigarettes. He has never used smokeless tobacco. He reports that he does not drink alcohol and does not use drugs.    Family History  No family history on file.     Allergies  Codeine and Penicillins    Review of Systems  Unable to obtain 2/2 AMS      Physical Exam  Physical Exam  Constitutional:       General: He is not in acute distress.     Appearance: He is ill-appearing.   HENT:      Head: Normocephalic and atraumatic.   Cardiovascular:      Rate and Rhythm: Normal rate and regular rhythm.      Pulses: Normal pulses.      Heart sounds: Normal heart sounds.   Pulmonary:      Effort: Pulmonary effort is normal.      Breath sounds: Normal breath sounds.   Musculoskeletal:      Right lower leg: No edema.      Left lower leg: No edema.   Neurological:      Mental Status: He is disoriented.        Last Recorded Vitals  Blood pressure 137/89, pulse 99, temperature 36.8 °C (98.2 °F), temperature source Temporal, resp. rate 19, weight 101 kg (223 lb 12.3 oz), SpO2 90 %.    Relevant Results  Results for orders placed or performed during the hospital encounter of 01/01/24 (from the past 24 hour(s))   CBC   Result Value Ref Range    WBC 5.2 4.4 - 11.3 x10*3/uL    nRBC 0.0 0.0 - 0.0 /100 WBCs    RBC 4.28 (L) 4.50 - 5.90 x10*6/uL    Hemoglobin 13.9 13.5 - 17.5 g/dL    Hematocrit 42.2 41.0 - 52.0 %    MCV 99 80 - 100 fL    MCH 32.5 26.0 - 34.0 pg    MCHC 32.9 32.0 - 36.0 g/dL    RDW 14.4 11.5 - 14.5 %     Platelets 101 (L) 150 - 450 x10*3/uL   Renal function panel   Result Value Ref Range    Glucose 144 (H) 74 - 99 mg/dL    Sodium 149 (H) 136 - 145 mmol/L    Potassium 4.8 3.5 - 5.3 mmol/L    Chloride 113 (H) 98 - 107 mmol/L    Bicarbonate 23 21 - 32 mmol/L    Anion Gap 18 10 - 20 mmol/L    Urea Nitrogen 82 (H) 6 - 23 mg/dL    Creatinine 3.56 (H) 0.50 - 1.30 mg/dL    eGFR 17 (L) >60 mL/min/1.73m*2    Calcium 7.8 (L) 8.6 - 10.6 mg/dL    Phosphorus 6.0 (H) 2.5 - 4.9 mg/dL    Albumin 2.8 (L) 3.4 - 5.0 g/dL   SARS-CoV-2 RT PCR   Result Value Ref Range    Coronavirus 2019, PCR Detected (A) Not Detected   POCT GLUCOSE   Result Value Ref Range    POCT Glucose 116 (H) 74 - 99 mg/dL   POCT GLUCOSE   Result Value Ref Range    POCT Glucose 105 (H) 74 - 99 mg/dL   POCT GLUCOSE   Result Value Ref Range    POCT Glucose 101 (H) 74 - 99 mg/dL   CBC   Result Value Ref Range    WBC 5.0 4.4 - 11.3 x10*3/uL    nRBC 0.0 0.0 - 0.0 /100 WBCs    RBC 4.53 4.50 - 5.90 x10*6/uL    Hemoglobin 13.9 13.5 - 17.5 g/dL    Hematocrit 43.8 41.0 - 52.0 %    MCV 97 80 - 100 fL    MCH 30.7 26.0 - 34.0 pg    MCHC 31.7 (L) 32.0 - 36.0 g/dL    RDW 14.2 11.5 - 14.5 %    Platelets 100 (L) 150 - 450 x10*3/uL   Magnesium   Result Value Ref Range    Magnesium 2.68 (H) 1.60 - 2.40 mg/dL   Renal Function Panel   Result Value Ref Range    Glucose 127 (H) 74 - 99 mg/dL    Sodium 152 (H) 136 - 145 mmol/L    Potassium 4.4 3.5 - 5.3 mmol/L    Chloride 115 (H) 98 - 107 mmol/L    Bicarbonate 24 21 - 32 mmol/L    Anion Gap 17 10 - 20 mmol/L    Urea Nitrogen 82 (H) 6 - 23 mg/dL    Creatinine 3.27 (H) 0.50 - 1.30 mg/dL    eGFR 19 (L) >60 mL/min/1.73m*2    Calcium 8.2 (L) 8.6 - 10.6 mg/dL    Phosphorus 5.2 (H) 2.5 - 4.9 mg/dL    Albumin 3.0 (L) 3.4 - 5.0 g/dL   Cortisol AM   Result Value Ref Range    Cortisol  A.M. 58.2 (H) 5.0 - 20.0 ug/dL   Troponin I, High Sensitivity   Result Value Ref Range    Troponin I, High Sensitivity 22 0 - 53 ng/L   POCT GLUCOSE   Result Value Ref  Range    POCT Glucose 122 (H) 74 - 99 mg/dL   Blood Gas Arterial   Result Value Ref Range    POCT pH, Arterial 7.38 7.38 - 7.42 pH    POCT pCO2, Arterial 40 38 - 42 mm Hg    POCT pO2, Arterial 67 (L) 85 - 95 mm Hg    POCT SO2, Arterial 94 94 - 100 %    POCT Oxy Hemoglobin, Arterial 92.7 (L) 94.0 - 98.0 %    POCT Base Excess, Arterial -1.3 -2.0 - 3.0 mmol/L    POCT HCO3 Calculated, Arterial 23.7 22.0 - 26.0 mmol/L    Patient Temperature      FiO2 35 %   Troponin I, High Sensitivity   Result Value Ref Range    Troponin I, High Sensitivity 21 0 - 53 ng/L   Electrocardiogram, 12-lead PRN ACS symptoms   Result Value Ref Range    Ventricular Rate 99 BPM    Atrial Rate 99 BPM    SD Interval 170 ms    QRS Duration 72 ms    QT Interval 340 ms    QTC Calculation(Bazett) 436 ms    P Axis 62 degrees    R Axis 13 degrees    T Axis 60 degrees    QRS Count 16 beats    Q Onset 227 ms    P Onset 142 ms    P Offset 187 ms    T Offset 397 ms    QTC Fredericia 401 ms   Electrocardiogram, 12-lead PRN ACS symptoms   Result Value Ref Range    Ventricular Rate 87 BPM    Atrial Rate 87 BPM    SD Interval 176 ms    QRS Duration 72 ms    QT Interval 364 ms    QTC Calculation(Bazett) 438 ms    P Axis 52 degrees    R Axis -2 degrees    T Axis 36 degrees    QRS Count 15 beats    Q Onset 226 ms    P Onset 138 ms    P Offset 186 ms    T Offset 408 ms    QTC Fredericia 411 ms   Renal function panel   Result Value Ref Range    Glucose 116 (H) 74 - 99 mg/dL    Sodium 155 (H) 136 - 145 mmol/L    Potassium 4.2 3.5 - 5.3 mmol/L    Chloride 120 (H) 98 - 107 mmol/L    Bicarbonate 26 21 - 32 mmol/L    Anion Gap 13 10 - 20 mmol/L    Urea Nitrogen 67 (H) 6 - 23 mg/dL    Creatinine 2.62 (H) 0.50 - 1.30 mg/dL    eGFR 25 (L) >60 mL/min/1.73m*2    Calcium 8.3 (L) 8.6 - 10.6 mg/dL    Phosphorus 3.5 2.5 - 4.9 mg/dL    Albumin 2.8 (L) 3.4 - 5.0 g/dL   POCT GLUCOSE   Result Value Ref Range    POCT Glucose 125 (H) 74 - 99 mg/dL         Assessment/Plan   Gopal R Kopasz  is a 73 y.o. male with PMH of pituitary adenoma s/p resection at [facility], DVT/PE on Eliquis, COPD, DMII, HLD  presenting after being found down after a fall 2/2 seizures vs new meningiomas. Cardiology consulted for runs of SVT with HD instability which started last night. Runs of SVT could be sinus tachycardia related to infection vs hypovolemia vs respiratory distress vs underlying arrhythmia. Unclear underlying SVT as rhythm was not captured on telemetry or EKG. Recent ECHO without reduced EF or acute wall motion abnormalities. Patient's HR are currently well controlled on metoprolol 12.5 mg BID with normal blood pressures.     Recommendations:   - Continue metoprolol 12.5 mg BID, ok to up titrtate to 25 mg BID if recurrent runs of SVT and BP > 100 systolic's   - Continue patient on telemetry  - Obtain EKG if SVT returns     Patient was seen and staffed with Dr. Winter. Cardiology will continue to follow.       Thao Oconnor MD

## 2024-01-05 NOTE — CONSULTS
Consults    Reason For Consult  Transfer to medicine for management of multiple comorbidities    History Of Present Illness  Gopal Ness is a 73 y.o. male with PMH of Pituitary adenoma (s/p resection in 2016), panhypopituitarism, Diabetes insipidus, DVT/PE (on Eliquis), COPD, Depression, type 2 DM, HLD, HTN, presenting after an unwitnessed LOC (c/b aspiration) on 1/1/24. Pt currently admitted under neurology. Medicine transfer requested for management of his medical comobidities. Patient's current hospital stay c/b SVT episodes, acute hypernatremia, LEEANNE and respiratory failure. COVID +ve    Pt initially presented to the OSH ED, brought I by EMS after his family found him unconscious in at home. CT head negative for bleed at OSH. Transferred to [facility] [place] int[place]ated for airway protection after loading with Keppra for possible seizure activity. EEG at [facility] did not show any seizure activity but severe diffuse encephalopathy. MRI brain suspicious for CP angle meningioma. Pt is currently under the care of neurology and has no active neurological issues as per the team.     Aspiration pneumonia d/t the initial LOC responding to antibiotics ( currently on Ceftriaxone), endocrinology involved for the management of panhypopituitarism following resection of pituitary adenoma, LEEANNE responding to fluids, improving S.creat. Pt developed multiple episodes of SVTs overnight, started on metoprolol. Pt has also developed acute hypernatremia which may be due to his underlying DI.    Pt was seen ane examined in the [facility]. Responds to commands occasionally. Unable to obtain history due to altered mental status. Hist obtained from EMR and discussion with primary team.     As per EMR, pt has h/o medication noncompliance.    Home Meds:  Eliquis 5mg once  Glimiperide 4mg BID  Levothyroxine 200mcg daily  Metformin 1000mg BID  Sertraline 50 mg daily  Tamsulosin 0.4mg daily  Trazadone 50 mg daily     Past Medical History  He has no past medical  history on file.    Surgical History  He has a past surgical history that includes Total hip arthroplasty (08/01/2014) and Tonsillectomy (08/01/2014).     Social History  He reports that he has been smoking cigarettes. He has never used smokeless tobacco. He reports that he does not drink alcohol and does not use drugs.    Family History  No family history on file.     Allergies  Codeine and Penicillins    Review of Systems  As noted above    Physical Exam  Vitals reviewed.   Constitutional:       General: He is not in acute distress.     Appearance: He is obese. He is not diaphoretic.   HENT:      Head: Normocephalic and atraumatic.      Nose:      Comments: NG tube in situ     Mouth/Throat:      Mouth: Mucous membranes are dry.   Eyes:      Comments: Unable to perform as pt did not open eyes   Cardiovascular:      Rate and Rhythm: Normal rate and regular rhythm.      Pulses: Normal pulses.      Heart sounds: No murmur heard.  Pulmonary:      Effort: Pulmonary effort is normal. No respiratory distress.      Breath sounds: No wheezing.      Comments: Diffuse crackles +    SpO2 935 on 6L NC  Abdominal:      General: There is distension.      Palpations: Abdomen is soft.   Genitourinary:     Comments: Lomeli in situ  Skin:     General: Skin is warm and dry.   Neurological:      Comments: Altered mental status, occasiaonnaly responds to questions, unclear speech so difficult to understand     Last Recorded Vitals  Blood pressure 137/89, pulse 99, temperature 36.8 °C (98.2 °F), temperature source Temporal, resp. rate 19, weight 101 kg (223 lb 12.3 oz), SpO2 90 %.    Relevant Results  Scheduled medications  cefTRIAXone, 2 g, intravenous, Daily  heparin (porcine), 5,000 Units, subcutaneous, q8h KENDRA  insulin glargine, 5 Units, subcutaneous, Nightly  insulin lispro, 0-5 Units, subcutaneous, TID with meals  [Held by provider] levothyroxine, 200 mcg, oral, Daily  metoprolol tartrate, 12.5 mg, nasogastric tube,  BID  pantoprazole, 40 mg, intravenous, Daily  perflutren protein A microsphere, 0.5 mL, intravenous, Once in imaging  polyethylene glycol, 17 g, oral, Daily  sennosides, 2 tablet, oral, BID  sertraline, 50 mg, oral, Daily  sulfur hexafluoride microsphr, 2 mL, intravenous, Once in imaging      Continuous medications  dextrose 5 % in water (D5W), 100 mL/hr      PRN medications  PRN medications: dextrose 10 % in water (D10W), dextrose, diclofenac sodium, glucagon, oxygen, phenoL    Results for orders placed or performed during the hospital encounter of 01/01/24 (from the past 24 hour(s))   CBC   Result Value Ref Range    WBC 5.2 4.4 - 11.3 x10*3/uL    nRBC 0.0 0.0 - 0.0 /100 WBCs    RBC 4.28 (L) 4.50 - 5.90 x10*6/uL    Hemoglobin 13.9 13.5 - 17.5 g/dL    Hematocrit 42.2 41.0 - 52.0 %    MCV 99 80 - 100 fL    MCH 32.5 26.0 - 34.0 pg    MCHC 32.9 32.0 - 36.0 g/dL    RDW 14.4 11.5 - 14.5 %    Platelets 101 (L) 150 - 450 x10*3/uL   Renal function panel   Result Value Ref Range    Glucose 144 (H) 74 - 99 mg/dL    Sodium 149 (H) 136 - 145 mmol/L    Potassium 4.8 3.5 - 5.3 mmol/L    Chloride 113 (H) 98 - 107 mmol/L    Bicarbonate 23 21 - 32 mmol/L    Anion Gap 18 10 - 20 mmol/L    Urea Nitrogen 82 (H) 6 - 23 mg/dL    Creatinine 3.56 (H) 0.50 - 1.30 mg/dL    eGFR 17 (L) >60 mL/min/1.73m*2    Calcium 7.8 (L) 8.6 - 10.6 mg/dL    Phosphorus 6.0 (H) 2.5 - 4.9 mg/dL    Albumin 2.8 (L) 3.4 - 5.0 g/dL   SARS-CoV-2 RT PCR   Result Value Ref Range    Coronavirus 2019, PCR Detected (A) Not Detected   POCT GLUCOSE   Result Value Ref Range    POCT Glucose 116 (H) 74 - 99 mg/dL   POCT GLUCOSE   Result Value Ref Range    POCT Glucose 105 (H) 74 - 99 mg/dL   POCT GLUCOSE   Result Value Ref Range    POCT Glucose 101 (H) 74 - 99 mg/dL   CBC   Result Value Ref Range    WBC 5.0 4.4 - 11.3 x10*3/uL    nRBC 0.0 0.0 - 0.0 /100 WBCs    RBC 4.53 4.50 - 5.90 x10*6/uL    Hemoglobin 13.9 13.5 - 17.5 g/dL    Hematocrit 43.8 41.0 - 52.0 %    MCV 97 80 -  100 fL    MCH 30.7 26.0 - 34.0 pg    MCHC 31.7 (L) 32.0 - 36.0 g/dL    RDW 14.2 11.5 - 14.5 %    Platelets 100 (L) 150 - 450 x10*3/uL   Magnesium   Result Value Ref Range    Magnesium 2.68 (H) 1.60 - 2.40 mg/dL   Renal Function Panel   Result Value Ref Range    Glucose 127 (H) 74 - 99 mg/dL    Sodium 152 (H) 136 - 145 mmol/L    Potassium 4.4 3.5 - 5.3 mmol/L    Chloride 115 (H) 98 - 107 mmol/L    Bicarbonate 24 21 - 32 mmol/L    Anion Gap 17 10 - 20 mmol/L    Urea Nitrogen 82 (H) 6 - 23 mg/dL    Creatinine 3.27 (H) 0.50 - 1.30 mg/dL    eGFR 19 (L) >60 mL/min/1.73m*2    Calcium 8.2 (L) 8.6 - 10.6 mg/dL    Phosphorus 5.2 (H) 2.5 - 4.9 mg/dL    Albumin 3.0 (L) 3.4 - 5.0 g/dL   Cortisol AM   Result Value Ref Range    Cortisol  A.M. 58.2 (H) 5.0 - 20.0 ug/dL   Troponin I, High Sensitivity   Result Value Ref Range    Troponin I, High Sensitivity 22 0 - 53 ng/L   POCT GLUCOSE   Result Value Ref Range    POCT Glucose 122 (H) 74 - 99 mg/dL   Blood Gas Arterial   Result Value Ref Range    POCT pH, Arterial 7.38 7.38 - 7.42 pH    POCT pCO2, Arterial 40 38 - 42 mm Hg    POCT pO2, Arterial 67 (L) 85 - 95 mm Hg    POCT SO2, Arterial 94 94 - 100 %    POCT Oxy Hemoglobin, Arterial 92.7 (L) 94.0 - 98.0 %    POCT Base Excess, Arterial -1.3 -2.0 - 3.0 mmol/L    POCT HCO3 Calculated, Arterial 23.7 22.0 - 26.0 mmol/L    Patient Temperature      FiO2 35 %   Troponin I, High Sensitivity   Result Value Ref Range    Troponin I, High Sensitivity 21 0 - 53 ng/L   Electrocardiogram, 12-lead PRN ACS symptoms   Result Value Ref Range    Ventricular Rate 99 BPM    Atrial Rate 99 BPM    MT Interval 170 ms    QRS Duration 72 ms    QT Interval 340 ms    QTC Calculation(Bazett) 436 ms    P Axis 62 degrees    R Axis 13 degrees    T Axis 60 degrees    QRS Count 16 beats    Q Onset 227 ms    P Onset 142 ms    P Offset 187 ms    T Offset 397 ms    QTC Fredericia 401 ms   Electrocardiogram, 12-lead PRN ACS symptoms   Result Value Ref Range    Ventricular  Rate 87 BPM    Atrial Rate 87 BPM    OH Interval 176 ms    QRS Duration 72 ms    QT Interval 364 ms    QTC Calculation(Bazett) 438 ms    P Axis 52 degrees    R Axis -2 degrees    T Axis 36 degrees    QRS Count 15 beats    Q Onset 226 ms    P Onset 138 ms    P Offset 186 ms    T Offset 408 ms    QTC Fredericia 411 ms   Renal function panel   Result Value Ref Range    Glucose 116 (H) 74 - 99 mg/dL    Sodium 155 (H) 136 - 145 mmol/L    Potassium 4.2 3.5 - 5.3 mmol/L    Chloride 120 (H) 98 - 107 mmol/L    Bicarbonate 26 21 - 32 mmol/L    Anion Gap 13 10 - 20 mmol/L    Urea Nitrogen 67 (H) 6 - 23 mg/dL    Creatinine 2.62 (H) 0.50 - 1.30 mg/dL    eGFR 25 (L) >60 mL/min/1.73m*2    Calcium 8.3 (L) 8.6 - 10.6 mg/dL    Phosphorus 3.5 2.5 - 4.9 mg/dL    Albumin 2.8 (L) 3.4 - 5.0 g/dL   POCT GLUCOSE   Result Value Ref Range    POCT Glucose 125 (H) 74 - 99 mg/dL     Electrocardiogram, 12-lead PRN ACS symptoms  Result Date: 1/5/2024  Normal sinus rhythm Low voltage QRS Borderline ECG When compared with ECG of 04-JAN-2024 20:58, No significant change was found Confirmed by Vel Nevarez ([id number]) on 1/5/2024 1:13:31 PM    XR abdomen 1 view  Result Date: 1/5/2024  FINDINGS: Single AP radiograph of abdomen.   Similar positioning of Dobbhoff enteric tube with tip overlying the expected position of the gastric cardia/proximal body; consider advancement.   Nonobstructive bowel gas pattern. Limited evaluation of pneumoperitoneum on supine imaging, however no gross evidence of free air is noted.   Redemonstration of hazy bibasilar opacities again favored to represent atelectasis.   Osseous structures demonstrate no acute bony changes.       1.  Dobbhoff enteric tube again overlies the expected position of the gastric cardia/proximal body; consider advancement. 2. Bibasilar atelectasis.       MR brain w and wo IV contrast  Addendum Date: 1/4/2024    Interpreted By:  Deion Oates, ADDENDUM: The limited extracranial images again demonstrate  postoperative changes compatible with a previous right parotidectomy.   Signed by: Deion Oates 1/4/2024 1:46 PM   -------- ORIGINAL REPORT -------- Dictation workstation:   [id number]    Result Date: 1/4/2024  FINDINGS: Postoperative changes are again identified compatible with a previous trans-sphenoidal surgery.   There is again evidence of irregular abnormal enhancing soft tissue along the lateral margins of the sella most suspicious for residual neoplasm without significant interval change in size and configuration when compared with the prior study dated 07/31/2023. On the right, there is again evidence of lateral extension into the right cavernous sinus. On the left there is again evidence of lateral extension abutting the medial margin of the cavernous segment of the left internal carotid artery and inferior margin of the ophthalmic segment of the left internal carotid artery. 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 There is again evidence of diffuse atrophy of the optic apparatus. Again, no abnormal narrowing of the flow voids of the carotid siphons is noted.   There is again evidence of lobulated foci of extra-axial dural-based enhancement noted along the inferior right cerebellopontine angle cistern similar in appearance when compared with the prior study dated 07/31/2023. The findings remain suspicious for underlying meningioma. Using similar points of measurement on the current and prior study, the more dorsal dominant nodular extra-axial enhancing lesion measures approximately 12 mm x 11 mm in transaxial dimensions by 14 mm in craniocaudal dimension on both the current and prior exam. The smaller nodular lesion more anterior/medially again measures approximately 9 mm x 6 mm in  transaxial dimensions on both the current and prior study.   The diffusion-weighted images fail to demonstrate evidence of abnormal diffusion restriction to suggest acute infarction.   The above findings are superimposed upon moderate brain parenchymal volume loss.   Mild nonspecific white matter changes are noted within the cerebral hemispheres bilaterally which while nonspecific, given the patient's age, likely represent sequelae of more remote small-vessel ischemic change.   There is minimal mucosal thickening within the paranasal sinuses.   There is opacification/partial opacification of scattered mastoid air cells right greater than left.       Postoperative changes are again identified compatible with a previous trans-sphenoidal surgery.   There is again evidence of irregular abnormal enhancing soft tissue along the lateral margins of the sella most suspicious for residual neoplasm without significant interval change in size and configuration when compared with the prior study dated 07/31/2023 as described above.   There is again evidence of lobulated foci of extra-axial dural-based enhancement noted along the inferior right cerebellopontine angle cistern similar in appearance when compared with the prior study dated 07/31/2023 as described above. The findings remain suspicious for underlying meningioma.       XR chest 1 view  Result Date: 1/4/2024  FINDINGS:     CARDIOMEDIASTINAL SILHOUETTE: Cardiomediastinal silhouette is normal in size and configuration.   LUNGS: Right midlung and retrocardiac infiltrate/atelectasis again noted. No pneumothorax.   ABDOMEN: No remarkable upper abdominal findings.   BONES: No acute osseous changes.       1.  Continued right perihilar and retrocardiac infiltrate/pneumonia. Continued follow-up to resolution recommended.         EEG  IMPRESSION This vEEG is indicative of moderate diffuse encephalopathy. No epileptiform discharges or lateralizing signs are seen. There is  improvement in the degree of encephalopathy compared to the previous day. A full report will be scanned into the patient's chart at a later time. This report has been interpreted and electronically signed by     Assessment/Plan     Gopal Ness is a 73 y.o. male with PMH of Pituitary adenoma (s/p resection in 2016), panhypopituitarism, Diabetes insipidus, DVT/PE (on Eliquis), COPD, Depression, type 2 DM, HLD, HTN, presenting after an unwitnessed LOC (c/b aspiration) on 1/1/24. Pt currently admitted under neurology. Medicine transfer requested for management of his medical comobidities. Patient's current hospital stay c/b SVT episodes, acute hypernatremia, LEEANNE and respiratory failure. COVID +    Pt initially presented to the OSH ED, brought I by EMS after his family found him unconscious in at home. CT head negative for bleed at OSH. Transferred to [facility] [place] intubated for airway protection after loading with Keppra for possible seizure activity. EEG at [facility] did not show any seizure activity but severe diffuse encephalopathy. MRI brain suspicious for CP angle meningioma. Pt is currently under the care of neurology and has no active neurological issues as per the team.     Aspiration pneumonia d/t the initial LOC responding to antibiotics ( currently on Ceftriaxone), endocrinology involved for the management of panhypopituitarism following resection of pituitary adenoma, LEEANNE responding to fluids, improving S.creat. Pt developed multiple episodes of SVTs overnight, started on metoprolol. Pt has also developed acute hypernatremia which may be due to his underlying DI.    Please repeat RFP and Mg to reassess hypernatremia. Pt can be accepted to medicine if stable or improving hypernatremia with no s/s neurological deterioration indicating continued ICU needs. Endocrine and cardiology following.    Continue management as per the primary till transfer accepted.     Pt seen and discussed with Dr. Reagan.    Thao  Gibson  PGY-1  Anesthesiology

## 2024-01-05 NOTE — PROGRESS NOTES
SW called pt's daughter to offer support and discuss pt's discharge plan. Pt is awake and alert, but not able to discuss discharge plan and verbalize his needs per pt's bednurse. Pt is recommended to high intensity, and anticipated to be medically ready to discharge early next week. Pt's daughter is agreeable with pt's discharge recommendation, and prefers facility which is close to pt's home. SW offered [facility], [facility], and [facility] in order of distance. Daughter likes to have time to discuss with family and pt, then will be back to SW with choices. Daughter denied any further issue at this time. SW will continue to follow and assist with discharge plan.     JOLANTA Willard, LSW

## 2024-01-06 ENCOUNTER — APPOINTMENT (OUTPATIENT)
Dept: RADIOLOGY | Facility: HOSPITAL | Age: 74
DRG: 208 | End: 2024-01-06
Payer: MEDICARE

## 2024-01-06 LAB
ACANTHOCYTES BLD QL SMEAR: ABNORMAL
ALBUMIN SERPL BCP-MCNC: 2.8 G/DL (ref 3.4–5)
ALBUMIN SERPL BCP-MCNC: 2.8 G/DL (ref 3.4–5)
ANION GAP SERPL CALC-SCNC: 15 MMOL/L (ref 10–20)
ANION GAP SERPL CALC-SCNC: 18 MMOL/L
BASOPHILS # BLD MANUAL: 0 X10*3/UL (ref 0–0.1)
BASOPHILS NFR BLD MANUAL: 0 %
BUN SERPL-MCNC: 59 MG/DL (ref 6–23)
BUN SERPL-MCNC: 60 MG/DL (ref 6–23)
BURR CELLS BLD QL SMEAR: ABNORMAL
CALCIUM SERPL-MCNC: 8.3 MG/DL (ref 8.6–10.6)
CALCIUM SERPL-MCNC: 8.4 MG/DL (ref 8.6–10.6)
CHLORIDE SERPL-SCNC: 115 MMOL/L (ref 98–107)
CHLORIDE SERPL-SCNC: 116 MMOL/L (ref 98–107)
CO2 SERPL-SCNC: 23 MMOL/L (ref 21–32)
CO2 SERPL-SCNC: 26 MMOL/L (ref 21–32)
CREAT SERPL-MCNC: 1.72 MG/DL (ref 0.5–1.3)
CREAT SERPL-MCNC: 1.73 MG/DL (ref 0.5–1.3)
EOSINOPHIL # BLD MANUAL: 0 X10*3/UL (ref 0–0.4)
EOSINOPHIL NFR BLD MANUAL: 0 %
ERYTHROCYTE [DISTWIDTH] IN BLOOD BY AUTOMATED COUNT: 13.9 % (ref 11.5–14.5)
GFR SERPL CREATININE-BSD FRML MDRD: 41 ML/MIN/1.73M*2
GFR SERPL CREATININE-BSD FRML MDRD: 42 ML/MIN/1.73M*2
GLUCOSE BLD MANUAL STRIP-MCNC: 175 MG/DL (ref 74–99)
GLUCOSE BLD MANUAL STRIP-MCNC: 178 MG/DL (ref 74–99)
GLUCOSE BLD MANUAL STRIP-MCNC: 228 MG/DL (ref 74–99)
GLUCOSE SERPL-MCNC: 184 MG/DL (ref 74–99)
GLUCOSE SERPL-MCNC: 233 MG/DL (ref 74–99)
HCT VFR BLD AUTO: 48 % (ref 41–52)
HGB BLD-MCNC: 15.4 G/DL (ref 13.5–17.5)
IMM GRANULOCYTES # BLD AUTO: 0.25 X10*3/UL (ref 0–0.5)
IMM GRANULOCYTES NFR BLD AUTO: 6.4 % (ref 0–0.9)
LYMPHOCYTES # BLD MANUAL: 0.13 X10*3/UL (ref 0.8–3)
LYMPHOCYTES NFR BLD MANUAL: 3.4 %
MAGNESIUM SERPL-MCNC: 2.27 MG/DL (ref 1.6–2.4)
MCH RBC QN AUTO: 31.6 PG (ref 26–34)
MCHC RBC AUTO-ENTMCNC: 32.1 G/DL (ref 32–36)
MCV RBC AUTO: 98 FL (ref 80–100)
MONOCYTES # BLD MANUAL: 0.37 X10*3/UL (ref 0.05–0.8)
MONOCYTES NFR BLD MANUAL: 9.5 %
MYELOCYTES # BLD MANUAL: 0.1 X10*3/UL
MYELOCYTES NFR BLD MANUAL: 2.6 %
NEUTROPHILS # BLD MANUAL: 3.29 X10*3/UL (ref 1.6–5.5)
NEUTS BAND # BLD MANUAL: 0.2 X10*3/UL (ref 0–0.5)
NEUTS BAND NFR BLD MANUAL: 5.2 %
NEUTS SEG # BLD MANUAL: 3.09 X10*3/UL (ref 1.6–5)
NEUTS SEG NFR BLD MANUAL: 79.3 %
NRBC BLD-RTO: 0 /100 WBCS (ref 0–0)
PHOSPHATE SERPL-MCNC: 3.1 MG/DL (ref 2.5–4.9)
PHOSPHATE SERPL-MCNC: 4.1 MG/DL (ref 2.5–4.9)
PLATELET # BLD AUTO: 87 X10*3/UL (ref 150–450)
POTASSIUM SERPL-SCNC: 4.6 MMOL/L (ref 3.5–5.3)
POTASSIUM SERPL-SCNC: 6.9 MMOL/L (ref 3.5–5.3)
RBC # BLD AUTO: 4.88 X10*6/UL (ref 4.5–5.9)
RBC MORPH BLD: ABNORMAL
SODIUM SERPL-SCNC: 149 MMOL/L (ref 136–145)
SODIUM SERPL-SCNC: 152 MMOL/L (ref 136–145)
SODIUM SERPL-SCNC: 152 MMOL/L (ref 136–145)
SODIUM SERPL-SCNC: 153 MMOL/L (ref 136–145)
SODIUM SERPL-SCNC: 153 MMOL/L (ref 136–145)
TOTAL CELLS COUNTED BLD: 116
WBC # BLD AUTO: 3.9 X10*3/UL (ref 4.4–11.3)

## 2024-01-06 PROCEDURE — 85007 BL SMEAR W/DIFF WBC COUNT: CPT

## 2024-01-06 PROCEDURE — 2500000004 HC RX 250 GENERAL PHARMACY W/ HCPCS (ALT 636 FOR OP/ED)

## 2024-01-06 PROCEDURE — 82947 ASSAY GLUCOSE BLOOD QUANT: CPT

## 2024-01-06 PROCEDURE — 2500000001 HC RX 250 WO HCPCS SELF ADMINISTERED DRUGS (ALT 637 FOR MEDICARE OP)

## 2024-01-06 PROCEDURE — 99233 SBSQ HOSP IP/OBS HIGH 50: CPT | Performed by: INTERNAL MEDICINE

## 2024-01-06 PROCEDURE — 71045 X-RAY EXAM CHEST 1 VIEW: CPT

## 2024-01-06 PROCEDURE — 2500000002 HC RX 250 W HCPCS SELF ADMINISTERED DRUGS (ALT 637 FOR MEDICARE OP, ALT 636 FOR OP/ED)

## 2024-01-06 PROCEDURE — XW033E5 INTRODUCTION OF REMDESIVIR ANTI-INFECTIVE INTO PERIPHERAL VEIN, PERCUTANEOUS APPROACH, NEW TECHNOLOGY GROUP 5: ICD-10-PCS | Performed by: STUDENT IN AN ORGANIZED HEALTH CARE EDUCATION/TRAINING PROGRAM

## 2024-01-06 PROCEDURE — 2500000002 HC RX 250 W HCPCS SELF ADMINISTERED DRUGS (ALT 637 FOR MEDICARE OP, ALT 636 FOR OP/ED): Mod: MUE

## 2024-01-06 PROCEDURE — 2500000005 HC RX 250 GENERAL PHARMACY W/O HCPCS

## 2024-01-06 PROCEDURE — 99232 SBSQ HOSP IP/OBS MODERATE 35: CPT | Performed by: STUDENT IN AN ORGANIZED HEALTH CARE EDUCATION/TRAINING PROGRAM

## 2024-01-06 PROCEDURE — 2500000004 HC RX 250 GENERAL PHARMACY W/ HCPCS (ALT 636 FOR OP/ED): Mod: MUE

## 2024-01-06 PROCEDURE — 2500000005 HC RX 250 GENERAL PHARMACY W/O HCPCS: Mod: JZ

## 2024-01-06 PROCEDURE — 84295 ASSAY OF SERUM SODIUM: CPT

## 2024-01-06 PROCEDURE — 99232 SBSQ HOSP IP/OBS MODERATE 35: CPT

## 2024-01-06 PROCEDURE — 1200000002 HC GENERAL ROOM WITH TELEMETRY DAILY

## 2024-01-06 PROCEDURE — 71045 X-RAY EXAM CHEST 1 VIEW: CPT | Performed by: RADIOLOGY

## 2024-01-06 PROCEDURE — 85027 COMPLETE CBC AUTOMATED: CPT

## 2024-01-06 PROCEDURE — C9113 INJ PANTOPRAZOLE SODIUM, VIA: HCPCS

## 2024-01-06 PROCEDURE — 83735 ASSAY OF MAGNESIUM: CPT

## 2024-01-06 PROCEDURE — 36415 COLL VENOUS BLD VENIPUNCTURE: CPT

## 2024-01-06 PROCEDURE — 80069 RENAL FUNCTION PANEL: CPT | Mod: MUE

## 2024-01-06 RX ORDER — INSULIN GLARGINE 100 [IU]/ML
5 INJECTION, SOLUTION SUBCUTANEOUS NIGHTLY
Status: DISCONTINUED | OUTPATIENT
Start: 2024-01-06 | End: 2024-01-06

## 2024-01-06 RX ORDER — INSULIN GLARGINE 100 [IU]/ML
10 INJECTION, SOLUTION SUBCUTANEOUS NIGHTLY
Status: DISCONTINUED | OUTPATIENT
Start: 2024-01-06 | End: 2024-01-08

## 2024-01-06 RX ORDER — INSULIN LISPRO 100 [IU]/ML
0-5 INJECTION, SOLUTION INTRAVENOUS; SUBCUTANEOUS EVERY 6 HOURS
Status: DISCONTINUED | OUTPATIENT
Start: 2024-01-06 | End: 2024-01-07

## 2024-01-06 RX ORDER — METOPROLOL TARTRATE 25 MG/1
12.5 TABLET, FILM COATED ORAL EVERY 6 HOURS
Status: DISCONTINUED | OUTPATIENT
Start: 2024-01-06 | End: 2024-01-08

## 2024-01-06 RX ADMIN — STANDARDIZED SENNA CONCENTRATE 17.2 MG: 8.6 TABLET ORAL at 20:22

## 2024-01-06 RX ADMIN — HEPARIN SODIUM 5000 UNITS: 5000 INJECTION INTRAVENOUS; SUBCUTANEOUS at 14:29

## 2024-01-06 RX ADMIN — HEPARIN SODIUM 5000 UNITS: 5000 INJECTION INTRAVENOUS; SUBCUTANEOUS at 06:21

## 2024-01-06 RX ADMIN — CEFTRIAXONE SODIUM 2 G: 2 INJECTION, SOLUTION INTRAVENOUS at 10:03

## 2024-01-06 RX ADMIN — REMDESIVIR 100 MG: 100 INJECTION, POWDER, LYOPHILIZED, FOR SOLUTION INTRAVENOUS at 20:15

## 2024-01-06 RX ADMIN — HEPARIN SODIUM 5000 UNITS: 5000 INJECTION INTRAVENOUS; SUBCUTANEOUS at 21:18

## 2024-01-06 RX ADMIN — INSULIN GLARGINE 10 UNITS: 100 INJECTION, SOLUTION SUBCUTANEOUS at 20:23

## 2024-01-06 RX ADMIN — DEXAMETHASONE 6 MG: 2 TABLET ORAL at 08:01

## 2024-01-06 RX ADMIN — PANTOPRAZOLE SODIUM 40 MG: 40 INJECTION, POWDER, FOR SOLUTION INTRAVENOUS at 08:01

## 2024-01-06 RX ADMIN — METOPROLOL TARTRATE 12.5 MG: 25 TABLET, FILM COATED ORAL at 08:01

## 2024-01-06 RX ADMIN — INSULIN LISPRO 1 UNITS: 100 INJECTION, SOLUTION INTRAVENOUS; SUBCUTANEOUS at 08:32

## 2024-01-06 RX ADMIN — DEXTROSE MONOHYDRATE 100 ML/HR: 50 INJECTION, SOLUTION INTRAVENOUS at 14:33

## 2024-01-06 RX ADMIN — INSULIN LISPRO 2 UNITS: 100 INJECTION, SOLUTION INTRAVENOUS; SUBCUTANEOUS at 17:27

## 2024-01-06 RX ADMIN — REMDESIVIR 200 MG: 100 INJECTION, POWDER, LYOPHILIZED, FOR SOLUTION INTRAVENOUS at 01:01

## 2024-01-06 RX ADMIN — SERTRALINE HYDROCHLORIDE 50 MG: 50 TABLET ORAL at 08:01

## 2024-01-06 RX ADMIN — METOPROLOL TARTRATE 12.5 MG: 25 TABLET, FILM COATED ORAL at 20:22

## 2024-01-06 RX ADMIN — DEXTROSE MONOHYDRATE 100 ML/HR: 50 INJECTION, SOLUTION INTRAVENOUS at 00:00

## 2024-01-06 RX ADMIN — LEVOTHYROXINE SODIUM ANHYDROUS 100 MCG: 200 INJECTION, POWDER, LYOPHILIZED, FOR SOLUTION INTRAVENOUS at 08:01

## 2024-01-06 RX ADMIN — INSULIN LISPRO 1 UNITS: 100 INJECTION, SOLUTION INTRAVENOUS; SUBCUTANEOUS at 23:18

## 2024-01-06 RX ADMIN — METOPROLOL TARTRATE 12.5 MG: 25 TABLET, FILM COATED ORAL at 14:29

## 2024-01-06 ASSESSMENT — PAIN SCALES - PAIN ASSESSMENT IN ADVANCED DEMENTIA (PAINAD)
BREATHING: NORMAL
CONSOLABILITY: DISTRACTED OR REASSURED BY VOICE/TOUCH
BODYLANGUAGE: RELAXED
TOTALSCORE: 1
FACIALEXPRESSION: SMILING OR INEXPRESSIVE

## 2024-01-06 ASSESSMENT — COGNITIVE AND FUNCTIONAL STATUS - GENERAL
PERSONAL GROOMING: TOTAL
DRESSING REGULAR LOWER BODY CLOTHING: TOTAL
CLIMB 3 TO 5 STEPS WITH RAILING: TOTAL
HELP NEEDED FOR BATHING: TOTAL
DRESSING REGULAR UPPER BODY CLOTHING: TOTAL
EATING MEALS: TOTAL
WALKING IN HOSPITAL ROOM: TOTAL
TURNING FROM BACK TO SIDE WHILE IN FLAT BAD: A LOT
MOVING FROM LYING ON BACK TO SITTING ON SIDE OF FLAT BED WITH BEDRAILS: A LOT
MOVING TO AND FROM BED TO CHAIR: A LOT
DAILY ACTIVITIY SCORE: 6
STANDING UP FROM CHAIR USING ARMS: A LOT
MOBILITY SCORE: 10
TOILETING: TOTAL

## 2024-01-06 ASSESSMENT — PAIN - FUNCTIONAL ASSESSMENT
PAIN_FUNCTIONAL_ASSESSMENT: CPOT (CRITICAL CARE PAIN OBSERVATION TOOL)
PAIN_FUNCTIONAL_ASSESSMENT: 0-10

## 2024-01-06 ASSESSMENT — PAIN SCALES - GENERAL
PAINLEVEL_OUTOF10: 0 - NO PAIN
PAINLEVEL_OUTOF10: 0 - NO PAIN

## 2024-01-06 NOTE — PROGRESS NOTES
Gopal Ness is a 73 y.o. male on day 5 of admission presenting with AMS (altered mental status).    Subjective   Gopal Ness is a 73 y.o. male with PMH of Pituitary adenoma (s/p resection at [facility] in 2016), panhypopituitarism, Diabetes insipidus (per records), DVT/PE (on Eliquis), COPD, Depression, type 2 DM (HBA1c 5.6% on 1/1/2024), HLD, HTN, presenting after an unwitnessed LOC (c/b aspiration).     Pt initially presented to the OSH ED, brought by EMS after his family found him unconscious in at home. CT head negative for bleed at OSH. Transferred to [facility] intubated for airway protection after loading with Keppra for possible seizure activity. EEG at [facility] did not show any seizure activity but severe diffuse encephalopathy. MRI brain suspicious for CP angle meningioma. Pt was initially admitted to Neurology service on 1/1/2024 and has no active neurological issues as per the team. Now being transferred to Medicine for continued management.     Patient's current hospital stay c/b multiple episodes of SVT with HR 190s at a time with hypotension (BP 50/30) while on CPAP on 1/4/2024 (seen by Cardiology and started on metop 12.5mg BID) acute hypernatremia to Na 150's, LEEANNE and respiratory failure currently on 8L HFNC. COVID +ve (1/4/2024).    Aspiration pneumonia d/t the initial LOC responding to antibiotics (currently on Ceftriaxone), endocrinology involved for the management of panhypopituitarism, LEEANNE responding to fluids, improving Cr. Acute hypernatremia likely due to combination of dehydration with ongoing infection (COVID and Strep pneumo pneumonia) as well as underlying DI.     As per EMR, pt has h/o medication noncompliance.     Home Meds:  Eliquis 5mg once daily  Glimiperide 4mg BID  Levothyroxine 200mcg daily  Metformin 1000mg BID  Sertraline 50 mg daily  Tamsulosin 0.4mg daily  Trazadone 50 mg daily    Per H&P on 1/1/2024:   ------------------------------------------  The patient was found down i/s/o fall on  12/31 (LKN 1100 on 12/31). Patient called his family to let them know he fell, family went to his house to help him up, and patient had reported some facial pain but refused to go to ER for evaluation. This morning, 1/1, family was unable to reach him, so they went to his house and found him lying on the couch around emesis. They called EMS, who found him only responsive to painful stimuli, with snoring respirations, placed on non-rebreather with SpO2 91%, tachycardia, and BG 60. Pt was brought to OSH, negative CTH, intubated for airway protection. Neurology and trauma surgery at OSH recommended starting Keppra and transferring pt for cvEEG monitoring as there was some concern for seizure. EMS reported witnessing possible seizure activity(?). At OSH, full trauma activation occurred d/t fall on apixaban, CT CAP showed R sided infiltrates and there was concern for aspiration pneumonia, with leukocytosis to 16.8 and persistent tachycardia in 100s, so patient started on vanc and cefepime. Patient then transferred to [facility] for cvEEG monitoring and further care.      Upon arrival to [facility], patient found to have fixed and dilated R pupil not previously documented at OSH. Momentarily started on mannitol, however CTH obtained stat which ruled out acute problems, including herniation. CTA H&N obtained to r/o dissection as patient recently had a fall with new onset, unexplained unilateral mydriasis. CTA H&N showed no LVO, dissection, or aneurysm, with mild atherosclerosis of cervical vessels. 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   ----------------------------------------------------    Objective     Physical Exam  Constitutional:       General: He is not in acute distress.     Appearance: He is not toxic-appearing or diaphoretic.   HENT:      Head: Normocephalic and atraumatic.   Eyes:      Extraocular  "Movements: Extraocular movements intact.      Conjunctiva/sclera: Conjunctivae normal.   Cardiovascular:      Rate and Rhythm: Normal rate and regular rhythm.      Heart sounds: No murmur heard.  Pulmonary:      Effort: Pulmonary effort is normal.      Breath sounds: Rhonchi and rales present.   Musculoskeletal:      Right lower leg: No edema.      Left lower leg: No edema.   Skin:     General: Skin is warm and dry.   Neurological:      General: No focal deficit present.      Mental Status: He is disoriented.      Comments: AAO x2 (oriented to self and place - \"hospital\" and \"[place]\")     Last Recorded Vitals  Blood pressure 132/79, pulse 67, temperature 36.1 °C (97 °F), temperature source Temporal, resp. rate 15, weight 101 kg (223 lb 12.3 oz), SpO2 94 %.  Intake/Output last 3 Shifts:  I/O last 3 completed shifts:  In: 2681.3 (26.4 mL/kg) [I.V.:1701.3 (16.8 mL/kg); NG/GT:230; IV Piggyback:750]  Out: 4875 (48 mL/kg) [Urine:4875 (1.3 mL/kg/hr)]  Weight: 101.5 kg     Relevant Results    Scheduled medications  cefTRIAXone, 2 g, intravenous, Daily  [START ON 1/7/2024] dexAMETHasone, 6 mg, nasogastric tube, Daily  heparin (porcine), 5,000 Units, subcutaneous, q8h KENDRA  insulin glargine, 10 Units, subcutaneous, Nightly  insulin lispro, 0-5 Units, subcutaneous, q6h  [START ON 1/7/2024] levothyroxine, 200 mcg, oral, Daily  metoprolol tartrate, 12.5 mg, nasogastric tube, q6h  pantoprazole, 40 mg, intravenous, Daily  polyethylene glycol, 17 g, oral, Daily  remdesivir, 100 mg, intravenous, q24h  sennosides, 2 tablet, oral, BID  sertraline, 50 mg, oral, Daily    Continuous medications  dextrose 5 % in water (D5W), 100 mL/hr, Last Rate: 100 mL/hr (01/06/24 1433)    PRN medications  PRN medications: dextrose 10 % in water (D10W), dextrose, diclofenac sodium, glucagon, oxygen, phenoL    Results for orders placed or performed during the hospital encounter of 01/01/24 (from the past 24 hour(s))   POCT GLUCOSE   Result Value Ref Range "    POCT Glucose 188 (H) 74 - 99 mg/dL   Sodium   Result Value Ref Range    Sodium 153 (H) 136 - 145 mmol/L   Sodium   Result Value Ref Range    Sodium 153 (H) 136 - 145 mmol/L   POCT GLUCOSE   Result Value Ref Range    POCT Glucose 175 (H) 74 - 99 mg/dL   Sodium   Result Value Ref Range    Sodium 152 (H) 136 - 145 mmol/L   CBC and Auto Differential   Result Value Ref Range    WBC 3.9 (L) 4.4 - 11.3 x10*3/uL    nRBC 0.0 0.0 - 0.0 /100 WBCs    RBC 4.88 4.50 - 5.90 x10*6/uL    Hemoglobin 15.4 13.5 - 17.5 g/dL    Hematocrit 48.0 41.0 - 52.0 %    MCV 98 80 - 100 fL    MCH 31.6 26.0 - 34.0 pg    MCHC 32.1 32.0 - 36.0 g/dL    RDW 13.9 11.5 - 14.5 %    Platelets 87 (L) 150 - 450 x10*3/uL    Immature Granulocytes %, Automated 6.4 (H) 0.0 - 0.9 %    Immature Granulocytes Absolute, Automated 0.25 0.00 - 0.50 x10*3/uL   Renal Function Panel   Result Value Ref Range    Glucose 184 (H) 74 - 99 mg/dL    Sodium 152 (H) 136 - 145 mmol/L    Potassium 4.6 3.5 - 5.3 mmol/L    Chloride 116 (H) 98 - 107 mmol/L    Bicarbonate 26 21 - 32 mmol/L    Anion Gap 15 10 - 20 mmol/L    Urea Nitrogen 60 (H) 6 - 23 mg/dL    Creatinine 1.72 (H) 0.50 - 1.30 mg/dL    eGFR 42 (L) >60 mL/min/1.73m*2    Calcium 8.3 (L) 8.6 - 10.6 mg/dL    Phosphorus 3.1 2.5 - 4.9 mg/dL    Albumin 2.8 (L) 3.4 - 5.0 g/dL   Magnesium   Result Value Ref Range    Magnesium 2.27 1.60 - 2.40 mg/dL   Manual Differential   Result Value Ref Range    Neutrophils %, Manual 79.3 40.0 - 80.0 %    Bands %, Manual 5.2 0.0 - 5.0 %    Lymphocytes %, Manual 3.4 13.0 - 44.0 %    Monocytes %, Manual 9.5 2.0 - 10.0 %    Eosinophils %, Manual 0.0 0.0 - 6.0 %    Basophils %, Manual 0.0 0.0 - 2.0 %    Myelocytes %, Manual 2.6 0.0 - 0.0 %    Seg Neutrophils Absolute, Manual 3.09 1.60 - 5.00 x10*3/uL    Bands Absolute, Manual 0.20 0.00 - 0.50 x10*3/uL    Lymphocytes Absolute, Manual 0.13 (L) 0.80 - 3.00 x10*3/uL    Monocytes Absolute, Manual 0.37 0.05 - 0.80 x10*3/uL    Eosinophils Absolute, Manual  0.00 0.00 - 0.40 x10*3/uL    Basophils Absolute, Manual 0.00 0.00 - 0.10 x10*3/uL    Myelocytes Absolute, Manual 0.10 0.00 - 0.00 x10*3/uL    Total Cells Counted 116     Neutrophils Absolute, Manual 3.29 1.60 - 5.50 x10*3/uL    RBC Morphology See Below     [place] Cells Few     Acanthocytes Few    POCT GLUCOSE   Result Value Ref Range    POCT Glucose 228 (H) 74 - 99 mg/dL     XR chest 1 view  Result Date: 1/6/2024  1.  Hypoinflated lungs with superimposed findings suggestive of mild pulmonary edema. Correlate with patient fluid volume status. 2. Interval improvement in right mid lung opacity.           XR abdomen 1 view  Result Date: 1/6/2024  1. Enteric tube tip projects over the expected location of the 2nd portion of the duodenum, retracted compared to prior exam. 2. Nonobstructive visualized bowel gas pattern.        This patient has a urinary catheter   Reason for the urinary catheter remaining today? critically ill patient who need accurate urinary output measurements       Assessment/Plan   Principal Problem:    AMS (altered mental status)    Gopal Ness is a 73 y.o. male with PMH of Pituitary adenoma (s/p resection at [facility] in 2016), panhypopituitarism, Diabetes insipidus (per records), DVT/PE (on Eliquis), COPD, Depression, type 2 DM (HBA1c 5.6% on 1/1/2024), HLD, HTN, presenting after an unwitnessed LOC (c/b aspiration) on 1/1/24. Being transferred from Neurology to Medicine service for continued care. rPatient's current hospital stay c/b SVT episodes, acute hypernatremia, LEEANNE and respiratory failure. COVID +ve as of 1/4/2024 on dexamethasone 6mg and remdesivir (1/6-1/10) given increased O2 requirement and currently on 8L HFNC.      Aspiration pneumonia d/t the initial LOC responding to antibiotics (currently on Ceftriaxone), endocrinology involved for the management of panhypopituitarism, adrenal insufficiency and DM2. LEEANNE responding to fluids with improving Cr. Pt developed multiple episodes of SVTs  overnight on 1/4, started on metoprolol 12.5mg BID. Cardiology following. Pt has also developed acute hypernatremia which may be due to his underlying DI and dehydration, currently on D5W at 100mL/hr and FWF 200mL q4h.    Updates 1/6:   - c/w ceftriaxone  - Continue with remdesivir (1/6-1/10) and steroids for covid  - Increased evening glargine to 10u  - Will continue with D5W 100 ml/hr, increased FWF from 200mL q6h to q4h  - Cervical MRI given R arm weakness and recent fall   - On Nepro with goal 50ml/hr  - Increase metoprolol to 12.5mg q6h     #Acute hypoxic respiratory failure  #COVID pneumonia, CAP  #COPD  - Maintain O2- 88-92%, currently on 8L HFNC  - CPAP at night  - Elevated CRP, LDH, Ferritin, D-dimer (CRP 6.53, Ferritin 354)  - s/p 1x cefepime 2g on 1/1, meropenem (1/1-1/2), Vanc 1.5g (1/1-1/2) then ceftriaxone 2g (1/2- )  - Sputum culture from 1/2/2024 positive for S. pneumo sensitive to Ceftriaxone  - on Remdesivir for COVID, started today for 5 total doses (1/6-1/10), s/p 200mg loading dose this AM  - c/w dexamethasone 6mg daily   - Consider baricitinib if oxygen requirements do not improve      #Hypernatremia - component of dehydration and DI  #LEEANNE (baseline Cr 0.8-0.9) - improving  #Hx of DI   - (1/5) Serum Osm 343, Urine Osm 355, Urine Na 66 - hypertonic hypernatremia   - Lomeli in, strict I/Os  - Renal ultrasound pending   - Hold home tamsulosin 0.4mg daily while NPO  - D5W 100ml/hr with 200ml q4h FWF  - RFP q6h  - Follow up Endocrine recs     #SVT  - Several episodes of SVT, terminated spontaneously, some associated with hypotension down to 80/50s.  - Consulted cardiology, appreciate recs.         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 New small dural based enhancing masses in the R posterior fossa  - MRI Brain and MRI Sella - residual adenoma unchanged from prior examination and similar to slightly increased size of small dural-based enhancing masses of small dural-based enhancing masses within the R posterior fossa favored to represent meningiomas.  - EEG - mod diffuse encephalopathy. Improvement in degree of encephalopathy compared to the previous day  - s/p Keppra loading; Keppra 1000mg BID now discontinued  Plan:  - Q2h neuro checks  - Hold home trazadone 50mg at bedtime  - Continue home sertraline 50mg daily when taking PO  - Outpatient neurosurgery/neuro-ophthalmology follow up ordered  - Ordered MRI cervical spine given isolated RUE weakness. Can consider EMG in the future due to possible brachial plexus injury in the setting of a fall     #Panhypothyroidism s/p adenoma resection  #Cushing disease  #Diabetes mellitus (A1c 5.6% on 1/1/2024)  - Holding home glimepiride 4mg BID, metformin 1000mg BID  Plan:  - Mild corrective SSI #1  - Was started on glargine 5u daily inpatient, increased to 10u on 1/6/2024  - POCT glucose q4hrs while NPO, Hypoglycemia protocol  - Hydrocortisone 50mg q8h (1/3- )   - Continue home levothyroxine 200mcg daily -> IV 100mcg while NPO  - 1/5: AM cortisol 58.2 , ACTH pending  - Follow up outpatient with endocrinologist Dr. Yvonne Lofton in 4 weeks (scheduled by Endocrine)    #hx of DVT/PE  - H/H: 16.9/51 -> 14.3/45.8  - PLT: 148 -> 133 -> 98 > 100, likely related to COVID  - 4T score: 3, low probability <5%  - Daily CBC     - Hold home Eliquis at this time pending stabilization     F: D5W @ 100mL/hr  E: Replete PRN, K >4, Mg>2  N: TF @ 50mL/hr and  q6hr  A: PIV x2  DVT ppx: Heparin subcu  GI ppx: pantoprazole IV 40mg  Code status: Full code  NOK/Surrogate: Donya Emerson (Daughter) [phone]     Marlena Bhat, PGY-3  DACR/Flex Team/Medicine Consult

## 2024-01-06 NOTE — PROGRESS NOTES
Subjective Data:  Reports no new symptoms    Overnight Events:    Brief runs of SVT yesterday evening     Objective Data:  Last Recorded Vitals:  Vitals:    01/06/24 0600 01/06/24 0700 01/06/24 0800 01/06/24 1000   BP: 135/72 (!) 133/103 (!) 133/103 136/81   BP Location:  Left arm     Patient Position:  Lying     Pulse: 71 68 65 62   Resp: 16 18 13 15   Temp:   36.3 °C (97.3 °F)    TempSrc:  Temporal     SpO2: 95% 95% 96% 95%   Weight:           Last Labs:  CBC - 1/5/2024: 12:55 AM  5.0 13.9 100    43.8      CMP - 1/5/2024:  6:12 PM  7.9 5.6 35 --- 0.3   3.3 2.6 24 48      PTT - 1/3/2024:  1:21 AM  1.1   12.1 30     TROPHS   Date/Time Value Ref Range Status   01/05/2024 04:28 AM 21 0 - 53 ng/L Final   01/05/2024 12:55 AM 22 0 - 53 ng/L Final   01/01/2024 07:43 PM 73 0 - 53 ng/L Final     HGBA1C   Date/Time Value Ref Range Status   01/01/2024 07:43 PM 5.6 see below % Final   07/18/2023 08:45 AM 6.0 % Final     Comment:          Diagnosis of Diabetes-Adults   Non-Diabetic: < or = 5.6%   Increased risk for developing diabetes: 5.7-6.4%   Diagnostic of diabetes: > or = 6.5%  .       Monitoring of Diabetes                Age (y)     Therapeutic Goal (%)   Adults:          >18           <7.0   Pediatrics:    13-18           <7.5                   7-12           <8.0                   0- 6            7.5-8.5   American Diabetes Association. Diabetes Care 33(S1), Jan 2010.   01/24/2023 08:46 AM 5.8 % Final     Comment:          Diagnosis of Diabetes-Adults   Non-Diabetic: < or = 5.6%   Increased risk for developing diabetes: 5.7-6.4%   Diagnostic of diabetes: > or = 6.5%  .       Monitoring of Diabetes                Age (y)     Therapeutic Goal (%)   Adults:          >18           <7.0   Pediatrics:    13-18           <7.5                   7-12           <8.0                   0- 6            7.5-8.5   American Diabetes Association. Diabetes Care 33(S1), Jan 2010.     10/14/2020 09:17 AM 6.0 4.3 - 5.6 % Final     Comment:  "    American Diabetes Association guidelines indicate that patients with HgbA1c in   the range 5.7-6.4% are at increased risk for development of diabetes, and   intervention by lifestyle modification may be beneficial. HgbA1c greater or   equal to 6.5% is considered diagnostic of diabetes.     VLDL   Date/Time Value Ref Range Status   07/18/2023 08:45 AM 25 0 - 40 mg/dL Final   01/24/2023 08:46 AM 23 0 - 40 mg/dL Final   09/07/2022 09:38 AM 30 0 - 40 mg/dL Final      Last I/O:  I/O last 3 completed shifts:  In: 2681.3 (26.4 mL/kg) [I.V.:1701.3 (16.8 mL/kg); NG/GT:230; IV Piggyback:750]  Out: 4875 (48 mL/kg) [Urine:4875 (1.3 mL/kg/hr)]  Weight: 101.5 kg     Past Cardiology Tests (Last 3 Years):  EKG:  Electrocardiogram, 12-lead PRN ACS symptoms 01/05/2024      Electrocardiogram, 12-lead PRN ACS symptoms 01/05/2024      Electrocardiogram, 12-lead PRN ACS symptoms 01/02/2024    Echo:  Transthoracic Echo (TTE) Complete 01/03/2024    Ejection Fractions:  No results found for: \"EF\"  Cath:  No results found for this or any previous visit from the past 1095 days.    Stress Test:  No results found for this or any previous visit from the past 1095 days.    Cardiac Imaging:  No results found for this or any previous visit from the past 1095 days.      Inpatient Medications:  Scheduled medications   Medication Dose Route Frequency    cefTRIAXone  2 g intravenous Daily    [START ON 1/7/2024] dexAMETHasone  6 mg nasogastric tube Daily    heparin (porcine)  5,000 Units subcutaneous q8h KENDRA    insulin glargine  5 Units subcutaneous Nightly    insulin lispro  0-5 Units subcutaneous TID with meals    levothyroxine  200 mcg oral Daily    metoprolol tartrate  12.5 mg nasogastric tube BID    pantoprazole  40 mg intravenous Daily    polyethylene glycol  17 g oral Daily    remdesivir  100 mg intravenous q24h    sennosides  2 tablet oral BID    sertraline  50 mg oral Daily     PRN medications   Medication    dextrose 10 % in water (D10W) "    dextrose    diclofenac sodium    glucagon    oxygen    phenoL     Continuous Medications   Medication Dose Last Rate    dextrose 5 % in water (D5W)  100 mL/hr 100 mL/hr (01/06/24 1037)       Physical Exam:  Gen: awake  HEENT: normocephalic. Trachea midline  CV: RRR. No murmurs, gallops, rubs  Pulm: mild coarse lung sounds  Abd: soft, non-distended. Normal active bowel sounds  Ext: warm and well-perfused  Psych: intermittently responsive to questions  Neuro: unable to fully assess     Assessment/Plan   Gopal Ness is a 73 y.o. male with PMH of pituitary adenoma s/p resection at [facility], DVT/PE on Eliquis, COPD, DMII, HLD  presenting after being found down after a fall 2/2 seizures vs new meningiomas. Cardiology consulted for runs of SVT with HD instability which started a few nights ago. 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 Recent ECHO without reduced EF or acute wall motion abnormalities.     Tele from overnight showed brief runs episodes of SVT however patient was otherwise hemodynamically stable.      Recommendations:   -Please increase Metoprolol 12.5 mg from BID to q6h  -If recurrent of persistent SVT, start with vagal maneuvers (carotid sinus massage), if unsuccessful, may consider Adenosine dose and/or IV Metop  -Please continue to monitor patient on telemetry  -Please obtain EKG if there's recurrence of SVT  -Optimal lytes: K>4, Mag>2    Thank you for the consult. Please page cardiology [phone]) with any other concerns.     Peripheral IV 01/01/24 20 G Right Hand (Active)   Site Assessment Dry;Clean;Intact 01/06/24 0758   Dressing Status Clean;Dry 01/06/24 0758   Number of days: 5       Peripheral IV 01/01/24 18 G Right;Anterior Forearm (Active)   Site Assessment Dry;Intact;Clean 01/06/24 0758   Dressing Status Clean;Dry 01/06/24 0758   Number of days: 5       NG/OG/Feeding Tube Other (Comment) Left nostril 12 Fr. (Active)    Intake (mL) 10 mL 01/06/24 0600   Number of days: 2       Urethral Catheter (Active)   Site Assessment Clean;Skin intact 01/06/24 1022   Output (mL) 400 mL 01/06/24 1022   Number of days: 5       Code Status:  Full Code    I spent 30 minutes in the professional and overall care of this patient.        Saul Cardozo MD

## 2024-01-06 NOTE — PROGRESS NOTES
Subjective   No acute events overnight. Patient refused to answer questions this morning. Repeatedly stated that he wanted to be left alone. Several brief runs of SVT yesterday evening, pt remained HDS.    Objective   Vitals 24 hour ranges:  Heart Rate:  [60-99]   Temp:  [36.3 °C (97.3 °F)-36.8 °C (98.2 °F)]   Resp:  [13-19]   BP: (119-141)/()   SpO2:  [90 %-96 %]          Intake/Output for last 24 hours:    Intake/Output Summary (Last 24 hours) at 1/6/2024 1024  Last data filed at 1/6/2024 0600  Gross per 24 hour   Intake 2005 ml   Output 3150 ml   Net -1145 ml        Physical Exam:  GENERAL: Repeatedly stated that he wanted to be left alone this morning. Did not want to answer any questions. Refused to follow instructions    NEURO:  Will awaken to voice, oriented to self,  says that he is in the hospital   Unable to assess cranial nerves, Nasolabial folds symmetric. Hearing intact to voice.  Will spontaneously lift LUE, LLE, RLE.   CV:  RRR on telemetry  RESP:  On 8L NC, rhonchi present, no wheezing   :  Abdomen NT/ND, soft  SKIN:  Scattered ecchymoses    Medications  Scheduled:  cefTRIAXone, 2 g, intravenous, Daily  [START ON 1/7/2024] dexAMETHasone, 6 mg, nasogastric tube, Daily  heparin (porcine), 5,000 Units, subcutaneous, q8h KENDRA  insulin glargine, 5 Units, subcutaneous, Nightly  insulin lispro, 0-5 Units, subcutaneous, TID with meals  levothyroxine, 200 mcg, oral, Daily  metoprolol tartrate, 12.5 mg, nasogastric tube, BID  pantoprazole, 40 mg, intravenous, Daily  polyethylene glycol, 17 g, oral, Daily  remdesivir, 100 mg, intravenous, q24h  sennosides, 2 tablet, oral, BID  sertraline, 50 mg, oral, Daily    PRN:  PRN medications: dextrose 10 % in water (D10W), dextrose, diclofenac sodium, glucagon, oxygen, phenoL     Continuous:  dextrose 5 % in water (D5W), 100 mL/hr, Last Rate: 100 mL/hr (01/06/24 0400)        Lab Results  Results from last 72 hours   Lab Units 01/06/24  0611 01/06/24  0009  01/05/24  1812 01/05/24  1351 01/05/24  1102 01/05/24  0055 01/04/24  1519 01/04/24  0137   GLUCOSE mg/dL  --   --  168*  --  116* 127*   < > 131*   SODIUM mmol/L 153* 153* 152*  --  155* 152*   < > 143   POTASSIUM mmol/L  --   --  4.4  --  4.2 4.4   < > 5.1   CHLORIDE mmol/L  --   --  117*  --  120* 115*   < > 109*   CO2 mmol/L  --   --  26  --  26 24   < > 20*   ANION GAP mmol/L  --   --  13  --  13 17   < > 19   BUN mg/dL  --   --  60*  --  67* 82*   < > 77*   CREATININE mg/dL  --   --  2.25*  --  2.62* 3.27*   < > 3.68*   EGFR mL/min/1.73m*2  --   --  30*  --  25* 19*   < > 17*   CALCIUM mg/dL  --   --  7.9*  --  8.3* 8.2*   < > 8.0*   PHOSPHORUS mg/dL  --   --  3.3  --  3.5 5.2*   < > 7.6*   ALBUMIN g/dL  --   --  2.6* 2.9* 2.8* 3.0*   < > 3.0*   MAGNESIUM mg/dL  --   --   --   --   --  2.68*  --  2.55*    < > = values in this interval not displayed.      Results from last 72 hours   Lab Units 01/05/24 0055 01/04/24  1519   WBC AUTO x10*3/uL 5.0 5.2   NRBC AUTO /100 WBCs 0.0 0.0   RBC AUTO x10*6/uL 4.53 4.28*   HEMOGLOBIN g/dL 13.9 13.9   HEMATOCRIT % 43.8 42.2   MCV fL 97 99   MCH pg 30.7 32.5   MCHC g/dL 31.7* 32.9   RDW % 14.2 14.4   PLATELETS AUTO x10*3/uL 100* 101*      Assessment/Plan   Mr. Gopal Ness is a 72 y/o man with h/o pituitary adenoma s/p resection at [facility], DVT/PE on Eliquis, COPD (noncompliant with medications)/tobacco use, depresssion, DMII, HLD presenting after being found down i/s/o fall on 12/31 (LKN 1100 on 12/31). Patient's children checked on him after his fall on 12/31 and he was reportedly okay, however was found down and unresponsive the following morning with emesis around him. Brought to OSH, negative CTH, intubated for airway protection. EMS reported witnessing possible seizure activity(?). Also with persistent tachycardia, leukocytosis and infiltrate on chest CT, so treating presumed aspiration pneumonia with antibiotics. Started on Keppra 1g BID and transferred to [facility] for cvEEG  and further monitoring. Extubated 1/2. cvEEG did not demonstrate any seizures. Course complicated by covid pnuemonia, LEEANNE, SVT likely in the setting of infection, and RUE weakness in the setting of a fall.     Updates 1/6:   - C/w ceftriaxone  - Continue with remdesivir and steroids for covid  - Increased evening glargine to 10u  - Will continue with D5W 100 ml/hr, fwf q6h - follow up medicine recommendations  - Cervical MRI given R arm weakness and recent fall   - On Nepro with goal 50ml/hr  - Increase metoprolol to 12.5 q6h given several episodes of SVT overnight    #Acute hypoxic respiratory failure  #COVID pneumonia, CAP  #COPD  - Maintain O2- 88-92%, currently on 8L HFNC  - Cpap at night  - Elevated CRP, LDH, Ferritin, d-dimer  - Sputum culture - Strep pneumoniae  - s/p 1x cefepime 2g on 1/1, meropenem (1/1-1/2), Vanc 1.5g (1/1-1/2) then ceftriaxone 2g (1/2- )   - Started remdesivir (1/6-1/10), dexamethasone 6mg daily  - Consider baricitinib if oxygen requirements do not improve     #Hypernatremia   #LEEANNE (baseline Cr 0.8-0.9) - improving  #Hx of DI   - Lomeli in, strict I/Os  - Renal ultrasound pending   - Hold home tamsulosin 0.4mg daily while NPO  - D5W 100ml/hr with 200ml q6h FWF  - RFP BID  - Follow up endocrine recs    #SVT  - Several episodes of SVT, terminated spontaneously, some associated with hypotension down to 80/50s.  - Consulted cardiology, pending recs.         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 New small dural based enhancing masses in the R posterior fossa  - MRI Brain and MRI Sella - residual adenoma unchanged from prior examination and similar to slightly increased size of small dural-based enhancing masses of small dural-based enhancing masses within the R posterior fossa favored to represent meningiomas.  - EEG - mod diffuse encephalopathy. Improvement in degree of encephalopathy compared to the previous day  - Keppra 1000mg BID discontinued  Plan:  - Q2h neuro checks  - Hold home trazadone 50mg at bedtime  - Continue home sertraline 50mg daily when taking PO  - Outpatient neurosurgery/neuro-ophthalmology follow up ordered  - Ordered MRI cervical spine given isolated RUE weakness. Can consider EMG in the future due to possible brachial plexus injury in the setting of a fall     #panhypothyroidism s/p adenoma resection  #Cushing disease  #diabetes mellitus  - Holding home glimepiride 4mg BID, metformin 1000mg BID  - A1C 5.6  Plan:  - Mild corrective SSI #1  - Start glargine 5u daily  - POCT glucose q4hrs while NPO, Hypoglycemia protocol  - Continue home levothyroxine 200mcg daily -> IV 100mcg while NPO  - AM cortisol, ACTH   - follow up outpatient with endocrinologist Dr. Yvonne Lofton in 4 weeks (scheduled by endocrine)    #hx of DVT/PE  - H/H: 16.9/51 -> 14.3/45.8  - PLT: 148 -> 133 -> 98 > 100  - 4T score: 3, low probability <5%  - Daily CBC    - Hold home Eliquis at this time pending stabilization    F: LR, D5W  E: Replete PRN  N: Nepro  A: PIVs  Lomeli: Yes  GI ppx: PPI  DVT ppx: SQH     Full Code  NOK/Surrogate: Donya Emerson (Daughter) [phone]     RESTRAINTS:  Not indicated/Patient does not meet criteria for restraints    Fatmata Kumar MD

## 2024-01-06 NOTE — CARE PLAN
The patient's goals for the shift include      The clinical goals for the shift include Patient remains hemodynamically stable through the shift      Problem: Diabetes  Goal: Achieve decreasing blood glucose levels by end of shift  Outcome: Progressing  Goal: Increase stability of blood glucose readings by end of shift  Outcome: Progressing  Goal: Decrease in ketones present in urine by end of shift  Outcome: Progressing  Goal: Maintain electrolyte levels within acceptable range throughout shift  Outcome: Progressing  Goal: Maintain glucose levels >70mg/dl to <250mg/dl throughout shift  Outcome: Progressing  Goal: No changes in neurological exam by end of shift  Outcome: Progressing  Goal: Learn about and adhere to nutrition recommendations by end of shift  Outcome: Progressing  Goal: Vital signs within normal range for age by end of shift  Outcome: Progressing  Goal: Increase self care and/or family involovement by end of shift  Outcome: Progressing  Goal: Receive DSME education by end of shift  Outcome: Progressing     Problem: Fall/Injury  Goal: Not fall by end of shift  Outcome: Progressing  Goal: Be free from injury by end of the shift  Outcome: Progressing  Goal: Verbalize understanding of personal risk factors for fall in the hospital  Outcome: Progressing  Goal: Verbalize understanding of risk factor reduction measures to prevent injury from fall in the home  Outcome: Progressing  Goal: Use assistive devices by end of the shift  Outcome: Progressing  Goal: Pace activities to prevent fatigue by end of the shift  Outcome: Progressing     Problem: Skin  Goal: Decreased wound size/increased tissue granulation at next dressing change  Outcome: Progressing  Flowsheets (Taken 1/6/2024 4886)  Decreased wound size/increased tissue granulation at next dressing change:   Promote sleep for wound healing   Protective dressings over bony prominences   Utilize specialty bed per algorithm  Goal: Participates in  plan/prevention/treatment measures  Outcome: Progressing  Flowsheets (Taken 1/6/2024 0346)  Participates in plan/prevention/treatment measures:   Discuss with provider PT/OT consult   Elevate heels   Increase activity/out of bed for meals  Goal: Prevent/manage excess moisture  Outcome: Progressing  Flowsheets (Taken 1/6/2024 0346)  Prevent/manage excess moisture:   Cleanse incontinence/protect with barrier cream   Monitor for/manage infection if present   Follow provider orders for dressing changes   Use wicking fabric (obtain order)   Moisturize dry skin  Goal: Prevent/minimize sheer/friction injuries  Outcome: Progressing  Flowsheets (Taken 1/6/2024 0346)  Prevent/minimize sheer/friction injuries:   Complete micro-shifts as needed if patient unable. Adjust patient position to relieve pressure points, not a full turn   Increase activity/out of bed for meals   Use pull sheet   HOB 30 degrees or less   Turn/reposition every 2 hours/use positioning/transfer devices   Utilize specialty bed per algorithm  Goal: Promote/optimize nutrition  Outcome: Progressing  Flowsheets (Taken 1/6/2024 0346)  Promote/optimize nutrition:   Monitor/record intake including meals   Assist with feeding   Discuss with provider if NPO > 2 days  Goal: Promote skin healing  Outcome: Progressing  Flowsheets (Taken 1/6/2024 0346)  Promote skin healing:   Assess skin/pad under line(s)/device(s)   Protective dressings over bony prominences   Turn/reposition every 2 hours/use positioning/transfer devices   Ensure correct size (line/device) and apply per  instructions   Rotate device position/do not position patient on device     Problem: Pain - Adult  Goal: Verbalizes/displays adequate comfort level or baseline comfort level  Outcome: Progressing     Problem: Safety - Adult  Goal: Free from fall injury  Outcome: Progressing     Problem: Discharge Planning  Goal: Discharge to home or other facility with appropriate resources  Outcome:  Progressing     Problem: Chronic Conditions and Co-morbidities  Goal: Patient's chronic conditions and co-morbidity symptoms are monitored and maintained or improved  Outcome: Progressing     Problem: Nutrition  Goal: Less than 5 days NPO/clear liquids  Outcome: Progressing  Goal: Oral intake greater than 50%  Outcome: Progressing  Goal: Oral intake greater 75%  Outcome: Progressing  Goal: Consume prescribed supplement  Outcome: Progressing  Goal: Adequate PO fluid intake  Outcome: Progressing  Goal: Nutrition support goals are met within 48 hrs  Outcome: Progressing  Goal: Nutrition support is meeting 75% of nutrient needs  Outcome: Progressing  Goal: Tube feed tolerance  Outcome: Progressing  Goal: BG  mg/dL  Outcome: Progressing  Goal: Lab values WNL  Outcome: Progressing  Goal: Electrolytes WNL  Outcome: Progressing  Goal: Promote healing  Outcome: Progressing  Goal: Maintain stable weight  Outcome: Progressing  Goal: Reduce weight from edema/fluid  Outcome: Progressing  Goal: Gradual weight gain  Outcome: Progressing  Goal: Improve ostomy output  Outcome: Progressing     Problem: Safety - Medical Restraint  Goal: Remains free of injury from restraints (Restraint for Interference with Medical Device)  Outcome: Progressing  Goal: Free from restraint(s) (Restraint for Interference with Medical Device)  Outcome: Progressing     Problem: Knowledge Deficit  Goal: Patient/family/caregiver demonstrates understanding of disease process, treatment plan, medications, and discharge instructions  Outcome: Progressing     Problem: Mechanical Ventilation  Goal: Patient Will Maintain Patent Airway  Outcome: Progressing  Goal: Oral health is maintained or improved  Outcome: Progressing  Goal: Tracheostomy will be managed safely  Outcome: Progressing  Goal: ET tube will be managed safely  Outcome: Progressing  Goal: Ability to express needs and understand communication  Outcome: Progressing  Goal: Mobility/activity is  maintained at optimum level for patient  Outcome: Progressing

## 2024-01-06 NOTE — PROGRESS NOTES
"Gopal Ness is a 73 y.o. male on day 5 of admission presenting with AMS (altered mental status).    Subjective   No acute events overnight, reviewed Tele, no SVTs  Patient AAOx1, unable to answer questions appropriately and repeatedly saying \"leave me alone\"  Na 152 -> 155 -> 152 last 24 hours with D5W 100mL/hr       Objective     Physical Exam  Constitutional:       General: He is not in acute distress.     Appearance: He is not diaphoretic.   HENT:      Head: Normocephalic and atraumatic.   Eyes:      Extraocular Movements: Extraocular movements intact.      Conjunctiva/sclera: Conjunctivae normal.   Cardiovascular:      Rate and Rhythm: Normal rate and regular rhythm.      Heart sounds: Normal heart sounds. No murmur heard.  Pulmonary:      Effort: Pulmonary effort is normal. No respiratory distress.      Breath sounds: Rhonchi and rales present. No wheezing.      Comments: On 8L NC  Abdominal:      General: Abdomen is flat. There is no distension.      Palpations: Abdomen is soft.      Tenderness: There is no guarding.   Musculoskeletal:      Right lower leg: No edema.      Left lower leg: No edema.   Skin:     General: Skin is warm and dry.   Neurological:      General: No focal deficit present.      Mental Status: He is alert.      Comments: AAOx1 (oriented to self only)         Last Recorded Vitals  Blood pressure (!) 133/103, pulse 65, temperature 36.3 °C (97.3 °F), resp. rate 13, weight 101 kg (223 lb 12.3 oz), SpO2 96 %.  Intake/Output last 3 Shifts:  I/O last 3 completed shifts:  In: 2681.3 (26.4 mL/kg) [I.V.:1701.3 (16.8 mL/kg); NG/GT:230; IV Piggyback:750]  Out: 4875 (48 mL/kg) [Urine:4875 (1.3 mL/kg/hr)]  Weight: 101.5 kg     Relevant Results    Scheduled medications  cefTRIAXone, 2 g, intravenous, Daily  [START ON 1/7/2024] dexAMETHasone, 6 mg, nasogastric tube, Daily  heparin (porcine), 5,000 Units, subcutaneous, q8h KENDRA  insulin glargine, 5 Units, subcutaneous, Nightly  insulin lispro, 0-5 Units, " subcutaneous, TID with meals  levothyroxine, 200 mcg, oral, Daily  metoprolol tartrate, 12.5 mg, nasogastric tube, BID  pantoprazole, 40 mg, intravenous, Daily  polyethylene glycol, 17 g, oral, Daily  remdesivir, 100 mg, intravenous, q24h  sennosides, 2 tablet, oral, BID  sertraline, 50 mg, oral, Daily    Continuous medications  dextrose 5 % in water (D5W), 100 mL/hr, Last Rate: 100 mL/hr (01/06/24 0400)    PRN medications  PRN medications: dextrose 10 % in water (D10W), dextrose, diclofenac sodium, glucagon, oxygen, phenoL    Results for orders placed or performed during the hospital encounter of 01/01/24 (from the past 24 hour(s))   Electrocardiogram, 12-lead PRN ACS symptoms   Result Value Ref Range    Ventricular Rate 99 BPM    Atrial Rate 99 BPM    VT Interval 170 ms    QRS Duration 72 ms    QT Interval 340 ms    QTC Calculation(Bazett) 436 ms    P Axis 62 degrees    R Axis 13 degrees    T Axis 60 degrees    QRS Count 16 beats    Q Onset 227 ms    P Onset 142 ms    P Offset 187 ms    T Offset 397 ms    QTC Fredericia 401 ms   Electrocardiogram, 12-lead PRN ACS symptoms   Result Value Ref Range    Ventricular Rate 87 BPM    Atrial Rate 87 BPM    VT Interval 176 ms    QRS Duration 72 ms    QT Interval 364 ms    QTC Calculation(Bazett) 438 ms    P Axis 52 degrees    R Axis -2 degrees    T Axis 36 degrees    QRS Count 15 beats    Q Onset 226 ms    P Onset 138 ms    P Offset 186 ms    T Offset 408 ms    QTC Fredericia 411 ms   Renal function panel   Result Value Ref Range    Glucose 116 (H) 74 - 99 mg/dL    Sodium 155 (H) 136 - 145 mmol/L    Potassium 4.2 3.5 - 5.3 mmol/L    Chloride 120 (H) 98 - 107 mmol/L    Bicarbonate 26 21 - 32 mmol/L    Anion Gap 13 10 - 20 mmol/L    Urea Nitrogen 67 (H) 6 - 23 mg/dL    Creatinine 2.62 (H) 0.50 - 1.30 mg/dL    eGFR 25 (L) >60 mL/min/1.73m*2    Calcium 8.3 (L) 8.6 - 10.6 mg/dL    Phosphorus 3.5 2.5 - 4.9 mg/dL    Albumin 2.8 (L) 3.4 - 5.0 g/dL   POCT GLUCOSE   Result Value Ref  Range    POCT Glucose 125 (H) 74 - 99 mg/dL   C-reactive protein   Result Value Ref Range    C-Reactive Protein 6.53 (H) <1.00 mg/dL   D-dimer, Non VTE   Result Value Ref Range    D-Dimer Non VTE, Quant (ng/mL FEU) 1,360 (H) <=500 ng/mL FEU   Lactate Dehydrogenase   Result Value Ref Range     (H) 84 - 246 U/L   Ferritin   Result Value Ref Range    Ferritin 354 (H) 20 - 300 ng/mL   Hepatic function panel   Result Value Ref Range    Albumin 2.9 (L) 3.4 - 5.0 g/dL    Bilirubin, Total 0.3 0.0 - 1.2 mg/dL    Bilirubin, Direct 0.1 0.0 - 0.3 mg/dL    Alkaline Phosphatase 48 33 - 136 U/L    ALT 24 10 - 52 U/L    AST 35 9 - 39 U/L    Total Protein 5.6 (L) 6.4 - 8.2 g/dL   POCT GLUCOSE   Result Value Ref Range    POCT Glucose 123 (H) 74 - 99 mg/dL   Osmolality, urine   Result Value Ref Range    Osmolality, Urine Random 355 200 - 1,200 mOsm/kg   Urine electrolytes   Result Value Ref Range    Sodium, Urine Random 66 mmol/L    Sodium/Creatinine Ratio 183 Not established. mmol/g Creat    Potassium, Urine Random 16 mmol/L    Potassium/Creatinine Ratio 44 Not established mmol/g Creat    Chloride, Urine Random 70 mmol/L    Chloride/Creatinine Ratio 194 23 - 275 mmol/g creat    Creatinine, Urine Random 36.0 20.0 - 370.0 mg/dL   Renal function panel   Result Value Ref Range    Glucose 168 (H) 74 - 99 mg/dL    Sodium 152 (H) 136 - 145 mmol/L    Potassium 4.4 3.5 - 5.3 mmol/L    Chloride 117 (H) 98 - 107 mmol/L    Bicarbonate 26 21 - 32 mmol/L    Anion Gap 13 10 - 20 mmol/L    Urea Nitrogen 60 (H) 6 - 23 mg/dL    Creatinine 2.25 (H) 0.50 - 1.30 mg/dL    eGFR 30 (L) >60 mL/min/1.73m*2    Calcium 7.9 (L) 8.6 - 10.6 mg/dL    Phosphorus 3.3 2.5 - 4.9 mg/dL    Albumin 2.6 (L) 3.4 - 5.0 g/dL   POCT GLUCOSE   Result Value Ref Range    POCT Glucose 188 (H) 74 - 99 mg/dL   Sodium   Result Value Ref Range    Sodium 153 (H) 136 - 145 mmol/L   Sodium   Result Value Ref Range    Sodium 153 (H) 136 - 145 mmol/L   POCT GLUCOSE   Result Value  Ref Range    POCT Glucose 175 (H) 74 - 99 mg/dL     XR abdomen 1 view    Result Date: 1/6/2024  1. Enteric tube tip projects over the expected location of the 2nd portion of the duodenum, retracted compared to prior exam. 2. Nonobstructive visualized bowel gas pattern.           This patient has a urinary catheter   Reason for the urinary catheter remaining today? critically ill patient who need accurate urinary output measurements and perioperative use for selected surgical procedures         Assessment/Plan   Principal Problem:    AMS (altered mental status)    Gopal Ness is a 73 y.o. male with PMH of Pituitary adenoma (s/p resection in 2016), panhypopituitarism, Diabetes insipidus, DVT/PE (on Eliquis), COPD, Depression, type 2 DM, HLD, HTN, presenting after an unwitnessed LOC (c/b aspiration) on 1/1/24. Pt currently admitted under neurology. Medicine transfer requested for management of his medical comobidities. Patient's current hospital stay c/b SVT episodes, acute hypernatremia, LEEANNE and respiratory failure. COVID +.     Aspiration pneumonia d/t the initial LOC responding to antibiotics ( currently on Ceftriaxone), endocrinology involved for the management of panhypopituitarism following resection of pituitary adenoma, LEEANNE responding to fluids, improving Cr. Pt developed multiple episodes of SVTs overnight on 1/4, started on metoprolol 12.5mg BID. Cardiology following. Pt has also developed acute hypernatremia which may be due to his underlying DI, currently on D5W at 100mL/hr.     Plans for today:  - continue trending Na q6h, currently pending AM RFP to decide on adjusting free water (increasing FWF preferred over D5W as IV may increase his O2 requirement). FWD = 2.8L calculated with goal Na of 145  - Continue ceftriaxone and metop 12.5 mg BID, HR 60's sinus currently  - Being started on remdesivir today for total 5 doses (1/6-1/10), s/p 200mg loading dose this AM  - Please order STAT RFP, CBC, Mg, Procal  (no AM labs available)  - Medicine will monitor patient and decide on SDU vs Medicine transfer later in the day     #Acute encephalopathy  #c/f seizure, s/p Keppra loading on 1/1  - Neurology following  - MRI Brain and MRI Sella - residual adenoma unchanged from prior examination and similar to slightly increased size of small dural-based enhancing masses of small dural-based enhancing masses within the R posterior fossa favored to represent meningiomas    #Hypernatremia  - Component of DI and dehydration  - Currently on D5W @ 100mL/hr, Na trended down overnight from 155 to 152  - Also has  q6hr, on TF with Nepro @ 50mL/hr  - AM RFP currently pending   - (1/5) Serum Osm 343, Urine Osm 355, Urine Na 66 - hypertonic hypernatremia    #Panhypopituitarism  #DM2  - Endocrinology following  - Continue with Levothyroxine IV 100mcg  - Lantus 5 units + SSI  - Hydrocortisone 50mg q8h (1/3- )    #h/o SVT on 1/4  - seen by Cardiology  - started on metoprolol 12.5 mg BID on 1/5  - No acute events overnight     #Aspiration pneumonia  #S. pneumoniae pneumonia   - s/p 1x cefepime 2g on 1/1, meropenem (1/1-1/2), Vanc 1.5g (1/1-1/2) then ceftriaxone 2g (1/2- )  - Sputum culture from 1/2/2024 positive for S. pneumo sensitive to Ceftriaxone  - continue Ceftriaxone    #COVID with increased O2 requirement   - on Remdesivir, started today for 5 total doses (1/6-1/10), s/p 200mg loading dose this AM  - c/w dexamethasone 6mg daily   - CRP 6.53, Ferritin 354    F: D5W @ 100mL/hr  E: replete PRN, K >4, Mg>2  N: TF @ 50mL/hr and  q6hr  A: PIV x2  DVT ppx: heparin subcu  GI ppx: pantoprazole IV 40mg  Code status: Full code    Marlena Bhat, PGY-3  DACR/Flex Team/Medicine Consult

## 2024-01-06 NOTE — PROGRESS NOTES
Gopal Ness is a 73 y.o. male on day 5 of admission presenting with AMS (altered mental status).    Subjective   Patient was not seen today, only chart was reviewed.         Objective   Physical exam not done today  Last Recorded Vitals  Blood pressure 132/79, pulse 67, temperature 36.1 °C (97 °F), temperature source Temporal, resp. rate 15, weight 101 kg (223 lb 12.3 oz), SpO2 94 %.  Intake/Output last 3 Shifts:  I/O last 3 completed shifts:  In: 2681.3 (26.4 mL/kg) [I.V.:1701.3 (16.8 mL/kg); NG/GT:230; IV Piggyback:750]  Out: 4875 (48 mL/kg) [Urine:4875 (1.3 mL/kg/hr)]  Weight: 101.5 kg     Relevant Results  Results from last 7 days   Lab Units 01/06/24  1657 01/06/24  1324 01/06/24  0818 01/05/24  2014 01/05/24  1812 01/05/24  1539 01/05/24  1150 01/05/24  1102 01/05/24  0401 01/05/24  0055 01/04/24  1635 01/04/24  1519   POCT GLUCOSE mg/dL 228*  --  175* 188*  --  123* 125*  --    < >  --    < >  --    GLUCOSE mg/dL  --  184*  --   --  168*  --   --  116*  --  127*  --  144*    < > = values in this interval not displayed.     Results for orders placed or performed during the hospital encounter of 01/01/24 (from the past 24 hour(s))   Renal function panel   Result Value Ref Range    Glucose 168 (H) 74 - 99 mg/dL    Sodium 152 (H) 136 - 145 mmol/L    Potassium 4.4 3.5 - 5.3 mmol/L    Chloride 117 (H) 98 - 107 mmol/L    Bicarbonate 26 21 - 32 mmol/L    Anion Gap 13 10 - 20 mmol/L    Urea Nitrogen 60 (H) 6 - 23 mg/dL    Creatinine 2.25 (H) 0.50 - 1.30 mg/dL    eGFR 30 (L) >60 mL/min/1.73m*2    Calcium 7.9 (L) 8.6 - 10.6 mg/dL    Phosphorus 3.3 2.5 - 4.9 mg/dL    Albumin 2.6 (L) 3.4 - 5.0 g/dL   POCT GLUCOSE   Result Value Ref Range    POCT Glucose 188 (H) 74 - 99 mg/dL   Sodium   Result Value Ref Range    Sodium 153 (H) 136 - 145 mmol/L   Sodium   Result Value Ref Range    Sodium 153 (H) 136 - 145 mmol/L   POCT GLUCOSE   Result Value Ref Range    POCT Glucose 175 (H) 74 - 99 mg/dL   Sodium   Result Value Ref Range     Sodium 152 (H) 136 - 145 mmol/L   CBC and Auto Differential   Result Value Ref Range    WBC 3.9 (L) 4.4 - 11.3 x10*3/uL    nRBC 0.0 0.0 - 0.0 /100 WBCs    RBC 4.88 4.50 - 5.90 x10*6/uL    Hemoglobin 15.4 13.5 - 17.5 g/dL    Hematocrit 48.0 41.0 - 52.0 %    MCV 98 80 - 100 fL    MCH 31.6 26.0 - 34.0 pg    MCHC 32.1 32.0 - 36.0 g/dL    RDW 13.9 11.5 - 14.5 %    Platelets 87 (L) 150 - 450 x10*3/uL    Immature Granulocytes %, Automated 6.4 (H) 0.0 - 0.9 %    Immature Granulocytes Absolute, Automated 0.25 0.00 - 0.50 x10*3/uL   Renal Function Panel   Result Value Ref Range    Glucose 184 (H) 74 - 99 mg/dL    Sodium 152 (H) 136 - 145 mmol/L    Potassium 4.6 3.5 - 5.3 mmol/L    Chloride 116 (H) 98 - 107 mmol/L    Bicarbonate 26 21 - 32 mmol/L    Anion Gap 15 10 - 20 mmol/L    Urea Nitrogen 60 (H) 6 - 23 mg/dL    Creatinine 1.72 (H) 0.50 - 1.30 mg/dL    eGFR 42 (L) >60 mL/min/1.73m*2    Calcium 8.3 (L) 8.6 - 10.6 mg/dL    Phosphorus 3.1 2.5 - 4.9 mg/dL    Albumin 2.8 (L) 3.4 - 5.0 g/dL   Magnesium   Result Value Ref Range    Magnesium 2.27 1.60 - 2.40 mg/dL   Manual Differential   Result Value Ref Range    Neutrophils %, Manual 79.3 40.0 - 80.0 %    Bands %, Manual 5.2 0.0 - 5.0 %    Lymphocytes %, Manual 3.4 13.0 - 44.0 %    Monocytes %, Manual 9.5 2.0 - 10.0 %    Eosinophils %, Manual 0.0 0.0 - 6.0 %    Basophils %, Manual 0.0 0.0 - 2.0 %    Myelocytes %, Manual 2.6 0.0 - 0.0 %    Seg Neutrophils Absolute, Manual 3.09 1.60 - 5.00 x10*3/uL    Bands Absolute, Manual 0.20 0.00 - 0.50 x10*3/uL    Lymphocytes Absolute, Manual 0.13 (L) 0.80 - 3.00 x10*3/uL    Monocytes Absolute, Manual 0.37 0.05 - 0.80 x10*3/uL    Eosinophils Absolute, Manual 0.00 0.00 - 0.40 x10*3/uL    Basophils Absolute, Manual 0.00 0.00 - 0.10 x10*3/uL    Myelocytes Absolute, Manual 0.10 0.00 - 0.00 x10*3/uL    Total Cells Counted 116     Neutrophils Absolute, Manual 3.29 1.60 - 5.50 x10*3/uL    RBC Morphology See Below     Bari Cells Few      Acanthocytes Few    POCT GLUCOSE   Result Value Ref Range    POCT Glucose 228 (H) 74 - 99 mg/dL       Scheduled medications  cefTRIAXone, 2 g, intravenous, Daily  [START ON 1/7/2024] dexAMETHasone, 6 mg, nasogastric tube, Daily  heparin (porcine), 5,000 Units, subcutaneous, q8h KENDRA  insulin glargine, 10 Units, subcutaneous, Nightly  insulin lispro, 0-5 Units, subcutaneous, q6h  [START ON 1/7/2024] levothyroxine, 200 mcg, oral, Daily  metoprolol tartrate, 12.5 mg, nasogastric tube, q6h  pantoprazole, 40 mg, intravenous, Daily  polyethylene glycol, 17 g, oral, Daily  remdesivir, 100 mg, intravenous, q24h  sennosides, 2 tablet, oral, BID  sertraline, 50 mg, oral, Daily      Continuous medications  dextrose 5 % in water (D5W), 100 mL/hr, Last Rate: 100 mL/hr (01/06/24 1433)      PRN medications  PRN medications: dextrose 10 % in water (D10W), dextrose, diclofenac sodium, glucagon, oxygen, phenoL               Assessment/Plan   Principal Problem:    AMS (altered mental status)  Gopal Ness is a 73 y.o. male with a h/o pituitary adenoma s/p transphenoidal resection at [facility] (2016) c/b panhypopituitarism (adrenal insufficiency-resolved, hypogonadism, diabetes insipidis reportedly), hypothyroidism on levothyroxine);  DVT/PE on Eliquis, COPD (noncompliant with medications)/tobacco use, depresssion, DMII, HLD presenting after being found down unresponsive on 1/1/24 and subsequently transferred to [facility] on 1/2 with course complicated by intubation for airway protection (now extubated on intermittent BiPAP- HFNC?), concern for possible seizure activity (initially placed on keppra, now off), presumed aspiration pneumonia on antibiotics, hypotension initially on pressors- now resolved, and LEEANNE.      Endocrinology consulted given history of pituitary adenoma resection and whether patient needed to be on steroid replacement therapy.      Patient with known history Cushing disease confirmed biochemically and with biopsy with prior  recurrence. Now patient presenting with active Cushing disease again with high AM cortisol and ACTH likely 2/2 residual adenoma. He has panhypopituitarism s/p transphenoidal resection of pituitary adenoma (2016) and radiation (2018), and no recovery expected of pituitary based on current labs and s/p radiation. Per neurosurgery in 2023, additional surgery of residual is not warranted/feasible, and would not result in improvement of ophthalmoplegia. Residual on 2024 MRI appears grossly the same as residual on 2023 MRI. Unlikely to be cause of seizures.      #Cushing disease  #Panhypopituitarism  ::Patient with sellar mass dx  progressing to vision changes  with Cushing disease now s/p resection 10/2016 (pathology pituitary adenoma, ACTH cell-type, Ki-67 focally 12%) with MRI and biochemical recurrence of Cushing in 2018 now s/p 5220 cGy in 29 fx now with recent worsening ophthalmoplegia with repeat MRI 2023 thought to be likely unchanged from prior in size based on report (known mass effect on OC). Had been followed at [facility] by endocrinology (see consult note for additional endocrine history).   ::24 FSH <0.9, LH <0.1, prolactin 1.4L, Growth hormone <0.05, IGF1 31 (Z score -2.8), total testosterone 73, free testosterone 11.3  ::24 LH <0.1, prolactin 2.8,   ::24 ACTH 100.0H, 24 AM cortisol 67.6  ::Per chart review, did not start inpatient steroids until 1/3/2024  ::24 AM cortisol 58.2 confirms cushing disease as >10+ hours from prior steroid dose  ::Collected labs pendin/04 ACTH and I-GF  -2024: Urine sodium 66, urine awesome > 300, post IV fluids    -Patient was having persistent SVT, cardiology on board, etiology hypovolemia versus respiratory distress versus hypoxia.  -Currently being transferred to medicine    Recommendations:  -no steroids indicated from endocrinology standpoint (active Cushing disease), okay for steroids for other indications  -hypernatremia most  likely 2/2 dehydration given that urine still light yellow in color (per medicine consult team) no suspicion for DI, will defer management to primary team  -no further inpatient treatment of Cushing disease  -follow up outpatient with endocrinologist Dr. Yvonne Lofton in 4 weeks (our coordinator will schedule this appointment)     #Diabetes  ::HgbA1c 5.6 on 1/1/24  ::home metformin 1000 mg twice daily, glimeperide 4 mg twice daily   Recommendations: (Patient started on D5 100 mm/h and 1/5 in p.m. for hyponatremia, and tube feeds 10 mL/h on 1/6 4 AM, goal to increase gradually to 50 mill per hour, remains on dexamethasone 6 mg day 2 / 6)  -Increase glargine from 5 to 10 units subcutaneously in p.m.  -Switch from lispro sliding scale #1, to regular insulin #1 (1 units for every 50 above 150) every 6 hours while on tube feeds  -POCT glucose q6hrs while on tube feeds  -hypoglycemia protocol     #Hypothyroidism  ::1/4/24 TSH 0.02L, free T4 0.71L, free T3 1.8L  ::1/4/24 free T4 0.71L  Recommendations:  -if not able to give po medication,  give IV levothyroxine 100mcg daily   -Currently on home oral levothyroxine 200 mcg daily, kindly make sure tube feeds are held 2 hours before, 1 hour after levothyroxine dose       Plan was communicated to the primary team.   Pager [phone]  Patient discussed with Dr. Brandon

## 2024-01-07 LAB
ACTH PLAS-MCNC: 82.7 PG/ML (ref 7.2–63.3)
ALBUMIN SERPL BCP-MCNC: 2.6 G/DL (ref 3.4–5)
ALBUMIN SERPL BCP-MCNC: 2.8 G/DL (ref 3.4–5)
ALT SERPL W P-5'-P-CCNC: 23 U/L (ref 10–52)
ANION GAP SERPL CALC-SCNC: 14 MMOL/L (ref 10–20)
ANION GAP SERPL CALC-SCNC: 14 MMOL/L (ref 10–20)
AST SERPL W P-5'-P-CCNC: 30 U/L (ref 9–39)
BASOPHILS # BLD AUTO: 0.04 X10*3/UL (ref 0–0.1)
BASOPHILS NFR BLD AUTO: 0.7 %
BUN SERPL-MCNC: 49 MG/DL (ref 6–23)
BUN SERPL-MCNC: 56 MG/DL (ref 6–23)
CALCIUM SERPL-MCNC: 8.2 MG/DL (ref 8.6–10.6)
CALCIUM SERPL-MCNC: 8.7 MG/DL (ref 8.6–10.6)
CHLORIDE SERPL-SCNC: 113 MMOL/L (ref 98–107)
CHLORIDE SERPL-SCNC: 116 MMOL/L (ref 98–107)
CO2 SERPL-SCNC: 26 MMOL/L (ref 21–32)
CO2 SERPL-SCNC: 28 MMOL/L (ref 21–32)
CREAT SERPL-MCNC: 1.35 MG/DL (ref 0.5–1.3)
CREAT SERPL-MCNC: 1.48 MG/DL (ref 0.5–1.3)
EOSINOPHIL # BLD AUTO: 0.02 X10*3/UL (ref 0–0.4)
EOSINOPHIL NFR BLD AUTO: 0.4 %
ERYTHROCYTE [DISTWIDTH] IN BLOOD BY AUTOMATED COUNT: 13.7 % (ref 11.5–14.5)
GFR SERPL CREATININE-BSD FRML MDRD: 50 ML/MIN/1.73M*2
GFR SERPL CREATININE-BSD FRML MDRD: 56 ML/MIN/1.73M*2
GLUCOSE BLD MANUAL STRIP-MCNC: 136 MG/DL (ref 74–99)
GLUCOSE BLD MANUAL STRIP-MCNC: 170 MG/DL (ref 74–99)
GLUCOSE BLD MANUAL STRIP-MCNC: 175 MG/DL (ref 74–99)
GLUCOSE BLD MANUAL STRIP-MCNC: 230 MG/DL (ref 74–99)
GLUCOSE SERPL-MCNC: 145 MG/DL (ref 74–99)
GLUCOSE SERPL-MCNC: 199 MG/DL (ref 74–99)
HCT VFR BLD AUTO: 47.1 % (ref 41–52)
HGB BLD-MCNC: 14.9 G/DL (ref 13.5–17.5)
IMM GRANULOCYTES # BLD AUTO: 0.27 X10*3/UL (ref 0–0.5)
IMM GRANULOCYTES NFR BLD AUTO: 4.9 % (ref 0–0.9)
INR PPP: 1.1 (ref 0.9–1.1)
LYMPHOCYTES # BLD AUTO: 0.63 X10*3/UL (ref 0.8–3)
LYMPHOCYTES NFR BLD AUTO: 11.5 %
MAGNESIUM SERPL-MCNC: 1.94 MG/DL (ref 1.6–2.4)
MCH RBC QN AUTO: 31.7 PG (ref 26–34)
MCHC RBC AUTO-ENTMCNC: 31.6 G/DL (ref 32–36)
MCV RBC AUTO: 100 FL (ref 80–100)
MONOCYTES # BLD AUTO: 0.52 X10*3/UL (ref 0.05–0.8)
MONOCYTES NFR BLD AUTO: 9.5 %
NEUTROPHILS # BLD AUTO: 3.98 X10*3/UL (ref 1.6–5.5)
NEUTROPHILS NFR BLD AUTO: 73 %
NRBC BLD-RTO: 0 /100 WBCS (ref 0–0)
PHOSPHATE SERPL-MCNC: 2.7 MG/DL (ref 2.5–4.9)
PHOSPHATE SERPL-MCNC: 2.8 MG/DL (ref 2.5–4.9)
PLATELET # BLD AUTO: 95 X10*3/UL (ref 150–450)
POTASSIUM SERPL-SCNC: 3.8 MMOL/L (ref 3.5–5.3)
POTASSIUM SERPL-SCNC: 4 MMOL/L (ref 3.5–5.3)
PROCALCITONIN SERPL-MCNC: 0.06 NG/ML
PROTHROMBIN TIME: 12.3 SECONDS (ref 9.8–12.8)
RBC # BLD AUTO: 4.7 X10*6/UL (ref 4.5–5.9)
SODIUM SERPL-SCNC: 151 MMOL/L (ref 136–145)
SODIUM SERPL-SCNC: 152 MMOL/L (ref 136–145)
WBC # BLD AUTO: 5.5 X10*3/UL (ref 4.4–11.3)

## 2024-01-07 PROCEDURE — 36415 COLL VENOUS BLD VENIPUNCTURE: CPT

## 2024-01-07 PROCEDURE — 2500000005 HC RX 250 GENERAL PHARMACY W/O HCPCS: Mod: JZ | Performed by: STUDENT IN AN ORGANIZED HEALTH CARE EDUCATION/TRAINING PROGRAM

## 2024-01-07 PROCEDURE — 80069 RENAL FUNCTION PANEL: CPT

## 2024-01-07 PROCEDURE — 2500000004 HC RX 250 GENERAL PHARMACY W/ HCPCS (ALT 636 FOR OP/ED): Performed by: STUDENT IN AN ORGANIZED HEALTH CARE EDUCATION/TRAINING PROGRAM

## 2024-01-07 PROCEDURE — 99233 SBSQ HOSP IP/OBS HIGH 50: CPT

## 2024-01-07 PROCEDURE — 84450 TRANSFERASE (AST) (SGOT): CPT

## 2024-01-07 PROCEDURE — 84145 PROCALCITONIN (PCT): CPT

## 2024-01-07 PROCEDURE — 82947 ASSAY GLUCOSE BLOOD QUANT: CPT

## 2024-01-07 PROCEDURE — 2500000004 HC RX 250 GENERAL PHARMACY W/ HCPCS (ALT 636 FOR OP/ED): Mod: MUE

## 2024-01-07 PROCEDURE — 2500000004 HC RX 250 GENERAL PHARMACY W/ HCPCS (ALT 636 FOR OP/ED)

## 2024-01-07 PROCEDURE — 83735 ASSAY OF MAGNESIUM: CPT

## 2024-01-07 PROCEDURE — 85610 PROTHROMBIN TIME: CPT

## 2024-01-07 PROCEDURE — 2500000001 HC RX 250 WO HCPCS SELF ADMINISTERED DRUGS (ALT 637 FOR MEDICARE OP)

## 2024-01-07 PROCEDURE — 2500000002 HC RX 250 W HCPCS SELF ADMINISTERED DRUGS (ALT 637 FOR MEDICARE OP, ALT 636 FOR OP/ED)

## 2024-01-07 PROCEDURE — 85025 COMPLETE CBC W/AUTO DIFF WBC: CPT

## 2024-01-07 PROCEDURE — 1200000002 HC GENERAL ROOM WITH TELEMETRY DAILY

## 2024-01-07 PROCEDURE — 2500000001 HC RX 250 WO HCPCS SELF ADMINISTERED DRUGS (ALT 637 FOR MEDICARE OP): Performed by: STUDENT IN AN ORGANIZED HEALTH CARE EDUCATION/TRAINING PROGRAM

## 2024-01-07 PROCEDURE — 84460 ALANINE AMINO (ALT) (SGPT): CPT

## 2024-01-07 RX ORDER — LORAZEPAM 2 MG/ML
1 INJECTION INTRAMUSCULAR
Status: DISCONTINUED | OUTPATIENT
Start: 2024-01-07 | End: 2024-01-15

## 2024-01-07 RX ORDER — PANTOPRAZOLE SODIUM 40 MG/1
40 TABLET, DELAYED RELEASE ORAL
Status: DISCONTINUED | OUTPATIENT
Start: 2024-01-07 | End: 2024-01-11

## 2024-01-07 RX ADMIN — METOPROLOL TARTRATE 12.5 MG: 25 TABLET, FILM COATED ORAL at 21:56

## 2024-01-07 RX ADMIN — INSULIN LISPRO 1 UNITS: 100 INJECTION, SOLUTION INTRAVENOUS; SUBCUTANEOUS at 05:22

## 2024-01-07 RX ADMIN — HEPARIN SODIUM 5000 UNITS: 5000 INJECTION INTRAVENOUS; SUBCUTANEOUS at 21:55

## 2024-01-07 RX ADMIN — POLYETHYLENE GLYCOL 3350 17 G: 17 POWDER, FOR SOLUTION ORAL at 09:00

## 2024-01-07 RX ADMIN — METOPROLOL TARTRATE 12.5 MG: 25 TABLET, FILM COATED ORAL at 14:26

## 2024-01-07 RX ADMIN — HEPARIN SODIUM 5000 UNITS: 5000 INJECTION INTRAVENOUS; SUBCUTANEOUS at 14:26

## 2024-01-07 RX ADMIN — DEXTROSE MONOHYDRATE 100 ML/HR: 50 INJECTION, SOLUTION INTRAVENOUS at 12:00

## 2024-01-07 RX ADMIN — LEVOTHYROXINE SODIUM 200 MCG: 75 TABLET ORAL at 05:22

## 2024-01-07 RX ADMIN — INSULIN GLARGINE 10 UNITS: 100 INJECTION, SOLUTION SUBCUTANEOUS at 21:56

## 2024-01-07 RX ADMIN — CEFTRIAXONE SODIUM 2 G: 2 INJECTION, SOLUTION INTRAVENOUS at 12:00

## 2024-01-07 RX ADMIN — SERTRALINE HYDROCHLORIDE 50 MG: 50 TABLET ORAL at 10:22

## 2024-01-07 RX ADMIN — STANDARDIZED SENNA CONCENTRATE 17.2 MG: 8.6 TABLET ORAL at 10:17

## 2024-01-07 RX ADMIN — PANTOPRAZOLE SODIUM 40 MG: 40 TABLET, DELAYED RELEASE ORAL at 10:22

## 2024-01-07 RX ADMIN — INSULIN HUMAN 1 UNITS: 100 INJECTION, SOLUTION PARENTERAL at 13:03

## 2024-01-07 RX ADMIN — DEXTROSE MONOHYDRATE 100 ML/HR: 50 INJECTION, SOLUTION INTRAVENOUS at 01:05

## 2024-01-07 RX ADMIN — DEXAMETHASONE 6 MG: 2 TABLET ORAL at 10:17

## 2024-01-07 RX ADMIN — REMDESIVIR 100 MG: 100 INJECTION, POWDER, LYOPHILIZED, FOR SOLUTION INTRAVENOUS at 21:55

## 2024-01-07 RX ADMIN — METOPROLOL TARTRATE 12.5 MG: 25 TABLET, FILM COATED ORAL at 10:17

## 2024-01-07 RX ADMIN — HEPARIN SODIUM 5000 UNITS: 5000 INJECTION INTRAVENOUS; SUBCUTANEOUS at 05:22

## 2024-01-07 RX ADMIN — METOPROLOL TARTRATE 12.5 MG: 25 TABLET, FILM COATED ORAL at 02:11

## 2024-01-07 ASSESSMENT — PAIN - FUNCTIONAL ASSESSMENT
PAIN_FUNCTIONAL_ASSESSMENT: CPOT (CRITICAL CARE PAIN OBSERVATION TOOL)

## 2024-01-07 ASSESSMENT — PAIN SCALES - PAIN ASSESSMENT IN ADVANCED DEMENTIA (PAINAD)
BODYLANGUAGE: RELAXED
TOTALSCORE: REPOSITIONED
BREATHING: NORMAL
CONSOLABILITY: NO NEED TO CONSOLE
TOTALSCORE: 0
FACIALEXPRESSION: SMILING OR INEXPRESSIVE

## 2024-01-07 NOTE — PROGRESS NOTES
Gopal Ness is a 73 y.o. male on day 6 of admission presenting with AMS (altered mental status).    Subjective   NAEO. This morning patient was resting in bed, did not have any concerns. He said he feels like he is progessively getting better, still on 8L HFNC. Tube feeds at 40ml/hr this AM.        Objective     Physical Exam    GENERAL: Repeatedly stated that he wanted to be left alone this morning. Did not want to answer any questions. Refused to follow instructions     NEURO:  Will awaken to voice, oriented to self,  says that he is in the hospital   Unable to assess cranial nerves, Nasolabial folds symmetric. Hearing intact to voice.  Will spontaneously lift LUE, LLE, RLE.   CV:  RRR on telemetry  RESP:  On 8L NC, rhonchi present, no wheezing   :  Abdomen NT/ND, soft  SKIN:  Scattered ecchymoses  Last Recorded Vitals  Blood pressure 152/87, pulse 63, temperature 35.9 °C (96.7 °F), temperature source Temporal, resp. rate 13, weight 99.1 kg (218 lb 7.6 oz), SpO2 95 %.  Intake/Output last 3 Shifts:  I/O last 3 completed shifts:  In: 3666.7 (37 mL/kg) [I.V.:2586.7 (26.1 mL/kg); NG/GT:830; IV Piggyback:250]  Out: 3500 (35.3 mL/kg) [Urine:3500 (1 mL/kg/hr)]  Weight: 99.1 kg     Relevant Results  Results for orders placed or performed during the hospital encounter of 01/01/24 (from the past 24 hour(s))   Renal function panel   Result Value Ref Range    Glucose 233 (H) 74 - 99 mg/dL    Sodium 149 (H) 136 - 145 mmol/L    Potassium 6.9 (HH) 3.5 - 5.3 mmol/L    Chloride 115 (H) 98 - 107 mmol/L    Bicarbonate 23 21 - 32 mmol/L    Anion Gap 18 mmol/L    Urea Nitrogen 59 (H) 6 - 23 mg/dL    Creatinine 1.73 (H) 0.50 - 1.30 mg/dL    eGFR 41 (L) >60 mL/min/1.73m*2    Calcium 8.4 (L) 8.6 - 10.6 mg/dL    Phosphorus 4.1 2.5 - 4.9 mg/dL    Albumin 2.8 (L) 3.4 - 5.0 g/dL   POCT GLUCOSE   Result Value Ref Range    POCT Glucose 178 (H) 74 - 99 mg/dL   Renal function panel   Result Value Ref Range    Glucose 199 (H) 74 - 99 mg/dL     Sodium 152 (H) 136 - 145 mmol/L    Potassium 4.0 3.5 - 5.3 mmol/L    Chloride 116 (H) 98 - 107 mmol/L    Bicarbonate 26 21 - 32 mmol/L    Anion Gap 14 10 - 20 mmol/L    Urea Nitrogen 56 (H) 6 - 23 mg/dL    Creatinine 1.48 (H) 0.50 - 1.30 mg/dL    eGFR 50 (L) >60 mL/min/1.73m*2    Calcium 8.2 (L) 8.6 - 10.6 mg/dL    Phosphorus 2.7 2.5 - 4.9 mg/dL    Albumin 2.6 (L) 3.4 - 5.0 g/dL   POCT GLUCOSE   Result Value Ref Range    POCT Glucose 170 (H) 74 - 99 mg/dL   Procalcitonin   Result Value Ref Range    Procalcitonin 0.06 <=0.07 ng/mL   ALT   Result Value Ref Range    ALT 23 10 - 52 U/L   AST   Result Value Ref Range    AST 30 9 - 39 U/L   Protime-INR   Result Value Ref Range    Protime 12.3 9.8 - 12.8 seconds    INR 1.1 0.9 - 1.1   CBC and Auto Differential   Result Value Ref Range    WBC 5.5 4.4 - 11.3 x10*3/uL    nRBC 0.0 0.0 - 0.0 /100 WBCs    RBC 4.70 4.50 - 5.90 x10*6/uL    Hemoglobin 14.9 13.5 - 17.5 g/dL    Hematocrit 47.1 41.0 - 52.0 %     80 - 100 fL    MCH 31.7 26.0 - 34.0 pg    MCHC 31.6 (L) 32.0 - 36.0 g/dL    RDW 13.7 11.5 - 14.5 %    Platelets 95 (L) 150 - 450 x10*3/uL    Neutrophils % 73.0 40.0 - 80.0 %    Immature Granulocytes %, Automated 4.9 (H) 0.0 - 0.9 %    Lymphocytes % 11.5 13.0 - 44.0 %    Monocytes % 9.5 2.0 - 10.0 %    Eosinophils % 0.4 0.0 - 6.0 %    Basophils % 0.7 0.0 - 2.0 %    Neutrophils Absolute 3.98 1.60 - 5.50 x10*3/uL    Immature Granulocytes Absolute, Automated 0.27 0.00 - 0.50 x10*3/uL    Lymphocytes Absolute 0.63 (L) 0.80 - 3.00 x10*3/uL    Monocytes Absolute 0.52 0.05 - 0.80 x10*3/uL    Eosinophils Absolute 0.02 0.00 - 0.40 x10*3/uL    Basophils Absolute 0.04 0.00 - 0.10 x10*3/uL   Magnesium   Result Value Ref Range    Magnesium 1.94 1.60 - 2.40 mg/dL   Renal function panel   Result Value Ref Range    Glucose 145 (H) 74 - 99 mg/dL    Sodium 151 (H) 136 - 145 mmol/L    Potassium 3.8 3.5 - 5.3 mmol/L    Chloride 113 (H) 98 - 107 mmol/L    Bicarbonate 28 21 - 32 mmol/L     Anion Gap 14 10 - 20 mmol/L    Urea Nitrogen 49 (H) 6 - 23 mg/dL    Creatinine 1.35 (H) 0.50 - 1.30 mg/dL    eGFR 56 (L) >60 mL/min/1.73m*2    Calcium 8.7 8.6 - 10.6 mg/dL    Phosphorus 2.8 2.5 - 4.9 mg/dL    Albumin 2.8 (L) 3.4 - 5.0 g/dL   POCT GLUCOSE   Result Value Ref Range    POCT Glucose 136 (H) 74 - 99 mg/dL   POCT GLUCOSE   Result Value Ref Range    POCT Glucose 175 (H) 74 - 99 mg/dL                   Assessment/Plan   Principal Problem:    AMS (altered mental status)    Mr. Gopal Ness is a 74 y/o man with h/o pituitary adenoma s/p resection at [facility], DVT/PE on Eliquis, COPD (noncompliant with medications)/tobacco use, depresssion, DMII, HLD presenting after being found down i/s/o fall on 12/31 (LKN 1100 on 12/31). Patient's children checked on him after his fall on 12/31 and he was reportedly okay, however was found down and unresponsive the following morning with emesis around him. Brought to OSH, negative CTH, intubated for airway protection. EMS reported witnessing possible seizure activity(?). Also with persistent tachycardia, leukocytosis and infiltrate on chest CT, so treating presumed aspiration pneumonia with antibiotics. Started on Keppra 1g BID and transferred to [facility] for cvEEG and further monitoring. Extubated 1/2. cvEEG did not demonstrate any seizures. Course complicated by covid pnuemonia, LEEANNE, SVT likely in the setting of infection, and RUE weakness in the setting of a fall.      Updates 1/7:  - FT4 ordered for AM   - C/w ceftriaxone  - Continue with remdesivir and steroids for covid  - Increased evening glargine to 10u  - Will continue with D5W 100 ml/hr, fwf q6h - follow up medicine recommendations  - Cervical MRI given R arm weakness and recent fall   - On Nepro with goal 50ml/hr  - Increase metoprolol to 12.5 q6h given several episodes of SVT overnight     #Acute hypoxic respiratory failure  #COVID pneumonia, CAP  #COPD  - Maintain O2- 88-92%, currently on 8L HFNC  - Cpap at night  -  Elevated CRP, LDH, Ferritin, d-dimer  - Sputum culture - Strep pneumoniae  - s/p 1x cefepime 2g on 1/1, meropenem (1/1-1/2), Vanc 1.5g (1/1-1/2) then ceftriaxone 2g (1/2- )   - Started remdesivir (1/6-1/10), dexamethasone 6mg daily  - Consider baricitinib if oxygen requirements do not improve      #Hypernatremia   #LEEANNE (baseline Cr 0.8-0.9) - improving  #Hx of DI   - Lomeli in, strict I/Os  - Renal ultrasound pending   - Hold home tamsulosin 0.4mg daily while NPO  - D5W 100ml/hr with 200ml q6h FWF  - RFP BID  - Follow up endocrine recs     #SVT  - Several episodes of SVT, terminated spontaneously, some associated with hypotension down to 80/50s.  - Consulted cardiology, pending recs.         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 New small dural based enhancing masses in the R posterior fossa  - MRI Brain and MRI Sella - residual adenoma unchanged from prior examination and similar to slightly increased size of small dural-based enhancing masses of small dural-based enhancing masses within the R posterior fossa favored to represent meningiomas.  - EEG - mod diffuse encephalopathy. Improvement in degree of encephalopathy compared to the previous day  - Keppra 1000mg BID discontinued  Plan:  - Q2h neuro checks  - Hold home trazadone 50mg at bedtime  - Continue  home sertraline 50mg daily when taking PO  - Outpatient neurosurgery/neuro-ophthalmology follow up ordered  - Ordered MRI cervical spine given isolated RUE weakness. Can consider EMG in the future due to possible brachial plexus injury in the setting of a fall     #panhypothyroidism s/p adenoma resection  #Cushing disease  #diabetes mellitus  - Holding home glimepiride 4mg BID, metformin 1000mg BID  - A1C 5.6  Plan:  - Mild corrective SSI #1  - Start glargine 5u daily  - POCT glucose q4hrs while NPO, Hypoglycemia protocol  - Continue home levothyroxine 200mcg daily -> IV 100mcg while NPO  - AM cortisol, ACTH   - follow up outpatient with endocrinologist Dr. Yvonne Lofton in 4 weeks (scheduled by endocrine)     #hx of DVT/PE  - H/H: 16.9/51 -> 14.3/45.8  - PLT: 148 -> 133 -> 98 > 100  - 4T score: 3, low probability <5%  - Daily CBC    - Hold home Eliquis at this time pending stabilization     F: LR, D5W  E: Replete PRN  N: Nepro  A: PIVs  Lomeli: Yes  GI ppx: PPI  DVT ppx: SQH     Full Code  NOK/Surrogate: Donya Emerson (Daughter) [phone]               Max Dominguez MD

## 2024-01-07 NOTE — PROGRESS NOTES
Gopal Ness is a 73 y.o. male on day 6 of admission presenting with AMS (altered mental status).    Subjective   Patient was not seen or examined today.  Only chart was reviewed.       Objective   Not done today    Last Recorded Vitals  Blood pressure 152/87, pulse 63, temperature 35.9 °C (96.7 °F), temperature source Temporal, resp. rate 13, weight 99.1 kg (218 lb 7.6 oz), SpO2 95 %.  Intake/Output last 3 Shifts:  I/O last 3 completed shifts:  In: 3666.7 (37 mL/kg) [I.V.:2586.7 (26.1 mL/kg); NG/GT:830; IV Piggyback:250]  Out: 3500 (35.3 mL/kg) [Urine:3500 (1 mL/kg/hr)]  Weight: 99.1 kg     Relevant Results  Results from last 7 days   Lab Units 01/07/24  1205 01/07/24  0849 01/07/24  0816 01/07/24  0520 01/06/24  2330 01/06/24  2315 01/06/24  1733 01/06/24  1657 01/06/24  1324 01/05/24  2014 01/05/24  1812   POCT GLUCOSE mg/dL 175* 136*  --  170*  --  178*  --  228*  --    < >  --    GLUCOSE mg/dL  --   --  145*  --  199*  --  233*  --  184*  --  168*    < > = values in this interval not displayed.     Results for orders placed or performed during the hospital encounter of 01/01/24 (from the past 24 hour(s))   POCT GLUCOSE   Result Value Ref Range    POCT Glucose 228 (H) 74 - 99 mg/dL   Renal function panel   Result Value Ref Range    Glucose 233 (H) 74 - 99 mg/dL    Sodium 149 (H) 136 - 145 mmol/L    Potassium 6.9 (HH) 3.5 - 5.3 mmol/L    Chloride 115 (H) 98 - 107 mmol/L    Bicarbonate 23 21 - 32 mmol/L    Anion Gap 18 mmol/L    Urea Nitrogen 59 (H) 6 - 23 mg/dL    Creatinine 1.73 (H) 0.50 - 1.30 mg/dL    eGFR 41 (L) >60 mL/min/1.73m*2    Calcium 8.4 (L) 8.6 - 10.6 mg/dL    Phosphorus 4.1 2.5 - 4.9 mg/dL    Albumin 2.8 (L) 3.4 - 5.0 g/dL   POCT GLUCOSE   Result Value Ref Range    POCT Glucose 178 (H) 74 - 99 mg/dL   Renal function panel   Result Value Ref Range    Glucose 199 (H) 74 - 99 mg/dL    Sodium 152 (H) 136 - 145 mmol/L    Potassium 4.0 3.5 - 5.3 mmol/L    Chloride 116 (H) 98 - 107 mmol/L    Bicarbonate  26 21 - 32 mmol/L    Anion Gap 14 10 - 20 mmol/L    Urea Nitrogen 56 (H) 6 - 23 mg/dL    Creatinine 1.48 (H) 0.50 - 1.30 mg/dL    eGFR 50 (L) >60 mL/min/1.73m*2    Calcium 8.2 (L) 8.6 - 10.6 mg/dL    Phosphorus 2.7 2.5 - 4.9 mg/dL    Albumin 2.6 (L) 3.4 - 5.0 g/dL   POCT GLUCOSE   Result Value Ref Range    POCT Glucose 170 (H) 74 - 99 mg/dL   Procalcitonin   Result Value Ref Range    Procalcitonin 0.06 <=0.07 ng/mL   ALT   Result Value Ref Range    ALT 23 10 - 52 U/L   AST   Result Value Ref Range    AST 30 9 - 39 U/L   Protime-INR   Result Value Ref Range    Protime 12.3 9.8 - 12.8 seconds    INR 1.1 0.9 - 1.1   CBC and Auto Differential   Result Value Ref Range    WBC 5.5 4.4 - 11.3 x10*3/uL    nRBC 0.0 0.0 - 0.0 /100 WBCs    RBC 4.70 4.50 - 5.90 x10*6/uL    Hemoglobin 14.9 13.5 - 17.5 g/dL    Hematocrit 47.1 41.0 - 52.0 %     80 - 100 fL    MCH 31.7 26.0 - 34.0 pg    MCHC 31.6 (L) 32.0 - 36.0 g/dL    RDW 13.7 11.5 - 14.5 %    Platelets 95 (L) 150 - 450 x10*3/uL    Neutrophils % 73.0 40.0 - 80.0 %    Immature Granulocytes %, Automated 4.9 (H) 0.0 - 0.9 %    Lymphocytes % 11.5 13.0 - 44.0 %    Monocytes % 9.5 2.0 - 10.0 %    Eosinophils % 0.4 0.0 - 6.0 %    Basophils % 0.7 0.0 - 2.0 %    Neutrophils Absolute 3.98 1.60 - 5.50 x10*3/uL    Immature Granulocytes Absolute, Automated 0.27 0.00 - 0.50 x10*3/uL    Lymphocytes Absolute 0.63 (L) 0.80 - 3.00 x10*3/uL    Monocytes Absolute 0.52 0.05 - 0.80 x10*3/uL    Eosinophils Absolute 0.02 0.00 - 0.40 x10*3/uL    Basophils Absolute 0.04 0.00 - 0.10 x10*3/uL   Magnesium   Result Value Ref Range    Magnesium 1.94 1.60 - 2.40 mg/dL   Renal function panel   Result Value Ref Range    Glucose 145 (H) 74 - 99 mg/dL    Sodium 151 (H) 136 - 145 mmol/L    Potassium 3.8 3.5 - 5.3 mmol/L    Chloride 113 (H) 98 - 107 mmol/L    Bicarbonate 28 21 - 32 mmol/L    Anion Gap 14 10 - 20 mmol/L    Urea Nitrogen 49 (H) 6 - 23 mg/dL    Creatinine 1.35 (H) 0.50 - 1.30 mg/dL    eGFR 56 (L)  >60 mL/min/1.73m*2    Calcium 8.7 8.6 - 10.6 mg/dL    Phosphorus 2.8 2.5 - 4.9 mg/dL    Albumin 2.8 (L) 3.4 - 5.0 g/dL   POCT GLUCOSE   Result Value Ref Range    POCT Glucose 136 (H) 74 - 99 mg/dL   POCT GLUCOSE   Result Value Ref Range    POCT Glucose 175 (H) 74 - 99 mg/dL        Scheduled medications  cefTRIAXone, 2 g, intravenous, Daily  dexAMETHasone, 6 mg, nasogastric tube, Daily  heparin (porcine), 5,000 Units, subcutaneous, q8h KENDRA  insulin glargine, 10 Units, subcutaneous, Nightly  insulin regular, 0-5 Units, subcutaneous, TID with meals  levothyroxine, 200 mcg, oral, Daily  metoprolol tartrate, 12.5 mg, nasogastric tube, q6h  pantoprazole, 40 mg, oral, Daily before breakfast  polyethylene glycol, 17 g, oral, Daily  remdesivir, 100 mg, intravenous, q24h  sennosides, 2 tablet, oral, BID  sertraline, 50 mg, oral, Daily      Continuous medications  dextrose 5 % in water (D5W), 100 mL/hr, Last Rate: 100 mL/hr (01/07/24 1418)      PRN medications  PRN medications: dextrose 10 % in water (D10W), dextrose, diclofenac sodium, glucagon, LORazepam, oxygen, phenoL                Assessment/Plan   Principal Problem:    AMS (altered mental status)    Gopal Ness is a 73 y.o. male with a h/o pituitary adenoma s/p transphenoidal resection at [facility] (2016) c/b panhypopituitarism (adrenal insufficiency-resolved, hypogonadism, diabetes insipidis reportedly), hypothyroidism on levothyroxine);  DVT/PE on Eliquis, COPD (noncompliant with medications)/tobacco use, depresssion, DMII, HLD presenting after being found down unresponsive on 1/1/24 and subsequently transferred to [facility] on 1/2 with course complicated by intubation for airway protection (now extubated on intermittent BiPAP- HFNC?), concern for possible seizure activity (initially placed on keppra, now off), presumed aspiration pneumonia on antibiotics, hypotension initially on pressors- now resolved, and LEEANNE.      Endocrinology consulted given history of pituitary  adenoma resection and whether patient needed to be on steroid replacement therapy.      Patient with known history Cushing disease confirmed biochemically and with biopsy with prior recurrence. Now patient presenting with active Cushing disease again with high AM cortisol and ACTH likely 2/2 residual adenoma. He has panhypopituitarism s/p transphenoidal resection of pituitary adenoma (2016) and radiation (), and no recovery expected of pituitary based on current labs and s/p radiation. Per neurosurgery in 2023, additional surgery of residual is not warranted/feasible, and would not result in improvement of ophthalmoplegia. Residual on 2024 MRI appears grossly the same as residual on 2023 MRI. Unlikely to be cause of seizures.      #Cushing disease  #Panhypopituitarism  ::Patient with sellar mass dx  progressing to vision changes  with Cushing disease now s/p resection 10/2016 (pathology pituitary adenoma, ACTH cell-type, Ki-67 focally 12%) with MRI and biochemical recurrence of Cushing in 2018 now s/p 5220 cGy in 29 fx now with recent worsening ophthalmoplegia with repeat MRI 2023 thought to be likely unchanged from prior in size based on report (known mass effect on OC). Had been followed at [facility] by endocrinology (see consult note for additional endocrine history).   ::24 FSH <0.9, LH <0.1, prolactin 1.4L, Growth hormone <0.05, IGF1 31 (Z score -2.8), total testosterone 73, free testosterone 11.3  ::24 LH <0.1, prolactin 2.8,   ::24 ACTH 100.0H, 24 AM cortisol 67.6  ::Per chart review, did not start inpatient steroids until 1/3/2024  ::24 AM cortisol 58.2 confirms cushing disease as >10+ hours from prior steroid dose  ::Collected labs pendin/04 ACTH and I-GF  -2024: Urine sodium 66, urine awesome > 300, post IV fluids     -Patient was having persistent SVT, cardiology on board, etiology hypovolemia versus respiratory distress versus hypoxia.      Recommendations:  -no steroids indicated from endocrinology standpoint (active Cushing disease), okay for steroids for other indications  -hypernatremia most likely 2/2 dehydration given that urine still light yellow in color (per medicine consult team) no suspicion for DI, will defer management to primary team  -no further inpatient treatment of Cushing disease  -follow up outpatient with endocrinologist Dr. Yvonne Lofton in 4 weeks (our coordinator will schedule this appointment closer to MD)     #Diabetes  ::HgbA1c 5.6 on 1/1/24  ::home metformin 1000 mg twice daily, glimeperide 4 mg twice daily   Recommendations: (Patient started on D5 100 mm/h and 1/5 in p.m. for hyponatremia, and tube feeds 10 mL/h on 1/6 4 AM, goal to increase gradually to 50 mill per hour, remains on dexamethasone 6 mg day 3/ 6)  -Continue with glargine 10 units subcutaneously in p.m.  -Kindly make sure to switch from lispro sliding scale #1, to regular insulin #1 (1 units for every 50 above 150) every 6 hours while on tube feeds  -POCT glucose q6hrs while on tube feeds  -hypoglycemia protocol     #Hypothyroidism  ::1/4/24 TSH 0.02L, free T4 0.71L, free T3 1.8L  ::1/4/24 free T4 0.71L  Recommendations:  -if not able to give po medication,  give IV levothyroxine 100mcg daily   -Currently on home oral levothyroxine 200 mcg daily, kindly make sure tube feeds are held 2 hours before, 1 hour after levothyroxine dose  -repeat Ft4 tomorrow on 1/8/24, (before morning dose if possible)     Plan was communicated to the primary team via secure chat earlier today.  Pager [phone]  Patient discussed with Dr. Brandon

## 2024-01-08 ENCOUNTER — APPOINTMENT (OUTPATIENT)
Dept: RADIOLOGY | Facility: HOSPITAL | Age: 74
DRG: 208 | End: 2024-01-08
Payer: MEDICARE

## 2024-01-08 LAB
ALBUMIN SERPL BCP-MCNC: 2.6 G/DL (ref 3.4–5)
ALBUMIN SERPL BCP-MCNC: 2.9 G/DL (ref 3.4–5)
ANION GAP SERPL CALC-SCNC: 11 MMOL/L (ref 10–20)
ANION GAP SERPL CALC-SCNC: 17 MMOL/L (ref 10–20)
BASOPHILS # BLD AUTO: 0.02 X10*3/UL (ref 0–0.1)
BASOPHILS NFR BLD AUTO: 0.4 %
BUN SERPL-MCNC: 43 MG/DL (ref 6–23)
BUN SERPL-MCNC: 43 MG/DL (ref 6–23)
CALCIUM SERPL-MCNC: 8.2 MG/DL (ref 8.6–10.6)
CALCIUM SERPL-MCNC: 8.9 MG/DL (ref 8.6–10.6)
CHLORIDE SERPL-SCNC: 110 MMOL/L (ref 98–107)
CHLORIDE SERPL-SCNC: 113 MMOL/L (ref 98–107)
CO2 SERPL-SCNC: 24 MMOL/L (ref 21–32)
CO2 SERPL-SCNC: 26 MMOL/L (ref 21–32)
CREAT SERPL-MCNC: 1.09 MG/DL (ref 0.5–1.3)
CREAT SERPL-MCNC: 1.18 MG/DL (ref 0.5–1.3)
EGFRCR SERPLBLD CKD-EPI 2021: 66 ML/MIN/1.73M*2
EGFRCR SERPLBLD CKD-EPI 2021: 72 ML/MIN/1.73M*2
EOSINOPHIL # BLD AUTO: 0.05 X10*3/UL (ref 0–0.4)
EOSINOPHIL NFR BLD AUTO: 0.9 %
ERYTHROCYTE [DISTWIDTH] IN BLOOD BY AUTOMATED COUNT: 13.4 % (ref 11.5–14.5)
GLUCOSE BLD MANUAL STRIP-MCNC: 115 MG/DL (ref 74–99)
GLUCOSE BLD MANUAL STRIP-MCNC: 131 MG/DL (ref 74–99)
GLUCOSE BLD MANUAL STRIP-MCNC: 198 MG/DL (ref 74–99)
GLUCOSE BLD MANUAL STRIP-MCNC: 206 MG/DL (ref 74–99)
GLUCOSE SERPL-MCNC: 109 MG/DL (ref 74–99)
GLUCOSE SERPL-MCNC: 291 MG/DL (ref 74–99)
HCT VFR BLD AUTO: 47.9 % (ref 41–52)
HGB BLD-MCNC: 15.6 G/DL (ref 13.5–17.5)
IMM GRANULOCYTES # BLD AUTO: 0.26 X10*3/UL (ref 0–0.5)
IMM GRANULOCYTES NFR BLD AUTO: 4.6 % (ref 0–0.9)
LYMPHOCYTES # BLD AUTO: 0.38 X10*3/UL (ref 0.8–3)
LYMPHOCYTES NFR BLD AUTO: 6.7 %
MAGNESIUM SERPL-MCNC: 1.92 MG/DL (ref 1.6–2.4)
MCH RBC QN AUTO: 31.8 PG (ref 26–34)
MCHC RBC AUTO-ENTMCNC: 32.6 G/DL (ref 32–36)
MCV RBC AUTO: 98 FL (ref 80–100)
MONOCYTES # BLD AUTO: 0.23 X10*3/UL (ref 0.05–0.8)
MONOCYTES NFR BLD AUTO: 4.1 %
NEUTROPHILS # BLD AUTO: 4.72 X10*3/UL (ref 1.6–5.5)
NEUTROPHILS NFR BLD AUTO: 83.3 %
NRBC BLD-RTO: 0 /100 WBCS (ref 0–0)
PHOSPHATE SERPL-MCNC: 3 MG/DL (ref 2.5–4.9)
PHOSPHATE SERPL-MCNC: 3.2 MG/DL (ref 2.5–4.9)
PLATELET # BLD AUTO: 96 X10*3/UL (ref 150–450)
POTASSIUM SERPL-SCNC: 4 MMOL/L (ref 3.5–5.3)
POTASSIUM SERPL-SCNC: 4.4 MMOL/L (ref 3.5–5.3)
RBC # BLD AUTO: 4.91 X10*6/UL (ref 4.5–5.9)
SODIUM SERPL-SCNC: 143 MMOL/L (ref 136–145)
SODIUM SERPL-SCNC: 150 MMOL/L (ref 136–145)
T4 FREE SERPL-MCNC: 1.09 NG/DL (ref 0.78–1.48)
WBC # BLD AUTO: 5.7 X10*3/UL (ref 4.4–11.3)

## 2024-01-08 PROCEDURE — 83735 ASSAY OF MAGNESIUM: CPT

## 2024-01-08 PROCEDURE — 97110 THERAPEUTIC EXERCISES: CPT | Mod: GP,CQ

## 2024-01-08 PROCEDURE — 1200000002 HC GENERAL ROOM WITH TELEMETRY DAILY

## 2024-01-08 PROCEDURE — 2500000004 HC RX 250 GENERAL PHARMACY W/ HCPCS (ALT 636 FOR OP/ED): Performed by: STUDENT IN AN ORGANIZED HEALTH CARE EDUCATION/TRAINING PROGRAM

## 2024-01-08 PROCEDURE — 84439 ASSAY OF FREE THYROXINE: CPT

## 2024-01-08 PROCEDURE — 82947 ASSAY GLUCOSE BLOOD QUANT: CPT

## 2024-01-08 PROCEDURE — 2500000001 HC RX 250 WO HCPCS SELF ADMINISTERED DRUGS (ALT 637 FOR MEDICARE OP): Performed by: STUDENT IN AN ORGANIZED HEALTH CARE EDUCATION/TRAINING PROGRAM

## 2024-01-08 PROCEDURE — 80069 RENAL FUNCTION PANEL: CPT

## 2024-01-08 PROCEDURE — 92526 ORAL FUNCTION THERAPY: CPT | Mod: GN

## 2024-01-08 PROCEDURE — 85025 COMPLETE CBC W/AUTO DIFF WBC: CPT

## 2024-01-08 PROCEDURE — 99232 SBSQ HOSP IP/OBS MODERATE 35: CPT

## 2024-01-08 PROCEDURE — 2500000001 HC RX 250 WO HCPCS SELF ADMINISTERED DRUGS (ALT 637 FOR MEDICARE OP)

## 2024-01-08 PROCEDURE — 72141 MRI NECK SPINE W/O DYE: CPT

## 2024-01-08 PROCEDURE — 80069 RENAL FUNCTION PANEL: CPT | Mod: MUE

## 2024-01-08 PROCEDURE — 2500000005 HC RX 250 GENERAL PHARMACY W/O HCPCS: Mod: JZ | Performed by: STUDENT IN AN ORGANIZED HEALTH CARE EDUCATION/TRAINING PROGRAM

## 2024-01-08 PROCEDURE — 2500000004 HC RX 250 GENERAL PHARMACY W/ HCPCS (ALT 636 FOR OP/ED): Performed by: INTERNAL MEDICINE

## 2024-01-08 PROCEDURE — 72141 MRI NECK SPINE W/O DYE: CPT | Performed by: RADIOLOGY

## 2024-01-08 PROCEDURE — 94762 N-INVAS EAR/PLS OXIMTRY CONT: CPT

## 2024-01-08 PROCEDURE — 2500000004 HC RX 250 GENERAL PHARMACY W/ HCPCS (ALT 636 FOR OP/ED): Mod: MUE

## 2024-01-08 PROCEDURE — 36415 COLL VENOUS BLD VENIPUNCTURE: CPT

## 2024-01-08 PROCEDURE — 2500000002 HC RX 250 W HCPCS SELF ADMINISTERED DRUGS (ALT 637 FOR MEDICARE OP, ALT 636 FOR OP/ED): Performed by: INTERNAL MEDICINE

## 2024-01-08 RX ORDER — METOPROLOL TARTRATE 25 MG/1
25 TABLET, FILM COATED ORAL 2 TIMES DAILY
Status: DISCONTINUED | OUTPATIENT
Start: 2024-01-08 | End: 2024-01-31 | Stop reason: HOSPADM

## 2024-01-08 RX ORDER — INSULIN GLARGINE 100 [IU]/ML
8 INJECTION, SOLUTION SUBCUTANEOUS NIGHTLY
Status: DISCONTINUED | OUTPATIENT
Start: 2024-01-08 | End: 2024-01-10

## 2024-01-08 RX ORDER — DEXTROSE MONOHYDRATE 50 MG/ML
125 INJECTION, SOLUTION INTRAVENOUS CONTINUOUS
Status: DISPENSED | OUTPATIENT
Start: 2024-01-08 | End: 2024-01-09

## 2024-01-08 RX ADMIN — METOPROLOL TARTRATE 25 MG: 25 TABLET, FILM COATED ORAL at 20:39

## 2024-01-08 RX ADMIN — REMDESIVIR 100 MG: 100 INJECTION, POWDER, LYOPHILIZED, FOR SOLUTION INTRAVENOUS at 19:08

## 2024-01-08 RX ADMIN — INSULIN GLARGINE 8 UNITS: 100 INJECTION, SOLUTION SUBCUTANEOUS at 20:41

## 2024-01-08 RX ADMIN — INSULIN HUMAN 2 UNITS: 100 INJECTION, SOLUTION PARENTERAL at 18:00

## 2024-01-08 RX ADMIN — DEXAMETHASONE 6 MG: 2 TABLET ORAL at 11:08

## 2024-01-08 RX ADMIN — HEPARIN SODIUM 5000 UNITS: 5000 INJECTION INTRAVENOUS; SUBCUTANEOUS at 13:20

## 2024-01-08 RX ADMIN — HEPARIN SODIUM 5000 UNITS: 5000 INJECTION INTRAVENOUS; SUBCUTANEOUS at 06:17

## 2024-01-08 RX ADMIN — PANTOPRAZOLE SODIUM 40 MG: 40 TABLET, DELAYED RELEASE ORAL at 06:17

## 2024-01-08 RX ADMIN — METOPROLOL TARTRATE 12.5 MG: 25 TABLET, FILM COATED ORAL at 11:08

## 2024-01-08 RX ADMIN — STANDARDIZED SENNA CONCENTRATE 17.2 MG: 8.6 TABLET ORAL at 20:39

## 2024-01-08 RX ADMIN — LEVOTHYROXINE SODIUM 200 MCG: 75 TABLET ORAL at 06:17

## 2024-01-08 RX ADMIN — SERTRALINE HYDROCHLORIDE 50 MG: 50 TABLET ORAL at 11:08

## 2024-01-08 RX ADMIN — DEXTROSE MONOHYDRATE 125 ML/HR: 50 INJECTION, SOLUTION INTRAVENOUS at 17:46

## 2024-01-08 RX ADMIN — CEFTRIAXONE SODIUM 2 G: 2 INJECTION, SOLUTION INTRAVENOUS at 11:08

## 2024-01-08 RX ADMIN — HEPARIN SODIUM 5000 UNITS: 5000 INJECTION INTRAVENOUS; SUBCUTANEOUS at 23:37

## 2024-01-08 ASSESSMENT — PAIN SCALES - PAIN ASSESSMENT IN ADVANCED DEMENTIA (PAINAD)
CONSOLABILITY: NO NEED TO CONSOLE
FACIALEXPRESSION: SMILING OR INEXPRESSIVE
BREATHING: NORMAL
BREATHING: NORMAL
TOTALSCORE: REPOSITIONED
CONSOLABILITY: NO NEED TO CONSOLE
BODYLANGUAGE: RELAXED
TOTALSCORE: 0
BODYLANGUAGE: RELAXED
TOTALSCORE: 0
FACIALEXPRESSION: SMILING OR INEXPRESSIVE
TOTALSCORE: REPOSITIONED

## 2024-01-08 ASSESSMENT — COGNITIVE AND FUNCTIONAL STATUS - GENERAL
STANDING UP FROM CHAIR USING ARMS: A LOT
CLIMB 3 TO 5 STEPS WITH RAILING: TOTAL
MOVING TO AND FROM BED TO CHAIR: TOTAL
MOVING FROM LYING ON BACK TO SITTING ON SIDE OF FLAT BED WITH BEDRAILS: A LOT
TURNING FROM BACK TO SIDE WHILE IN FLAT BAD: A LOT
MOBILITY SCORE: 9
WALKING IN HOSPITAL ROOM: TOTAL

## 2024-01-08 ASSESSMENT — PAIN SCALES - GENERAL: PAINLEVEL_OUTOF10: 0 - NO PAIN

## 2024-01-08 ASSESSMENT — PAIN - FUNCTIONAL ASSESSMENT: PAIN_FUNCTIONAL_ASSESSMENT: 0-10

## 2024-01-08 NOTE — PROGRESS NOTES
Subjective Data:  SR 60s. No further episodes SVT    No overnight events per nursing     Objective Data:  Last Recorded Vitals:  Vitals:    01/08/24 0600 01/08/24 0700 01/08/24 1000 01/08/24 1200   BP: 129/76  118/70 126/76   BP Location:       Patient Position:       Pulse: 66  67 70   Resp: 13 20 11 15   Temp:    36.1 °C (97 °F)   TempSrc:       SpO2: 94%  97% 96%   Weight:           Last Labs:  CBC - 1/7/2024:  8:16 AM  5.5 14.9 95    47.1      CMP - 1/7/2024:  8:16 AM  8.7 5.6 30 --- 0.3   2.8 2.8 23 48      PTT - 1/3/2024:  1:21 AM  1.1   12.3 30     TROPHS   Date/Time Value Ref Range Status   01/05/2024 04:28 AM 21 0 - 53 ng/L Final   01/05/2024 12:55 AM 22 0 - 53 ng/L Final   01/01/2024 07:43 PM 73 0 - 53 ng/L Final     HGBA1C   Date/Time Value Ref Range Status   01/01/2024 07:43 PM 5.6 see below % Final   07/18/2023 08:45 AM 6.0 % Final     Comment:          Diagnosis of Diabetes-Adults   Non-Diabetic: < or = 5.6%   Increased risk for developing diabetes: 5.7-6.4%   Diagnostic of diabetes: > or = 6.5%  .       Monitoring of Diabetes                Age (y)     Therapeutic Goal (%)   Adults:          >18           <7.0   Pediatrics:    13-18           <7.5                   7-12           <8.0                   0- 6            7.5-8.5   American Diabetes Association. Diabetes Care 33(S1), Jan 2010.   01/24/2023 08:46 AM 5.8 % Final     Comment:          Diagnosis of Diabetes-Adults   Non-Diabetic: < or = 5.6%   Increased risk for developing diabetes: 5.7-6.4%   Diagnostic of diabetes: > or = 6.5%  .       Monitoring of Diabetes                Age (y)     Therapeutic Goal (%)   Adults:          >18           <7.0   Pediatrics:    13-18           <7.5                   7-12           <8.0                   0- 6            7.5-8.5   American Diabetes Association. Diabetes Care 33(S1), Jan 2010.     10/14/2020 09:17 AM 6.0 4.3 - 5.6 % Final     Comment:     American Diabetes Association guidelines indicate that  patients with HgbA1c in   the range 5.7-6.4% are at increased risk for development of diabetes, and   intervention by lifestyle modification may be beneficial. HgbA1c greater or   equal to 6.5% is considered diagnostic of diabetes.     VLDL   Date/Time Value Ref Range Status   07/18/2023 08:45 AM 25 0 - 40 mg/dL Final   01/24/2023 08:46 AM 23 0 - 40 mg/dL Final   09/07/2022 09:38 AM 30 0 - 40 mg/dL Final      Last I/O:  I/O last 3 completed shifts:  In: 6050 (61.1 mL/kg) [I.V.:3600 (36.3 mL/kg); NG/GT:1800; IV Piggyback:650]  Out: 2450 (24.7 mL/kg) [Urine:2450 (0.7 mL/kg/hr)]  Weight: 99.1 kg       Inpatient Medications:  Scheduled medications   Medication Dose Route Frequency    dexAMETHasone  6 mg nasogastric tube Daily    heparin (porcine)  5,000 Units subcutaneous q8h KENDRA    insulin glargine  8 Units subcutaneous Nightly    insulin regular  0-5 Units subcutaneous q6h    levothyroxine  200 mcg oral Daily    metoprolol tartrate  12.5 mg nasogastric tube q6h    pantoprazole  40 mg oral Daily before breakfast    polyethylene glycol  17 g oral Daily    remdesivir  100 mg intravenous q24h    sennosides  2 tablet oral BID    sertraline  50 mg oral Daily     PRN medications   Medication    dextrose 10 % in water (D10W)    dextrose    diclofenac sodium    glucagon    LORazepam    oxygen    phenoL     Continuous Medications   Medication Dose Last Rate    dextrose 5 % in water (D5W)  100 mL/hr 100 mL/hr (01/08/24 0600)       No PE 2/2 COVID     Assessment/Plan   Gopal Ness is a 73 y.o. male with PMH of pituitary adenoma s/p resection at [facility], DVT/PE on Eliquis, COPD, DMII, HLD  presenting after being found down after a fall 2/2 seizures vs new meningiomas. Cardiology consulted for runs of SVT with HD instability which started a few nights ago. 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 Recent ECHO without reduced EF or acute wall motion  abnormalities.       Recommendations:   -Transition Metoprolol tartrate to 25 mg to BID from 12.5 mg q6h  -If recurrent of persistent SVT, start with vagal maneuvers (carotid sinus massage), if unsuccessful, may consider Adenosine dose and/or IV Metop  -Please continue to monitor patient on telemetry  -Please obtain EKG if there's recurrence of SVT  -Optimal lytes: K>4, Mag>2    Code Status:  Full Code      Cardiology will follow      CHEMA Mauro-CNP  Cardiology Consults    Please call with any questions  Pager [phone] M-F 8a-5p; Saturday 8a-2p  Pager [phone] all other times

## 2024-01-08 NOTE — PROGRESS NOTES
"Gopal Ness is a 73 y.o. male on day 7 of admission presenting with AMS (altered mental status).    Subjective   Lethargic but will respond to questions. denies any abdominal pain, fever, chills. Has no concerns at this time. Was taken down for MRI this AM, but MRI was broken and he did not receive any cervical imaging.    Objective     Physical Exam    GENERAL: Will awake to voice, but refuses to open eyes.     NEURO:  Oriented to person, place \"[facility]\", says year is 2023  Unable to assess cranial nerves, Nasolabial folds symmetric. Hearing intact to voice.  RUE 5/5, LUE extension 4/5, flexion 3/5, hand  2/5. Will move legs spontaneously but does not lift them off of bed, dorsiflexion 5/5, plantarflexion 5/5  CV:  RRR on telemetry  RESP:  On 8L NC, rhonchi present, no wheezing   :  Abdomen NT/ND, soft  SKIN:  Scattered ecchymoses    Last Recorded Vitals  Blood pressure 126/76, pulse 70, temperature 36.1 °C (97 °F), resp. rate 15, weight 99.1 kg (218 lb 7.6 oz), SpO2 96 %.  Intake/Output last 3 Shifts:  I/O last 3 completed shifts:  In: 6050 (61.1 mL/kg) [I.V.:3600 (36.3 mL/kg); NG/GT:1800; IV Piggyback:650]  Out: 2450 (24.7 mL/kg) [Urine:2450 (0.7 mL/kg/hr)]  Weight: 99.1 kg     Relevant Results  Results for orders placed or performed during the hospital encounter of 01/01/24 (from the past 24 hour(s))   POCT GLUCOSE   Result Value Ref Range    POCT Glucose 230 (H) 74 - 99 mg/dL   POCT GLUCOSE   Result Value Ref Range    POCT Glucose 198 (H) 74 - 99 mg/dL   POCT GLUCOSE   Result Value Ref Range    POCT Glucose 131 (H) 74 - 99 mg/dL   T4, free   Result Value Ref Range    Thyroxine, Free 1.09 0.78 - 1.48 ng/dL   POCT GLUCOSE   Result Value Ref Range    POCT Glucose 115 (H) 74 - 99 mg/dL            Assessment/Plan   Principal Problem:    AMS (altered mental status)    Mr. Gopal Ness is a 74 y/o man with h/o pituitary adenoma s/p resection at [facility], DVT/PE on Eliquis, COPD (noncompliant with " medications)/tobacco use, depresssion, DMII, HLD presenting after being found down i/s/o fall on 12/31 (LKN 1100 on 12/31). Patient's children checked on him after his fall on 12/31 and he was reportedly okay, however was found down and unresponsive the following morning with emesis around him. Brought to OSH, negative CTH, intubated for airway protection. EMS reported witnessing possible seizure activity(?). Also with persistent tachycardia, leukocytosis and infiltrate on chest CT, so treating presumed aspiration pneumonia with antibiotics. Started on Keppra 1g BID and transferred to [facility] for cvEEG and further monitoring. Extubated 1/2. cvEEG did not demonstrate any seizures. Course complicated by covid pnuemonia, LEEANNE, SVT likely in the setting of infection, and RUE weakness in the setting of a fall.      Updates 1/8:  - FT4 ordered for AM 1/9, prior to AM levothyroxine dose  - Ceftriaxone completed  - Continue with remdesivir and dexamethasone for covid  - Evening glargine to 8u  - Will continue with D5W 100 ml/hr, fwf 200 q4h -> Follow up on PM RFP   - Cervical MRI given R arm weakness and recent fall   - Metoprolol tartrate transitioned to 25mg BID    #Acute hypoxic respiratory failure  #COVID pneumonia, CAP  #COPD  - Maintain O2- 88-92%, currently on 8L HFNC  - Cpap at night  - Elevated CRP, LDH, Ferritin, d-dimer  - Sputum culture - Strep pneumoniae  - s/p 1x cefepime 2g on 1/1, meropenem (1/1-1/2), Vanc 1.5g (1/1-1/2) then ceftriaxone 2g (1/2- 1/8)   - Started remdesivir (1/6-1/10), dexamethasone 6mg daily  - Consider baricitinib if oxygen requirements do not improve      #Hypernatremia   #LEEANNE (baseline Cr 0.8-0.9) - improving  #Hx of DI   - Lomeli in, strict I/Os  - Renal ultrasound pending   - Hold home tamsulosin 0.4mg daily while NPO  - D5W 100ml/hr with 200ml q4h FWF  - RFP BID  - Follow up endocrine recs     #SVT  - Several episodes of SVT, terminated spontaneously, some associated with hypotension down  to 80/50s.  - Consulted cardiology, pending recs.         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 New small dural based enhancing masses in the R posterior fossa  - MRI Brain and MRI Sella - residual adenoma unchanged from prior examination and similar to slightly increased size of small dural-based enhancing masses of small dural-based enhancing masses within the R posterior fossa favored to represent meningiomas.  - EEG - mod diffuse encephalopathy. Improvement in degree of encephalopathy compared to the previous day  - Keppra 1000mg BID discontinued  Plan:  - Q2h neuro checks  - Hold home trazadone 50mg at bedtime  - Continue home sertraline 50mg daily when taking PO  - Outpatient neurosurgery/neuro-ophthalmology follow up ordered  - Ordered MRI cervical spine given isolated RUE weakness. Can consider EMG in the future due to possible brachial plexus injury in the setting of a fall     #panhypothyroidism s/p adenoma resection  #Cushing disease  #diabetes mellitus  - Holding home glimepiride 4mg BID, metformin 1000mg BID  - A1C 5.6  Plan:  - Mild corrective SSI #1  - POCT glucose q6hrs while on tube feeds, Hypoglycemia protocol  - Continue home levothyroxine 200mcg daily  - AM Free T4 for 1/8  - Decreased glargine to 8u  - follow up outpatient with  endocrinologist Dr. Yvonne Lofton in 4 weeks (scheduled by endocrine)     #hx of DVT/PE  - H/H: 16.9/51 -> 14.3/45.8  - PLT: 148 -> 133 -> 98 > 100  - 4T score: 3, low probability <5%  - Daily CBC    - Hold home Eliquis at this time pending stabilization     F: LR, D5W  E: Replete PRN  N: Nepro  A: PIVs  Lomeli: Yes  GI ppx: PPI  DVT ppx: SQH     Full Code  NOK/Surrogate: Donya Emerson (Daughter) [phone]     Fatmata Kumar MD

## 2024-01-08 NOTE — PROGRESS NOTES
Speech-Language Pathology    Inpatient SLP Swallow Treatment     Patient Name: Gopal Ness  MRN: [id number]  Today's Date: 1/8/2024  Time Calculation  Start Time: 1415  Stop Time: 1435  Time Calculation (min): 20 min         Recommendations:   NPO, continue alternative means nutrition/hydration  Frequent oral care  May allow ice chips when alert and upright    SLP Assessment:   Repeat clinical swallow evaluation completed per MD request. Pt presenting withcontinued high concern for pharyngeal dysphagia/aspiration d/t lethargy, compromised respiratory status, and altered mental status. Repeat assessment as mentation/alertness/respiratory status improves/stabilizes.         Plan:  SLP Frequency: 1x per week  Duration: 3 weeks  SLP Discharge Recommendations:  (Unlcear, defer to updated notes)  Discussed POC: Patient, Nursing, Physician  Discussed Risks/Benefits: Yes  Patient/Caregiver Agreeable: Yes  SLP - OK to Discharge: Yes      Goals: Patient/Family will verbalize/demonstrate comprehension of dysphagia education, strategies, recommendations and POC. Progressing     Subjective   Respiratory Status: Oxygen via nasal cannula  Behavior/Cognition:  Lethargic, not cooperative, requiring max cues to participate. Minimal eye opening. Oriented only to self.    Patient Positioning: Upright in Bed  Speech significantly dysarthric. Daughter at bedside who reported that he has been agitated when asked to participate, otherwise sleeping majority of the time. Daughter questioning whether poor mood related to nicotine withdrawal.     Objective     Therapeutic Swallow Therapy:   Given max cues to participate pt accepted x3 ice chip trials and x1 straw sip of water. No s/s aspiration noted with ice chips however pt with immediate weak/wet coughing given single straw sip, requiring significant suctioning via yankauer; pt desatted to 88%, improving after suctioning and given time to stop coughing/take deep breaths. RN notified.         Inpatient Education:  Pt and his daughter provided extensive education regarding results and recommendations. Pt did not indicate understanding however daughter indicated understanding.

## 2024-01-08 NOTE — PROGRESS NOTES
Gopal Ness is a 73 y.o. male on day 7 of admission presenting with AMS (altered mental status).    Subjective   -pending free T4 1/8, lab not yet collected   -Currently at goal tube feed rate 50mL/hr (was briefly unhooked for MRI cervical spine for R arm weakness)  -sodium 149 ->152 -> 151 1/7, pending 1/8  -no further episodes of SVT overnight    Objective     Physical Exam  Not done today    Last Recorded Vitals  Blood pressure 129/78, pulse 74, temperature 36 °C (96.8 °F), resp. rate 14, weight 99.1 kg (218 lb 7.6 oz), SpO2 94 %.  Intake/Output last 3 Shifts:  I/O last 3 completed shifts:  In: 6050 (61.1 mL/kg) [I.V.:3600 (36.3 mL/kg); NG/GT:1800; IV Piggyback:650]  Out: 2450 (24.7 mL/kg) [Urine:2450 (0.7 mL/kg/hr)]  Weight: 99.1 kg     Scheduled medications  dexAMETHasone, 6 mg, nasogastric tube, Daily  heparin (porcine), 5,000 Units, subcutaneous, q8h KENDRA  insulin glargine, 8 Units, subcutaneous, Nightly  insulin regular, 0-5 Units, subcutaneous, q6h  levothyroxine, 200 mcg, oral, Daily  metoprolol tartrate, 12.5 mg, nasogastric tube, q6h  pantoprazole, 40 mg, oral, Daily before breakfast  polyethylene glycol, 17 g, oral, Daily  remdesivir, 100 mg, intravenous, q24h  sennosides, 2 tablet, oral, BID  sertraline, 50 mg, oral, Daily      Continuous medications  dextrose 5 % in water (D5W), 125 mL/hr      PRN medications  PRN medications: dextrose 10 % in water (D10W), dextrose, diclofenac sodium, glucagon, LORazepam, oxygen, phenoL     Relevant Results  Results for orders placed or performed during the hospital encounter of 01/01/24 (from the past 24 hour(s))   POCT GLUCOSE   Result Value Ref Range    POCT Glucose 230 (H) 74 - 99 mg/dL   POCT GLUCOSE   Result Value Ref Range    POCT Glucose 198 (H) 74 - 99 mg/dL   POCT GLUCOSE   Result Value Ref Range    POCT Glucose 131 (H) 74 - 99 mg/dL   T4, free   Result Value Ref Range    Thyroxine, Free 1.09 0.78 - 1.48 ng/dL   Renal Function Panel   Result Value Ref  Range    Glucose 109 (H) 74 - 99 mg/dL    Sodium 150 (H) 136 - 145 mmol/L    Potassium 4.0 3.5 - 5.3 mmol/L    Chloride 113 (H) 98 - 107 mmol/L    Bicarbonate 24 21 - 32 mmol/L    Anion Gap 17 10 - 20 mmol/L    Urea Nitrogen 43 (H) 6 - 23 mg/dL    Creatinine 1.18 0.50 - 1.30 mg/dL    eGFR 66 >60 mL/min/1.73m*2    Calcium 8.9 8.6 - 10.6 mg/dL    Phosphorus 3.0 2.5 - 4.9 mg/dL    Albumin 2.9 (L) 3.4 - 5.0 g/dL   Magnesium   Result Value Ref Range    Magnesium 1.92 1.60 - 2.40 mg/dL   POCT GLUCOSE   Result Value Ref Range    POCT Glucose 115 (H) 74 - 99 mg/dL          Assessment/Plan   Principal Problem:    AMS (altered mental status)    Gopal Ness is a 73 y.o. male with a h/o pituitary adenoma s/p transphenoidal resection at [facility] (2016) c/b hypopituitarism (adrenal insufficiency-resolved, hypogonadism, diabetes insipidis reportedly), hypothyroidism on levothyroxine);  DVT/PE on Eliquis, COPD (noncompliant with medications)/tobacco use, depresssion, DMII, HLD presenting after being found down unresponsive on 1/1/24 and subsequently transferred to [facility] on 1/2 with course complicated by intubation for airway protection (now extubated on intermittent BiPAP- HFNC?), concern for possible seizure activity (initially placed on keppra, now off), presumed aspiration pneumonia on antibiotics, hypotension initially on pressors- now resolved, and LEEANNE. Patient also found to have COVID.     Endocrinology consulted given history of pituitary adenoma resection and whether patient needed to be on steroid replacement therapy.      Patient with known history Cushing disease confirmed biochemically and with biopsy with prior recurrence. Now patient presenting with active Cushing disease again with high AM cortisol and ACTH likely 2/2 residual adenoma. 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 Notably NOT panhypopituitarism as prolactin level was 2.8 on 1/4 and some element of  pituitary released hormone suppression may be from Cushing disease's effect on axis. High cortisol would suppress GnRH. Per neurosurgery in 2023, additional surgery of residual is not warranted/feasible, and would not result in improvement of ophthalmoplegia. Residual on 2024 MRI appears grossly the same as residual on 2023 MRI. Unlikely to be cause of seizures.        #Cushing disease  #Hypopituitarism  ::Patient with sellar mass dx  progressing to vision changes  with Cushing disease now s/p resection 10/2016 (pathology pituitary adenoma, ACTH cell-type, Ki-67 focally 12%) with MRI and biochemical recurrence of Cushing in 2018 now s/p 5220 cGy in 29 fx now with recent worsening ophthalmoplegia with repeat MRI 2023 thought to be likely unchanged from prior in size based on report (known mass effect on OC). Had been followed at [facility] by endocrinology (see consult note for additional endocrine history).   ::24 FSH <0.9, LH <0.1, prolactin 1.4L, Growth hormone <0.05, IGF1 31 (Z score -2.8), total testosterone 73, free testosterone 11.3  ::24 LH <0.1, prolactin 2.8,   ::24 ACTH 100.0H, 24 AM cortisol 67.6  ::Per chart review, did not start inpatient steroids until 1/3/2024  ::24 AM cortisol 58.2 confirms cushing disease as >10+ hours from prior steroid dose  ::Collected labs pendin/04 ACTH and I-GF  -2024: Urine sodium 66, urine awesome > 300, post IV fluids     -Patient was having persistent SVT, cardiology on board, etiology hypovolemia versus respiratory distress versus hypoxia. As of , no further episodes of SVT.      Recommendations:  -no steroids indicated from endocrinology standpoint (active Cushing disease)   -hypernatremia most likely 2/2 volume contraction, no suspicion for DI, will defer management to primary team (though do recommend at least BID RFP and continued volume with FWF/IV fluids)  -no further inpatient treatment of Cushing disease  -follow up  outpatient with endocrinologist Dr. Yvonne Lofton in 4 weeks (our coordinator will schedule this appointment closer to dc- please let our team know when patient will be discharged)     #Diabetes  #On steroids for COVID  ::HgbA1c 5.6 on 1/1/24  ::home metformin 1000 mg twice daily, glimeperide 4 mg twice daily   :: (Patient started on D5 100 mm/h and 1/5 in p.m. for hyponatremia, and tube feeds 10 mL/h on 1/6 4 AM, goal to increase gradually to 50 mill per hour (at goal now 1/8), remains on dexamethasone 6 mg (1/5-1/16 per orders)  Recommendations:  -Decrease glargine to 8 units subcutaneously in p.m.  -Continue sliding scale Regular insulin #1 (1 units for every 50 above 150) every 6 hours while on tube feeds  -POCT glucose q6hrs while on tube feeds  -hypoglycemia protocol  -please let our team know if D5 drip stopped/decreased or if steroids are stopped     #Hypothyroidism  ::1/4/24 TSH 0.02L, free T4 0.71L, free T3 1.8L  ::1/4/24 free T4 0.71L  Recommendations:  -if not able to give po medication, give IV levothyroxine 100mcg daily   -Currently on home oral levothyroxine 200 mcg daily, kindly make sure tube feeds are held 2 hours before, 1 hour after levothyroxine dose  -repeat Ft4 tomorrow on 1/9/24, (before morning dose if possible)      Irene Wang MD  Plan was communicated to the primary team via secure chat earlier today.  Pager [phone]  Patient discussed with Dr. Banks

## 2024-01-08 NOTE — PROGRESS NOTES
Physical Therapy    Physical Therapy Treatment    Patient Name: Gopal Ness  MRN: [id number]  Today's Date: 1/8/2024  Time Calculation  Start Time: 1434  Stop Time: 1452  Time Calculation (min): 18 min       Assessment/Plan   PT Assessment  End of Session Communication: Bedside nurse  Assessment Comment: Pt only tolerated supine exercises d/t lethergy, and decreased arousal. Pt declined EOB transfer/ OOB activity this date. Will continue to follow per POC. Pt continues to remain appropriate for High intensity PT upon D/C from [facility].  End of Session Patient Position: Bed, 3 rail up, Alarm on (Daughter present)  PT Plan  Inpatient/Swing Bed or Outpatient: Inpatient  PT Plan  Treatment/Interventions: Bed mobility, Transfer training, Gait training, Balance training, Neuromuscular re-education, Stair training, Neurodevelopmental intervention, Strengthening, Endurance training, Therapeutic exercise, Therapeutic activity, Home exercise program, Positioning, Postural re-education  PT Plan: Skilled PT  PT Frequency: 5 times per week  PT Discharge Recommendations: High intensity level of continued care  Equipment Recommended upon Discharge: Wheeled walker  PT Recommended Transfer Status: Assist x2  PT - OK to Discharge: Yes (PT evaluation has been completed and discharge recommendations have been made.)      General Visit Information:   PT  Visit  PT Received On: 01/08/24  General  Family/Caregiver Present: Yes  Caregiver Feedback: Daughter present  Prior to Session Communication: Bedside nurse  Patient Position Received: Bed, 3 rail up, Alarm on  General Comment: Pt letheragic but arousable with verbal cues and stimulation. Pt's daughter present, expressed concern over pt's increased lethergy, RN notified.    Subjective   Precautions:  Precautions  Medical Precautions: Fall precautions  Precautions Comment: Droplet Precautions (Covid+)  Vital Signs:       Objective   Pain:  Pain Assessment  Pain Assessment: 0-10  Pain  "Score: 0 - No pain (Pt shook head \"no\" when asked about pain.)    Activity Tolerance:  Activity Tolerance  Endurance: Tolerates 10 - 20 min exercise with multiple rests  Treatments:  Therapeutic Exercise  Therapeutic Exercise Performed: Yes  Therapeutic Exercise Activity 1: Supine: AP, HS, SAQ, Hip ABD x10 BLE (Pt required increased verbal and tactile cues to perform supine exercises d/t increased lethergy)    Bed Mobility  Bed Mobility: No (Multiple attempts of encouragement provided to bed mobility to EOB, pt declined multiple times, shaking head no.)    Ambulation/Gait Training  Ambulation/Gait Training Performed: No (Anticipate MaxA1-2 per PT eval.)    Transfers  Transfer: No (Anticpate Mod/MaxA 1-2 per PT eval.)    Stairs  Stairs: No    Outcome Measures:  [facility] Basic Mobility  Turning from your back to your side while in a flat bed without using bedrails: A lot  Moving from lying on your back to sitting on the side of a flat bed without using bedrails: A lot  Moving to and from bed to chair (including a wheelchair): Total  Standing up from a chair using your arms (e.g. wheelchair or bedside chair): A lot  To walk in hospital room: Total  Climbing 3-5 steps with railing: Total  Basic Mobility - Total Score: 9    Education Documentation  No documentation found.  Education Comments  No comments found.        OP EDUCATION:       Encounter Problems       Encounter Problems (Active)       Balance       Patient will stand with UE support of LRD and MOD A x1 at least 5 min to improve balance required for self-care tasks.  (Progressing)       Start:  01/04/24    Expected End:  01/07/24               Mobility       Patient will ambulate at least 10 ft. with </= MAX A x1 and LRD to improve tolerance of household distances.  (Progressing)       Start:  01/04/24    Expected End:  01/07/24            Patient will perform bed mobility with </= MIN A x1 to reduce risk of developing decubitus ulcers.  (Progressing)       Start: "  01/04/24    Expected End:  01/07/24             LLE >/= 4+/5, RLE >/= 3+/5  (Progressing)       Start:  01/04/24    Expected End:  01/07/24               Pain - Adult          Transfers       Patient will perform sit to stand and stand to sit transfers with </= MAX A x1 and LRD to increase functional strength.  (Progressing)       Start:  01/04/24    Expected End:  01/07/24                 Mallory Sommers [facility]  Rehab Office [phone]

## 2024-01-08 NOTE — PROGRESS NOTES
Occupational Therapy                 Therapy Communication Note    Patient Name: Gopal Ness  MRN: [id number]  Today's Date: 1/8/2024     Discipline: Occupational Therapy    Missed Visit Reason: Missed Visit Reason:  (MRI)    Missed Time: Attempt

## 2024-01-09 LAB
ALBUMIN SERPL BCP-MCNC: 2.5 G/DL (ref 3.4–5)
ALBUMIN SERPL BCP-MCNC: 2.6 G/DL (ref 3.4–5)
ANION GAP SERPL CALC-SCNC: 11 MMOL/L (ref 10–20)
ANION GAP SERPL CALC-SCNC: 12 MMOL/L (ref 10–20)
BASOPHILS # BLD AUTO: 0.02 X10*3/UL (ref 0–0.1)
BASOPHILS NFR BLD AUTO: 0.3 %
BUN SERPL-MCNC: 37 MG/DL (ref 6–23)
BUN SERPL-MCNC: 41 MG/DL (ref 6–23)
CALCIUM SERPL-MCNC: 8.1 MG/DL (ref 8.6–10.6)
CALCIUM SERPL-MCNC: 8.3 MG/DL (ref 8.6–10.6)
CHLORIDE SERPL-SCNC: 108 MMOL/L (ref 98–107)
CHLORIDE SERPL-SCNC: 109 MMOL/L (ref 98–107)
CO2 SERPL-SCNC: 29 MMOL/L (ref 21–32)
CO2 SERPL-SCNC: 30 MMOL/L (ref 21–32)
CREAT SERPL-MCNC: 1.07 MG/DL (ref 0.5–1.3)
CREAT SERPL-MCNC: 1.1 MG/DL (ref 0.5–1.3)
EGFRCR SERPLBLD CKD-EPI 2021: 71 ML/MIN/1.73M*2
EGFRCR SERPLBLD CKD-EPI 2021: 74 ML/MIN/1.73M*2
EOSINOPHIL # BLD AUTO: 0.15 X10*3/UL (ref 0–0.4)
EOSINOPHIL NFR BLD AUTO: 2.4 %
ERYTHROCYTE [DISTWIDTH] IN BLOOD BY AUTOMATED COUNT: 13.3 % (ref 11.5–14.5)
GLUCOSE BLD MANUAL STRIP-MCNC: 147 MG/DL (ref 74–99)
GLUCOSE BLD MANUAL STRIP-MCNC: 197 MG/DL (ref 74–99)
GLUCOSE BLD MANUAL STRIP-MCNC: 241 MG/DL (ref 74–99)
GLUCOSE BLD MANUAL STRIP-MCNC: 283 MG/DL (ref 74–99)
GLUCOSE SERPL-MCNC: 194 MG/DL (ref 74–99)
GLUCOSE SERPL-MCNC: 324 MG/DL (ref 74–99)
HCT VFR BLD AUTO: 46.2 % (ref 41–52)
HGB BLD-MCNC: 15.2 G/DL (ref 13.5–17.5)
IMM GRANULOCYTES # BLD AUTO: 0.28 X10*3/UL (ref 0–0.5)
IMM GRANULOCYTES NFR BLD AUTO: 4.6 % (ref 0–0.9)
LYMPHOCYTES # BLD AUTO: 0.45 X10*3/UL (ref 0.8–3)
LYMPHOCYTES NFR BLD AUTO: 7.3 %
MAGNESIUM SERPL-MCNC: 1.84 MG/DL (ref 1.6–2.4)
MCH RBC QN AUTO: 31.9 PG (ref 26–34)
MCHC RBC AUTO-ENTMCNC: 32.9 G/DL (ref 32–36)
MCV RBC AUTO: 97 FL (ref 80–100)
MONOCYTES # BLD AUTO: 0.33 X10*3/UL (ref 0.05–0.8)
MONOCYTES NFR BLD AUTO: 5.4 %
NEUTROPHILS # BLD AUTO: 4.91 X10*3/UL (ref 1.6–5.5)
NEUTROPHILS NFR BLD AUTO: 80 %
NRBC BLD-RTO: 0 /100 WBCS (ref 0–0)
PHOSPHATE SERPL-MCNC: 2.7 MG/DL (ref 2.5–4.9)
PHOSPHATE SERPL-MCNC: 3.3 MG/DL (ref 2.5–4.9)
PLATELET # BLD AUTO: 94 X10*3/UL (ref 150–450)
POTASSIUM SERPL-SCNC: 3.8 MMOL/L (ref 3.5–5.3)
POTASSIUM SERPL-SCNC: 4.4 MMOL/L (ref 3.5–5.3)
RBC # BLD AUTO: 4.77 X10*6/UL (ref 4.5–5.9)
SODIUM SERPL-SCNC: 145 MMOL/L (ref 136–145)
SODIUM SERPL-SCNC: 146 MMOL/L (ref 136–145)
T4 FREE SERPL-MCNC: 0.92 NG/DL (ref 0.78–1.48)
WBC # BLD AUTO: 6.1 X10*3/UL (ref 4.4–11.3)

## 2024-01-09 PROCEDURE — 2500000004 HC RX 250 GENERAL PHARMACY W/ HCPCS (ALT 636 FOR OP/ED)

## 2024-01-09 PROCEDURE — 83735 ASSAY OF MAGNESIUM: CPT

## 2024-01-09 PROCEDURE — 2500000001 HC RX 250 WO HCPCS SELF ADMINISTERED DRUGS (ALT 637 FOR MEDICARE OP): Performed by: STUDENT IN AN ORGANIZED HEALTH CARE EDUCATION/TRAINING PROGRAM

## 2024-01-09 PROCEDURE — 36415 COLL VENOUS BLD VENIPUNCTURE: CPT

## 2024-01-09 PROCEDURE — 84100 ASSAY OF PHOSPHORUS: CPT

## 2024-01-09 PROCEDURE — 82947 ASSAY GLUCOSE BLOOD QUANT: CPT

## 2024-01-09 PROCEDURE — 2500000005 HC RX 250 GENERAL PHARMACY W/O HCPCS: Mod: JZ | Performed by: STUDENT IN AN ORGANIZED HEALTH CARE EDUCATION/TRAINING PROGRAM

## 2024-01-09 PROCEDURE — 1200000002 HC GENERAL ROOM WITH TELEMETRY DAILY

## 2024-01-09 PROCEDURE — 2500000004 HC RX 250 GENERAL PHARMACY W/ HCPCS (ALT 636 FOR OP/ED): Performed by: STUDENT IN AN ORGANIZED HEALTH CARE EDUCATION/TRAINING PROGRAM

## 2024-01-09 PROCEDURE — 84439 ASSAY OF FREE THYROXINE: CPT

## 2024-01-09 PROCEDURE — 85025 COMPLETE CBC W/AUTO DIFF WBC: CPT

## 2024-01-09 PROCEDURE — 2500000001 HC RX 250 WO HCPCS SELF ADMINISTERED DRUGS (ALT 637 FOR MEDICARE OP)

## 2024-01-09 PROCEDURE — 80069 RENAL FUNCTION PANEL: CPT | Mod: MUE

## 2024-01-09 PROCEDURE — 99232 SBSQ HOSP IP/OBS MODERATE 35: CPT | Performed by: STUDENT IN AN ORGANIZED HEALTH CARE EDUCATION/TRAINING PROGRAM

## 2024-01-09 PROCEDURE — 99232 SBSQ HOSP IP/OBS MODERATE 35: CPT

## 2024-01-09 RX ORDER — POTASSIUM CHLORIDE 20 MEQ/1
40 TABLET, EXTENDED RELEASE ORAL ONCE
Status: COMPLETED | OUTPATIENT
Start: 2024-01-09 | End: 2024-01-09

## 2024-01-09 RX ORDER — DEXTROSE MONOHYDRATE 50 MG/ML
125 INJECTION, SOLUTION INTRAVENOUS CONTINUOUS
Status: DISPENSED | OUTPATIENT
Start: 2024-01-09 | End: 2024-01-10

## 2024-01-09 RX ORDER — MAGNESIUM SULFATE HEPTAHYDRATE 40 MG/ML
2 INJECTION, SOLUTION INTRAVENOUS ONCE
Status: COMPLETED | OUTPATIENT
Start: 2024-01-09 | End: 2024-01-09

## 2024-01-09 RX ADMIN — METOPROLOL TARTRATE 25 MG: 25 TABLET, FILM COATED ORAL at 10:02

## 2024-01-09 RX ADMIN — POTASSIUM CHLORIDE 40 MEQ: 1500 TABLET, EXTENDED RELEASE ORAL at 10:02

## 2024-01-09 RX ADMIN — LEVOTHYROXINE SODIUM 200 MCG: 75 TABLET ORAL at 06:13

## 2024-01-09 RX ADMIN — INSULIN HUMAN 1 UNITS: 100 INJECTION, SOLUTION PARENTERAL at 06:13

## 2024-01-09 RX ADMIN — POLYETHYLENE GLYCOL 3350 17 G: 17 POWDER, FOR SOLUTION ORAL at 10:01

## 2024-01-09 RX ADMIN — HEPARIN SODIUM 5000 UNITS: 5000 INJECTION INTRAVENOUS; SUBCUTANEOUS at 15:51

## 2024-01-09 RX ADMIN — PANTOPRAZOLE SODIUM 40 MG: 40 TABLET, DELAYED RELEASE ORAL at 10:03

## 2024-01-09 RX ADMIN — DEXAMETHASONE 6 MG: 2 TABLET ORAL at 10:02

## 2024-01-09 RX ADMIN — MAGNESIUM SULFATE HEPTAHYDRATE 2 G: 40 INJECTION, SOLUTION INTRAVENOUS at 12:52

## 2024-01-09 RX ADMIN — STANDARDIZED SENNA CONCENTRATE 17.2 MG: 8.6 TABLET ORAL at 21:00

## 2024-01-09 RX ADMIN — STANDARDIZED SENNA CONCENTRATE 17.2 MG: 8.6 TABLET ORAL at 10:45

## 2024-01-09 RX ADMIN — INSULIN HUMAN 2 UNITS: 100 INJECTION, SOLUTION PARENTERAL at 17:38

## 2024-01-09 RX ADMIN — INSULIN GLARGINE 8 UNITS: 100 INJECTION, SOLUTION SUBCUTANEOUS at 21:00

## 2024-01-09 RX ADMIN — INSULIN HUMAN 1 UNITS: 100 INJECTION, SOLUTION PARENTERAL at 00:43

## 2024-01-09 RX ADMIN — HEPARIN SODIUM 5000 UNITS: 5000 INJECTION INTRAVENOUS; SUBCUTANEOUS at 06:13

## 2024-01-09 RX ADMIN — HEPARIN SODIUM 5000 UNITS: 5000 INJECTION INTRAVENOUS; SUBCUTANEOUS at 22:00

## 2024-01-09 RX ADMIN — METOPROLOL TARTRATE 25 MG: 25 TABLET, FILM COATED ORAL at 21:00

## 2024-01-09 RX ADMIN — SERTRALINE HYDROCHLORIDE 50 MG: 50 TABLET ORAL at 10:02

## 2024-01-09 RX ADMIN — REMDESIVIR 100 MG: 100 INJECTION, POWDER, LYOPHILIZED, FOR SOLUTION INTRAVENOUS at 19:25

## 2024-01-09 RX ADMIN — DEXTROSE MONOHYDRATE 125 ML/HR: 50 INJECTION, SOLUTION INTRAVENOUS at 17:38

## 2024-01-09 ASSESSMENT — PAIN - FUNCTIONAL ASSESSMENT: PAIN_FUNCTIONAL_ASSESSMENT: 0-10

## 2024-01-09 ASSESSMENT — PAIN SCALES - GENERAL: PAINLEVEL_OUTOF10: 0 - NO PAIN

## 2024-01-09 ASSESSMENT — PAIN SCALES - PAIN ASSESSMENT IN ADVANCED DEMENTIA (PAINAD): BREATHING: NORMAL

## 2024-01-09 NOTE — PROGRESS NOTES
Subjective Data:  SR 60s. No further episodes SVT    No overnight events per nursing     Objective Data:  Last Recorded Vitals:  Vitals:    01/09/24 0400 01/09/24 0600 01/09/24 0800 01/09/24 1200   BP: 131/72  134/74 138/72   Pulse: 63  63 61   Resp: 13  12 13   Temp: 36.3 °C (97.3 °F)  36.1 °C (97 °F) 36.1 °C (97 °F)   TempSrc: Temporal      SpO2: 92%  92% 92%   Weight:  99.4 kg (219 lb 2.2 oz)         Last Labs:  CBC - 1/9/2024:  4:47 AM  6.1 15.2 94    46.2      CMP - 1/9/2024:  4:47 AM  8.3 5.6 30 --- 0.3   3.3 2.6 23 48      PTT - 1/3/2024:  1:21 AM  1.1   12.3 30     TROPHS   Date/Time Value Ref Range Status   01/05/2024 04:28 AM 21 0 - 53 ng/L Final   01/05/2024 12:55 AM 22 0 - 53 ng/L Final   01/01/2024 07:43 PM 73 0 - 53 ng/L Final     HGBA1C   Date/Time Value Ref Range Status   01/01/2024 07:43 PM 5.6 see below % Final   07/18/2023 08:45 AM 6.0 % Final     Comment:          Diagnosis of Diabetes-Adults   Non-Diabetic: < or = 5.6%   Increased risk for developing diabetes: 5.7-6.4%   Diagnostic of diabetes: > or = 6.5%  .       Monitoring of Diabetes                Age (y)     Therapeutic Goal (%)   Adults:          >18           <7.0   Pediatrics:    13-18           <7.5                   7-12           <8.0                   0- 6            7.5-8.5   American Diabetes Association. Diabetes Care 33(S1), Jan 2010.   01/24/2023 08:46 AM 5.8 % Final     Comment:          Diagnosis of Diabetes-Adults   Non-Diabetic: < or = 5.6%   Increased risk for developing diabetes: 5.7-6.4%   Diagnostic of diabetes: > or = 6.5%  .       Monitoring of Diabetes                Age (y)     Therapeutic Goal (%)   Adults:          >18           <7.0   Pediatrics:    13-18           <7.5                   7-12           <8.0                   0- 6            7.5-8.5   American Diabetes Association. Diabetes Care 33(S1), Jan 2010.     10/14/2020 09:17 AM 6.0 4.3 - 5.6 % Final     Comment:     American Diabetes Association  guidelines indicate that patients with HgbA1c in   the range 5.7-6.4% are at increased risk for development of diabetes, and   intervention by lifestyle modification may be beneficial. HgbA1c greater or   equal to 6.5% is considered diagnostic of diabetes.     VLDL   Date/Time Value Ref Range Status   07/18/2023 08:45 AM 25 0 - 40 mg/dL Final   01/24/2023 08:46 AM 23 0 - 40 mg/dL Final   09/07/2022 09:38 AM 30 0 - 40 mg/dL Final      Last I/O:  I/O last 3 completed shifts:  In: 5822 (58.6 mL/kg) [I.V.:2449 (24.6 mL/kg); NG/GT:2873; IV Piggyback:500]  Out: 3900 (39.2 mL/kg) [Urine:3900 (1.1 mL/kg/hr)]  Weight: 99.4 kg       Inpatient Medications:  Scheduled medications   Medication Dose Route Frequency    dexAMETHasone  6 mg nasogastric tube Daily    heparin (porcine)  5,000 Units subcutaneous q8h KENDRA    insulin glargine  8 Units subcutaneous Nightly    insulin regular  0-5 Units subcutaneous q6h    levothyroxine  200 mcg oral Daily    magnesium sulfate  2 g intravenous Once    metoprolol tartrate  25 mg nasogastric tube BID    pantoprazole  40 mg oral Daily before breakfast    polyethylene glycol  17 g oral Daily    remdesivir  100 mg intravenous q24h    sennosides  2 tablet oral BID    sertraline  50 mg oral Daily     PRN medications   Medication    dextrose 10 % in water (D10W)    dextrose    diclofenac sodium    glucagon    LORazepam    oxygen    phenoL     Continuous Medications   Medication Dose Last Rate    dextrose 5 % in water (D5W)  125 mL/hr         No PE 2/2 COVID     Assessment/Plan   Gopal Ness is a 73 y.o. male with PMH of pituitary adenoma s/p resection at [facility], DVT/PE on Eliquis, COPD, DMII, HLD  presenting after being found down after a fall 2/2 seizures vs new meningiomas. Cardiology consulted for runs of SVT with HD instability which started a few nights ago. 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 Recent ECHO  without reduced EF or acute wall motion abnormalities.       Recommendations:   -Continue Metoprolol tartrate 25 mg BID   -If recurrent of persistent SVT, start with vagal maneuvers (carotid sinus massage), if unsuccessful, may consider Adenosine dose and/or IV Metop  -Please continue to monitor patient on telemetry  -Please obtain EKG if there's recurrence of SVT  -Optimal lytes: K>4, Mag>2    Code Status:  Full Code      Cardiology will sign off      CHEMA Mauro-CNP  Cardiology Consults    Please call with any questions  Pager [phone] M-F 8a-5p; Saturday 8a-2p  Pager [phone] all other times

## 2024-01-09 NOTE — PROGRESS NOTES
Gopal Ness is a 73 y.o. male on day 8 of admission presenting with AMS (altered mental status).    Subjective   -free T4 (collected 1/9 0447) of 0.92 wnl  -sodium 1/8 AM of 150 -> 1/8 PM of 143 -> 1/9 AM of 146  -total insulin from 1/8 7AM- 1/9 7AM: 8 units glargine + (1+1+2= 4u regular insulin) = 12 units TDD insulin  -continued on dexamethasone 6mg daily (plan to continue until 1/16)  - q6hrs (total of 2000mL daily)  -D5 drip   -tube feed rate remains at goal of 50 cc/hr       Objective     Physical Exam  Not performed    Last Recorded Vitals  Blood pressure 138/72, pulse 61, temperature 36.1 °C (97 °F), resp. rate 13, weight 99.4 kg (219 lb 2.2 oz), SpO2 92 %.  Intake/Output last 3 Shifts:  I/O last 3 completed shifts:  In: 5822 (58.6 mL/kg) [I.V.:2449 (24.6 mL/kg); NG/GT:2873; IV Piggyback:500]  Out: 3900 (39.2 mL/kg) [Urine:3900 (1.1 mL/kg/hr)]  Weight: 99.4 kg     Scheduled medications  dexAMETHasone, 6 mg, nasogastric tube, Daily  heparin (porcine), 5,000 Units, subcutaneous, q8h KENDRA  insulin glargine, 8 Units, subcutaneous, Nightly  insulin regular, 0-5 Units, subcutaneous, q6h  levothyroxine, 200 mcg, oral, Daily  magnesium sulfate, 2 g, intravenous, Once  metoprolol tartrate, 25 mg, nasogastric tube, BID  pantoprazole, 40 mg, oral, Daily before breakfast  polyethylene glycol, 17 g, oral, Daily  remdesivir, 100 mg, intravenous, q24h  sennosides, 2 tablet, oral, BID  sertraline, 50 mg, oral, Daily      Continuous medications  dextrose 5 % in water (D5W), 125 mL/hr      PRN medications  PRN medications: dextrose 10 % in water (D10W), dextrose, diclofenac sodium, glucagon, LORazepam, oxygen, phenoL     Relevant Results  Results for orders placed or performed during the hospital encounter of 01/01/24 (from the past 24 hour(s))   POCT GLUCOSE   Result Value Ref Range    POCT Glucose 206 (H) 74 - 99 mg/dL   CBC and Auto Differential   Result Value Ref Range    WBC 5.7 4.4 - 11.3 x10*3/uL    nRBC 0.0 0.0  - 0.0 /100 WBCs    RBC 4.91 4.50 - 5.90 x10*6/uL    Hemoglobin 15.6 13.5 - 17.5 g/dL    Hematocrit 47.9 41.0 - 52.0 %    MCV 98 80 - 100 fL    MCH 31.8 26.0 - 34.0 pg    MCHC 32.6 32.0 - 36.0 g/dL    RDW 13.4 11.5 - 14.5 %    Platelets 96 (L) 150 - 450 x10*3/uL    Neutrophils % 83.3 40.0 - 80.0 %    Immature Granulocytes %, Automated 4.6 (H) 0.0 - 0.9 %    Lymphocytes % 6.7 13.0 - 44.0 %    Monocytes % 4.1 2.0 - 10.0 %    Eosinophils % 0.9 0.0 - 6.0 %    Basophils % 0.4 0.0 - 2.0 %    Neutrophils Absolute 4.72 1.60 - 5.50 x10*3/uL    Immature Granulocytes Absolute, Automated 0.26 0.00 - 0.50 x10*3/uL    Lymphocytes Absolute 0.38 (L) 0.80 - 3.00 x10*3/uL    Monocytes Absolute 0.23 0.05 - 0.80 x10*3/uL    Eosinophils Absolute 0.05 0.00 - 0.40 x10*3/uL    Basophils Absolute 0.02 0.00 - 0.10 x10*3/uL   Renal function panel   Result Value Ref Range    Glucose 291 (H) 74 - 99 mg/dL    Sodium 143 136 - 145 mmol/L    Potassium 4.4 3.5 - 5.3 mmol/L    Chloride 110 (H) 98 - 107 mmol/L    Bicarbonate 26 21 - 32 mmol/L    Anion Gap 11 10 - 20 mmol/L    Urea Nitrogen 43 (H) 6 - 23 mg/dL    Creatinine 1.09 0.50 - 1.30 mg/dL    eGFR 72 >60 mL/min/1.73m*2    Calcium 8.2 (L) 8.6 - 10.6 mg/dL    Phosphorus 3.2 2.5 - 4.9 mg/dL    Albumin 2.6 (L) 3.4 - 5.0 g/dL   POCT GLUCOSE   Result Value Ref Range    POCT Glucose 197 (H) 74 - 99 mg/dL   T4, free   Result Value Ref Range    Thyroxine, Free 0.92 0.78 - 1.48 ng/dL   CBC and Auto Differential   Result Value Ref Range    WBC 6.1 4.4 - 11.3 x10*3/uL    nRBC 0.0 0.0 - 0.0 /100 WBCs    RBC 4.77 4.50 - 5.90 x10*6/uL    Hemoglobin 15.2 13.5 - 17.5 g/dL    Hematocrit 46.2 41.0 - 52.0 %    MCV 97 80 - 100 fL    MCH 31.9 26.0 - 34.0 pg    MCHC 32.9 32.0 - 36.0 g/dL    RDW 13.3 11.5 - 14.5 %    Platelets 94 (L) 150 - 450 x10*3/uL    Neutrophils % 80.0 40.0 - 80.0 %    Immature Granulocytes %, Automated 4.6 (H) 0.0 - 0.9 %    Lymphocytes % 7.3 13.0 - 44.0 %    Monocytes % 5.4 2.0 - 10.0 %     Eosinophils % 2.4 0.0 - 6.0 %    Basophils % 0.3 0.0 - 2.0 %    Neutrophils Absolute 4.91 1.60 - 5.50 x10*3/uL    Immature Granulocytes Absolute, Automated 0.28 0.00 - 0.50 x10*3/uL    Lymphocytes Absolute 0.45 (L) 0.80 - 3.00 x10*3/uL    Monocytes Absolute 0.33 0.05 - 0.80 x10*3/uL    Eosinophils Absolute 0.15 0.00 - 0.40 x10*3/uL    Basophils Absolute 0.02 0.00 - 0.10 x10*3/uL   Renal Function Panel   Result Value Ref Range    Glucose 194 (H) 74 - 99 mg/dL    Sodium 146 (H) 136 - 145 mmol/L    Potassium 3.8 3.5 - 5.3 mmol/L    Chloride 108 (H) 98 - 107 mmol/L    Bicarbonate 30 21 - 32 mmol/L    Anion Gap 12 10 - 20 mmol/L    Urea Nitrogen 41 (H) 6 - 23 mg/dL    Creatinine 1.07 0.50 - 1.30 mg/dL    eGFR 74 >60 mL/min/1.73m*2    Calcium 8.3 (L) 8.6 - 10.6 mg/dL    Phosphorus 3.3 2.5 - 4.9 mg/dL    Albumin 2.6 (L) 3.4 - 5.0 g/dL   Magnesium   Result Value Ref Range    Magnesium 1.84 1.60 - 2.40 mg/dL   POCT GLUCOSE   Result Value Ref Range    POCT Glucose 147 (H) 74 - 99 mg/dL               Assessment/Plan   Principal Problem:    AMS (altered mental status)    Gopal Ness is a 73 y.o. male with a h/o pituitary adenoma s/p transphenoidal resection at [facility] (2016) c/b hypopituitarism (adrenal insufficiency-resolved, hypogonadism, diabetes insipidis reportedly), hypothyroidism on levothyroxine);  DVT/PE on Eliquis, COPD (noncompliant with medications)/tobacco use, depresssion, DMII, HLD presenting after being found down unresponsive on 1/1/24 and subsequently transferred to [facility] on 1/2 with course complicated by intubation for airway protection (now extubated on intermittent BiPAP- HFNC?), concern for possible seizure activity (initially placed on keppra, now off), presumed aspiration pneumonia on antibiotics, hypotension initially on pressors- now resolved, and LEEANNE. Patient also found to have COVID.     Endocrinology consulted given history of pituitary adenoma resection and whether patient needed to be on steroid  replacement therapy.      Patient with known history Cushing disease confirmed biochemically and with biopsy with prior recurrence. Now patient presenting with active Cushing disease again with high AM cortisol and ACTH likely 2/2 residual adenoma. 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 Notably NOT panhypopituitarism as prolactin level was 2.8 on  and some element of pituitary released hormone suppression may be from Cushing disease's effect on axis. High cortisol would suppress GnRH. Per neurosurgery in 2023, additional surgery of residual is not warranted/feasible, and would not result in improvement of ophthalmoplegia. Residual on 2024 MRI appears grossly the same as residual on 2023 MRI. Unlikely to be cause of seizures. Patient was having persistent SVT, cardiology on board, etiology hypovolemia versus respiratory distress versus hypoxia. As of , no further episodes of SVT.         #Cushing disease  #Hypopituitarism  ::Patient with sellar mass dx  progressing to vision changes  with Cushing disease now s/p resection 10/2016 (pathology pituitary adenoma, ACTH cell-type, Ki-67 focally 12%) with MRI and biochemical recurrence of Cushing in 2018 now s/p 5220 cGy in 29 fx now with recent worsening ophthalmoplegia with repeat MRI 2023 thought to be likely unchanged from prior in size based on report (known mass effect on OC). Had been followed at [facility] by endocrinology (see consult note for additional endocrine history).   ::24 FSH <0.9, LH <0.1, prolactin 1.4L, Growth hormone <0.05, IGF1 31 (Z score -2.8), total testosterone 73, free testosterone 11.3  ::24 LH <0.1, prolactin 2.8,   ::24 ACTH 100.0H, 24 AM cortisol 67.6  ::Per chart review, did not start inpatient steroids until 1/3/2024  ::24 AM cortisol 58.2 confirms cushing disease as >10+ hours from prior steroid dose  ::Collected labs pendin/04 ACTH  and I-GF  ::1/5/2024: Urine sodium 66, urine awesome > 300, post IV fluids  ::Patient started on D5 100 mm/h and 1/5 in p.m. for hyponatremia, and tube feeds 10 mL/h on 1/6 4 AM, goal to increase gradually to 50 mill per hour (at goal now 1/8), remains on dexamethasone 6 mg (1/5-1/16 per orders). As of 1/9 D5 increased to 125ml/hr.        As of 1/9, currently on 125cc/hr D5 drip and 200cc q6h FWF.    Recommendations:  -no steroids indicated from endocrinology standpoint (active Cushing disease)   -hypernatremia most likely 2/2 volume contraction, no suspicion for DI, will defer management to primary team (though do recommend at least BID RFP)  -no further inpatient treatment of Cushing disease  -follow up outpatient with endocrinologist Dr. Yvonne Lofton in 4 weeks (our coordinator will schedule this appointment closer to dc- please let our team know when patient will be discharged)     #Diabetes  #On steroids for COVID  ::HgbA1c 5.6 on 1/1/24  ::home metformin 1000 mg twice daily, glimeperide 4 mg twice daily   ::Steroid dose currently dexamethasone 6mg daily; on 125cc/hr D5                  Recommendations:  -Continue glargine 8 units subcutaneously in p.m.  -Continue sliding scale Regular insulin #1 (1 units for every 50 above 150) every 6 hours while on tube feeds  -POCT glucose q6hrs while on tube feeds  -hypoglycemia protocol  -please let our team know if D5 drip stopped/decreased or if steroids are stopped     #Hypothyroidism  ::1/4/24 TSH 0.02L, free T4 0.71L, free T3 1.8L  ::1/9/24 free T4 0.92     Recommendations:  -if not able to give po medication, give IV levothyroxine 100mcg daily   -Continue home oral levothyroxine 200 mcg daily, kindly make sure tube feeds are held 2 hours before, 1 hour after levothyroxine dose       Irene Wang MD  Pager [phone]  Patient discussed with Dr. Banks

## 2024-01-09 NOTE — PROGRESS NOTES
Subjective:   MRI c-spine reviewed, no cord lesion. Radiology report: right-sided foraminal narrowing d/t uncovertebral joint hypertrophy at C4-C5 and C5-C6.     Objective:   Vitals:    01/09/24 0800   BP: 134/74   Pulse: 63   Resp: 12   Temp: 36.1 °C (97 °F)   SpO2: 92%     Focused motor exam:   LUE 5/5  RUE  intact but 3/5 barely antigravity  BLE AG symmetric    Assessment/Plan:  Gopal Ness is a 72 y/o man with h/o pituitary adenoma s/p resection at [facility], DVT/PE on Eliquis, COPD (noncompliant with medications)/tobacco use, depresssion, DMII, HLD presenting after being found down i/s/o fall on 12/31 (LKN 1100 on 12/31). Patient's children checked on him after his fall on 12/31 and he was reportedly okay, however was found down and unresponsive the following morning with emesis around him. Brought to OSH, negative CTH, intubated for airway protection. EMS reported witnessing possible seizure activity(?). Also with persistent tachycardia, leukocytosis and infiltrate on chest CT, so treating presumed aspiration pneumonia with antibiotics. Started on Keppra 1g BID and transferred to [facility] for cvEEG and further monitoring. Extubated 1/2. cvEEG did not demonstrate any seizures. Course complicated by covid pnuemonia, LEEANNE, SVT likely in the setting of infection, and RUE weakness in the setting of a fall.      MRI c-spine reviewed, no cord lesion. Radiology report: right-sided foraminal narrowing d/t uncovertebral joint hypertrophy at C4-C5 and C5-C6.     On repeat exam today, his right arm strength slightly improved but still week. We recommend EMG which can be done in two weeks (earliest 1/22) which would be about 3 weeks after his right arm weakness was noticed. If he is to be discharged before then, please schedule an outpatient EMG for him along with neurology follow up.     Recommendations:   -EMG as early as 1/22/23 (can be done inpatient if still admitted or outpatient)   -outpatient neurology follow up 4-6  weeks after discharge (can schedule in resident clinic)     Patient seen and examined by attending Dr. Bianchi who agrees with the plan.     Will staff with attending in AM. Note not final until staffed and signed by attending.    Amie Foley MD  PGY-3 Neurology  Neurology [id number]

## 2024-01-09 NOTE — PROGRESS NOTES
Subjective     NAEON    Pm Na 143, D5W stopped overnight    This morning, pt denies any chest pain, shortness of breath, nausea, vomiting. Stated last BM was yesterday.         Objective     Vitals:  Vitals:    01/09/24 0800   BP: 134/74   Pulse: 63   Resp: 12   Temp: 36.1 °C (97 °F)   SpO2: 92%       I/O last 3 completed shifts:  In: 5822 (58.6 mL/kg) [I.V.:2449 (24.6 mL/kg); NG/GT:2873; IV Piggyback:500]  Out: 3900 (39.2 mL/kg) [Urine:3900 (1.1 mL/kg/hr)]  Weight: 99.4 kg   I/O this shift:  In: 200 [NG/GT:200]  Out: 850 [Urine:850]    Physical exam:  Constitutional: Well-developed male in no acute distress.  HEENT: Normocephalic, atraumatic. PERRL. EOMI. No cervical lymphadenopathy.  Respiratory: On 4 L NC, rhonchi present  Cardiovascular: RRR. No murmurs, gallops, or rubs. No JVD. Radial pulses 2+.  Abdominal: Soft, nondistended, nontender to palpation. Bowel sounds present. No hepatosplenomegaly or masses. No CVA tenderness.  Neuro: somnolent, will awake to voice, AxO x 3, strength 5/5 RUE, 4/5 LUE, flexion 3/5, hand  2/5. Dorsiflexion 5/5, plantarflexion 5/5  Skin: scattered echymoses  Psych: Appropriate mood and affect.    Medications:  dexAMETHasone, 6 mg, nasogastric tube, Daily  heparin (porcine), 5,000 Units, subcutaneous, q8h KENDRA  insulin glargine, 8 Units, subcutaneous, Nightly  insulin regular, 0-5 Units, subcutaneous, q6h  levothyroxine, 200 mcg, oral, Daily  magnesium sulfate, 2 g, intravenous, Once  metoprolol tartrate, 25 mg, nasogastric tube, BID  pantoprazole, 40 mg, oral, Daily before breakfast  polyethylene glycol, 17 g, oral, Daily  remdesivir, 100 mg, intravenous, q24h  sennosides, 2 tablet, oral, BID  sertraline, 50 mg, oral, Daily      dextrose 5 % in water (D5W), 125 mL/hr      PRN medications: dextrose 10 % in water (D10W), dextrose, diclofenac sodium, glucagon, LORazepam, oxygen, phenoL    Labs:  Results for orders placed or performed during the hospital encounter of 01/01/24 (from  the past 24 hour(s))   POCT GLUCOSE   Result Value Ref Range    POCT Glucose 115 (H) 74 - 99 mg/dL   POCT GLUCOSE   Result Value Ref Range    POCT Glucose 206 (H) 74 - 99 mg/dL   CBC and Auto Differential   Result Value Ref Range    WBC 5.7 4.4 - 11.3 x10*3/uL    nRBC 0.0 0.0 - 0.0 /100 WBCs    RBC 4.91 4.50 - 5.90 x10*6/uL    Hemoglobin 15.6 13.5 - 17.5 g/dL    Hematocrit 47.9 41.0 - 52.0 %    MCV 98 80 - 100 fL    MCH 31.8 26.0 - 34.0 pg    MCHC 32.6 32.0 - 36.0 g/dL    RDW 13.4 11.5 - 14.5 %    Platelets 96 (L) 150 - 450 x10*3/uL    Neutrophils % 83.3 40.0 - 80.0 %    Immature Granulocytes %, Automated 4.6 (H) 0.0 - 0.9 %    Lymphocytes % 6.7 13.0 - 44.0 %    Monocytes % 4.1 2.0 - 10.0 %    Eosinophils % 0.9 0.0 - 6.0 %    Basophils % 0.4 0.0 - 2.0 %    Neutrophils Absolute 4.72 1.60 - 5.50 x10*3/uL    Immature Granulocytes Absolute, Automated 0.26 0.00 - 0.50 x10*3/uL    Lymphocytes Absolute 0.38 (L) 0.80 - 3.00 x10*3/uL    Monocytes Absolute 0.23 0.05 - 0.80 x10*3/uL    Eosinophils Absolute 0.05 0.00 - 0.40 x10*3/uL    Basophils Absolute 0.02 0.00 - 0.10 x10*3/uL   Renal function panel   Result Value Ref Range    Glucose 291 (H) 74 - 99 mg/dL    Sodium 143 136 - 145 mmol/L    Potassium 4.4 3.5 - 5.3 mmol/L    Chloride 110 (H) 98 - 107 mmol/L    Bicarbonate 26 21 - 32 mmol/L    Anion Gap 11 10 - 20 mmol/L    Urea Nitrogen 43 (H) 6 - 23 mg/dL    Creatinine 1.09 0.50 - 1.30 mg/dL    eGFR 72 >60 mL/min/1.73m*2    Calcium 8.2 (L) 8.6 - 10.6 mg/dL    Phosphorus 3.2 2.5 - 4.9 mg/dL    Albumin 2.6 (L) 3.4 - 5.0 g/dL   POCT GLUCOSE   Result Value Ref Range    POCT Glucose 197 (H) 74 - 99 mg/dL   T4, free   Result Value Ref Range    Thyroxine, Free 0.92 0.78 - 1.48 ng/dL   CBC and Auto Differential   Result Value Ref Range    WBC 6.1 4.4 - 11.3 x10*3/uL    nRBC 0.0 0.0 - 0.0 /100 WBCs    RBC 4.77 4.50 - 5.90 x10*6/uL    Hemoglobin 15.2 13.5 - 17.5 g/dL    Hematocrit 46.2 41.0 - 52.0 %    MCV 97 80 - 100 fL    MCH 31.9  26.0 - 34.0 pg    MCHC 32.9 32.0 - 36.0 g/dL    RDW 13.3 11.5 - 14.5 %    Platelets 94 (L) 150 - 450 x10*3/uL    Neutrophils % 80.0 40.0 - 80.0 %    Immature Granulocytes %, Automated 4.6 (H) 0.0 - 0.9 %    Lymphocytes % 7.3 13.0 - 44.0 %    Monocytes % 5.4 2.0 - 10.0 %    Eosinophils % 2.4 0.0 - 6.0 %    Basophils % 0.3 0.0 - 2.0 %    Neutrophils Absolute 4.91 1.60 - 5.50 x10*3/uL    Immature Granulocytes Absolute, Automated 0.28 0.00 - 0.50 x10*3/uL    Lymphocytes Absolute 0.45 (L) 0.80 - 3.00 x10*3/uL    Monocytes Absolute 0.33 0.05 - 0.80 x10*3/uL    Eosinophils Absolute 0.15 0.00 - 0.40 x10*3/uL    Basophils Absolute 0.02 0.00 - 0.10 x10*3/uL   Renal Function Panel   Result Value Ref Range    Glucose 194 (H) 74 - 99 mg/dL    Sodium 146 (H) 136 - 145 mmol/L    Potassium 3.8 3.5 - 5.3 mmol/L    Chloride 108 (H) 98 - 107 mmol/L    Bicarbonate 30 21 - 32 mmol/L    Anion Gap 12 10 - 20 mmol/L    Urea Nitrogen 41 (H) 6 - 23 mg/dL    Creatinine 1.07 0.50 - 1.30 mg/dL    eGFR 74 >60 mL/min/1.73m*2    Calcium 8.3 (L) 8.6 - 10.6 mg/dL    Phosphorus 3.3 2.5 - 4.9 mg/dL    Albumin 2.6 (L) 3.4 - 5.0 g/dL   Magnesium   Result Value Ref Range    Magnesium 1.84 1.60 - 2.40 mg/dL       Imaging:  MR cervical spine wo IV contrast    Result Date: 1/9/2024  Interpreted By:  Joey Rizzo, STUDY: MR CERVICAL SPINE WO IV CONTRAST;  1/8/2024 10:18 pm   INDICATION: Signs/Symptoms:R arm weakness.   COMPARISON: None.   ACCESSION NUMBER(S): [id number]   ORDERING CLINICIAN: DIEGO ABARCA   TECHNIQUE: The cervical spine was studied in the sagittal and axial planes utilizing T1 and T2 weighted images. Examination is limited due to motion artifact   FINDINGS: The craniovertebral junction is normal. The cord is normal in size and signal. The marrow signal is normal. Serial axial images reveal the following: C2/C3 There is normal alignment and vertebral body height. The disc space is normal. There is no evidence of canal or foraminal  narrowing. There is no evidence of bulging or herniated disc. C3/C4 There is normal alignment and vertebral body height. The disc space is normal. There is no evidence of canal or foraminal narrowing. There is no evidence of bulging or herniated disc. C4/C5 Right greater than left uncovertebral joint hypertrophy with focal right-sided foraminal narrowing. No measurable canal stenosis. C5/C6 Right greater than left uncovertebral joint hypertrophy without canal stenosis. Right-sided foraminal narrowing C6/C7 Bilateral facet hypertrophy without canal or foraminal narrowing C7/T1 Bilateral facet hypertrophy without canal or foraminal narrowing       * Right-sided foraminal narrowing due to uncovertebral joint hypertrophy at C4/C5 and C5/C6 *No measurable canal stenosis or cord compression.   MACRO: none   Signed by: Joey Rizzo 1/9/2024 9:02 AM Dictation workstation:   [id number]           Assessment/Plan   Mr. Gopal Ness is a 74 y/o man with h/o pituitary adenoma s/p resection at [facility], DVT/PE on Eliquis, COPD (noncompliant with medications)/tobacco use, depresssion, DMII, HLD presenting after being found down i/s/o fall on 12/31 (LKN 1100 on 12/31). Patient's children checked on him after his fall on 12/31 and he was reportedly okay, however was found down and unresponsive the following morning with emesis around him. Brought to OSH, negative CTH, intubated for airway protection. EMS reported witnessing possible seizure activity(?). Also with persistent tachycardia, leukocytosis and infiltrate on chest CT, so treating presumed aspiration pneumonia with antibiotics. Started on Keppra 1g BID and transferred to [facility] for cvEEG and further monitoring. Extubated 1/2. cvEEG did not demonstrate any seizures. Course complicated by covid pnuemonia, LEEANNE, SVT likely in the setting of infection, and RUE weakness in the setting of a fall.      Updates 1/9:  - Continue with remdesivir and dexamethasone for covid  - Will  continue with D5W 100 ml/hr, fwf 200 q4h -> Follow up on PM RFP   - Cervical MRI showed right-sided foraminal narrowing d/t uncovertebral joint hypertrophy at C4/C5 and C5/C5, no measurable canal stenosis or cord compression  - per neuro, will need outpatient EMG on 1/22 and outpatient follow-up in 4-6 weeks  - AM free T4 0.92     #Acute hypoxic respiratory failure  #COVID pneumonia, CAP  #COPD  - Maintain O2- 88-92%, currently on 4L HFNC  - Cpap at night  - Elevated CRP, LDH, Ferritin, d-dimer  - Sputum culture - Strep pneumoniae  - s/p 1x cefepime 2g on 1/1, meropenem (1/1-1/2), Vanc 1.5g (1/1-1/2) then ceftriaxone 2g (1/2- 1/8)   - Started remdesivir (1/6-1/10), dexamethasone 6mg daily  - Consider baricitinib if oxygen requirements do not improve      #Hypernatremia   #LEEANNE (baseline Cr 0.8-0.9) - improving  #Hx of DI   - Lomeli in, strict I/Os  - Renal ultrasound pending   - Hold home tamsulosin 0.4mg daily while NPO  - D5W 100ml/hr with 200ml q4h FWF  - RFP BID  - Follow up endocrine recs     #SVT  - Several episodes of SVT, terminated spontaneously, some associated with hypotension down to 80/50s.  - Consulted cardiology, pending recs.         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 New small dural based enhancing masses in the R posterior fossa  - MRI  Brain and MRI Sella - residual adenoma unchanged from prior examination and similar to slightly increased size of small dural-based enhancing masses of small dural-based enhancing masses within the R posterior fossa favored to represent meningiomas.  - EEG - mod diffuse encephalopathy. Improvement in degree of encephalopathy compared to the previous day  - Keppra 1000mg BID discontinued  - MRI cervical spine showed right-sided foraminal narrowing d/t uncovertebral joint hypertrophy at C4/C5 and C5/C6  Plan:  - Q2h neuro checks  - Hold home trazadone 50mg at bedtime  - Continue home sertraline 50mg daily when taking PO  - Outpatient neurosurgery/neuro-ophthalmology follow up ordered  - EMG as early as 1/22/23     #panhypothyroidism s/p adenoma resection  #Cushing disease  #diabetes mellitus  - Holding home glimepiride 4mg BID, metformin 1000mg BID  - A1C 5.6  - AM free T4 0.92  Plan:  - Mild corrective SSI #1  - POCT glucose q6hrs while on tube feeds, Hypoglycemia protocol  - Continue home levothyroxine 200mcg daily  - Decreased glargine to 8u  - follow up outpatient with endocrinologist Dr. Yvonne Lofton in 4 weeks (scheduled by endocrine)     #hx of DVT/PE  - H/H: 16.9/51 -> 14.3/45.8  - PLT: 148 -> 133 -> 98 > 100  - 4T score: 3, low probability <5%  - Daily CBC    - Hold home Eliquis at this time pending stabilization     F: LR, D5W  E: Replete PRN  N: Nepro  A: PIVs  Lomeli: Yes  GI ppx: PPI  DVT ppx: SQH     Full Code  NOK/Surrogate: Donya Emerson (Daughter) [phone]      Patient and plan discussed with Dr. Lane,    Heidy Cooper MD MPH  PGY-1 Internal Medicine

## 2024-01-10 LAB
ACTH PLAS-MCNC: 91 PG/ML (ref 7.2–63.3)
ALBUMIN SERPL BCP-MCNC: 2.5 G/DL (ref 3.4–5)
ALBUMIN SERPL BCP-MCNC: 2.5 G/DL (ref 3.4–5)
ALP SERPL-CCNC: 57 U/L (ref 33–136)
ALT SERPL W P-5'-P-CCNC: 30 U/L (ref 10–52)
ANION GAP SERPL CALC-SCNC: 12 MMOL/L (ref 10–20)
ANION GAP SERPL CALC-SCNC: 13 MMOL/L (ref 10–20)
AST SERPL W P-5'-P-CCNC: 28 U/L (ref 9–39)
BILIRUB SERPL-MCNC: 0.4 MG/DL (ref 0–1.2)
BUN SERPL-MCNC: 37 MG/DL (ref 6–23)
BUN SERPL-MCNC: 41 MG/DL (ref 6–23)
CALCIUM SERPL-MCNC: 8.2 MG/DL (ref 8.6–10.6)
CALCIUM SERPL-MCNC: 8.3 MG/DL (ref 8.6–10.6)
CHLORIDE SERPL-SCNC: 109 MMOL/L (ref 98–107)
CHLORIDE SERPL-SCNC: 111 MMOL/L (ref 98–107)
CO2 SERPL-SCNC: 28 MMOL/L (ref 21–32)
CO2 SERPL-SCNC: 30 MMOL/L (ref 21–32)
CREAT SERPL-MCNC: 0.99 MG/DL (ref 0.5–1.3)
CREAT SERPL-MCNC: 1.07 MG/DL (ref 0.5–1.3)
EGFRCR SERPLBLD CKD-EPI 2021: 74 ML/MIN/1.73M*2
EGFRCR SERPLBLD CKD-EPI 2021: 81 ML/MIN/1.73M*2
ERYTHROCYTE [DISTWIDTH] IN BLOOD BY AUTOMATED COUNT: 13.2 % (ref 11.5–14.5)
GLUCOSE BLD MANUAL STRIP-MCNC: 159 MG/DL (ref 74–99)
GLUCOSE BLD MANUAL STRIP-MCNC: 221 MG/DL (ref 74–99)
GLUCOSE BLD MANUAL STRIP-MCNC: 222 MG/DL (ref 74–99)
GLUCOSE BLD MANUAL STRIP-MCNC: 283 MG/DL (ref 74–99)
GLUCOSE BLD MANUAL STRIP-MCNC: 285 MG/DL (ref 74–99)
GLUCOSE SERPL-MCNC: 184 MG/DL (ref 74–99)
GLUCOSE SERPL-MCNC: 279 MG/DL (ref 74–99)
HCT VFR BLD AUTO: 44.5 % (ref 41–52)
HGB BLD-MCNC: 14.4 G/DL (ref 13.5–17.5)
IGF-I SERPL-MCNC: 21 NG/ML (ref 25–242)
IGF-I Z-SCORE SERPL: -3.7
MAGNESIUM SERPL-MCNC: 2.15 MG/DL (ref 1.6–2.4)
MAGNESIUM SERPL-MCNC: 2.23 MG/DL (ref 1.6–2.4)
MCH RBC QN AUTO: 30.6 PG (ref 26–34)
MCHC RBC AUTO-ENTMCNC: 32.4 G/DL (ref 32–36)
MCV RBC AUTO: 95 FL (ref 80–100)
NRBC BLD-RTO: 0 /100 WBCS (ref 0–0)
PHOSPHATE SERPL-MCNC: 2.8 MG/DL (ref 2.5–4.9)
PHOSPHATE SERPL-MCNC: 3.2 MG/DL (ref 2.5–4.9)
PLATELET # BLD AUTO: 102 X10*3/UL (ref 150–450)
POTASSIUM SERPL-SCNC: 3.9 MMOL/L (ref 3.5–5.3)
POTASSIUM SERPL-SCNC: 4.4 MMOL/L (ref 3.5–5.3)
PROT SERPL-MCNC: 5 G/DL (ref 6.4–8.2)
RBC # BLD AUTO: 4.7 X10*6/UL (ref 4.5–5.9)
SODIUM SERPL-SCNC: 147 MMOL/L (ref 136–145)
SODIUM SERPL-SCNC: 148 MMOL/L (ref 136–145)
WBC # BLD AUTO: 7.3 X10*3/UL (ref 4.4–11.3)

## 2024-01-10 PROCEDURE — 2500000004 HC RX 250 GENERAL PHARMACY W/ HCPCS (ALT 636 FOR OP/ED): Performed by: STUDENT IN AN ORGANIZED HEALTH CARE EDUCATION/TRAINING PROGRAM

## 2024-01-10 PROCEDURE — 83735 ASSAY OF MAGNESIUM: CPT

## 2024-01-10 PROCEDURE — 1200000002 HC GENERAL ROOM WITH TELEMETRY DAILY

## 2024-01-10 PROCEDURE — 2500000001 HC RX 250 WO HCPCS SELF ADMINISTERED DRUGS (ALT 637 FOR MEDICARE OP)

## 2024-01-10 PROCEDURE — 99232 SBSQ HOSP IP/OBS MODERATE 35: CPT | Performed by: STUDENT IN AN ORGANIZED HEALTH CARE EDUCATION/TRAINING PROGRAM

## 2024-01-10 PROCEDURE — 80053 COMPREHEN METABOLIC PANEL: CPT

## 2024-01-10 PROCEDURE — 82947 ASSAY GLUCOSE BLOOD QUANT: CPT

## 2024-01-10 PROCEDURE — 84100 ASSAY OF PHOSPHORUS: CPT

## 2024-01-10 PROCEDURE — 80069 RENAL FUNCTION PANEL: CPT | Mod: CCI

## 2024-01-10 PROCEDURE — 2500000004 HC RX 250 GENERAL PHARMACY W/ HCPCS (ALT 636 FOR OP/ED)

## 2024-01-10 PROCEDURE — 94762 N-INVAS EAR/PLS OXIMTRY CONT: CPT

## 2024-01-10 PROCEDURE — 2500000001 HC RX 250 WO HCPCS SELF ADMINISTERED DRUGS (ALT 637 FOR MEDICARE OP): Performed by: STUDENT IN AN ORGANIZED HEALTH CARE EDUCATION/TRAINING PROGRAM

## 2024-01-10 PROCEDURE — 36415 COLL VENOUS BLD VENIPUNCTURE: CPT

## 2024-01-10 PROCEDURE — 85027 COMPLETE CBC AUTOMATED: CPT

## 2024-01-10 PROCEDURE — 99231 SBSQ HOSP IP/OBS SF/LOW 25: CPT

## 2024-01-10 RX ORDER — INSULIN GLARGINE 100 [IU]/ML
10 INJECTION, SOLUTION SUBCUTANEOUS NIGHTLY
Status: DISCONTINUED | OUTPATIENT
Start: 2024-01-10 | End: 2024-01-12

## 2024-01-10 RX ORDER — DEXTROSE MONOHYDRATE 50 MG/ML
125 INJECTION, SOLUTION INTRAVENOUS CONTINUOUS
Status: ACTIVE | OUTPATIENT
Start: 2024-01-10 | End: 2024-01-11

## 2024-01-10 RX ADMIN — HEPARIN SODIUM 5000 UNITS: 5000 INJECTION INTRAVENOUS; SUBCUTANEOUS at 23:03

## 2024-01-10 RX ADMIN — HEPARIN SODIUM 5000 UNITS: 5000 INJECTION INTRAVENOUS; SUBCUTANEOUS at 06:19

## 2024-01-10 RX ADMIN — INSULIN HUMAN 2 UNITS: 100 INJECTION, SOLUTION PARENTERAL at 18:00

## 2024-01-10 RX ADMIN — DEXTROSE 125 ML/HR: 50 INJECTION, SOLUTION INTRAVENOUS at 13:30

## 2024-01-10 RX ADMIN — METOPROLOL TARTRATE 25 MG: 25 TABLET, FILM COATED ORAL at 20:18

## 2024-01-10 RX ADMIN — INSULIN HUMAN 1 UNITS: 100 INJECTION, SOLUTION PARENTERAL at 12:00

## 2024-01-10 RX ADMIN — POLYETHYLENE GLYCOL 3350 17 G: 17 POWDER, FOR SOLUTION ORAL at 09:15

## 2024-01-10 RX ADMIN — SERTRALINE HYDROCHLORIDE 50 MG: 50 TABLET ORAL at 09:16

## 2024-01-10 RX ADMIN — DEXAMETHASONE 6 MG: 2 TABLET ORAL at 09:16

## 2024-01-10 RX ADMIN — INSULIN HUMAN 2 UNITS: 100 INJECTION, SOLUTION PARENTERAL at 23:34

## 2024-01-10 RX ADMIN — HEPARIN SODIUM 5000 UNITS: 5000 INJECTION INTRAVENOUS; SUBCUTANEOUS at 14:00

## 2024-01-10 RX ADMIN — INSULIN HUMAN 2 UNITS: 100 INJECTION, SOLUTION PARENTERAL at 06:19

## 2024-01-10 RX ADMIN — LEVOTHYROXINE SODIUM 200 MCG: 75 TABLET ORAL at 06:19

## 2024-01-10 RX ADMIN — METOPROLOL TARTRATE 25 MG: 25 TABLET, FILM COATED ORAL at 09:15

## 2024-01-10 RX ADMIN — INSULIN HUMAN 2 UNITS: 100 INJECTION, SOLUTION PARENTERAL at 00:28

## 2024-01-10 RX ADMIN — INSULIN GLARGINE 10 UNITS: 100 INJECTION, SOLUTION SUBCUTANEOUS at 20:18

## 2024-01-10 RX ADMIN — STANDARDIZED SENNA CONCENTRATE 17.2 MG: 8.6 TABLET ORAL at 09:16

## 2024-01-10 ASSESSMENT — PAIN SCALES - GENERAL
PAINLEVEL_OUTOF10: 0 - NO PAIN
PAINLEVEL_OUTOF10: 0 - NO PAIN

## 2024-01-10 ASSESSMENT — COGNITIVE AND FUNCTIONAL STATUS - GENERAL
MOVING TO AND FROM BED TO CHAIR: TOTAL
DAILY ACTIVITIY SCORE: 6
CLIMB 3 TO 5 STEPS WITH RAILING: TOTAL
DRESSING REGULAR LOWER BODY CLOTHING: TOTAL
WALKING IN HOSPITAL ROOM: TOTAL
MOVING FROM LYING ON BACK TO SITTING ON SIDE OF FLAT BED WITH BEDRAILS: TOTAL
DRESSING REGULAR UPPER BODY CLOTHING: TOTAL
STANDING UP FROM CHAIR USING ARMS: TOTAL
PERSONAL GROOMING: TOTAL
TOILETING: TOTAL
MOBILITY SCORE: 6
EATING MEALS: TOTAL
TURNING FROM BACK TO SIDE WHILE IN FLAT BAD: TOTAL
HELP NEEDED FOR BATHING: TOTAL

## 2024-01-10 ASSESSMENT — PAIN - FUNCTIONAL ASSESSMENT: PAIN_FUNCTIONAL_ASSESSMENT: CPOT (CRITICAL CARE PAIN OBSERVATION TOOL)

## 2024-01-10 NOTE — PROGRESS NOTES
Physical Therapy                 Therapy Communication Note    Patient Name: Gopal Ness  MRN: [id number]  Today's Date: 1/10/2024     Discipline: Physical Therapy    Missed Visit Reason: Missed Visit Reason: Patient refused (Pt stated he was not going to do anything today despite encouragement. Pt educated on the importance of participating in PT, pt still refused.)    Missed Time: Attempt    Comment:

## 2024-01-10 NOTE — PROGRESS NOTES
Subjective     NAEON  PM Na 145 s/p D5W 1.5L. Had several 4-5 beats of VT that spontaneously resolved.     This morning, pt denies any chest pain, shortness of breath, nausea, vomiting. Was oriented to person and place.        Objective     Vitals:  Vitals:    01/10/24 0800   BP: 135/70   Pulse: 72   Resp: 11   Temp: 36.5 °C (97.7 °F)   SpO2: 94%       I/O last 3 completed shifts:  In: 4720.1 (47.5 mL/kg) [I.V.:2356.1 (23.7 mL/kg); NG/GT:2364]  Out: 4750 (47.8 mL/kg) [Urine:4750 (1.3 mL/kg/hr)]  Weight: 99.4 kg   No intake/output data recorded.    Physical exam:  Constitutional: Well-developed male in no acute distress.  HEENT: Normocephalic, atraumatic. PERRL. EOMI.   Respiratory: On 4 L NC  Cardiovascular: RRR on Tele  Abdominal: Soft, nondistended, nontender to palpation. No hepatosplenomegaly or masses.   Neuro: will open eyes to voice, strength 5/5 RUE, 4/5 LUE extension, flexion 3/5, hand  2/5. Spontaneously moves feet.   Skin: scattered echymoses  Psych: Appropriate mood and affect.    Medications:  dexAMETHasone, 6 mg, nasogastric tube, Daily  heparin (porcine), 5,000 Units, subcutaneous, q8h KENDRA  insulin glargine, 8 Units, subcutaneous, Nightly  insulin regular, 0-5 Units, subcutaneous, q6h  levothyroxine, 200 mcg, oral, Daily  metoprolol tartrate, 25 mg, nasogastric tube, BID  pantoprazole, 40 mg, oral, Daily before breakfast  polyethylene glycol, 17 g, oral, Daily  sennosides, 2 tablet, oral, BID  sertraline, 50 mg, oral, Daily           PRN medications: dextrose 10 % in water (D10W), dextrose, diclofenac sodium, glucagon, LORazepam, oxygen, phenoL    Labs:  Results for orders placed or performed during the hospital encounter of 01/01/24 (from the past 24 hour(s))   POCT GLUCOSE   Result Value Ref Range    POCT Glucose 147 (H) 74 - 99 mg/dL   POCT GLUCOSE   Result Value Ref Range    POCT Glucose 241 (H) 74 - 99 mg/dL   POCT GLUCOSE   Result Value Ref Range    POCT Glucose 283 (H) 74 - 99 mg/dL   Renal  function panel   Result Value Ref Range    Glucose 324 (H) 74 - 99 mg/dL    Sodium 145 136 - 145 mmol/L    Potassium 4.4 3.5 - 5.3 mmol/L    Chloride 109 (H) 98 - 107 mmol/L    Bicarbonate 29 21 - 32 mmol/L    Anion Gap 11 10 - 20 mmol/L    Urea Nitrogen 37 (H) 6 - 23 mg/dL    Creatinine 1.10 0.50 - 1.30 mg/dL    eGFR 71 >60 mL/min/1.73m*2    Calcium 8.1 (L) 8.6 - 10.6 mg/dL    Phosphorus 2.7 2.5 - 4.9 mg/dL    Albumin 2.5 (L) 3.4 - 5.0 g/dL   Magnesium   Result Value Ref Range    Magnesium 2.23 1.60 - 2.40 mg/dL   POCT GLUCOSE   Result Value Ref Range    POCT Glucose 283 (H) 74 - 99 mg/dL   CBC   Result Value Ref Range    WBC 7.3 4.4 - 11.3 x10*3/uL    nRBC 0.0 0.0 - 0.0 /100 WBCs    RBC 4.70 4.50 - 5.90 x10*6/uL    Hemoglobin 14.4 13.5 - 17.5 g/dL    Hematocrit 44.5 41.0 - 52.0 %    MCV 95 80 - 100 fL    MCH 30.6 26.0 - 34.0 pg    MCHC 32.4 32.0 - 36.0 g/dL    RDW 13.2 11.5 - 14.5 %    Platelets 102 (L) 150 - 450 x10*3/uL   Comprehensive Metabolic Panel   Result Value Ref Range    Glucose 184 (H) 74 - 99 mg/dL    Sodium 147 (H) 136 - 145 mmol/L    Potassium 3.9 3.5 - 5.3 mmol/L    Chloride 109 (H) 98 - 107 mmol/L    Bicarbonate 30 21 - 32 mmol/L    Anion Gap 12 10 - 20 mmol/L    Urea Nitrogen 41 (H) 6 - 23 mg/dL    Creatinine 1.07 0.50 - 1.30 mg/dL    eGFR 74 >60 mL/min/1.73m*2    Calcium 8.3 (L) 8.6 - 10.6 mg/dL    Albumin 2.5 (L) 3.4 - 5.0 g/dL    Alkaline Phosphatase 57 33 - 136 U/L    Total Protein 5.0 (L) 6.4 - 8.2 g/dL    AST 28 9 - 39 U/L    Bilirubin, Total 0.4 0.0 - 1.2 mg/dL    ALT 30 10 - 52 U/L   Magnesium   Result Value Ref Range    Magnesium 2.15 1.60 - 2.40 mg/dL   Phosphorus   Result Value Ref Range    Phosphorus 2.8 2.5 - 4.9 mg/dL       Imaging:  MR cervical spine wo IV contrast    Result Date: 1/9/2024  Interpreted By:  Joey Rizzo, STUDY: MR CERVICAL SPINE WO IV CONTRAST;  1/8/2024 10:18 pm   INDICATION: Signs/Symptoms:R arm weakness.   COMPARISON: None.   ACCESSION NUMBER(S):  [id number]   ORDERING CLINICIAN: DIEGO ABARCA   TECHNIQUE: The cervical spine was studied in the sagittal and axial planes utilizing T1 and T2 weighted images. Examination is limited due to motion artifact   FINDINGS: The craniovertebral junction is normal. The cord is normal in size and signal. The marrow signal is normal. Serial axial images reveal the following: C2/C3 There is normal alignment and vertebral body height. The disc space is normal. There is no evidence of canal or foraminal narrowing. There is no evidence of bulging or herniated disc. C3/C4 There is normal alignment and vertebral body height. The disc space is normal. There is no evidence of canal or foraminal narrowing. There is no evidence of bulging or herniated disc. C4/C5 Right greater than left uncovertebral joint hypertrophy with focal right-sided foraminal narrowing. No measurable canal stenosis. C5/C6 Right greater than left uncovertebral joint hypertrophy without canal stenosis. Right-sided foraminal narrowing C6/C7 Bilateral facet hypertrophy without canal or foraminal narrowing C7/T1 Bilateral facet hypertrophy without canal or foraminal narrowing       * Right-sided foraminal narrowing due to uncovertebral joint hypertrophy at C4/C5 and C5/C6 *No measurable canal stenosis or cord compression.   MACRO: none   Signed by: Joey Rizzo 1/9/2024 9:02 AM Dictation workstation:   [id number]       Assessment/Plan   Mr. Gopal Ness is a 74 y/o man with h/o pituitary adenoma s/p resection at [facility], DVT/PE on Eliquis, COPD (noncompliant with medications)/tobacco use, depresssion, DMII, HLD presenting after being found down i/s/o fall on 12/31 (LKN 1100 on 12/31). Patient's children checked on him after his fall on 12/31 and he was reportedly okay, however was found down and unresponsive the following morning with emesis around him. Brought to OSH, negative CTH, intubated for airway protection. EMS reported witnessing possible seizure  activity(?). Also with persistent tachycardia, leukocytosis and infiltrate on chest CT, so treating presumed aspiration pneumonia with antibiotics. Started on Keppra 1g BID and transferred to [facility] for cvEEG and further monitoring. Extubated 1/2. cvEEG did not demonstrate any seizures. Course complicated by covid pnuemonia, LEEANNE, SVT likely in the setting of infection, and RUE weakness in the setting of a fall.      Updates 1/10:  - Finished remdesivir, will stop dexamethasone  - Na 147 this AM, will increase FWF to 250 q4h, 1L D5W 125cc/hr. Will obtain PM RFP  - Wean oxygen as tolerated, goal 88-92%  - Glargine increased to 10u   - per neuro, will need outpatient EMG on 1/22 and follow-up in 4-6 weeks     #Acute hypoxic respiratory failure  #COVID pneumonia, CAP  #COPD  - Maintain O2- 88-92%, currently on 4L HFNC  - Cpap at night  - Elevated CRP, LDH, Ferritin, d-dimer  - Sputum culture - Strep pneumoniae  - s/p 1x cefepime 2g on 1/1, meropenem (1/1-1/2), Vanc 1.5g (1/1-1/2) then ceftriaxone 2g (1/2- 1/8)   - Started remdesivir (1/6-1/10), dexamethasone 6mg daily for 10 days (stop 1/14)  - Consider baricitinib if oxygen requirements do not improve      #Hypernatremia   #LEEANNE (baseline Cr 0.8-0.9) - improving  #Hx of DI   - Lomeli in, strict I/Os  - Renal ultrasound pending   - Hold home tamsulosin 0.4mg daily while NPO  - D5W 125ml/hr with 250ml q4h FWF  - RFP BID  - Follow up endocrine recs     #SVT  - Several episodes of SVT, terminated spontaneously, some associated with hypotension down to 80/50s.  - Consulted cardiology, pending recs.         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 New small dural based enhancing masses in the R posterior fossa  - MRI Brain and MRI Sella - residual adenoma unchanged from prior examination and similar to slightly increased size of small dural-based enhancing masses of small dural-based enhancing masses within the R posterior fossa favored to represent meningiomas.  - EEG - mod diffuse encephalopathy. Improvement in degree of encephalopathy compared to the previous day  - Keppra 1000mg BID discontinued  - MRI cervical spine showed right-sided foraminal narrowing d/t uncovertebral joint hypertrophy at C4/C5 and C5/C6  Plan:  - Q2h neuro checks  - Hold home trazadone 50mg at bedtime  - Continue home sertraline 50mg daily when taking PO  - Outpatient neurosurgery/neuro-ophthalmology follow up ordered  - EMG as an outpatient (1/22/23 earliest available), follow up with neurology in 4 weeks     #panhypothyroidism s/p adenoma resection  #Cushing disease  #diabetes mellitus  - Holding home glimepiride 4mg BID, metformin 1000mg BID  - A1C 5.6  - AM free T4 0.92  Plan:  - Mild corrective SSI #1  - POCT glucose q6hrs while on tube feeds, Hypoglycemia protocol  - Continue home levothyroxine 200mcg daily  - Decreased glargine to 8u  - follow up outpatient with endocrinologist Dr. Yvonne Lofton in 4 weeks (scheduled by endocrine)     #hx of DVT/PE  - H/H: 16.9/51 -> 14.3/45.8  - PLT: 148 -> 133 -> 98 > 100  - 4T score: 3, low probability <5%  - Daily CBC    - Hold home Eliquis at this time pending stabilization     F: LR, D5W  E: Replete PRN  N: Nepro  A: PIVs  Lomeli: Yes  GI ppx: PPI  DVT ppx: SQH     Full Code  NOK/Surrogate:  Donya Emerson (Daughter) [phone]      Patient and plan discussed with Dr. Lane

## 2024-01-11 LAB
ALBUMIN SERPL BCP-MCNC: 2.6 G/DL (ref 3.4–5)
ALBUMIN SERPL BCP-MCNC: 2.6 G/DL (ref 3.4–5)
ALBUMIN SERPL BCP-MCNC: 2.7 G/DL (ref 3.4–5)
ALP SERPL-CCNC: 59 U/L (ref 33–136)
ALT SERPL W P-5'-P-CCNC: 40 U/L (ref 10–52)
ANION GAP SERPL CALC-SCNC: 10 MMOL/L (ref 10–20)
ANION GAP SERPL CALC-SCNC: 12 MMOL/L
ANION GAP SERPL CALC-SCNC: 8 MMOL/L (ref 10–20)
AST SERPL W P-5'-P-CCNC: 42 U/L (ref 9–39)
BASOPHILS # BLD AUTO: 0.02 X10*3/UL (ref 0–0.1)
BASOPHILS NFR BLD AUTO: 0.2 %
BILIRUB SERPL-MCNC: 0.6 MG/DL (ref 0–1.2)
BUN SERPL-MCNC: 33 MG/DL (ref 6–23)
BUN SERPL-MCNC: 34 MG/DL (ref 6–23)
BUN SERPL-MCNC: 34 MG/DL (ref 6–23)
CALCIUM SERPL-MCNC: 8.4 MG/DL (ref 8.6–10.6)
CALCIUM SERPL-MCNC: 8.6 MG/DL (ref 8.6–10.6)
CALCIUM SERPL-MCNC: 8.6 MG/DL (ref 8.6–10.6)
CHLORIDE SERPL-SCNC: 107 MMOL/L (ref 98–107)
CHLORIDE SERPL-SCNC: 107 MMOL/L (ref 98–107)
CHLORIDE SERPL-SCNC: 110 MMOL/L (ref 98–107)
CO2 SERPL-SCNC: 30 MMOL/L (ref 21–32)
CO2 SERPL-SCNC: 32 MMOL/L (ref 21–32)
CO2 SERPL-SCNC: 33 MMOL/L (ref 21–32)
CREAT SERPL-MCNC: 0.92 MG/DL (ref 0.5–1.3)
CREAT SERPL-MCNC: 0.94 MG/DL (ref 0.5–1.3)
CREAT SERPL-MCNC: 0.97 MG/DL (ref 0.5–1.3)
EGFRCR SERPLBLD CKD-EPI 2021: 83 ML/MIN/1.73M*2
EGFRCR SERPLBLD CKD-EPI 2021: 86 ML/MIN/1.73M*2
EGFRCR SERPLBLD CKD-EPI 2021: 88 ML/MIN/1.73M*2
EOSINOPHIL # BLD AUTO: 0.45 X10*3/UL (ref 0–0.4)
EOSINOPHIL NFR BLD AUTO: 5.4 %
ERYTHROCYTE [DISTWIDTH] IN BLOOD BY AUTOMATED COUNT: 13.2 % (ref 11.5–14.5)
GLUCOSE BLD MANUAL STRIP-MCNC: 147 MG/DL (ref 74–99)
GLUCOSE BLD MANUAL STRIP-MCNC: 172 MG/DL (ref 74–99)
GLUCOSE BLD MANUAL STRIP-MCNC: 184 MG/DL (ref 74–99)
GLUCOSE BLD MANUAL STRIP-MCNC: 217 MG/DL (ref 74–99)
GLUCOSE SERPL-MCNC: 175 MG/DL (ref 74–99)
GLUCOSE SERPL-MCNC: 193 MG/DL (ref 74–99)
GLUCOSE SERPL-MCNC: 224 MG/DL (ref 74–99)
HCT VFR BLD AUTO: 45.8 % (ref 41–52)
HGB BLD-MCNC: 14.6 G/DL (ref 13.5–17.5)
IMM GRANULOCYTES # BLD AUTO: 0.16 X10*3/UL (ref 0–0.5)
IMM GRANULOCYTES NFR BLD AUTO: 1.9 % (ref 0–0.9)
LYMPHOCYTES # BLD AUTO: 0.61 X10*3/UL (ref 0.8–3)
LYMPHOCYTES NFR BLD AUTO: 7.4 %
MAGNESIUM SERPL-MCNC: 1.86 MG/DL (ref 1.6–2.4)
MCH RBC QN AUTO: 30.7 PG (ref 26–34)
MCHC RBC AUTO-ENTMCNC: 31.9 G/DL (ref 32–36)
MCV RBC AUTO: 96 FL (ref 80–100)
MONOCYTES # BLD AUTO: 0.45 X10*3/UL (ref 0.05–0.8)
MONOCYTES NFR BLD AUTO: 5.4 %
NEUTROPHILS # BLD AUTO: 6.59 X10*3/UL (ref 1.6–5.5)
NEUTROPHILS NFR BLD AUTO: 79.7 %
NRBC BLD-RTO: 0 /100 WBCS (ref 0–0)
PHOSPHATE SERPL-MCNC: 2.8 MG/DL (ref 2.5–4.9)
PHOSPHATE SERPL-MCNC: 2.9 MG/DL (ref 2.5–4.9)
PHOSPHATE SERPL-MCNC: 3.1 MG/DL (ref 2.5–4.9)
PLATELET # BLD AUTO: 96 X10*3/UL (ref 150–450)
POTASSIUM SERPL-SCNC: 3.4 MMOL/L (ref 3.5–5.3)
POTASSIUM SERPL-SCNC: 3.6 MMOL/L (ref 3.5–5.3)
POTASSIUM SERPL-SCNC: 4.1 MMOL/L (ref 3.5–5.3)
PROT SERPL-MCNC: 5.6 G/DL (ref 6.4–8.2)
RBC # BLD AUTO: 4.76 X10*6/UL (ref 4.5–5.9)
SODIUM SERPL-SCNC: 145 MMOL/L (ref 136–145)
SODIUM SERPL-SCNC: 145 MMOL/L (ref 136–145)
SODIUM SERPL-SCNC: 148 MMOL/L (ref 136–145)
WBC # BLD AUTO: 8.3 X10*3/UL (ref 4.4–11.3)

## 2024-01-11 PROCEDURE — 2500000001 HC RX 250 WO HCPCS SELF ADMINISTERED DRUGS (ALT 637 FOR MEDICARE OP): Performed by: STUDENT IN AN ORGANIZED HEALTH CARE EDUCATION/TRAINING PROGRAM

## 2024-01-11 PROCEDURE — 99231 SBSQ HOSP IP/OBS SF/LOW 25: CPT

## 2024-01-11 PROCEDURE — 83735 ASSAY OF MAGNESIUM: CPT

## 2024-01-11 PROCEDURE — 2500000004 HC RX 250 GENERAL PHARMACY W/ HCPCS (ALT 636 FOR OP/ED)

## 2024-01-11 PROCEDURE — 85025 COMPLETE CBC W/AUTO DIFF WBC: CPT

## 2024-01-11 PROCEDURE — 80069 RENAL FUNCTION PANEL: CPT | Mod: CCI,MUE

## 2024-01-11 PROCEDURE — 2500000001 HC RX 250 WO HCPCS SELF ADMINISTERED DRUGS (ALT 637 FOR MEDICARE OP)

## 2024-01-11 PROCEDURE — 36415 COLL VENOUS BLD VENIPUNCTURE: CPT

## 2024-01-11 PROCEDURE — C9113 INJ PANTOPRAZOLE SODIUM, VIA: HCPCS

## 2024-01-11 PROCEDURE — 2500000004 HC RX 250 GENERAL PHARMACY W/ HCPCS (ALT 636 FOR OP/ED): Performed by: STUDENT IN AN ORGANIZED HEALTH CARE EDUCATION/TRAINING PROGRAM

## 2024-01-11 PROCEDURE — 97530 THERAPEUTIC ACTIVITIES: CPT | Mod: GP,CQ

## 2024-01-11 PROCEDURE — 84075 ASSAY ALKALINE PHOSPHATASE: CPT

## 2024-01-11 PROCEDURE — 82947 ASSAY GLUCOSE BLOOD QUANT: CPT

## 2024-01-11 PROCEDURE — 1200000002 HC GENERAL ROOM WITH TELEMETRY DAILY

## 2024-01-11 PROCEDURE — 99232 SBSQ HOSP IP/OBS MODERATE 35: CPT | Performed by: STUDENT IN AN ORGANIZED HEALTH CARE EDUCATION/TRAINING PROGRAM

## 2024-01-11 PROCEDURE — 84100 ASSAY OF PHOSPHORUS: CPT

## 2024-01-11 PROCEDURE — 2500000002 HC RX 250 W HCPCS SELF ADMINISTERED DRUGS (ALT 637 FOR MEDICARE OP, ALT 636 FOR OP/ED): Performed by: INTERNAL MEDICINE

## 2024-01-11 RX ORDER — DEXTROSE MONOHYDRATE 50 MG/ML
140 INJECTION, SOLUTION INTRAVENOUS CONTINUOUS
Status: DISCONTINUED | OUTPATIENT
Start: 2024-01-11 | End: 2024-01-12

## 2024-01-11 RX ORDER — PANTOPRAZOLE SODIUM 40 MG/10ML
40 INJECTION, POWDER, LYOPHILIZED, FOR SOLUTION INTRAVENOUS DAILY
Status: DISCONTINUED | OUTPATIENT
Start: 2024-01-11 | End: 2024-01-13

## 2024-01-11 RX ORDER — POTASSIUM CHLORIDE 1.5 G/1.58G
40 POWDER, FOR SOLUTION ORAL ONCE
Status: COMPLETED | OUTPATIENT
Start: 2024-01-11 | End: 2024-01-11

## 2024-01-11 RX ORDER — MAGNESIUM SULFATE HEPTAHYDRATE 40 MG/ML
2 INJECTION, SOLUTION INTRAVENOUS ONCE
Status: COMPLETED | OUTPATIENT
Start: 2024-01-11 | End: 2024-01-11

## 2024-01-11 RX ADMIN — METOPROLOL TARTRATE 25 MG: 25 TABLET, FILM COATED ORAL at 22:31

## 2024-01-11 RX ADMIN — MAGNESIUM SULFATE HEPTAHYDRATE 2 G: 40 INJECTION, SOLUTION INTRAVENOUS at 17:26

## 2024-01-11 RX ADMIN — INSULIN HUMAN 1 UNITS: 100 INJECTION, SOLUTION PARENTERAL at 18:53

## 2024-01-11 RX ADMIN — DEXTROSE MONOHYDRATE 140 ML/HR: 50 INJECTION, SOLUTION INTRAVENOUS at 12:15

## 2024-01-11 RX ADMIN — PANTOPRAZOLE SODIUM 40 MG: 40 INJECTION, POWDER, FOR SOLUTION INTRAVENOUS at 09:37

## 2024-01-11 RX ADMIN — HEPARIN SODIUM 5000 UNITS: 5000 INJECTION INTRAVENOUS; SUBCUTANEOUS at 16:11

## 2024-01-11 RX ADMIN — STANDARDIZED SENNA CONCENTRATE 17.2 MG: 8.6 TABLET ORAL at 22:31

## 2024-01-11 RX ADMIN — DEXTROSE MONOHYDRATE 140 ML/HR: 50 INJECTION, SOLUTION INTRAVENOUS at 17:26

## 2024-01-11 RX ADMIN — INSULIN GLARGINE 10 UNITS: 100 INJECTION, SOLUTION SUBCUTANEOUS at 22:32

## 2024-01-11 RX ADMIN — METOPROLOL TARTRATE 25 MG: 25 TABLET, FILM COATED ORAL at 09:37

## 2024-01-11 RX ADMIN — POTASSIUM CHLORIDE 40 MEQ: 1.5 POWDER, FOR SOLUTION ORAL at 16:12

## 2024-01-11 RX ADMIN — HEPARIN SODIUM 5000 UNITS: 5000 INJECTION INTRAVENOUS; SUBCUTANEOUS at 22:31

## 2024-01-11 RX ADMIN — LEVOTHYROXINE SODIUM 200 MCG: 75 TABLET ORAL at 06:02

## 2024-01-11 RX ADMIN — SERTRALINE HYDROCHLORIDE 50 MG: 50 TABLET ORAL at 09:37

## 2024-01-11 RX ADMIN — HEPARIN SODIUM 5000 UNITS: 5000 INJECTION INTRAVENOUS; SUBCUTANEOUS at 06:02

## 2024-01-11 RX ADMIN — INSULIN HUMAN 1 UNITS: 100 INJECTION, SOLUTION PARENTERAL at 12:43

## 2024-01-11 ASSESSMENT — COGNITIVE AND FUNCTIONAL STATUS - GENERAL
DRESSING REGULAR UPPER BODY CLOTHING: TOTAL
DRESSING REGULAR LOWER BODY CLOTHING: TOTAL
MOBILITY SCORE: 7
WALKING IN HOSPITAL ROOM: TOTAL
CLIMB 3 TO 5 STEPS WITH RAILING: TOTAL
STANDING UP FROM CHAIR USING ARMS: TOTAL
EATING MEALS: TOTAL
MOVING TO AND FROM BED TO CHAIR: TOTAL
MOVING FROM LYING ON BACK TO SITTING ON SIDE OF FLAT BED WITH BEDRAILS: A LOT
MOVING TO AND FROM BED TO CHAIR: TOTAL
TURNING FROM BACK TO SIDE WHILE IN FLAT BAD: TOTAL
PERSONAL GROOMING: TOTAL
CLIMB 3 TO 5 STEPS WITH RAILING: TOTAL
DAILY ACTIVITIY SCORE: 6
STANDING UP FROM CHAIR USING ARMS: TOTAL
DRESSING REGULAR LOWER BODY CLOTHING: TOTAL
MOBILITY SCORE: 7
TURNING FROM BACK TO SIDE WHILE IN FLAT BAD: TOTAL
HELP NEEDED FOR BATHING: TOTAL
DAILY ACTIVITIY SCORE: 6
MOVING FROM LYING ON BACK TO SITTING ON SIDE OF FLAT BED WITH BEDRAILS: A LOT
DRESSING REGULAR UPPER BODY CLOTHING: TOTAL
TOILETING: TOTAL
EATING MEALS: TOTAL
HELP NEEDED FOR BATHING: TOTAL
PERSONAL GROOMING: TOTAL
WALKING IN HOSPITAL ROOM: TOTAL
TOILETING: TOTAL

## 2024-01-11 ASSESSMENT — PAIN SCALES - GENERAL
PAINLEVEL_OUTOF10: 0 - NO PAIN

## 2024-01-11 ASSESSMENT — PAIN SCALES - PAIN ASSESSMENT IN ADVANCED DEMENTIA (PAINAD)
CONSOLABILITY: NO NEED TO CONSOLE
FACIALEXPRESSION: SMILING OR INEXPRESSIVE
TOTALSCORE: 0
BREATHING: NORMAL
BODYLANGUAGE: RELAXED

## 2024-01-11 ASSESSMENT — PAIN - FUNCTIONAL ASSESSMENT
PAIN_FUNCTIONAL_ASSESSMENT: CPOT (CRITICAL CARE PAIN OBSERVATION TOOL)
PAIN_FUNCTIONAL_ASSESSMENT: CPOT (CRITICAL CARE PAIN OBSERVATION TOOL)
PAIN_FUNCTIONAL_ASSESSMENT: 0-10
PAIN_FUNCTIONAL_ASSESSMENT: 0-10

## 2024-01-11 NOTE — PROGRESS NOTES
Subjective   NAEO- was lethargic this AM, refused to answer questions.  On 2L NC this AM.    Objective     Vitals:  Vitals:    01/11/24 1200   BP: 125/71   Pulse: 77   Resp: 13   Temp: 36.5 °C (97.7 °F)   SpO2: 91%       I/O last 3 completed shifts:  In: 3912.1 (39.4 mL/kg) [I.V.:1077.1 (10.8 mL/kg); NG/GT:2835]  Out: 3875 (39 mL/kg) [Urine:3875 (1.1 mL/kg/hr)]  Weight: 99.4 kg   I/O this shift:  In: 740 [NG/GT:740]  Out: 500 [Urine:500]    Physical exam:  Constitutional: Well-developed male in no acute distress.  HEENT: Normocephalic, atraumatic. PERRL. EOMI.   Respiratory: On 2L NC  Cardiovascular: RRR, on Tele  Abdominal: Soft, nondistended, nontender to palpation. No hepatosplenomegaly or masses.   Neuro: will open eyes to voice, spontaneously moves all extremities  Skin: scattered echymoses  Psych: Appropriate mood and affect.    Medications:  heparin (porcine), 5,000 Units, subcutaneous, q8h KENDRA  insulin glargine, 10 Units, subcutaneous, Nightly  insulin regular, 0-5 Units, subcutaneous, q6h  levothyroxine, 200 mcg, oral, Daily  metoprolol tartrate, 25 mg, nasogastric tube, BID  pantoprazole, 40 mg, intravenous, Daily  polyethylene glycol, 17 g, oral, Daily  sennosides, 2 tablet, oral, BID  sertraline, 50 mg, oral, Daily      dextrose 5 % in water (D5W), 140 mL/hr, Last Rate: 140 mL/hr (01/11/24 1215)        PRN medications: dextrose 10 % in water (D10W), dextrose, diclofenac sodium, glucagon, LORazepam, oxygen, phenoL    Labs:  Results for orders placed or performed during the hospital encounter of 01/01/24 (from the past 24 hour(s))   POCT GLUCOSE   Result Value Ref Range    POCT Glucose 222 (H) 74 - 99 mg/dL   Renal Function Panel   Result Value Ref Range    Glucose 279 (H) 74 - 99 mg/dL    Sodium 148 (H) 136 - 145 mmol/L    Potassium 4.4 3.5 - 5.3 mmol/L    Chloride 111 (H) 98 - 107 mmol/L    Bicarbonate 28 21 - 32 mmol/L    Anion Gap 13 10 - 20 mmol/L    Urea Nitrogen 37 (H) 6 - 23 mg/dL    Creatinine  0.99 0.50 - 1.30 mg/dL    eGFR 81 >60 mL/min/1.73m*2    Calcium 8.2 (L) 8.6 - 10.6 mg/dL    Phosphorus 3.2 2.5 - 4.9 mg/dL    Albumin 2.5 (L) 3.4 - 5.0 g/dL   POCT GLUCOSE   Result Value Ref Range    POCT Glucose 285 (H) 74 - 99 mg/dL   POCT GLUCOSE   Result Value Ref Range    POCT Glucose 221 (H) 74 - 99 mg/dL   POCT GLUCOSE   Result Value Ref Range    POCT Glucose 147 (H) 74 - 99 mg/dL   Comprehensive Metabolic Panel   Result Value Ref Range    Glucose 175 (H) 74 - 99 mg/dL    Sodium 148 (H) 136 - 145 mmol/L    Potassium 3.6 3.5 - 5.3 mmol/L    Chloride 110 (H) 98 - 107 mmol/L    Bicarbonate 32 21 - 32 mmol/L    Anion Gap 10 10 - 20 mmol/L    Urea Nitrogen 34 (H) 6 - 23 mg/dL    Creatinine 0.92 0.50 - 1.30 mg/dL    eGFR 88 >60 mL/min/1.73m*2    Calcium 8.6 8.6 - 10.6 mg/dL    Albumin 2.6 (L) 3.4 - 5.0 g/dL    Alkaline Phosphatase 59 33 - 136 U/L    Total Protein 5.6 (L) 6.4 - 8.2 g/dL    AST 42 (H) 9 - 39 U/L    Bilirubin, Total 0.6 0.0 - 1.2 mg/dL    ALT 40 10 - 52 U/L   CBC and Auto Differential   Result Value Ref Range    WBC 8.3 4.4 - 11.3 x10*3/uL    nRBC 0.0 0.0 - 0.0 /100 WBCs    RBC 4.76 4.50 - 5.90 x10*6/uL    Hemoglobin 14.6 13.5 - 17.5 g/dL    Hematocrit 45.8 41.0 - 52.0 %    MCV 96 80 - 100 fL    MCH 30.7 26.0 - 34.0 pg    MCHC 31.9 (L) 32.0 - 36.0 g/dL    RDW 13.2 11.5 - 14.5 %    Platelets 96 (L) 150 - 450 x10*3/uL    Neutrophils % 79.7 40.0 - 80.0 %    Immature Granulocytes %, Automated 1.9 (H) 0.0 - 0.9 %    Lymphocytes % 7.4 13.0 - 44.0 %    Monocytes % 5.4 2.0 - 10.0 %    Eosinophils % 5.4 0.0 - 6.0 %    Basophils % 0.2 0.0 - 2.0 %    Neutrophils Absolute 6.59 (H) 1.60 - 5.50 x10*3/uL    Immature Granulocytes Absolute, Automated 0.16 0.00 - 0.50 x10*3/uL    Lymphocytes Absolute 0.61 (L) 0.80 - 3.00 x10*3/uL    Monocytes Absolute 0.45 0.05 - 0.80 x10*3/uL    Eosinophils Absolute 0.45 (H) 0.00 - 0.40 x10*3/uL    Basophils Absolute 0.02 0.00 - 0.10 x10*3/uL   Magnesium   Result Value Ref Range     Magnesium 1.86 1.60 - 2.40 mg/dL   Phosphorus   Result Value Ref Range    Phosphorus 2.8 2.5 - 4.9 mg/dL   POCT GLUCOSE   Result Value Ref Range    POCT Glucose 172 (H) 74 - 99 mg/dL   Renal function panel   Result Value Ref Range    Glucose 224 (H) 74 - 99 mg/dL    Sodium 145 136 - 145 mmol/L    Potassium 3.4 (L) 3.5 - 5.3 mmol/L    Chloride 107 98 - 107 mmol/L    Bicarbonate 33 (H) 21 - 32 mmol/L    Anion Gap 8 (L) 10 - 20 mmol/L    Urea Nitrogen 34 (H) 6 - 23 mg/dL    Creatinine 0.97 0.50 - 1.30 mg/dL    eGFR 83 >60 mL/min/1.73m*2    Calcium 8.4 (L) 8.6 - 10.6 mg/dL    Phosphorus 2.9 2.5 - 4.9 mg/dL    Albumin 2.6 (L) 3.4 - 5.0 g/dL       Imaging:  MR cervical spine wo IV contrast    Result Date: 1/9/2024  Interpreted By:  Joey Rizzo, STUDY: MR CERVICAL SPINE WO IV CONTRAST;  1/8/2024 10:18 pm   INDICATION: Signs/Symptoms:R arm weakness.   COMPARISON: None.   ACCESSION NUMBER(S): [id number]   ORDERING CLINICIAN: DIEGO ABARCA   TECHNIQUE: The cervical spine was studied in the sagittal and axial planes utilizing T1 and T2 weighted images. Examination is limited due to motion artifact   FINDINGS: The craniovertebral junction is normal. The cord is normal in size and signal. The marrow signal is normal. Serial axial images reveal the following: C2/C3 There is normal alignment and vertebral body height. The disc space is normal. There is no evidence of canal or foraminal narrowing. There is no evidence of bulging or herniated disc. C3/C4 There is normal alignment and vertebral body height. The disc space is normal. There is no evidence of canal or foraminal narrowing. There is no evidence of bulging or herniated disc. C4/C5 Right greater than left uncovertebral joint hypertrophy with focal right-sided foraminal narrowing. No measurable canal stenosis. C5/C6 Right greater than left uncovertebral joint hypertrophy without canal stenosis. Right-sided foraminal narrowing C6/C7 Bilateral facet hypertrophy  without canal or foraminal narrowing C7/T1 Bilateral facet hypertrophy without canal or foraminal narrowing       * Right-sided foraminal narrowing due to uncovertebral joint hypertrophy at C4/C5 and C5/C6 *No measurable canal stenosis or cord compression.   MACRO: none   Signed by: Joey Rizzo 1/9/2024 9:02 AM Dictation workstation:   [id number]       Assessment/Plan   Mr. Gopal Ness is a 72 y/o man with h/o pituitary adenoma s/p resection at [facility], DVT/PE on Eliquis, COPD (noncompliant with medications)/tobacco use, depresssion, DMII, HLD presenting after being found down i/s/o fall on 12/31 (LKN 1100 on 12/31). Patient's children checked on him after his fall on 12/31 and he was reportedly okay, however was found down and unresponsive the following morning with emesis around him. Brought to OSH, negative CTH, intubated for airway protection. EMS reported witnessing possible seizure activity(?). Also with persistent tachycardia, leukocytosis and infiltrate on chest CT, so treating presumed aspiration pneumonia with antibiotics. Started on Keppra 1g BID and transferred to [facility] for cvEEG and further monitoring. Extubated 1/2. cvEEG did not demonstrate any seizures. Course complicated by covid pnuemonia, LEEANNE, SVT likely in the setting of infection, and RUE weakness in the setting of a fall.      Updates 1/11:  - Na 145 this AM, 1L D5W 125cc/hr. Will obtain evening RFP and consider increasing FWF to 300 q6h  - Wean oxygen as tolerated, goal 88-92%  - Glargine increased to 10u   - per neuro, will need outpatient EMG on 1/22 and follow-up in 4-6 weeks     #Acute hypoxic respiratory failure  #COVID pneumonia, CAP  #COPD  - Maintain O2- 88-92%, currently on 4L HFNC  - Cpap at night  - Elevated CRP, LDH, Ferritin, d-dimer  - Sputum culture - Strep pneumoniae  - s/p 1x cefepime 2g on 1/1, meropenem (1/1-1/2), Vanc 1.5g (1/1-1/2) then ceftriaxone 2g (1/2- 1/8)   - Started remdesivir (1/6-1/10), dexamethasone 6mg daily  (stopped 1/10)    #Hypernatremia   #LEEANNE (baseline Cr 0.8-0.9) - improving  #Hx of DI   - Lomeli in, strict I/Os  - Renal ultrasound pending   - Hold home tamsulosin 0.4mg daily while NPO  - D5W 125ml/hr with 300ml q6h FWF  - RFP BID  - Follow up endocrine recs     #SVT  - Several episodes of SVT, terminated spontaneously, some associated with hypotension down to 80/50s.  - Consulted cardiology, pending recs.         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 New small dural based enhancing masses in the R posterior fossa  - MRI Brain and MRI Sella - residual adenoma unchanged from prior examination and similar to slightly increased size of small dural-based enhancing masses of small dural-based enhancing masses within the R posterior fossa favored to represent meningiomas.  - EEG - mod diffuse encephalopathy. Improvement in degree of encephalopathy compared to the previous day  - Keppra 1000mg BID discontinued  - MRI cervical spine showed right-sided foraminal narrowing d/t uncovertebral joint hypertrophy at C4/C5 and C5/C6  Plan:  - Q2h neuro checks  - Hold home trazadone 50mg at bedtime  - Continue home sertraline 50mg daily when taking PO  - Outpatient neurosurgery/neuro-ophthalmology follow up ordered  - EMG as an outpatient (1/22/23 earliest available),  follow up with neurology in 4 weeks     #panhypothyroidism s/p adenoma resection  #Cushing disease  #diabetes mellitus  - Holding home glimepiride 4mg BID, metformin 1000mg BID  - A1C 5.6  - AM free T4 0.92  Plan:  - Mild corrective SSI #1  - POCT glucose q6hrs while on tube feeds, Hypoglycemia protocol  - Continue home levothyroxine 200mcg daily  - Glargine 10u nightly  - follow up outpatient with endocrinologist Dr. Yvonne Lofton in 4 weeks (scheduled by endocrine)     #hx of DVT/PE  - H/H: 16.9/51 -> 14.3/45.8  - PLT: 148 -> 133 -> 98 > 100  - 4T score: 3, low probability <5%  - Daily CBC    - Hold home Eliquis at this time pending stabilization     F: LR, D5W  E: Replete PRN  N: Nepro  A: PIVs  Lomeli: Yes  GI ppx: PPI  DVT ppx: SQH     Full Code  NOK/Surrogate: Donya Emerson (Daughter) [phone]      Patient and plan discussed with Dr. Lane

## 2024-01-11 NOTE — PROGRESS NOTES
Gopal Ness is a 73 y.o. male on day 10 of admission presenting with AMS (altered mental status).    Subjective   Patient was not seen given his COVID-positive status.  Chart was reviewed thoroughly.       Objective   Last Recorded Vitals  Blood pressure 125/71, pulse 77, temperature 36.5 °C (97.7 °F), resp. rate 13, weight 99.4 kg (219 lb 2.2 oz), SpO2 91 %.  Intake/Output last 3 Shifts:  I/O last 3 completed shifts:  In: 3912.1 (39.4 mL/kg) [I.V.:1077.1 (10.8 mL/kg); NG/GT:2835]  Out: 3875 (39 mL/kg) [Urine:3875 (1.1 mL/kg/hr)]  Weight: 99.4 kg     Scheduled medications  heparin (porcine), 5,000 Units, subcutaneous, q8h KENDRA  insulin glargine, 10 Units, subcutaneous, Nightly  insulin regular, 0-5 Units, subcutaneous, q6h  levothyroxine, 200 mcg, oral, Daily  metoprolol tartrate, 25 mg, nasogastric tube, BID  pantoprazole, 40 mg, intravenous, Daily  polyethylene glycol, 17 g, oral, Daily  sennosides, 2 tablet, oral, BID  sertraline, 50 mg, oral, Daily      Continuous medications  dextrose 5 % in water (D5W), 140 mL/hr, Last Rate: 140 mL/hr (01/11/24 1215)      PRN medications  PRN medications: dextrose 10 % in water (D10W), dextrose, diclofenac sodium, glucagon, LORazepam, oxygen, phenoL     Relevant Results  Results from last 7 days   Lab Units 01/11/24  1412 01/11/24  0957   SODIUM mmol/L 145 148*   POTASSIUM mmol/L 3.4* 3.6   CHLORIDE mmol/L 107 110*   CO2 mmol/L 33* 32   BUN mg/dL 34* 34*   CREATININE mg/dL 0.97 0.92   CALCIUM mg/dL 8.4* 8.6   PROTEIN TOTAL g/dL  --  5.6*   BILIRUBIN TOTAL mg/dL  --  0.6   ALK PHOS U/L  --  59   ALT U/L  --  40   AST U/L  --  42*   GLUCOSE mg/dL 224* 175*     Results from last 7 days   Lab Units 01/11/24  1412 01/11/24  1231 01/11/24  0957 01/11/24  0550 01/10/24  2316 01/10/24  2000 01/10/24  1841 01/10/24  1723 01/10/24  1300 01/10/24  0409 01/10/24  0023 01/09/24  2201 01/09/24  2013 01/09/24  1554 01/09/24  1233 01/09/24  0447 01/09/24  0038 01/08/24  1926 01/08/24  1139  01/08/24  1049 01/07/24  0849 01/07/24  0816 01/07/24  0520 01/06/24  2330   POCT GLUCOSE mg/dL  --  172*  --  147* 221* 285*  --  222* 159*  --  283*  --  283* 241* 147*  --    < >  --    < >  --    < >  --    < >  --    GLUCOSE mg/dL 224*  --  175*  --   --   --  279*  --   --  184*  --  324*  --   --   --  194*  --  291*  --  109*  --  145*  --  199*    < > = values in this interval not displayed.       Assessment/Plan   Principal Problem:    AMS (altered mental status)    Gopal Ness is a 73 y.o. male with a h/o pituitary adenoma s/p transphenoidal resection at [facility] (2016) c/b hypopituitarism (adrenal insufficiency-resolved, hypogonadism, diabetes insipidis reportedly), hypothyroidism on levothyroxine);  DVT/PE on Eliquis, COPD (noncompliant with medications)/tobacco use, depresssion, DMII, HLD presenting after being found down unresponsive on 1/1/24 and subsequently transferred to [facility] on 1/2 with course complicated by intubation for airway protection (now extubated on intermittent BiPAP- HFNC?), concern for possible seizure activity (initially placed on keppra, now off), presumed aspiration pneumonia on antibiotics, hypotension initially on pressors- now resolved, and LEEANNE. Patient also found to have COVID.     Endocrinology consulted given history of pituitary adenoma resection and whether patient needed to be on steroid replacement therapy.      Patient with known history Cushing disease confirmed biochemically and with biopsy with prior recurrence. Now patient presenting with active Cushing disease again with high AM cortisol and ACTH likely 2/2 residual adenoma. 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 Notably NOT panhypopituitarism as prolactin level was 2.8 on 1/4 and some element of pituitary released hormone suppression may be from Cushing disease's effect on axis. High cortisol would suppress GnRH. Per neurosurgery in 09/2023, additional  surgery of residual is not warranted/feasible, and would not result in improvement of ophthalmoplegia. Residual on 2024 MRI appears grossly the same as residual on 2023 MRI. Unlikely to be cause of seizures. Patient was having persistent SVT, cardiology on board, etiology hypovolemia versus respiratory distress versus hypoxia. As of , no further episodes of SVT.         #Cushing disease  #Hypopituitarism  ::Patient with sellar mass dx  progressing to vision changes  with Cushing disease now s/p resection 10/2016 (pathology pituitary adenoma, ACTH cell-type, Ki-67 focally 12%) with MRI and biochemical recurrence of Cushing in 2018 now s/p 5220 cGy in 29 fx now with recent worsening ophthalmoplegia with repeat MRI 2023 thought to be likely unchanged from prior in size based on report (known mass effect on OC). Had been followed at [facility] by endocrinology (see consult note for additional endocrine history).   ::24 FSH <0.9, LH <0.1, prolactin 1.4L, Growth hormone <0.05, IGF1 31 (Z score -2.8), total testosterone 73, free testosterone 11.3  ::24 LH <0.1, prolactin 2.8,   ::24 ACTH 100.0H, 24 AM cortisol 67.6  ::Per chart review, did not start inpatient steroids until 1/3/2024  ::24 AM cortisol 58.2 confirms cushing disease as >10+ hours from prior steroid dose  ::Collected labs pendin/04 ACTH and I-GF  ::Patient was receiving dexamethasone for COVID treatment however given that has significantly elevated endogenous cortisol/glucocorticoids levels we recommended discontinuation.  They were discontinued on 1/10    Recommendations:  -no further inpatient treatment of Cushing disease  -Follow up outpatient with endocrinologist Dr. Yvonne Lofton in 4 weeks (our coordinator will schedule this appointment closer to [place]- please let our team know when patient will be discharged)      #Hypernatremia:  ::2024: Urine sodium 66, urine osm > 300, post IV fluids.  No concern for diabetes  insipidus.  - Deferring management of hypernatremia to primary team but please consider increasing free water flushes to 400 every 6 hours and supplementing his free water deficit by going up on his free water flushes rather than placing the patient on D5W as this may worsen his glycemic control   - Also consider obtaining RFP's twice daily    #Diabetes  Started on dexamethasone on 1/6 for COVID treatment.  Discontinued on 1/10.  Was receiving D5W for treatment of hyponatremia but was discontinued on 1/11  ::HgbA1c 5.6 on 1/1/24  ::home metformin 1000 mg twice daily, glimeperide 4 mg twice daily      Recommendations:              -Continue glargine to 10 units subcutaneously in p.m.  -Continue sliding scale Regular insulin to #2 (2 units for every 50 above 150) every 6 hours while on tube feeds  -Please inform endocrinology if tube feeds are held, or rate is changed  -POCT glucose q6hrs while on tube feeds  -hypoglycemia protocol       #Hypothyroidism  ::1/4/24 TSH 0.02L, free T4 0.71L, free T3 1.8L  ::1/9/24 free T4 0.92     Recommendations:  -if not able to give po medication, give IV levothyroxine 100mcg daily   -Continue home oral levothyroxine 200 mcg daily, kindly make sure tube feeds are held 2 hours before, 1 hour after levothyroxine dose       Roberto Samuel MD  Pager [phone]  Patient discussed with Dr. Banks

## 2024-01-11 NOTE — PROGRESS NOTES
Physical Therapy    Physical Therapy Treatment    Patient Name: Gopal Ness  MRN: [id number]  Today's Date: 1/11/2024  Time Calculation  Start Time: 0900  Stop Time: 0912  Time Calculation (min): 12 min       Assessment/Plan   PT Assessment  End of Session Communication: Bedside nurse  Assessment Comment: Pt continues to rmeain appropriate for high intensity PT upon D/C from hosptial.  End of Session Patient Position: Bed, 3 rail up, Alarm on  PT Plan  Inpatient/Swing Bed or Outpatient: Inpatient  PT Plan  Treatment/Interventions: Bed mobility, Transfer training, Gait training, Balance training, Neuromuscular re-education, Stair training, Neurodevelopmental intervention, Strengthening, Endurance training, Therapeutic exercise, Therapeutic activity, Home exercise program, Positioning, Postural re-education  PT Plan: Skilled PT  PT Frequency: 5 times per week  PT Discharge Recommendations: High intensity level of continued care  Equipment Recommended upon Discharge: Wheeled walker  PT Recommended Transfer Status: Assist x2  PT - OK to Discharge: Yes (PT evaluation has been completed and discharge recommendations have been made.)      General Visit Information:   PT  Visit  PT Received On: 01/11/24  General  Prior to Session Communication: Bedside nurse  Patient Position Received: Bed, 3 rail up, Alarm on  General Comment: pt lethagic, difficulty maintaining eyes open throughout session, pt answering orientation questions however unable to understand d/t gabaled speech.    Subjective   Precautions:  Precautions  Medical Precautions: Fall precautions  Precautions Comment: Droplet Precautions (Covid+)  Vital Signs:       Objective   Cognition:  Cognition  Orientation Level:  (Pt attempts to answer orientation questions however unable to understand answer d/t garbaled speech)    Activity Tolerance:  Activity Tolerance  Endurance: Tolerates less than 10 min exercise, no significant change in vital  signs  Treatments:  Therapeutic Activity  Therapeutic Activity Performed: Yes  Therapeutic Activity 1: PROM AP, HS x10 BLEs, encouraged pt to participate in LE movement, pt unable to maintain eyes open.    Bed Mobility  Bed Mobility: No (Attempts and encouragement provided for bed mobility, however pt shaking head no.)    Outcome Measures:  [facility] Basic Mobility  Turning from your back to your side while in a flat bed without using bedrails: A lot  Moving from lying on your back to sitting on the side of a flat bed without using bedrails: Total  Moving to and from bed to chair (including a wheelchair): Total  Standing up from a chair using your arms (e.g. wheelchair or bedside chair): Total  To walk in hospital room: Total  Climbing 3-5 steps with railing: Total  Basic Mobility - Total Score: 7    Education Documentation  Mobility Training, taught by Mallory Sommers PTA at 1/11/2024  9:29 AM.  Learner: Patient  Readiness: Nonacceptance  Method: Explanation  Response: Needs Reinforcement, No Evidence of Learning  Comment: Education provided on mobility and OOB activities with participation in PT.    Education Comments  No comments found.        OP EDUCATION:       Encounter Problems       Encounter Problems (Active)       Balance       Patient will stand with UE support of LRD and MOD A x1 at least 5 min to improve balance required for self-care tasks.  (Progressing)       Start:  01/04/24    Expected End:  01/07/24               Mobility       Patient will ambulate at least 10 ft. with </= MAX A x1 and LRD to improve tolerance of household distances.  (Progressing)       Start:  01/04/24    Expected End:  01/07/24            Patient will perform bed mobility with </= MIN A x1 to reduce risk of developing decubitus ulcers.  (Progressing)       Start:  01/04/24    Expected End:  01/07/24             LLE >/= 4+/5, RLE >/= 3+/5  (Progressing)       Start:  01/04/24    Expected End:  01/07/24               Pain - Adult           Transfers       Patient will perform sit to stand and stand to sit transfers with </= MAX A x1 and LRD to increase functional strength.  (Progressing)       Start:  01/04/24    Expected End:  01/07/24                 Mallory Sommers PTA  Rehab Office [phone]

## 2024-01-11 NOTE — PROGRESS NOTES
SW discussed with liaison at [facility] [place] regarding admission to the facility.   They are waiting for decision from PM&R  consult which is pending from initial consult. When PM&R re-evaluates pt, SW will update facility with recent clinicals.   SW and PCN will continue to follow and assist with discharge plan.     JOLANTA Willard, LSW

## 2024-01-11 NOTE — PROGRESS NOTES
Gopal Ness is a 73 y.o. male on day 9 of admission presenting with AMS (altered mental status).    Subjective   Patient was not seen given his COVID-positive status.  Chart was reviewed thoroughly.       Objective   Last Recorded Vitals  Blood pressure (!) 124/97, pulse 72, temperature 36.1 °C (97 °F), temperature source Temporal, resp. rate 14, weight 99.4 kg (219 lb 2.2 oz), SpO2 94 %.  Intake/Output last 3 Shifts:  I/O last 3 completed shifts:  In: 2218.1 (22.3 mL/kg) [I.V.:1077.1 (10.8 mL/kg); NG/GT:1141]  Out: 4350 (43.8 mL/kg) [Urine:4350 (1.2 mL/kg/hr)]  Weight: 99.4 kg     Scheduled medications  heparin (porcine), 5,000 Units, subcutaneous, q8h KENDRA  insulin glargine, 10 Units, subcutaneous, Nightly  insulin regular, 0-5 Units, subcutaneous, q6h  levothyroxine, 200 mcg, oral, Daily  metoprolol tartrate, 25 mg, nasogastric tube, BID  pantoprazole, 40 mg, oral, Daily before breakfast  polyethylene glycol, 17 g, oral, Daily  sennosides, 2 tablet, oral, BID  sertraline, 50 mg, oral, Daily      Continuous medications  dextrose 5 % in water (D5W), 125 mL/hr, Last Rate: 125 mL/hr (01/10/24 1330)      PRN medications  PRN medications: dextrose 10 % in water (D10W), dextrose, diclofenac sodium, glucagon, LORazepam, oxygen, phenoL     Relevant Results  Results from last 7 days   Lab Units 01/10/24  1841 01/10/24  0409   SODIUM mmol/L 148* 147*   POTASSIUM mmol/L 4.4 3.9   CHLORIDE mmol/L 111* 109*   CO2 mmol/L 28 30   BUN mg/dL 37* 41*   CREATININE mg/dL 0.99 1.07   CALCIUM mg/dL 8.2* 8.3*   PROTEIN TOTAL g/dL  --  5.0*   BILIRUBIN TOTAL mg/dL  --  0.4   ALK PHOS U/L  --  57   ALT U/L  --  30   AST U/L  --  28   GLUCOSE mg/dL 279* 184*     Results from last 7 days   Lab Units 01/10/24  2000 01/10/24  1841 01/10/24  1723 01/10/24  1300 01/10/24  0409 01/10/24  0023 01/09/24  2201 01/09/24  2013 01/09/24  1554 01/09/24  1233 01/09/24  0447 01/09/24  0038 01/08/24  1926 01/08/24  1749 01/08/24  1139 01/08/24  1049  01/07/24  0849 01/07/24  0816 01/07/24  0520 01/06/24  2330 01/06/24  2315 01/06/24  1733 01/06/24  1657 01/06/24  1324   POCT GLUCOSE mg/dL 285*  --  222* 159*  --  283*  --  283* 241* 147*  --  197*  --  206* 115*  --    < >  --    < >  --    < >  --    < >  --    GLUCOSE mg/dL  --  279*  --   --  184*  --  324*  --   --   --  194*  --  291*  --   --  109*  --  145*  --  199*  --  233*  --  184*    < > = values in this interval not displayed.       Assessment/Plan   Principal Problem:    AMS (altered mental status)    Gopal Ness is a 73 y.o. male with a h/o pituitary adenoma s/p transphenoidal resection at [facility] (2016) c/b hypopituitarism (adrenal insufficiency-resolved, hypogonadism, diabetes insipidis reportedly), hypothyroidism on levothyroxine);  DVT/PE on Eliquis, COPD (noncompliant with medications)/tobacco use, depresssion, DMII, HLD presenting after being found down unresponsive on 1/1/24 and subsequently transferred to [facility] on 1/2 with course complicated by intubation for airway protection (now extubated on intermittent BiPAP- HFNC?), concern for possible seizure activity (initially placed on keppra, now off), presumed aspiration pneumonia on antibiotics, hypotension initially on pressors- now resolved, and LEEANNE. Patient also found to have COVID.     Endocrinology consulted given history of pituitary adenoma resection and whether patient needed to be on steroid replacement therapy.      Patient with known history Cushing disease confirmed biochemically and with biopsy with prior recurrence. Now patient presenting with active Cushing disease again with high AM cortisol and ACTH likely 2/2 residual adenoma. 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 Notably NOT panhypopituitarism as prolactin level was 2.8 on 1/4 and some element of pituitary released hormone suppression may be from Cushing disease's effect on axis. High cortisol would suppress  GnRH. Per neurosurgery in 2023, additional surgery of residual is not warranted/feasible, and would not result in improvement of ophthalmoplegia. Residual on 2024 MRI appears grossly the same as residual on 2023 MRI. Unlikely to be cause of seizures. Patient was having persistent SVT, cardiology on board, etiology hypovolemia versus respiratory distress versus hypoxia. As of , no further episodes of SVT.         #Cushing disease  #Hypopituitarism  ::Patient with sellar mass dx  progressing to vision changes  with Cushing disease now s/p resection 10/2016 (pathology pituitary adenoma, ACTH cell-type, Ki-67 focally 12%) with MRI and biochemical recurrence of Cushing in 2018 now s/p 5220 cGy in 29 fx now with recent worsening ophthalmoplegia with repeat MRI 2023 thought to be likely unchanged from prior in size based on report (known mass effect on OC). Had been followed at [facility] by endocrinology (see consult note for additional endocrine history).   ::24 FSH <0.9, LH <0.1, prolactin 1.4L, Growth hormone <0.05, IGF1 31 (Z score -2.8), total testosterone 73, free testosterone 11.3  ::24 LH <0.1, prolactin 2.8,   ::24 ACTH 100.0H, 24 AM cortisol 67.6  ::Per chart review, did not start inpatient steroids until 1/3/2024  ::24 AM cortisol 58.2 confirms cushing disease as >10+ hours from prior steroid dose  ::Collected labs pendin/04 ACTH and I-GF      Recommendations:  -no further inpatient treatment of Cushing disease  -Follow up outpatient with endocrinologist Dr. Yvonne Lofton in 4 weeks (our coordinator will schedule this appointment closer to dc- please let our team know when patient will be discharged)  - Please note that the patient has significantly elevated endogenous cortisol/glucocorticoids levels.  Dexamethasone for COVID treatment would not have added benefit in this clinical setting.  Would recommend discontinuation.    #Hypernatremia:  ::2024: Urine sodium 66,  urine osm > 300, post IV fluids.  No concern for diabetes insipidus.  Patient was noted to be on free water flushes 200 every 6 hours as well as D5W.  - Deferring management of hypernatremia to primary team but please consider increasing free water flushes to 300 every 6 hours and supplementing his free water deficit by going up on his free water flushes rather than placing the patient on D5W as this may worsen his glycemic control (if at all possible, and no contraindications from the primary team's perspective)  - Also consider obtaining RFP's twice daily    #Diabetes  Started on dexamethasone on 1/6 for COVID treatment.  With an intended course for 10 days.  Patient is also on D5W as above.  ::HgbA1c 5.6 on 1/1/24  ::home metformin 1000 mg twice daily, glimeperide 4 mg twice daily      Recommendations:              -Increase glargine to 10 units subcutaneously in p.m.  -Increase sliding scale Regular insulin to #2 (2 units for every 50 above 150) every 6 hours while on tube feeds  -POCT glucose q6hrs while on tube feeds  -hypoglycemia protocol       #Hypothyroidism  ::1/4/24 TSH 0.02L, free T4 0.71L, free T3 1.8L  ::1/9/24 free T4 0.92     Recommendations:  -if not able to give po medication, give IV levothyroxine 100mcg daily   -Continue home oral levothyroxine 200 mcg daily, kindly make sure tube feeds are held 2 hours before, 1 hour after levothyroxine dose       Roberto Samuel MD  Pager [phone]  Patient discussed with Dr. Banks

## 2024-01-12 ENCOUNTER — APPOINTMENT (OUTPATIENT)
Dept: RADIOLOGY | Facility: HOSPITAL | Age: 74
DRG: 208 | End: 2024-01-12
Payer: MEDICARE

## 2024-01-12 ENCOUNTER — APPOINTMENT (OUTPATIENT)
Dept: CARDIOLOGY | Facility: HOSPITAL | Age: 74
DRG: 208 | End: 2024-01-12
Payer: MEDICARE

## 2024-01-12 LAB
ALBUMIN SERPL BCP-MCNC: 2.6 G/DL (ref 3.4–5)
ANION GAP SERPL CALC-SCNC: 11 MMOL/L (ref 10–20)
APPEARANCE UR: CLEAR
BASE EXCESS BLDA CALC-SCNC: 7.7 MMOL/L (ref -2–3)
BASOPHILS # BLD AUTO: 0.04 X10*3/UL (ref 0–0.1)
BASOPHILS NFR BLD AUTO: 0.5 %
BILIRUB UR STRIP.AUTO-MCNC: NEGATIVE MG/DL
BNP SERPL-MCNC: 77 PG/ML (ref 0–99)
BODY TEMPERATURE: 37 DEGREES CELSIUS
BUN SERPL-MCNC: 33 MG/DL (ref 6–23)
CALCIUM SERPL-MCNC: 8.9 MG/DL (ref 8.6–10.6)
CHLORIDE SERPL-SCNC: 108 MMOL/L (ref 98–107)
CHLORIDE UR-SCNC: 19 MMOL/L
CHLORIDE/CREATININE (MMOL/G) IN URINE: 56 MMOL/G CREAT (ref 23–275)
CO2 SERPL-SCNC: 31 MMOL/L (ref 21–32)
COLOR UR: YELLOW
CREAT SERPL-MCNC: 1.03 MG/DL (ref 0.5–1.3)
CREAT UR-MCNC: 33.8 MG/DL (ref 20–370)
CREAT UR-MCNC: 33.8 MG/DL (ref 20–370)
EGFRCR SERPLBLD CKD-EPI 2021: 77 ML/MIN/1.73M*2
EOSINOPHIL # BLD AUTO: 0.43 X10*3/UL (ref 0–0.4)
EOSINOPHIL NFR BLD AUTO: 5.1 %
ERYTHROCYTE [DISTWIDTH] IN BLOOD BY AUTOMATED COUNT: 13.2 % (ref 11.5–14.5)
GLUCOSE BLD MANUAL STRIP-MCNC: 152 MG/DL (ref 74–99)
GLUCOSE BLD MANUAL STRIP-MCNC: 171 MG/DL (ref 74–99)
GLUCOSE BLD MANUAL STRIP-MCNC: 196 MG/DL (ref 74–99)
GLUCOSE BLD MANUAL STRIP-MCNC: 222 MG/DL (ref 74–99)
GLUCOSE BLD MANUAL STRIP-MCNC: 242 MG/DL (ref 74–99)
GLUCOSE SERPL-MCNC: 162 MG/DL (ref 74–99)
GLUCOSE UR STRIP.AUTO-MCNC: NEGATIVE MG/DL
HCO3 BLDA-SCNC: 32.7 MMOL/L (ref 22–26)
HCT VFR BLD AUTO: 44.5 % (ref 41–52)
HGB BLD-MCNC: 14.4 G/DL (ref 13.5–17.5)
HYALINE CASTS #/AREA URNS AUTO: ABNORMAL /LPF
IMM GRANULOCYTES # BLD AUTO: 0.17 X10*3/UL (ref 0–0.5)
IMM GRANULOCYTES NFR BLD AUTO: 2 % (ref 0–0.9)
INHALED O2 CONCENTRATION: 36 %
KETONES UR STRIP.AUTO-MCNC: NEGATIVE MG/DL
LEUKOCYTE ESTERASE UR QL STRIP.AUTO: ABNORMAL
LYMPHOCYTES # BLD AUTO: 0.7 X10*3/UL (ref 0.8–3)
LYMPHOCYTES NFR BLD AUTO: 8.4 %
MAGNESIUM SERPL-MCNC: 2.15 MG/DL (ref 1.6–2.4)
MCH RBC QN AUTO: 31.3 PG (ref 26–34)
MCHC RBC AUTO-ENTMCNC: 32.4 G/DL (ref 32–36)
MCV RBC AUTO: 97 FL (ref 80–100)
MONOCYTES # BLD AUTO: 0.34 X10*3/UL (ref 0.05–0.8)
MONOCYTES NFR BLD AUTO: 4.1 %
MUCOUS THREADS #/AREA URNS AUTO: ABNORMAL /LPF
NEUTROPHILS # BLD AUTO: 6.68 X10*3/UL (ref 1.6–5.5)
NEUTROPHILS NFR BLD AUTO: 79.9 %
NITRITE UR QL STRIP.AUTO: NEGATIVE
NRBC BLD-RTO: 0 /100 WBCS (ref 0–0)
OSMOLALITY UR: 241 MOSM/KG (ref 200–1200)
OXYHGB MFR BLDA: 90.8 % (ref 94–98)
PCO2 BLDA: 46 MM HG (ref 38–42)
PH BLDA: 7.46 PH (ref 7.38–7.42)
PH UR STRIP.AUTO: 6 [PH]
PHOSPHATE SERPL-MCNC: 2.7 MG/DL (ref 2.5–4.9)
PLATELET # BLD AUTO: 115 X10*3/UL (ref 150–450)
PO2 BLDA: 66 MM HG (ref 85–95)
POTASSIUM SERPL-SCNC: 3.7 MMOL/L (ref 3.5–5.3)
POTASSIUM UR-SCNC: 10 MMOL/L
POTASSIUM/CREAT UR-RTO: 30 MMOL/G CREAT
PROT UR STRIP.AUTO-MCNC: NEGATIVE MG/DL
PROT UR-ACNC: 13 MG/DL (ref 5–25)
PROT/CREAT UR: 0.38 MG/MG CREAT (ref 0–0.17)
RBC # BLD AUTO: 4.6 X10*6/UL (ref 4.5–5.9)
RBC # UR STRIP.AUTO: ABNORMAL /UL
RBC #/AREA URNS AUTO: ABNORMAL /HPF
SAO2 % BLDA: 92 % (ref 94–100)
SODIUM SERPL-SCNC: 146 MMOL/L (ref 136–145)
SODIUM UR-SCNC: 17 MMOL/L
SODIUM/CREAT UR-RTO: 50 MMOL/G CREAT
SP GR UR STRIP.AUTO: 1.01
T4 FREE SERPL-MCNC: 1.04 NG/DL (ref 0.78–1.48)
TSH SERPL-ACNC: <0.01 MIU/L (ref 0.44–3.98)
UROBILINOGEN UR STRIP.AUTO-MCNC: <2 MG/DL
WBC # BLD AUTO: 8.4 X10*3/UL (ref 4.4–11.3)
WBC #/AREA URNS AUTO: ABNORMAL /HPF

## 2024-01-12 PROCEDURE — C9113 INJ PANTOPRAZOLE SODIUM, VIA: HCPCS

## 2024-01-12 PROCEDURE — 2500000001 HC RX 250 WO HCPCS SELF ADMINISTERED DRUGS (ALT 637 FOR MEDICARE OP): Performed by: STUDENT IN AN ORGANIZED HEALTH CARE EDUCATION/TRAINING PROGRAM

## 2024-01-12 PROCEDURE — 93005 ELECTROCARDIOGRAM TRACING: CPT

## 2024-01-12 PROCEDURE — 85025 COMPLETE CBC W/AUTO DIFF WBC: CPT

## 2024-01-12 PROCEDURE — 83880 ASSAY OF NATRIURETIC PEPTIDE: CPT | Performed by: INTERNAL MEDICINE

## 2024-01-12 PROCEDURE — 82947 ASSAY GLUCOSE BLOOD QUANT: CPT

## 2024-01-12 PROCEDURE — 99232 SBSQ HOSP IP/OBS MODERATE 35: CPT

## 2024-01-12 PROCEDURE — 84439 ASSAY OF FREE THYROXINE: CPT | Performed by: INTERNAL MEDICINE

## 2024-01-12 PROCEDURE — 83935 ASSAY OF URINE OSMOLALITY: CPT | Performed by: INTERNAL MEDICINE

## 2024-01-12 PROCEDURE — 83735 ASSAY OF MAGNESIUM: CPT

## 2024-01-12 PROCEDURE — 80069 RENAL FUNCTION PANEL: CPT

## 2024-01-12 PROCEDURE — 1200000002 HC GENERAL ROOM WITH TELEMETRY DAILY

## 2024-01-12 PROCEDURE — 36415 COLL VENOUS BLD VENIPUNCTURE: CPT

## 2024-01-12 PROCEDURE — 84156 ASSAY OF PROTEIN URINE: CPT | Performed by: INTERNAL MEDICINE

## 2024-01-12 PROCEDURE — 93010 ELECTROCARDIOGRAM REPORT: CPT | Performed by: INTERNAL MEDICINE

## 2024-01-12 PROCEDURE — 82436 ASSAY OF URINE CHLORIDE: CPT | Performed by: INTERNAL MEDICINE

## 2024-01-12 PROCEDURE — 2500000004 HC RX 250 GENERAL PHARMACY W/ HCPCS (ALT 636 FOR OP/ED): Performed by: STUDENT IN AN ORGANIZED HEALTH CARE EDUCATION/TRAINING PROGRAM

## 2024-01-12 PROCEDURE — 2500000001 HC RX 250 WO HCPCS SELF ADMINISTERED DRUGS (ALT 637 FOR MEDICARE OP)

## 2024-01-12 PROCEDURE — 2500000004 HC RX 250 GENERAL PHARMACY W/ HCPCS (ALT 636 FOR OP/ED)

## 2024-01-12 PROCEDURE — 71045 X-RAY EXAM CHEST 1 VIEW: CPT | Performed by: RADIOLOGY

## 2024-01-12 PROCEDURE — 84443 ASSAY THYROID STIM HORMONE: CPT | Performed by: INTERNAL MEDICINE

## 2024-01-12 PROCEDURE — 71045 X-RAY EXAM CHEST 1 VIEW: CPT

## 2024-01-12 PROCEDURE — 81001 URINALYSIS AUTO W/SCOPE: CPT | Performed by: INTERNAL MEDICINE

## 2024-01-12 PROCEDURE — 82805 BLOOD GASES W/O2 SATURATION: CPT

## 2024-01-12 RX ORDER — SERTRALINE HYDROCHLORIDE 50 MG/1
50 TABLET, FILM COATED ORAL DAILY
Status: DISCONTINUED | OUTPATIENT
Start: 2024-01-13 | End: 2024-01-31 | Stop reason: HOSPADM

## 2024-01-12 RX ORDER — INSULIN GLARGINE 100 [IU]/ML
8 INJECTION, SOLUTION SUBCUTANEOUS NIGHTLY
Status: DISCONTINUED | OUTPATIENT
Start: 2024-01-12 | End: 2024-01-13

## 2024-01-12 RX ORDER — FUROSEMIDE 10 MG/ML
40 INJECTION INTRAMUSCULAR; INTRAVENOUS ONCE
Status: COMPLETED | OUTPATIENT
Start: 2024-01-12 | End: 2024-01-12

## 2024-01-12 RX ADMIN — METOPROLOL TARTRATE 25 MG: 25 TABLET, FILM COATED ORAL at 10:36

## 2024-01-12 RX ADMIN — HEPARIN SODIUM 5000 UNITS: 5000 INJECTION INTRAVENOUS; SUBCUTANEOUS at 07:09

## 2024-01-12 RX ADMIN — LEVOTHYROXINE SODIUM 200 MCG: 75 TABLET ORAL at 07:09

## 2024-01-12 RX ADMIN — PANTOPRAZOLE SODIUM 40 MG: 40 INJECTION, POWDER, FOR SOLUTION INTRAVENOUS at 11:07

## 2024-01-12 RX ADMIN — INSULIN HUMAN 1 UNITS: 100 INJECTION, SOLUTION PARENTERAL at 13:27

## 2024-01-12 RX ADMIN — INSULIN HUMAN 2 UNITS: 100 INJECTION, SOLUTION PARENTERAL at 19:01

## 2024-01-12 RX ADMIN — INSULIN GLARGINE 8 UNITS: 100 INJECTION, SOLUTION SUBCUTANEOUS at 21:58

## 2024-01-12 RX ADMIN — FUROSEMIDE 40 MG: 10 INJECTION, SOLUTION INTRAVENOUS at 14:23

## 2024-01-12 RX ADMIN — STANDARDIZED SENNA CONCENTRATE 17.2 MG: 8.6 TABLET ORAL at 21:59

## 2024-01-12 RX ADMIN — INSULIN HUMAN 2 UNITS: 100 INJECTION, SOLUTION PARENTERAL at 03:19

## 2024-01-12 RX ADMIN — METOPROLOL TARTRATE 25 MG: 25 TABLET, FILM COATED ORAL at 21:59

## 2024-01-12 RX ADMIN — HEPARIN SODIUM 5000 UNITS: 5000 INJECTION INTRAVENOUS; SUBCUTANEOUS at 21:59

## 2024-01-12 RX ADMIN — HEPARIN SODIUM 5000 UNITS: 5000 INJECTION INTRAVENOUS; SUBCUTANEOUS at 14:23

## 2024-01-12 RX ADMIN — INSULIN HUMAN 1 UNITS: 100 INJECTION, SOLUTION PARENTERAL at 07:09

## 2024-01-12 ASSESSMENT — COGNITIVE AND FUNCTIONAL STATUS - GENERAL
CLIMB 3 TO 5 STEPS WITH RAILING: TOTAL
EATING MEALS: TOTAL
WALKING IN HOSPITAL ROOM: TOTAL
STANDING UP FROM CHAIR USING ARMS: TOTAL
STANDING UP FROM CHAIR USING ARMS: TOTAL
DRESSING REGULAR LOWER BODY CLOTHING: TOTAL
PERSONAL GROOMING: TOTAL
MOBILITY SCORE: 7
DAILY ACTIVITIY SCORE: 6
TURNING FROM BACK TO SIDE WHILE IN FLAT BAD: TOTAL
PERSONAL GROOMING: TOTAL
MOVING TO AND FROM BED TO CHAIR: TOTAL
TOILETING: TOTAL
CLIMB 3 TO 5 STEPS WITH RAILING: TOTAL
HELP NEEDED FOR BATHING: TOTAL
TURNING FROM BACK TO SIDE WHILE IN FLAT BAD: TOTAL
DRESSING REGULAR LOWER BODY CLOTHING: TOTAL
TOILETING: TOTAL
DAILY ACTIVITIY SCORE: 6
HELP NEEDED FOR BATHING: TOTAL
MOVING FROM LYING ON BACK TO SITTING ON SIDE OF FLAT BED WITH BEDRAILS: A LOT
MOBILITY SCORE: 7
EATING MEALS: TOTAL
WALKING IN HOSPITAL ROOM: TOTAL
DRESSING REGULAR UPPER BODY CLOTHING: TOTAL
MOVING FROM LYING ON BACK TO SITTING ON SIDE OF FLAT BED WITH BEDRAILS: A LOT
MOVING TO AND FROM BED TO CHAIR: TOTAL
DRESSING REGULAR UPPER BODY CLOTHING: TOTAL

## 2024-01-12 ASSESSMENT — PAIN SCALES - GENERAL
PAINLEVEL_OUTOF10: 0 - NO PAIN

## 2024-01-12 ASSESSMENT — PAIN - FUNCTIONAL ASSESSMENT
PAIN_FUNCTIONAL_ASSESSMENT: 0-10
PAIN_FUNCTIONAL_ASSESSMENT: 0-10

## 2024-01-12 NOTE — PROGRESS NOTES
Physical Therapy                 Therapy Communication Note    Patient Name: Gopal Ness  MRN: [id number]  Today's Date: 1/12/2024     Discipline: Physical Therapy    Missed Visit Reason: Missed Visit Reason: Other (Comment) (pt responsive to noxious stimuli but does not follow commands this date. RN in room and unaware. Will re-attempt as schedule permits and pt appropriate for PT.)    Missed Time: Attempt    01/12/24 at 11:30 AM - Cinthya Eng, PT

## 2024-01-12 NOTE — PROGRESS NOTES
Occupational Therapy                 Therapy Communication Note    Patient Name: Gopal Ness  MRN: [id number]  Today's Date: 1/12/2024     Discipline: Occupational Therapy    Missed Visit Reason: Missed Visit Reason: Other (Comment) (Pt responsive to noxious stimuli only, decreased command follow, not appropriate for therapy at this time. Will reattempt as pt appropriate.)    Missed Time: Attempt

## 2024-01-12 NOTE — SIGNIFICANT EVENT
01/12/24 1025   Onset Documentation   Rapid Response Initiated By Radar auto page   Location/Room [facility]   Pager Time 1025   Arrival Time 1035   Event End Time 1045   Level II Called No   Primary Reason for Call Radar auto page     Rapid Response Note    Radar auto-page received for a radar score of 6 with the following vital signs: 36.6, 91, 20, 121/68, 89%.  Vital signs were confirmed and reviewed with primary RN who has no concerns at this time.  Goal SpO2 is 88-92%.  RN advised to contact Rapid Response with any future concerns or signs of clinical deterioration.

## 2024-01-12 NOTE — PROGRESS NOTES
"Nutrition Note:   Nutrition Assessment    Reason for Assessment: Provider consult order (Consult via secure chat for nutrition support review in regards to water, sodium and protein)    Patient is a 73 y.o. male presenting with: after being found down i/s/o fall on 12/31     h/o pituitary adenoma s/p transphenoidal resection at [facility] (2016) c/b hypopituitarism (adrenal insufficiency-resolved, hypogonadism, diabetes insipidis reportedly), hypothyroidism on levothyroxine);  DVT/PE on Eliquis, COPD (noncompliant with medications)/tobacco use, depresssion, DMII, HLD     Nutrition History:  Food and Nutrient History: Current nutrition support order Nepro @ 55ml/hr (held 1hr before and after Synthroid) provides 2178kcal, 98g protien/1.2g/kg/IBW, 880ml H20, 1270mg sodium.  Water flushes @ 400ml QID= 1600ml H20/d.  Total water = 2480ml/H20/d or 30ml/kg/IBW.      Anthropometrics:  Height: 182.9 cm (6' 0.01\")   Weight: 106 kg (234 lb 5.6 oz)   BMI (Calculated): 31.78  IBW/kg (Dietitian Calculated): 80.9 kg  Percent of IBW: 122 %       Nutrition Significant Labs:  Lab Results   Component Value Date    GLUCOSE 162 (H) 01/12/2024    CALCIUM 8.9 01/12/2024     (H) 01/12/2024    K 3.7 01/12/2024    CO2 31 01/12/2024     (H) 01/12/2024    BUN 33 (H) 01/12/2024    CREATININE 1.03 01/12/2024      Lab Results   Component Value Date    CALCIUM 8.9 01/12/2024    PHOS 2.7 01/12/2024        Nutrition Specific Medications:  Scheduled medications  heparin (porcine), 5,000 Units, subcutaneous, q8h KENDRA  insulin glargine, 10 Units, subcutaneous, Nightly  insulin regular, 0-5 Units, subcutaneous, q6h  levothyroxine, 200 mcg, oral, Daily  metoprolol tartrate, 25 mg, nasogastric tube, BID  pantoprazole, 40 mg, intravenous, Daily  polyethylene glycol, 17 g, oral, Daily  sennosides, 2 tablet, oral, BID  sertraline, 50 mg, oral, Daily      I/O:   Last BM Date: 01/12/24; Stool Appearance: Loose (01/12/24 0402)        Dietary Orders (From " admission, onward)       Start     Ordered    01/12/24 0043  Enteral feeding with NPO Nepro; NG (nasogastric tube); 55 (start at 10ml/hr. increase by 10ml q8hrs until at goal of 50ml/hr); 400; Water; Every 6 hours  Diet effective now        Question Answer Comment   Tube feeding formula: Nepro    Feeding route: NG (nasogastric tube)    Tube feeding continuous rate (mL/hr): 55 start at 10ml/hr. increase by 10ml q8hrs until at goal of 50ml/hr   Tube feeding flush (mL): 400    Flush type: Water    Flush frequency: Every 6 hours        01/12/24 0044                    Nutrition Interventions/Recommendations         Nutrition Prescription:  Individualized Nutrition Prescription Provided for : Recommend continue nutrition support and water flushes as ordered for now.           Time Spent/Follow-up Reminder:   Time Spent (min): 30 minutes  Last Date of Nutrition Visit: 01/12/24  Nutrition Follow-Up Needed?: Dietitian to reassess per policy    Addendum: Provider requests cycled TF order to be off at night for CPAP.  TF order adjusted to 80ml/hr x 15hrs from 7a to 10p

## 2024-01-12 NOTE — PROGRESS NOTES
Subjective   NAEO- awakened to voice, will respond to questions initially. States his name, he's in the hospital. Remembers that his daughter visits him frequently. Briefly required 4L NC in the AM - now currently weaned to 3L NC. CXR was concerning for increased pulmonary edema.     Objective     Vitals:  Vitals:    01/12/24 1211   BP: 122/68   Pulse: 85   Resp: 22   Temp: 36.6 °C (97.9 °F)   SpO2: (!) 89%       I/O last 3 completed shifts:  In: 5504 (51.8 mL/kg) [I.V.:1150 (10.8 mL/kg); NG/GT:4354]  Out: 3950 (37.2 mL/kg) [Urine:3950 (1 mL/kg/hr)]  Weight: 106.3 kg   I/O this shift:  In: -   Out: 1100 [Urine:1100]    Physical exam:  Constitutional: Well-developed male in no acute distress.  HEENT: Normocephalic, atraumatic. PERRL. EOMI.   Respiratory: On 3L NC  Cardiovascular: RRR, on Tele  Abdominal: Soft, nondistended, nontender to palpation. No hepatosplenomegaly or masses.   Neuro: will open eyes to voice, Aox2, spontaneously moves all extremities  Skin: scattered echymoses  Psych: Appropriate mood and affect.    Medications:  heparin (porcine), 5,000 Units, subcutaneous, q8h KENDRA  insulin glargine, 10 Units, subcutaneous, Nightly  insulin regular, 0-5 Units, subcutaneous, q6h  levothyroxine, 200 mcg, oral, Daily  metoprolol tartrate, 25 mg, nasogastric tube, BID  pantoprazole, 40 mg, intravenous, Daily  polyethylene glycol, 17 g, oral, Daily  sennosides, 2 tablet, oral, BID  [START ON 1/13/2024] sertraline, 50 mg, nasogastric tube, Daily             PRN medications: dextrose 10 % in water (D10W), dextrose, diclofenac sodium, glucagon, LORazepam, oxygen, phenoL    Labs:  Results for orders placed or performed during the hospital encounter of 01/01/24 (from the past 24 hour(s))   Renal function panel   Result Value Ref Range    Glucose 193 (H) 74 - 99 mg/dL    Sodium 145 136 - 145 mmol/L    Potassium 4.1 3.5 - 5.3 mmol/L    Chloride 107 98 - 107 mmol/L    Bicarbonate 30 21 - 32 mmol/L    Anion Gap 12 mmol/L     Urea Nitrogen 33 (H) 6 - 23 mg/dL    Creatinine 0.94 0.50 - 1.30 mg/dL    eGFR 86 >60 mL/min/1.73m*2    Calcium 8.6 8.6 - 10.6 mg/dL    Phosphorus 3.1 2.5 - 4.9 mg/dL    Albumin 2.7 (L) 3.4 - 5.0 g/dL   POCT GLUCOSE   Result Value Ref Range    POCT Glucose 184 (H) 74 - 99 mg/dL   POCT GLUCOSE   Result Value Ref Range    POCT Glucose 217 (H) 74 - 99 mg/dL   POCT GLUCOSE   Result Value Ref Range    POCT Glucose 222 (H) 74 - 99 mg/dL   CBC and Auto Differential   Result Value Ref Range    WBC 8.4 4.4 - 11.3 x10*3/uL    nRBC 0.0 0.0 - 0.0 /100 WBCs    RBC 4.60 4.50 - 5.90 x10*6/uL    Hemoglobin 14.4 13.5 - 17.5 g/dL    Hematocrit 44.5 41.0 - 52.0 %    MCV 97 80 - 100 fL    MCH 31.3 26.0 - 34.0 pg    MCHC 32.4 32.0 - 36.0 g/dL    RDW 13.2 11.5 - 14.5 %    Platelets 115 (L) 150 - 450 x10*3/uL    Neutrophils % 79.9 40.0 - 80.0 %    Immature Granulocytes %, Automated 2.0 (H) 0.0 - 0.9 %    Lymphocytes % 8.4 13.0 - 44.0 %    Monocytes % 4.1 2.0 - 10.0 %    Eosinophils % 5.1 0.0 - 6.0 %    Basophils % 0.5 0.0 - 2.0 %    Neutrophils Absolute 6.68 (H) 1.60 - 5.50 x10*3/uL    Immature Granulocytes Absolute, Automated 0.17 0.00 - 0.50 x10*3/uL    Lymphocytes Absolute 0.70 (L) 0.80 - 3.00 x10*3/uL    Monocytes Absolute 0.34 0.05 - 0.80 x10*3/uL    Eosinophils Absolute 0.43 (H) 0.00 - 0.40 x10*3/uL    Basophils Absolute 0.04 0.00 - 0.10 x10*3/uL   Renal function panel   Result Value Ref Range    Glucose 162 (H) 74 - 99 mg/dL    Sodium 146 (H) 136 - 145 mmol/L    Potassium 3.7 3.5 - 5.3 mmol/L    Chloride 108 (H) 98 - 107 mmol/L    Bicarbonate 31 21 - 32 mmol/L    Anion Gap 11 10 - 20 mmol/L    Urea Nitrogen 33 (H) 6 - 23 mg/dL    Creatinine 1.03 0.50 - 1.30 mg/dL    eGFR 77 >60 mL/min/1.73m*2    Calcium 8.9 8.6 - 10.6 mg/dL    Phosphorus 2.7 2.5 - 4.9 mg/dL    Albumin 2.6 (L) 3.4 - 5.0 g/dL   Magnesium   Result Value Ref Range    Magnesium 2.15 1.60 - 2.40 mg/dL   B-type natriuretic peptide   Result Value Ref Range    BNP 77 0 - 99  pg/mL   TSH with reflex to Free T4 if abnormal   Result Value Ref Range    Thyroid Stimulating Hormone <0.01 (L) 0.44 - 3.98 mIU/L   Thyroxine, Free   Result Value Ref Range    Thyroxine, Free 1.04 0.78 - 1.48 ng/dL   POCT GLUCOSE   Result Value Ref Range    POCT Glucose 171 (H) 74 - 99 mg/dL   ECG 12 lead   Result Value Ref Range    Ventricular Rate 91 BPM    Atrial Rate 91 BPM    MA Interval 180 ms    QRS Duration 70 ms    QT Interval 322 ms    QTC Calculation(Bazett) 396 ms    P Axis 74 degrees    R Axis 17 degrees    T Axis 59 degrees    QRS Count 15 beats    Q Onset 227 ms    P Onset 137 ms    P Offset 186 ms    T Offset 388 ms    QTC Fredericia 370 ms   Urinalysis with Reflex Microscopic   Result Value Ref Range    Color, Urine Yellow Straw, Yellow    Appearance, Urine Clear Clear    Specific Gravity, Urine 1.006 1.005 - 1.035    pH, Urine 6.0 5.0, 5.5, 6.0, 6.5, 7.0, 7.5, 8.0    Protein, Urine NEGATIVE NEGATIVE mg/dL    Glucose, Urine NEGATIVE NEGATIVE mg/dL    Blood, Urine LARGE (3+) (A) NEGATIVE    Ketones, Urine NEGATIVE NEGATIVE mg/dL    Bilirubin, Urine NEGATIVE NEGATIVE    Urobilinogen, Urine <2.0 <2.0 mg/dL    Nitrite, Urine NEGATIVE NEGATIVE    Leukocyte Esterase, Urine TRACE (A) NEGATIVE   Urine electrolytes   Result Value Ref Range    Sodium, Urine Random 17 mmol/L    Sodium/Creatinine Ratio 50 Not established. mmol/g Creat    Potassium, Urine Random 10 mmol/L    Potassium/Creatinine Ratio 30 Not established mmol/g Creat    Chloride, Urine Random 19 mmol/L    Chloride/Creatinine Ratio 56 23 - 275 mmol/g creat    Creatinine, Urine Random 33.8 20.0 - 370.0 mg/dL   Protein, Urine Random   Result Value Ref Range    Total Protein, Urine Random 13 5 - 25 mg/dL    Creatinine, Urine Random 33.8 20.0 - 370.0 mg/dL    T. Protein/Creatinine Ratio 0.38 (H) 0.00 - 0.17 mg/mg Creat   Osmolality, urine   Result Value Ref Range    Osmolality, Urine Random 241 200 - 1,200 mOsm/kg   Microscopic Only, Urine   Result  Value Ref Range    WBC, Urine 1-5 1-5, NONE /HPF    RBC, Urine 1-2 NONE, 1-2, 3-5 /HPF    Mucus, Urine 1+ Reference range not established. /LPF    Hyaline Casts, Urine OCCASIONAL (A) NONE /LPF   POCT GLUCOSE   Result Value Ref Range    POCT Glucose 152 (H) 74 - 99 mg/dL       Imaging:  MR cervical spine wo IV contrast    Result Date: 1/9/2024  Interpreted By:  Joey Rizzo, STUDY: MR CERVICAL SPINE WO IV CONTRAST;  1/8/2024 10:18 pm   INDICATION: Signs/Symptoms:R arm weakness.   COMPARISON: None.   ACCESSION NUMBER(S): [id number]   ORDERING CLINICIAN: DIEGO ABARCA   TECHNIQUE: The cervical spine was studied in the sagittal and axial planes utilizing T1 and T2 weighted images. Examination is limited due to motion artifact   FINDINGS: The craniovertebral junction is normal. The cord is normal in size and signal. The marrow signal is normal. Serial axial images reveal the following: C2/C3 There is normal alignment and vertebral body height. The disc space is normal. There is no evidence of canal or foraminal narrowing. There is no evidence of bulging or herniated disc. C3/C4 There is normal alignment and vertebral body height. The disc space is normal. There is no evidence of canal or foraminal narrowing. There is no evidence of bulging or herniated disc. C4/C5 Right greater than left uncovertebral joint hypertrophy with focal right-sided foraminal narrowing. No measurable canal stenosis. C5/C6 Right greater than left uncovertebral joint hypertrophy without canal stenosis. Right-sided foraminal narrowing C6/C7 Bilateral facet hypertrophy without canal or foraminal narrowing C7/T1 Bilateral facet hypertrophy without canal or foraminal narrowing       * Right-sided foraminal narrowing due to uncovertebral joint hypertrophy at C4/C5 and C5/C6 *No measurable canal stenosis or cord compression.   MACRO: none   Signed by: Joey Rizzo 1/9/2024 9:02 AM Dictation workstation:   [id number]       Assessment/Plan    Mr. Gopal Ness is a 74 y/o man with h/o pituitary adenoma s/p resection at [facility], DVT/PE on Eliquis, COPD (noncompliant with medications)/tobacco use, depresssion, DMII, HLD presenting after being found down i/s/o fall on 12/31 (LKN 1100 on 12/31). Patient's children checked on him after his fall on 12/31 and he was reportedly okay, however was found down and unresponsive the following morning with emesis around him. Brought to OSH, negative CTH, intubated for airway protection. EMS reported witnessing possible seizure activity(?). Also with persistent tachycardia, leukocytosis and infiltrate on chest CT, so treating presumed aspiration pneumonia with antibiotics. Started on Keppra 1g BID and transferred to [facility] for cvEEG and further monitoring. Extubated 1/2. cvEEG did not demonstrate any seizures. Course complicated by covid pnuemonia, LEEANNE, SVT likely in the setting of infection, and RUE weakness in the setting of a fall.      Updates 1/12:  - Increased Oxygen to 4L this AM now down to 3L, CXR was concerning for edema. Will give IV lasix 40 x1 today.    - Consult pulmonology on floors for CPAP tomorrow  - Wean oxygen as tolerated, goal 88-92%  - Consider restarting eliquis if indicated  - Glargine decreased to 8u  - Trial void     #Acute hypoxic respiratory failure  #COVID pneumonia, CAP  #COPD  - Maintain O2- 88-92%, currently on 4L HFNC  - Cpap at night  - Elevated CRP, LDH, Ferritin, d-dimer  - Sputum culture - Strep pneumoniae  - s/p 1x cefepime 2g on 1/1, meropenem (1/1-1/2), Vanc 1.5g (1/1-1/2) then ceftriaxone 2g (1/2- 1/8)   - Started remdesivir (1/6-1/10), dexamethasone 6mg daily (stopped 1/10)  - Will give IV lasix 40mg today given concern for increased O2 requirements and pulmonary edema    #Hypernatremia   #LEEANNE (baseline Cr 0.8-0.9) - resolved  #Hx of DI   - Hold home tamsulosin 0.4mg daily while NPO  - Hold further D5W, continue with 300ml q6h FWF  - RFP BID  - Will trial void today     #SVT  -  Several episodes of SVT, terminated spontaneously, some associated with hypotension down to 80/50s.  - Consulted cardiology, pending recs.         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 New small dural based enhancing masses in the R posterior fossa  - MRI Brain and MRI Sella - residual adenoma unchanged from prior examination and similar to slightly increased size of small dural-based enhancing masses of small dural-based enhancing masses within the R posterior fossa favored to represent meningiomas.  - EEG - mod diffuse encephalopathy. Improvement in degree of encephalopathy compared to the previous day  - Keppra 1000mg BID discontinued  - MRI cervical spine showed right-sided foraminal narrowing d/t uncovertebral joint hypertrophy at C4/C5 and C5/C6  Plan:  - Q2h neuro checks  - Hold home trazadone 50mg at bedtime  - Continue home sertraline 50mg daily when taking PO  - Outpatient neurosurgery/neuro-ophthalmology follow up ordered  - EMG as an outpatient (1/22/23 earliest available), follow up with neurology in 4 weeks     #panhypothyroidism s/p adenoma resection  #Cushing disease  #diabetes mellitus  - Holding home glimepiride 4mg BID, metformin 1000mg BID  - A1C 5.6  - AM free T4 0.92  Plan:  - Mild corrective SSI #1  - POCT glucose q6hrs while on  tube feeds, Hypoglycemia protocol  - Continue home levothyroxine 200mcg daily  - Glargine 8u nightly  - follow up outpatient with endocrinologist Dr. Yvonne Lofton in 4 weeks (scheduled by endocrine)     #hx of DVT/PE  - H/H: 16.9/51 -> 14.3/45.8  - PLT: 148 -> 133 -> 98 > 100  - 4T score: 3, low probability <5%  - Daily CBC    - Hold home Eliquis at this time pending stabilization     F: LR, D5W  E: Replete PRN  N: Nepro  A: PIVs  Lomeli: Trial void  GI ppx: PPI  DVT ppx: SQH     Full Code  NOK/Surrogate: Donya Emerson (Daughter) [phone]      Patient and plan discussed with Dr. Lane

## 2024-01-12 NOTE — CARE PLAN
The patient's goals for the shift include    Problem: Diabetes  Goal: Maintain glucose levels >70mg/dl to <250mg/dl throughout shift  Outcome: Progressing     Problem: Fall/Injury  Goal: Be free from injury by end of the shift  Outcome: Progressing     Problem: Skin  Goal: Promote skin healing  Outcome: Progressing     Problem: Pain - Adult  Goal: Verbalizes/displays adequate comfort level or baseline comfort level  Outcome: Progressing       The clinical goals for the shift include tolerate Q2 turning schedule, keep skin clean and dry, provide oral care and mondragon care.

## 2024-01-13 LAB
ALBUMIN SERPL BCP-MCNC: 2.7 G/DL (ref 3.4–5)
ANION GAP SERPL CALC-SCNC: 10 MMOL/L (ref 10–20)
BASOPHILS # BLD AUTO: 0.04 X10*3/UL (ref 0–0.1)
BASOPHILS NFR BLD AUTO: 0.5 %
BNP SERPL-MCNC: 119 PG/ML (ref 0–99)
BUN SERPL-MCNC: 36 MG/DL (ref 6–23)
CALCIUM SERPL-MCNC: 8.7 MG/DL (ref 8.6–10.6)
CHLORIDE SERPL-SCNC: 107 MMOL/L (ref 98–107)
CO2 SERPL-SCNC: 34 MMOL/L (ref 21–32)
CORTIS AM PEAK SERPL-MSCNC: 32.9 UG/DL (ref 5–20)
CREAT SERPL-MCNC: 1.24 MG/DL (ref 0.5–1.3)
EGFRCR SERPLBLD CKD-EPI 2021: 62 ML/MIN/1.73M*2
EOSINOPHIL # BLD AUTO: 0.32 X10*3/UL (ref 0–0.4)
EOSINOPHIL NFR BLD AUTO: 3.7 %
ERYTHROCYTE [DISTWIDTH] IN BLOOD BY AUTOMATED COUNT: 13.2 % (ref 11.5–14.5)
GLUCOSE BLD MANUAL STRIP-MCNC: 150 MG/DL (ref 74–99)
GLUCOSE BLD MANUAL STRIP-MCNC: 152 MG/DL (ref 74–99)
GLUCOSE BLD MANUAL STRIP-MCNC: 153 MG/DL (ref 74–99)
GLUCOSE BLD MANUAL STRIP-MCNC: 164 MG/DL (ref 74–99)
GLUCOSE SERPL-MCNC: 169 MG/DL (ref 74–99)
HCT VFR BLD AUTO: 45 % (ref 41–52)
HGB BLD-MCNC: 14.5 G/DL (ref 13.5–17.5)
IMM GRANULOCYTES # BLD AUTO: 0.18 X10*3/UL (ref 0–0.5)
IMM GRANULOCYTES NFR BLD AUTO: 2.1 % (ref 0–0.9)
LYMPHOCYTES # BLD AUTO: 0.73 X10*3/UL (ref 0.8–3)
LYMPHOCYTES NFR BLD AUTO: 8.4 %
MAGNESIUM SERPL-MCNC: 1.99 MG/DL (ref 1.6–2.4)
MCH RBC QN AUTO: 31 PG (ref 26–34)
MCHC RBC AUTO-ENTMCNC: 32.2 G/DL (ref 32–36)
MCV RBC AUTO: 96 FL (ref 80–100)
MONOCYTES # BLD AUTO: 0.39 X10*3/UL (ref 0.05–0.8)
MONOCYTES NFR BLD AUTO: 4.5 %
NEUTROPHILS # BLD AUTO: 7.03 X10*3/UL (ref 1.6–5.5)
NEUTROPHILS NFR BLD AUTO: 80.8 %
NRBC BLD-RTO: 0 /100 WBCS (ref 0–0)
PHOSPHATE SERPL-MCNC: 3.3 MG/DL (ref 2.5–4.9)
PLATELET # BLD AUTO: 121 X10*3/UL (ref 150–450)
POTASSIUM SERPL-SCNC: 3.8 MMOL/L (ref 3.5–5.3)
RBC # BLD AUTO: 4.68 X10*6/UL (ref 4.5–5.9)
SODIUM SERPL-SCNC: 147 MMOL/L (ref 136–145)
WBC # BLD AUTO: 8.7 X10*3/UL (ref 4.4–11.3)

## 2024-01-13 PROCEDURE — 85025 COMPLETE CBC W/AUTO DIFF WBC: CPT

## 2024-01-13 PROCEDURE — 2500000002 HC RX 250 W HCPCS SELF ADMINISTERED DRUGS (ALT 637 FOR MEDICARE OP, ALT 636 FOR OP/ED): Performed by: INTERNAL MEDICINE

## 2024-01-13 PROCEDURE — 82533 TOTAL CORTISOL: CPT

## 2024-01-13 PROCEDURE — 2500000001 HC RX 250 WO HCPCS SELF ADMINISTERED DRUGS (ALT 637 FOR MEDICARE OP): Performed by: STUDENT IN AN ORGANIZED HEALTH CARE EDUCATION/TRAINING PROGRAM

## 2024-01-13 PROCEDURE — 80069 RENAL FUNCTION PANEL: CPT

## 2024-01-13 PROCEDURE — 83880 ASSAY OF NATRIURETIC PEPTIDE: CPT | Performed by: INTERNAL MEDICINE

## 2024-01-13 PROCEDURE — 2500000004 HC RX 250 GENERAL PHARMACY W/ HCPCS (ALT 636 FOR OP/ED): Performed by: STUDENT IN AN ORGANIZED HEALTH CARE EDUCATION/TRAINING PROGRAM

## 2024-01-13 PROCEDURE — 2500000002 HC RX 250 W HCPCS SELF ADMINISTERED DRUGS (ALT 637 FOR MEDICARE OP, ALT 636 FOR OP/ED): Mod: MUE

## 2024-01-13 PROCEDURE — 2500000004 HC RX 250 GENERAL PHARMACY W/ HCPCS (ALT 636 FOR OP/ED)

## 2024-01-13 PROCEDURE — 94640 AIRWAY INHALATION TREATMENT: CPT

## 2024-01-13 PROCEDURE — C9113 INJ PANTOPRAZOLE SODIUM, VIA: HCPCS

## 2024-01-13 PROCEDURE — 99222 1ST HOSP IP/OBS MODERATE 55: CPT | Performed by: STUDENT IN AN ORGANIZED HEALTH CARE EDUCATION/TRAINING PROGRAM

## 2024-01-13 PROCEDURE — 82947 ASSAY GLUCOSE BLOOD QUANT: CPT

## 2024-01-13 PROCEDURE — 2500000001 HC RX 250 WO HCPCS SELF ADMINISTERED DRUGS (ALT 637 FOR MEDICARE OP)

## 2024-01-13 PROCEDURE — 1200000002 HC GENERAL ROOM WITH TELEMETRY DAILY

## 2024-01-13 PROCEDURE — 83735 ASSAY OF MAGNESIUM: CPT

## 2024-01-13 PROCEDURE — 99232 SBSQ HOSP IP/OBS MODERATE 35: CPT

## 2024-01-13 PROCEDURE — 82024 ASSAY OF ACTH: CPT

## 2024-01-13 PROCEDURE — 36415 COLL VENOUS BLD VENIPUNCTURE: CPT | Performed by: INTERNAL MEDICINE

## 2024-01-13 RX ORDER — DEXTROSE MONOHYDRATE 50 MG/ML
75 INJECTION, SOLUTION INTRAVENOUS CONTINUOUS
Status: DISPENSED | OUTPATIENT
Start: 2024-01-13 | End: 2024-01-14

## 2024-01-13 RX ORDER — ALBUTEROL SULFATE 0.83 MG/ML
2.5 SOLUTION RESPIRATORY (INHALATION) 4 TIMES DAILY
Status: DISCONTINUED | OUTPATIENT
Start: 2024-01-13 | End: 2024-01-14

## 2024-01-13 RX ORDER — ESOMEPRAZOLE MAGNESIUM 20 MG/1
20 GRANULE, DELAYED RELEASE ORAL
Status: DISCONTINUED | OUTPATIENT
Start: 2024-01-14 | End: 2024-01-31 | Stop reason: HOSPADM

## 2024-01-13 RX ORDER — INSULIN GLARGINE 100 [IU]/ML
6 INJECTION, SOLUTION SUBCUTANEOUS NIGHTLY
Status: DISCONTINUED | OUTPATIENT
Start: 2024-01-13 | End: 2024-01-14

## 2024-01-13 RX ADMIN — STANDARDIZED SENNA CONCENTRATE 17.2 MG: 8.6 TABLET ORAL at 21:21

## 2024-01-13 RX ADMIN — INSULIN GLARGINE 6 UNITS: 100 INJECTION, SOLUTION SUBCUTANEOUS at 21:23

## 2024-01-13 RX ADMIN — INSULIN HUMAN 1 UNITS: 100 INJECTION, SOLUTION PARENTERAL at 06:21

## 2024-01-13 RX ADMIN — HEPARIN SODIUM 5000 UNITS: 5000 INJECTION INTRAVENOUS; SUBCUTANEOUS at 05:42

## 2024-01-13 RX ADMIN — PANTOPRAZOLE SODIUM 40 MG: 40 INJECTION, POWDER, FOR SOLUTION INTRAVENOUS at 08:21

## 2024-01-13 RX ADMIN — LEVOTHYROXINE SODIUM 200 MCG: 75 TABLET ORAL at 05:42

## 2024-01-13 RX ADMIN — METOPROLOL TARTRATE 25 MG: 25 TABLET, FILM COATED ORAL at 21:23

## 2024-01-13 RX ADMIN — STANDARDIZED SENNA CONCENTRATE 17.2 MG: 8.6 TABLET ORAL at 08:20

## 2024-01-13 RX ADMIN — POLYETHYLENE GLYCOL 3350 17 G: 17 POWDER, FOR SOLUTION ORAL at 08:21

## 2024-01-13 RX ADMIN — SERTRALINE HYDROCHLORIDE 50 MG: 50 TABLET ORAL at 08:20

## 2024-01-13 RX ADMIN — ALBUTEROL SULFATE 2.5 MG: 2.5 SOLUTION RESPIRATORY (INHALATION) at 15:25

## 2024-01-13 RX ADMIN — METOPROLOL TARTRATE 25 MG: 25 TABLET, FILM COATED ORAL at 08:20

## 2024-01-13 RX ADMIN — HEPARIN SODIUM 5000 UNITS: 5000 INJECTION INTRAVENOUS; SUBCUTANEOUS at 14:00

## 2024-01-13 RX ADMIN — HEPARIN SODIUM 5000 UNITS: 5000 INJECTION INTRAVENOUS; SUBCUTANEOUS at 21:21

## 2024-01-13 RX ADMIN — DEXTROSE MONOHYDRATE 75 ML/HR: 50 INJECTION, SOLUTION INTRAVENOUS at 21:31

## 2024-01-13 ASSESSMENT — COGNITIVE AND FUNCTIONAL STATUS - GENERAL
TURNING FROM BACK TO SIDE WHILE IN FLAT BAD: A LOT
DRESSING REGULAR LOWER BODY CLOTHING: TOTAL
DRESSING REGULAR UPPER BODY CLOTHING: TOTAL
HELP NEEDED FOR BATHING: TOTAL
MOBILITY SCORE: 8
PERSONAL GROOMING: TOTAL
MOVING FROM LYING ON BACK TO SITTING ON SIDE OF FLAT BED WITH BEDRAILS: A LOT
WALKING IN HOSPITAL ROOM: TOTAL
CLIMB 3 TO 5 STEPS WITH RAILING: TOTAL
TOILETING: TOTAL
EATING MEALS: TOTAL
MOVING TO AND FROM BED TO CHAIR: TOTAL
DAILY ACTIVITIY SCORE: 6
STANDING UP FROM CHAIR USING ARMS: TOTAL

## 2024-01-13 ASSESSMENT — PAIN SCALES - PAIN ASSESSMENT IN ADVANCED DEMENTIA (PAINAD)
FACIALEXPRESSION: SMILING OR INEXPRESSIVE
TOTALSCORE: 0
BREATHING: NORMAL
BODYLANGUAGE: RELAXED
CONSOLABILITY: NO NEED TO CONSOLE

## 2024-01-13 ASSESSMENT — PAIN SCALES - GENERAL: PAINLEVEL_OUTOF10: 0 - NO PAIN

## 2024-01-13 ASSESSMENT — PAIN - FUNCTIONAL ASSESSMENT: PAIN_FUNCTIONAL_ASSESSMENT: PAINAD (PAIN ASSESSMENT IN ADVANCED DEMENTIA SCALE)

## 2024-01-13 NOTE — CARE PLAN
The patient's goals for the shift include    Problem: Diabetes  Goal: Maintain glucose levels >70mg/dl to <250mg/dl throughout shift  Outcome: Progressing     Problem: Fall/Injury  Goal: Be free from injury by end of the shift  Outcome: Progressing     Problem: Skin  Goal: Participates in plan/prevention/treatment measures  Outcome: Progressing  Goal: Prevent/manage excess moisture  Outcome: Progressing     Problem: Pain - Adult  Goal: Verbalizes/displays adequate comfort level or baseline comfort level  Outcome: Progressing       The clinical goals for the shift include tolerate Q2 turning schedule, remain safe and comfortable.

## 2024-01-13 NOTE — CONSULTS
Reason For Consult  Acute hypoxic respiratory failure    History Of Present Illness  Gopal Ness is a 73 y.o. male with past medical history of COPD (moderate obstruction per documentation in 2016), pituitary adenoma s/p resection, DVT/PE on AC presents after being found down on 12/31, currently being managed for altered mental status and hypoxia. Pulmonary consulted for hypoxia.     Brief hospital course:   Admitted to an OSH with alteration and was intubated in this setting. Eventually transferred to [facility] for suspected seizure like activity. Course was complicated by strep pneumonia in respiratory secretions- treated with a 7 day course of CTX which he completed a week ago. Also noted to be positive for COVID, for which he was given remdesivir and dexamethasone.   Eventually extubated on 01/02 and transferred to the floors on 01/06. Since extubation has remained altered with intermittently AO x 2 and intermittently minimally responsive, currently being attributed to acute illness encephalopathy. From a respiratory standpoint has been on 2L since extubation, increased to 4L 2 days ago- no respiratory distress.   Imaging with a slightly elevated hemidiaphragm (more than normal) on the right and an infiltrate at the right base. Of note, is on tube feeds which was increased 2 days ago.     Also notably had records stating he had been prescribed CPAP in the past that he was not compliant with. Prior diagnosis of COPD- non compliant with inhaler therapy.      Past Medical History  He has no past medical history on file.    Surgical History  He has a past surgical history that includes Total hip arthroplasty (08/01/2014) and Tonsillectomy (08/01/2014).     Social History  He reports that he has been smoking cigarettes. He has never used smokeless tobacco. He reports that he does not drink alcohol and does not use drugs.    Family History  No family history on file.     Allergies  Codeine and Penicillins    Review of  "Systems  Unable to obtain a ROS due to altered mental status     Physical Exam  General: Obese, lying down in no acute distress  HEENT: Open mouth breathing, NC In mouth; feeding tube in place  CVS: S1, S2 heard  RS: Coarse crackles laterally on the right   Abdomen: Non tender  MSK: No edema  Neuro: Asleep, briefly awoke to voice but did not engage with questions     Last Recorded Vitals  Blood pressure 127/73, pulse 90, temperature 36.9 °C (98.4 °F), resp. rate 17, height 1.829 m (6' 0.01\"), weight 106 kg (234 lb 5.6 oz), SpO2 92 %.    Relevant Results  Results for orders placed or performed during the hospital encounter of 01/01/24 (from the past 24 hour(s))   POCT GLUCOSE   Result Value Ref Range    POCT Glucose 152 (H) 74 - 99 mg/dL   Blood Gas Arterial   Result Value Ref Range    POCT pH, Arterial 7.46 (H) 7.38 - 7.42 pH    POCT pCO2, Arterial 46 (H) 38 - 42 mm Hg    POCT pO2, Arterial 66 (L) 85 - 95 mm Hg    POCT SO2, Arterial 92 (L) 94 - 100 %    POCT Oxy Hemoglobin, Arterial 90.8 (L) 94.0 - 98.0 %    POCT Base Excess, Arterial 7.7 (H) -2.0 - 3.0 mmol/L    POCT HCO3 Calculated, Arterial 32.7 (H) 22.0 - 26.0 mmol/L    Patient Temperature 37.0 degrees Celsius    FiO2 36 %   POCT GLUCOSE   Result Value Ref Range    POCT Glucose 242 (H) 74 - 99 mg/dL   POCT GLUCOSE   Result Value Ref Range    POCT Glucose 196 (H) 74 - 99 mg/dL   POCT GLUCOSE   Result Value Ref Range    POCT Glucose 153 (H) 74 - 99 mg/dL   POCT GLUCOSE   Result Value Ref Range    POCT Glucose 164 (H) 74 - 99 mg/dL   Cortisol AM   Result Value Ref Range    Cortisol  A.M. 32.9 (H) 5.0 - 20.0 ug/dL   CBC and Auto Differential   Result Value Ref Range    WBC 8.7 4.4 - 11.3 x10*3/uL    nRBC 0.0 0.0 - 0.0 /100 WBCs    RBC 4.68 4.50 - 5.90 x10*6/uL    Hemoglobin 14.5 13.5 - 17.5 g/dL    Hematocrit 45.0 41.0 - 52.0 %    MCV 96 80 - 100 fL    MCH 31.0 26.0 - 34.0 pg    MCHC 32.2 32.0 - 36.0 g/dL    RDW 13.2 11.5 - 14.5 %    Platelets 121 (L) 150 - 450 " x10*3/uL    Neutrophils % 80.8 40.0 - 80.0 %    Immature Granulocytes %, Automated 2.1 (H) 0.0 - 0.9 %    Lymphocytes % 8.4 13.0 - 44.0 %    Monocytes % 4.5 2.0 - 10.0 %    Eosinophils % 3.7 0.0 - 6.0 %    Basophils % 0.5 0.0 - 2.0 %    Neutrophils Absolute 7.03 (H) 1.60 - 5.50 x10*3/uL    Immature Granulocytes Absolute, Automated 0.18 0.00 - 0.50 x10*3/uL    Lymphocytes Absolute 0.73 (L) 0.80 - 3.00 x10*3/uL    Monocytes Absolute 0.39 0.05 - 0.80 x10*3/uL    Eosinophils Absolute 0.32 0.00 - 0.40 x10*3/uL    Basophils Absolute 0.04 0.00 - 0.10 x10*3/uL   Renal Function Panel   Result Value Ref Range    Glucose 169 (H) 74 - 99 mg/dL    Sodium 147 (H) 136 - 145 mmol/L    Potassium 3.8 3.5 - 5.3 mmol/L    Chloride 107 98 - 107 mmol/L    Bicarbonate 34 (H) 21 - 32 mmol/L    Anion Gap 10 10 - 20 mmol/L    Urea Nitrogen 36 (H) 6 - 23 mg/dL    Creatinine 1.24 0.50 - 1.30 mg/dL    eGFR 62 >60 mL/min/1.73m*2    Calcium 8.7 8.6 - 10.6 mg/dL    Phosphorus 3.3 2.5 - 4.9 mg/dL    Albumin 2.7 (L) 3.4 - 5.0 g/dL   Magnesium   Result Value Ref Range    Magnesium 1.99 1.60 - 2.40 mg/dL   B-type natriuretic peptide   Result Value Ref Range     (H) 0 - 99 pg/mL          Assessment/Plan   Gopal Ness is a 73 y.o. male with past medical history of COPD (moderate obstruction per documentation in 2016), pituitary adenoma s/p resection, DVT/PE on AC presents after being found down on 12/31, currently being managed for altered mental status and hypoxia. Pulmonary consulted for hypoxia.     #Acute hypoxic respiratory failure:  Likely multifactorial in the setting of atelectasis, possible aspiration given his altered mental status and inability to clear secretions. Would currently keep him on aspiration precautions and hold tube feeds until cleared from that standpoint. He needs bronchopulmonary hygiene with the help of RT, given difficulty with cough and clearance of secretions.   Would hold off on antibiotic therapy unless there  are clear signs of a new infection; would also hold off on CPAP at this time given mental status and aspiration risk.     - Agree with holding tube feeds at this time  - Please start albuterol nebs Q6H  - Please consult respiratory therapy for help with BPH, chest vest therapy may be beneficial  - Hold off on antibiotics at this time unless there are clear infectious symptoms  - Hold off on CPAP therapy given mental status and aspiration risk  - Titrate O2 to 88-92%  - Can start DuoNebs PRN; will not be able to comply with inhaler therapy currently; can determine discharge recommendations at  a later point    Patient discussed with Dr. Jimenez.    Regina Calle MD

## 2024-01-13 NOTE — CARE PLAN
The patient's goals for the shift include      The clinical goals for the shift include tolerate Q2 turning schedule    Over the shift, the patient did not make progress toward the following goals. Barriers to progression include . Recommendations to address these barriers include .

## 2024-01-13 NOTE — PROGRESS NOTES
Subjective   NAEO- awakened to voice, will respond to questions initially but slightly somnolent. Denies pain. Tube feeds running at 80cc/hr.     Objective     Vitals:  Vitals:    01/13/24 0815   BP: 127/73   Pulse: 90   Resp: 17   Temp: 36.9 °C (98.4 °F)   SpO2: 92%       I/O last 3 completed shifts:  In: 3379 (31.8 mL/kg) [I.V.:1150 (10.8 mL/kg); NG/GT:2229]  Out: 4125 (38.8 mL/kg) [Urine:4125 (1.1 mL/kg/hr)]  Weight: 106.3 kg   No intake/output data recorded.    Physical exam:  Constitutional: Well-developed male in no acute distress.  HEENT: Normocephalic, atraumatic. PERRL. EOMI.   Respiratory: On 4L NC, coarse breath sounds bilaterally, minimal wheezing heard  Cardiovascular: RRR, no murmurs/rubs/gallops  Abdominal: Soft, nondistended, nontender to palpation. No hepatosplenomegaly or masses.   Neuro: will open eyes to voice, Aox1, spontaneously moves all extremities  Skin: scattered echymoses  Psych: Appropriate mood and affect.    Medications:  [START ON 1/14/2024] esomeprazole, 20 mg, nasogastric tube, Daily before breakfast  heparin (porcine), 5,000 Units, subcutaneous, q8h KENDRA  insulin glargine, 8 Units, subcutaneous, Nightly  insulin regular, 0-5 Units, subcutaneous, q6h  levothyroxine, 200 mcg, oral, Daily  metoprolol tartrate, 25 mg, nasogastric tube, BID  polyethylene glycol, 17 g, oral, Daily  sennosides, 2 tablet, oral, BID  sertraline, 50 mg, nasogastric tube, Daily             PRN medications: dextrose 10 % in water (D10W), dextrose, diclofenac sodium, glucagon, LORazepam, oxygen, phenoL    Labs:  Results for orders placed or performed during the hospital encounter of 01/01/24 (from the past 24 hour(s))   POCT GLUCOSE   Result Value Ref Range    POCT Glucose 152 (H) 74 - 99 mg/dL   Blood Gas Arterial   Result Value Ref Range    POCT pH, Arterial 7.46 (H) 7.38 - 7.42 pH    POCT pCO2, Arterial 46 (H) 38 - 42 mm Hg    POCT pO2, Arterial 66 (L) 85 - 95 mm Hg    POCT SO2, Arterial 92 (L) 94 - 100 %     POCT Oxy Hemoglobin, Arterial 90.8 (L) 94.0 - 98.0 %    POCT Base Excess, Arterial 7.7 (H) -2.0 - 3.0 mmol/L    POCT HCO3 Calculated, Arterial 32.7 (H) 22.0 - 26.0 mmol/L    Patient Temperature 37.0 degrees Celsius    FiO2 36 %   POCT GLUCOSE   Result Value Ref Range    POCT Glucose 242 (H) 74 - 99 mg/dL   POCT GLUCOSE   Result Value Ref Range    POCT Glucose 196 (H) 74 - 99 mg/dL   POCT GLUCOSE   Result Value Ref Range    POCT Glucose 153 (H) 74 - 99 mg/dL   POCT GLUCOSE   Result Value Ref Range    POCT Glucose 164 (H) 74 - 99 mg/dL   Cortisol AM   Result Value Ref Range    Cortisol  A.M. 32.9 (H) 5.0 - 20.0 ug/dL   CBC and Auto Differential   Result Value Ref Range    WBC 8.7 4.4 - 11.3 x10*3/uL    nRBC 0.0 0.0 - 0.0 /100 WBCs    RBC 4.68 4.50 - 5.90 x10*6/uL    Hemoglobin 14.5 13.5 - 17.5 g/dL    Hematocrit 45.0 41.0 - 52.0 %    MCV 96 80 - 100 fL    MCH 31.0 26.0 - 34.0 pg    MCHC 32.2 32.0 - 36.0 g/dL    RDW 13.2 11.5 - 14.5 %    Platelets 121 (L) 150 - 450 x10*3/uL    Neutrophils % 80.8 40.0 - 80.0 %    Immature Granulocytes %, Automated 2.1 (H) 0.0 - 0.9 %    Lymphocytes % 8.4 13.0 - 44.0 %    Monocytes % 4.5 2.0 - 10.0 %    Eosinophils % 3.7 0.0 - 6.0 %    Basophils % 0.5 0.0 - 2.0 %    Neutrophils Absolute 7.03 (H) 1.60 - 5.50 x10*3/uL    Immature Granulocytes Absolute, Automated 0.18 0.00 - 0.50 x10*3/uL    Lymphocytes Absolute 0.73 (L) 0.80 - 3.00 x10*3/uL    Monocytes Absolute 0.39 0.05 - 0.80 x10*3/uL    Eosinophils Absolute 0.32 0.00 - 0.40 x10*3/uL    Basophils Absolute 0.04 0.00 - 0.10 x10*3/uL   Renal Function Panel   Result Value Ref Range    Glucose 169 (H) 74 - 99 mg/dL    Sodium 147 (H) 136 - 145 mmol/L    Potassium 3.8 3.5 - 5.3 mmol/L    Chloride 107 98 - 107 mmol/L    Bicarbonate 34 (H) 21 - 32 mmol/L    Anion Gap 10 10 - 20 mmol/L    Urea Nitrogen 36 (H) 6 - 23 mg/dL    Creatinine 1.24 0.50 - 1.30 mg/dL    eGFR 62 >60 mL/min/1.73m*2    Calcium 8.7 8.6 - 10.6 mg/dL    Phosphorus 3.3 2.5 - 4.9  mg/dL    Albumin 2.7 (L) 3.4 - 5.0 g/dL   Magnesium   Result Value Ref Range    Magnesium 1.99 1.60 - 2.40 mg/dL   B-type natriuretic peptide   Result Value Ref Range     (H) 0 - 99 pg/mL       Imaging:  MR cervical spine wo IV contrast    Result Date: 1/9/2024  Interpreted By:  Joey Rizzo, STUDY: MR CERVICAL SPINE WO IV CONTRAST;  1/8/2024 10:18 pm   INDICATION: Signs/Symptoms:R arm weakness.   COMPARISON: None.   ACCESSION NUMBER(S): [id number]   ORDERING CLINICIAN: DIEGO ABARCA   TECHNIQUE: The cervical spine was studied in the sagittal and axial planes utilizing T1 and T2 weighted images. Examination is limited due to motion artifact   FINDINGS: The craniovertebral junction is normal. The cord is normal in size and signal. The marrow signal is normal. Serial axial images reveal the following: C2/C3 There is normal alignment and vertebral body height. The disc space is normal. There is no evidence of canal or foraminal narrowing. There is no evidence of bulging or herniated disc. C3/C4 There is normal alignment and vertebral body height. The disc space is normal. There is no evidence of canal or foraminal narrowing. There is no evidence of bulging or herniated disc. C4/C5 Right greater than left uncovertebral joint hypertrophy with focal right-sided foraminal narrowing. No measurable canal stenosis. C5/C6 Right greater than left uncovertebral joint hypertrophy without canal stenosis. Right-sided foraminal narrowing C6/C7 Bilateral facet hypertrophy without canal or foraminal narrowing C7/T1 Bilateral facet hypertrophy without canal or foraminal narrowing       * Right-sided foraminal narrowing due to uncovertebral joint hypertrophy at C4/C5 and C5/C6 *No measurable canal stenosis or cord compression.   MACRO: none   Signed by: Joey Rizzo 1/9/2024 9:02 AM Dictation workstation:   [id number]       Assessment/Plan   Mr. Gopal Ness is a 72 y/o man with h/o pituitary adenoma s/p resection  at [facility], DVT/PE on Eliquis, COPD (noncompliant with medications)/tobacco use, depresssion, DMII, HLD presenting after being found down i/s/o fall on 12/31 (LKN 1100 on 12/31). Patient's children checked on him after his fall on 12/31 and he was reportedly okay, however was found down and unresponsive the following morning with emesis around him. Brought to OSH, negative CTH, intubated for airway protection. EMS reported witnessing possible seizure activity(?). Also with persistent tachycardia, leukocytosis and infiltrate on chest CT, so treating presumed aspiration pneumonia with antibiotics. Started on Keppra 1g BID and transferred to [facility] for cvEEG and further monitoring. Extubated 1/2. cvEEG did not demonstrate any seizures. Course complicated by covid pnuemonia, LEEANNE, SVT likely in the setting of infection, and RUE weakness in the setting of a fall.      Updates 1/13:  - Pulmonology consulted, appreciate recs  - Pause tubs feeds 2/2 concern for aspiration on CXR     #Acute hypoxic respiratory failure  #COVID pneumonia, CAP  #COPD  - Maintain O2- 88-92%, currently on 4L HFNC  - Cpap at night  - Elevated CRP, LDH, Ferritin, d-dimer  - Sputum culture - Strep pneumoniae  - s/p 1x cefepime 2g on 1/1, meropenem (1/1-1/2), Vanc 1.5g (1/1-1/2) then ceftriaxone 2g (1/2- 1/8)   - Started remdesivir (1/6-1/10), dexamethasone 6mg daily (stopped 1/10)  - Will give IV lasix 40mg today given concern for increased O2 requirements and pulmonary edema    #Hypernatremia   #LEEANNE (baseline Cr 0.8-0.9) - resolved  #Hx of DI   - Hold home tamsulosin 0.4mg daily while NPO  - Hold further D5W, continue with 300ml q6h FWF  - RFP BID  - Will trial void today     #SVT  - Several episodes of SVT, terminated spontaneously, some associated with hypotension down to 80/50s.  - Consulted cardiology, pending recs.         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 New small dural based enhancing masses in the R posterior fossa  - MRI Brain and MRI Sella - residual adenoma unchanged from prior examination and similar to slightly increased size of small dural-based enhancing masses of small dural-based enhancing masses within the R posterior fossa favored to represent meningiomas.  - EEG - mod diffuse encephalopathy. Improvement in degree of encephalopathy compared to the previous day  - Keppra 1000mg BID discontinued  - MRI cervical spine showed right-sided foraminal narrowing d/t uncovertebral joint hypertrophy at C4/C5 and C5/C6  Plan:  - Q2h neuro checks  - Hold home trazadone 50mg at bedtime  - Continue home sertraline 50mg daily when taking PO  - Outpatient neurosurgery/neuro-ophthalmology follow up ordered  - EMG as an outpatient (1/22/23 earliest available), follow up with neurology in 4 weeks     #panhypothyroidism s/p adenoma resection  #Cushing disease  #diabetes mellitus  - Holding home glimepiride 4mg BID, metformin 1000mg BID  - A1C 5.6  - AM free T4 0.92  Plan:  - Mild corrective SSI #1  - POCT glucose q6hrs while on tube feeds, Hypoglycemia protocol  - Continue home levothyroxine 200mcg daily  - Glargine 8u nightly  - follow up outpatient with endocrinologist Dr. Yvonne Lofton in 4 weeks (scheduled by endocrine)     #hx of DVT/PE  - H/H: 16.9/51 -> 14.3/45.8  - PLT: 148 -> 133 -> 98 > 100  -  4T score: 3, low probability <5%  - Daily CBC    - Hold home Eliquis at this time pending stabilization     F: LR, D5W  E: Replete PRN  N: Nepro  A: PIVs  Lomeli: Trial void  GI ppx: PPI  DVT ppx: SQH     Full Code  NOK/Surrogate: Donya Emerson (Daughter) [phone]      Patient and plan discussed with Dr. Tamiko BUSBY Grover is a 73 y.o. male on day 12 of admission presenting with AMS (altered mental status).    Axel Hassan MD

## 2024-01-13 NOTE — NURSING NOTE
Pts continuous tube feeding stop for now per request of team. Patients lungs are sounding a little worse and concern for aspiration.

## 2024-01-14 ENCOUNTER — APPOINTMENT (OUTPATIENT)
Dept: RADIOLOGY | Facility: HOSPITAL | Age: 74
DRG: 208 | End: 2024-01-14
Payer: MEDICARE

## 2024-01-14 LAB
ALBUMIN SERPL BCP-MCNC: 2.8 G/DL (ref 3.4–5)
ALP SERPL-CCNC: 59 U/L (ref 33–136)
ALT SERPL W P-5'-P-CCNC: 25 U/L (ref 10–52)
ANION GAP SERPL CALC-SCNC: 9 MMOL/L (ref 10–20)
AST SERPL W P-5'-P-CCNC: 20 U/L (ref 9–39)
ATRIAL RATE: 91 BPM
BASOPHILS # BLD AUTO: 0.07 X10*3/UL (ref 0–0.1)
BASOPHILS NFR BLD AUTO: 0.9 %
BILIRUB SERPL-MCNC: 0.6 MG/DL (ref 0–1.2)
BUN SERPL-MCNC: 33 MG/DL (ref 6–23)
CALCIUM SERPL-MCNC: 8.3 MG/DL (ref 8.6–10.6)
CHLORIDE SERPL-SCNC: 109 MMOL/L (ref 98–107)
CO2 SERPL-SCNC: 33 MMOL/L (ref 21–32)
CREAT SERPL-MCNC: 1.15 MG/DL (ref 0.5–1.3)
EGFRCR SERPLBLD CKD-EPI 2021: 68 ML/MIN/1.73M*2
EOSINOPHIL # BLD AUTO: 0.4 X10*3/UL (ref 0–0.4)
EOSINOPHIL NFR BLD AUTO: 5 %
ERYTHROCYTE [DISTWIDTH] IN BLOOD BY AUTOMATED COUNT: 13.3 % (ref 11.5–14.5)
GLUCOSE BLD MANUAL STRIP-MCNC: 146 MG/DL (ref 74–99)
GLUCOSE BLD MANUAL STRIP-MCNC: 165 MG/DL (ref 74–99)
GLUCOSE BLD MANUAL STRIP-MCNC: 172 MG/DL (ref 74–99)
GLUCOSE SERPL-MCNC: 169 MG/DL (ref 74–99)
HCT VFR BLD AUTO: 44.7 % (ref 41–52)
HGB BLD-MCNC: 13.8 G/DL (ref 13.5–17.5)
IMM GRANULOCYTES # BLD AUTO: 0.13 X10*3/UL (ref 0–0.5)
IMM GRANULOCYTES NFR BLD AUTO: 1.6 % (ref 0–0.9)
LYMPHOCYTES # BLD AUTO: 0.85 X10*3/UL (ref 0.8–3)
LYMPHOCYTES NFR BLD AUTO: 10.7 %
MAGNESIUM SERPL-MCNC: 2.08 MG/DL (ref 1.6–2.4)
MCH RBC QN AUTO: 31 PG (ref 26–34)
MCHC RBC AUTO-ENTMCNC: 30.9 G/DL (ref 32–36)
MCV RBC AUTO: 100 FL (ref 80–100)
MONOCYTES # BLD AUTO: 0.45 X10*3/UL (ref 0.05–0.8)
MONOCYTES NFR BLD AUTO: 5.7 %
NEUTROPHILS # BLD AUTO: 6.05 X10*3/UL (ref 1.6–5.5)
NEUTROPHILS NFR BLD AUTO: 76.1 %
NRBC BLD-RTO: 0 /100 WBCS (ref 0–0)
P AXIS: 74 DEGREES
P OFFSET: 186 MS
P ONSET: 137 MS
PLATELET # BLD AUTO: 126 X10*3/UL (ref 150–450)
POTASSIUM SERPL-SCNC: 4.3 MMOL/L (ref 3.5–5.3)
PR INTERVAL: 180 MS
PROT SERPL-MCNC: 5.5 G/DL (ref 6.4–8.2)
Q ONSET: 227 MS
QRS COUNT: 15 BEATS
QRS DURATION: 70 MS
QT INTERVAL: 322 MS
QTC CALCULATION(BAZETT): 396 MS
QTC FREDERICIA: 370 MS
R AXIS: 17 DEGREES
RBC # BLD AUTO: 4.45 X10*6/UL (ref 4.5–5.9)
SODIUM SERPL-SCNC: 147 MMOL/L (ref 136–145)
T AXIS: 59 DEGREES
T OFFSET: 388 MS
VENTRICULAR RATE: 91 BPM
WBC # BLD AUTO: 8 X10*3/UL (ref 4.4–11.3)

## 2024-01-14 PROCEDURE — 2500000004 HC RX 250 GENERAL PHARMACY W/ HCPCS (ALT 636 FOR OP/ED): Mod: MUE

## 2024-01-14 PROCEDURE — 2500000002 HC RX 250 W HCPCS SELF ADMINISTERED DRUGS (ALT 637 FOR MEDICARE OP, ALT 636 FOR OP/ED): Mod: MUE

## 2024-01-14 PROCEDURE — 2500000001 HC RX 250 WO HCPCS SELF ADMINISTERED DRUGS (ALT 637 FOR MEDICARE OP)

## 2024-01-14 PROCEDURE — 2500000001 HC RX 250 WO HCPCS SELF ADMINISTERED DRUGS (ALT 637 FOR MEDICARE OP): Performed by: STUDENT IN AN ORGANIZED HEALTH CARE EDUCATION/TRAINING PROGRAM

## 2024-01-14 PROCEDURE — 1200000002 HC GENERAL ROOM WITH TELEMETRY DAILY

## 2024-01-14 PROCEDURE — 85025 COMPLETE CBC W/AUTO DIFF WBC: CPT

## 2024-01-14 PROCEDURE — 99231 SBSQ HOSP IP/OBS SF/LOW 25: CPT

## 2024-01-14 PROCEDURE — 2500000002 HC RX 250 W HCPCS SELF ADMINISTERED DRUGS (ALT 637 FOR MEDICARE OP, ALT 636 FOR OP/ED): Mod: MUE | Performed by: INTERNAL MEDICINE

## 2024-01-14 PROCEDURE — 2500000001 HC RX 250 WO HCPCS SELF ADMINISTERED DRUGS (ALT 637 FOR MEDICARE OP): Performed by: INTERNAL MEDICINE

## 2024-01-14 PROCEDURE — 2500000004 HC RX 250 GENERAL PHARMACY W/ HCPCS (ALT 636 FOR OP/ED): Performed by: STUDENT IN AN ORGANIZED HEALTH CARE EDUCATION/TRAINING PROGRAM

## 2024-01-14 PROCEDURE — 94640 AIRWAY INHALATION TREATMENT: CPT | Mod: MUE

## 2024-01-14 PROCEDURE — 82947 ASSAY GLUCOSE BLOOD QUANT: CPT

## 2024-01-14 PROCEDURE — 80053 COMPREHEN METABOLIC PANEL: CPT

## 2024-01-14 PROCEDURE — 83735 ASSAY OF MAGNESIUM: CPT

## 2024-01-14 PROCEDURE — 70450 CT HEAD/BRAIN W/O DYE: CPT | Performed by: STUDENT IN AN ORGANIZED HEALTH CARE EDUCATION/TRAINING PROGRAM

## 2024-01-14 PROCEDURE — 36415 COLL VENOUS BLD VENIPUNCTURE: CPT

## 2024-01-14 PROCEDURE — 70450 CT HEAD/BRAIN W/O DYE: CPT

## 2024-01-14 RX ORDER — INSULIN GLARGINE 100 [IU]/ML
8 INJECTION, SOLUTION SUBCUTANEOUS NIGHTLY
Status: DISCONTINUED | OUTPATIENT
Start: 2024-01-14 | End: 2024-01-31 | Stop reason: HOSPADM

## 2024-01-14 RX ORDER — ALBUTEROL SULFATE 0.83 MG/ML
2.5 SOLUTION RESPIRATORY (INHALATION) 3 TIMES DAILY
Status: DISCONTINUED | OUTPATIENT
Start: 2024-01-14 | End: 2024-01-15

## 2024-01-14 RX ADMIN — INSULIN HUMAN 1 UNITS: 100 INJECTION, SOLUTION PARENTERAL at 12:50

## 2024-01-14 RX ADMIN — METOPROLOL TARTRATE 25 MG: 25 TABLET, FILM COATED ORAL at 10:18

## 2024-01-14 RX ADMIN — HEPARIN SODIUM 5000 UNITS: 5000 INJECTION INTRAVENOUS; SUBCUTANEOUS at 13:00

## 2024-01-14 RX ADMIN — LEVOTHYROXINE SODIUM 200 MCG: 75 TABLET ORAL at 06:33

## 2024-01-14 RX ADMIN — ALBUTEROL SULFATE 2.5 MG: 2.5 SOLUTION RESPIRATORY (INHALATION) at 21:54

## 2024-01-14 RX ADMIN — POLYETHYLENE GLYCOL 3350 17 G: 17 POWDER, FOR SOLUTION ORAL at 10:18

## 2024-01-14 RX ADMIN — STANDARDIZED SENNA CONCENTRATE 17.2 MG: 8.6 TABLET ORAL at 10:18

## 2024-01-14 RX ADMIN — HEPARIN SODIUM 5000 UNITS: 5000 INJECTION INTRAVENOUS; SUBCUTANEOUS at 06:33

## 2024-01-14 RX ADMIN — HEPARIN SODIUM 5000 UNITS: 5000 INJECTION INTRAVENOUS; SUBCUTANEOUS at 22:15

## 2024-01-14 RX ADMIN — ESOMEPRAZOLE MAGNESIUM 20 MG: 40 FOR SUSPENSION ORAL at 06:33

## 2024-01-14 RX ADMIN — INSULIN GLARGINE 8 UNITS: 100 INJECTION, SOLUTION SUBCUTANEOUS at 22:15

## 2024-01-14 RX ADMIN — ALBUTEROL SULFATE 2.5 MG: 2.5 SOLUTION RESPIRATORY (INHALATION) at 15:49

## 2024-01-14 RX ADMIN — SERTRALINE HYDROCHLORIDE 50 MG: 50 TABLET ORAL at 10:18

## 2024-01-14 RX ADMIN — INSULIN HUMAN 1 UNITS: 100 INJECTION, SOLUTION PARENTERAL at 06:40

## 2024-01-14 RX ADMIN — METOPROLOL TARTRATE 25 MG: 25 TABLET, FILM COATED ORAL at 22:15

## 2024-01-14 ASSESSMENT — COGNITIVE AND FUNCTIONAL STATUS - GENERAL
STANDING UP FROM CHAIR USING ARMS: TOTAL
HELP NEEDED FOR BATHING: TOTAL
PERSONAL GROOMING: TOTAL
TOILETING: TOTAL
CLIMB 3 TO 5 STEPS WITH RAILING: TOTAL
EATING MEALS: TOTAL
DAILY ACTIVITIY SCORE: 6
MOVING FROM LYING ON BACK TO SITTING ON SIDE OF FLAT BED WITH BEDRAILS: TOTAL
DRESSING REGULAR UPPER BODY CLOTHING: TOTAL
MOVING TO AND FROM BED TO CHAIR: TOTAL
WALKING IN HOSPITAL ROOM: TOTAL
TURNING FROM BACK TO SIDE WHILE IN FLAT BAD: TOTAL
DRESSING REGULAR LOWER BODY CLOTHING: TOTAL
MOBILITY SCORE: 6

## 2024-01-14 ASSESSMENT — PAIN SCALES - WONG BAKER: WONGBAKER_NUMERICALRESPONSE: NO HURT

## 2024-01-14 NOTE — PROGRESS NOTES
Subjective   NAEO- awakened to voice, will respond to questions initially but slightly somnolent. Denies pain.     Objective     Vitals:  Vitals:    01/14/24 1148   BP: 121/61   Pulse: 71   Resp: 17   Temp: 36.6 °C (97.9 °F)   SpO2: 90%       I/O last 3 completed shifts:  In: 800 (7.5 mL/kg) [NG/GT:800]  Out: 1600 (15.1 mL/kg) [Urine:1600 (0.4 mL/kg/hr)]  Weight: 106.3 kg   I/O this shift:  In: 20 [NG/GT:20]  Out: -     Physical exam:  Constitutional: Well-developed male in no acute distress.  HEENT: Normocephalic, atraumatic. PERRL. EOMI.   Respiratory: On 4L NC, coarse breath sounds bilaterally, minimal wheezing heard  Cardiovascular: RRR, no murmurs/rubs/gallops  Abdominal: Soft, nondistended, nontender to palpation. No hepatosplenomegaly or masses.   Neuro: will open eyes to voice, Aox1, states name and that he is in hospital spontaneously moves all extremities  Skin: scattered echymoses  Psych: Appropriate mood and affect.    Medications:  albuterol, 2.5 mg, nebulization, 4x daily  esomeprazole, 20 mg, nasogastric tube, Daily before breakfast  heparin (porcine), 5,000 Units, subcutaneous, q8h KENDRA  insulin glargine, 8 Units, subcutaneous, Nightly  insulin regular, 0-5 Units, subcutaneous, q6h  levothyroxine, 200 mcg, oral, Daily  metoprolol tartrate, 25 mg, nasogastric tube, BID  polyethylene glycol, 17 g, oral, Daily  sennosides, 2 tablet, oral, BID  sertraline, 50 mg, nasogastric tube, Daily             PRN medications: dextrose 10 % in water (D10W), dextrose, diclofenac sodium, glucagon, LORazepam, oxygen, phenoL    Labs:  Results for orders placed or performed during the hospital encounter of 01/01/24 (from the past 24 hour(s))   POCT GLUCOSE   Result Value Ref Range    POCT Glucose 150 (H) 74 - 99 mg/dL   POCT GLUCOSE   Result Value Ref Range    POCT Glucose 172 (H) 74 - 99 mg/dL   Magnesium   Result Value Ref Range    Magnesium 2.08 1.60 - 2.40 mg/dL   CBC and Auto Differential   Result Value Ref Range     WBC 8.0 4.4 - 11.3 x10*3/uL    nRBC 0.0 0.0 - 0.0 /100 WBCs    RBC 4.45 (L) 4.50 - 5.90 x10*6/uL    Hemoglobin 13.8 13.5 - 17.5 g/dL    Hematocrit 44.7 41.0 - 52.0 %     80 - 100 fL    MCH 31.0 26.0 - 34.0 pg    MCHC 30.9 (L) 32.0 - 36.0 g/dL    RDW 13.3 11.5 - 14.5 %    Platelets 126 (L) 150 - 450 x10*3/uL    Neutrophils % 76.1 40.0 - 80.0 %    Immature Granulocytes %, Automated 1.6 (H) 0.0 - 0.9 %    Lymphocytes % 10.7 13.0 - 44.0 %    Monocytes % 5.7 2.0 - 10.0 %    Eosinophils % 5.0 0.0 - 6.0 %    Basophils % 0.9 0.0 - 2.0 %    Neutrophils Absolute 6.05 (H) 1.60 - 5.50 x10*3/uL    Immature Granulocytes Absolute, Automated 0.13 0.00 - 0.50 x10*3/uL    Lymphocytes Absolute 0.85 0.80 - 3.00 x10*3/uL    Monocytes Absolute 0.45 0.05 - 0.80 x10*3/uL    Eosinophils Absolute 0.40 0.00 - 0.40 x10*3/uL    Basophils Absolute 0.07 0.00 - 0.10 x10*3/uL   Comprehensive Metabolic Panel   Result Value Ref Range    Glucose 169 (H) 74 - 99 mg/dL    Sodium 147 (H) 136 - 145 mmol/L    Potassium 4.3 3.5 - 5.3 mmol/L    Chloride 109 (H) 98 - 107 mmol/L    Bicarbonate 33 (H) 21 - 32 mmol/L    Anion Gap 9 (L) 10 - 20 mmol/L    Urea Nitrogen 33 (H) 6 - 23 mg/dL    Creatinine 1.15 0.50 - 1.30 mg/dL    eGFR 68 >60 mL/min/1.73m*2    Calcium 8.3 (L) 8.6 - 10.6 mg/dL    Albumin 2.8 (L) 3.4 - 5.0 g/dL    Alkaline Phosphatase 59 33 - 136 U/L    Total Protein 5.5 (L) 6.4 - 8.2 g/dL    AST 20 9 - 39 U/L    Bilirubin, Total 0.6 0.0 - 1.2 mg/dL    ALT 25 10 - 52 U/L   POCT GLUCOSE   Result Value Ref Range    POCT Glucose 165 (H) 74 - 99 mg/dL       Imaging:  MR cervical spine wo IV contrast    Result Date: 1/9/2024  Interpreted By:  Joey Rizzo, STUDY: MR CERVICAL SPINE WO IV CONTRAST;  1/8/2024 10:18 pm   INDICATION: Signs/Symptoms:R arm weakness.   COMPARISON: None.   ACCESSION NUMBER(S): [id number]   ORDERING CLINICIAN: DIEGO ABARCA   TECHNIQUE: The cervical spine was studied in the sagittal and axial planes utilizing T1 and  T2 weighted images. Examination is limited due to motion artifact   FINDINGS: The craniovertebral junction is normal. The cord is normal in size and signal. The marrow signal is normal. Serial axial images reveal the following: C2/C3 There is normal alignment and vertebral body height. The disc space is normal. There is no evidence of canal or foraminal narrowing. There is no evidence of bulging or herniated disc. C3/C4 There is normal alignment and vertebral body height. The disc space is normal. There is no evidence of canal or foraminal narrowing. There is no evidence of bulging or herniated disc. C4/C5 Right greater than left uncovertebral joint hypertrophy with focal right-sided foraminal narrowing. No measurable canal stenosis. C5/C6 Right greater than left uncovertebral joint hypertrophy without canal stenosis. Right-sided foraminal narrowing C6/C7 Bilateral facet hypertrophy without canal or foraminal narrowing C7/T1 Bilateral facet hypertrophy without canal or foraminal narrowing       * Right-sided foraminal narrowing due to uncovertebral joint hypertrophy at C4/C5 and C5/C6 *No measurable canal stenosis or cord compression.   MACRO: none   Signed by: Joey Rizzo 1/9/2024 9:02 AM Dictation workstation:   [id number]       Assessment/Plan   Mr. Gopal Ness is a 72 y/o man with h/o pituitary adenoma s/p resection at [facility], DVT/PE on Eliquis, COPD (noncompliant with medications)/tobacco use, depresssion, DMII, HLD presenting after being found down i/s/o fall on 12/31 (LKN 1100 on 12/31). Patient's children checked on him after his fall on 12/31 and he was reportedly okay, however was found down and unresponsive the following morning with emesis around him. Brought to OSH, negative CTH, intubated for airway protection. EMS reported witnessing possible seizure activity(?). Also with persistent tachycardia, leukocytosis and infiltrate on chest CT, so treating presumed aspiration pneumonia with antibiotics.  Started on Keppra 1g BID and transferred to [facility] for cvEEG and further monitoring. Extubated 1/2. cvEEG did not demonstrate any seizures. Course complicated by covid pnuemonia, LEEANNE, SVT likely in the setting of infection, and RUE weakness in the setting of a fall.      Updates 1/14:  - Will repeat CT head given ongoing altered mentation  - Continue albuterol nebs and bronchopulmonary hygiene   - Restart tube feeds, increase rates as able     #Acute hypoxic respiratory failure  #COVID pneumonia, CAP  #COPD  - Maintain O2- 88-92%, currently on 4L HFNC  - Cpap at night  - Elevated CRP, LDH, Ferritin, d-dimer  - Sputum culture - Strep pneumoniae  - s/p 1x cefepime 2g on 1/1, meropenem (1/1-1/2), Vanc 1.5g (1/1-1/2) then ceftriaxone 2g (1/2- 1/8)   - Started remdesivir (1/6-1/10), dexamethasone 6mg daily (stopped 1/10)      #Hypernatremia   #LEEANNE (baseline Cr 0.8-0.9) - resolved  #Hx of DI   - Hold home tamsulosin 0.4mg daily while NPO  - Hold further D5W, continue with 300ml q6h FWF  - RFP BID  - Will trial void today     #SVT  - Several episodes of SVT, terminated spontaneously, some associated with hypotension down to 80/50s.  - Consulted cardiology, pending recs.         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 New small dural based enhancing masses in the R  posterior fossa  - MRI Brain and MRI Sella - residual adenoma unchanged from prior examination and similar to slightly increased size of small dural-based enhancing masses of small dural-based enhancing masses within the R posterior fossa favored to represent meningiomas.  - EEG - mod diffuse encephalopathy. Improvement in degree of encephalopathy compared to the previous day  - Keppra 1000mg BID discontinued  - MRI cervical spine showed right-sided foraminal narrowing d/t uncovertebral joint hypertrophy at C4/C5 and C5/C6  Plan:  - Q2h neuro checks  - Hold home trazadone 50mg at bedtime  - Continue home sertraline 50mg daily when taking PO  - Outpatient neurosurgery/neuro-ophthalmology follow up ordered  - EMG as an outpatient (1/22/23 earliest available), follow up with neurology in 4 weeks     #panhypothyroidism s/p adenoma resection  #Cushing disease  #diabetes mellitus  - Holding home glimepiride 4mg BID, metformin 1000mg BID  - A1C 5.6  - AM free T4 0.92  Plan:  - Mild corrective SSI #1  - POCT glucose q6hrs while on tube feeds, Hypoglycemia protocol  - Continue home levothyroxine 200mcg daily  - Glargine 8u nightly  - follow up outpatient with endocrinologist Dr. Yvonne Lofton in 4 weeks (scheduled by endocrine)     #hx of DVT/PE  - H/H: 16.9/51 -> 14.3/45.8  - PLT: 148 -> 133 -> 98 > 100  - 4T score: 3, low probability <5%  - Daily CBC    - Hold home Eliquis at this time pending stabilization     F: LR, D5W  E: Replete PRN  N: Nepro  A: PIVs  Lomeli: Trial void  GI ppx: PPI  DVT ppx: SQH     Full Code  NOK/Surrogate: Donya Emerson (Daughter) [phone]      Patient and plan discussed with Dr. Tamiko BUSBY Grover is a 73 y.o. male on day 13 of admission presenting with AMS (altered mental status).    Axel Hassan MD

## 2024-01-14 NOTE — PROGRESS NOTES
"Gopal Ness is a 73 y.o. male on day 12 of admission presenting with AMS (altered mental status).    Subjective   Patient was not seen or examined today.  Only chart was reviewed    Objective   Physical exam deferred.    Last Recorded Vitals  Blood pressure 135/76, pulse 81, temperature 36.4 °C (97.5 °F), resp. rate 17, height 1.829 m (6' 0.01\"), weight 106 kg (234 lb 5.6 oz), SpO2 93 %.  Intake/Output last 3 Shifts:  I/O last 3 completed shifts:  In: 1050 (9.9 mL/kg) [NG/GT:1050]  Out: 2700 (25.4 mL/kg) [Urine:2700 (0.7 mL/kg/hr)]  Weight: 106.3 kg     Relevant Results  Results from last 7 days   Lab Units 01/13/24  1830 01/13/24  1223 01/13/24  0727 01/13/24  0604 01/13/24  0057 01/12/24  2203 01/12/24  0708 01/12/24  0552 01/11/24  1826 01/11/24  1742 01/11/24  1412 01/11/24  1231 01/11/24  0957   POCT GLUCOSE mg/dL 150* 152*  --  164* 153* 196*   < >  --    < >  --   --    < >  --    GLUCOSE mg/dL  --   --  169*  --   --   --   --  162*  --  193* 224*  --  175*    < > = values in this interval not displayed.     Scheduled medications  albuterol, 2.5 mg, nebulization, 4x daily  [START ON 1/14/2024] esomeprazole, 20 mg, nasogastric tube, Daily before breakfast  heparin (porcine), 5,000 Units, subcutaneous, q8h KENDRA  insulin glargine, 8 Units, subcutaneous, Nightly  insulin regular, 0-5 Units, subcutaneous, q6h  levothyroxine, 200 mcg, oral, Daily  metoprolol tartrate, 25 mg, nasogastric tube, BID  polyethylene glycol, 17 g, oral, Daily  sennosides, 2 tablet, oral, BID  sertraline, 50 mg, nasogastric tube, Daily      Continuous medications     PRN medications  PRN medications: dextrose 10 % in water (D10W), dextrose, diclofenac sodium, glucagon, LORazepam, oxygen, phenoL     Results for orders placed or performed during the hospital encounter of 01/01/24 (from the past 24 hour(s))   POCT GLUCOSE   Result Value Ref Range    POCT Glucose 196 (H) 74 - 99 mg/dL   POCT GLUCOSE   Result Value Ref Range    POCT Glucose " 153 (H) 74 - 99 mg/dL   POCT GLUCOSE   Result Value Ref Range    POCT Glucose 164 (H) 74 - 99 mg/dL   Cortisol AM   Result Value Ref Range    Cortisol  A.M. 32.9 (H) 5.0 - 20.0 ug/dL   CBC and Auto Differential   Result Value Ref Range    WBC 8.7 4.4 - 11.3 x10*3/uL    nRBC 0.0 0.0 - 0.0 /100 WBCs    RBC 4.68 4.50 - 5.90 x10*6/uL    Hemoglobin 14.5 13.5 - 17.5 g/dL    Hematocrit 45.0 41.0 - 52.0 %    MCV 96 80 - 100 fL    MCH 31.0 26.0 - 34.0 pg    MCHC 32.2 32.0 - 36.0 g/dL    RDW 13.2 11.5 - 14.5 %    Platelets 121 (L) 150 - 450 x10*3/uL    Neutrophils % 80.8 40.0 - 80.0 %    Immature Granulocytes %, Automated 2.1 (H) 0.0 - 0.9 %    Lymphocytes % 8.4 13.0 - 44.0 %    Monocytes % 4.5 2.0 - 10.0 %    Eosinophils % 3.7 0.0 - 6.0 %    Basophils % 0.5 0.0 - 2.0 %    Neutrophils Absolute 7.03 (H) 1.60 - 5.50 x10*3/uL    Immature Granulocytes Absolute, Automated 0.18 0.00 - 0.50 x10*3/uL    Lymphocytes Absolute 0.73 (L) 0.80 - 3.00 x10*3/uL    Monocytes Absolute 0.39 0.05 - 0.80 x10*3/uL    Eosinophils Absolute 0.32 0.00 - 0.40 x10*3/uL    Basophils Absolute 0.04 0.00 - 0.10 x10*3/uL   Renal Function Panel   Result Value Ref Range    Glucose 169 (H) 74 - 99 mg/dL    Sodium 147 (H) 136 - 145 mmol/L    Potassium 3.8 3.5 - 5.3 mmol/L    Chloride 107 98 - 107 mmol/L    Bicarbonate 34 (H) 21 - 32 mmol/L    Anion Gap 10 10 - 20 mmol/L    Urea Nitrogen 36 (H) 6 - 23 mg/dL    Creatinine 1.24 0.50 - 1.30 mg/dL    eGFR 62 >60 mL/min/1.73m*2    Calcium 8.7 8.6 - 10.6 mg/dL    Phosphorus 3.3 2.5 - 4.9 mg/dL    Albumin 2.7 (L) 3.4 - 5.0 g/dL   Magnesium   Result Value Ref Range    Magnesium 1.99 1.60 - 2.40 mg/dL   B-type natriuretic peptide   Result Value Ref Range     (H) 0 - 99 pg/mL   POCT GLUCOSE   Result Value Ref Range    POCT Glucose 152 (H) 74 - 99 mg/dL   POCT GLUCOSE   Result Value Ref Range    POCT Glucose 150 (H) 74 - 99 mg/dL                   Assessment/Plan   Principal Problem:    AMS (altered mental  status)    Gopal Ness is a 73 y.o. male with a h/o pituitary adenoma s/p transphenoidal resection at [facility] (2016) c/b hypopituitarism (adrenal insufficiency-resolved, hypogonadism, diabetes insipidis reportedly), hypothyroidism on levothyroxine);  DVT/PE on Eliquis, COPD (noncompliant with medications)/tobacco use, depresssion, DMII, HLD presenting after being found down unresponsive on 1/1/24 and subsequently transferred to [facility] on 1/2 with course complicated by intubation for airway protection (now extubated on intermittent BiPAP- HFNC?), concern for possible seizure activity (initially placed on keppra, now off), presumed aspiration pneumonia on antibiotics, hypotension initially on pressors- now resolved, and LEEANNE. Patient also found to have COVID.     Endocrinology consulted given history of pituitary adenoma resection and whether patient needed to be on steroid replacement therapy.      Patient with known history Cushing disease confirmed biochemically and with biopsy with prior recurrence. Now patient presenting with active Cushing disease again with high AM cortisol and ACTH likely 2/2 residual adenoma. 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 Notably NOT panhypopituitarism as prolactin level was 2.8 on 1/4 and some element of pituitary released hormone suppression may be from Cushing disease's effect on axis. High cortisol would suppress GnRH. Per neurosurgery in 09/2023, additional surgery of residual is not warranted/feasible, and would not result in improvement of ophthalmoplegia. Residual on 01/2024 MRI appears grossly the same as residual on 07/2023 MRI. Unlikely to be cause of seizures. Patient was having persistent SVT, cardiology on board, etiology hypovolemia versus respiratory distress versus hypoxia. As of 1/8, no further episodes of SVT.         #Cushing disease  #Hypopituitarism  ::Patient with sellar mass dx 2011 progressing to  vision changes  with Cushing disease now s/p resection 10/2016 (pathology pituitary adenoma, ACTH cell-type, Ki-67 focally 12%) with MRI and biochemical recurrence of Cushing in 2018 now s/p 5220 cGy in 29 fx now with recent worsening ophthalmoplegia with repeat MRI 2023 thought to be likely unchanged from prior in size based on report (known mass effect on OC). Had been followed at [facility] by endocrinology (see consult note for additional endocrine history).   ::24 FSH <0.9, LH <0.1, prolactin 1.4L, Growth hormone <0.05, IGF1 31 (Z score -2.8), total testosterone 73, free testosterone 11.3  ::24 LH <0.1, prolactin 2.8,   ::24 ACTH 100.0H, 24 AM cortisol 67.6  ::Per chart review, did not start inpatient steroids until 1/3/2024  ::24 AM cortisol 58.2 confirms cushing disease as >10+ hours from prior steroid dose  ::Collected labs pendin/04 ACTH and I-GF  ::Patient was receiving dexamethasone for COVID treatment however given that has significantly elevated endogenous cortisol/glucocorticoids levels we recommended discontinuation.  They were discontinued on 1/10     Recommendations:  -no further inpatient treatment of Cushing disease     #Hypernatremia:  ::2024: Urine sodium 66, urine osm > 300, post IV fluids.  No concern for diabetes insipidus.  - Deferring management of hypernatremia to primary team, c/w free water flushes to 400 every 6 hours and supplementing his free water deficit by going up on his free water flushes rather than placing the patient on D5W as this may worsen his glycemic control   - Also consider obtaining RFP's twice daily     #Diabetes  Started on dexamethasone on  for COVID treatment.  Discontinued on 1/10.  Was receiving D5W for treatment of hyponatremia but was discontinued on   ::HgbA1c 5.6 on 24  ::home metformin 1000 mg twice daily, glimeperide 4 mg twice daily      Recommendations:        Per primary team, tube feeds might be held due to  worsening respiratory status and pending evaluation by pulmonology.      Currently on CPAP overnight, was being diuresed for pulmonary edema.    -Will decrease glargine from 8 to 5 units subcutaneously in p.m.  -Would keep sliding scale Regular insulin to #1 (1 unit for every 50 above 150) every 6 hours   -Please inform endocrinology if tube feeds are to be resumed, or rate is changed  -POCT glucose q6hrs while on tube feeds  -hypoglycemia protocol        #Hypothyroidism  ::1/4/24 TSH 0.02L, free T4 0.71L, free T3 1.8L  ::1/9/24 free T4 0.92      Recommendations:  -if not able to give po medication, give IV levothyroxine 100mcg daily   -Continue home oral levothyroxine 200 mcg daily, kindly make sure tube feeds are held 2 hours before, 1 hour after levothyroxine dose  -Patient has follow-up with Dr. Lofton as outpatient on February 7/2024    Plan communicated to primary team via secure chat earlier during the day.  Endocrinology pager [phone]  Patient discussed with

## 2024-01-15 LAB
ALBUMIN SERPL BCP-MCNC: 2.6 G/DL (ref 3.4–5)
ALP SERPL-CCNC: 75 U/L (ref 33–136)
ALT SERPL W P-5'-P-CCNC: 27 U/L (ref 10–52)
ANION GAP SERPL CALC-SCNC: 10 MMOL/L (ref 10–20)
AST SERPL W P-5'-P-CCNC: 22 U/L (ref 9–39)
BASOPHILS # BLD AUTO: 0.05 X10*3/UL (ref 0–0.1)
BASOPHILS NFR BLD AUTO: 0.7 %
BILIRUB SERPL-MCNC: 0.6 MG/DL (ref 0–1.2)
BUN SERPL-MCNC: 27 MG/DL (ref 6–23)
CALCIUM SERPL-MCNC: 8.2 MG/DL (ref 8.6–10.6)
CHLORIDE SERPL-SCNC: 108 MMOL/L (ref 98–107)
CO2 SERPL-SCNC: 33 MMOL/L (ref 21–32)
CREAT SERPL-MCNC: 1.11 MG/DL (ref 0.5–1.3)
EGFRCR SERPLBLD CKD-EPI 2021: 71 ML/MIN/1.73M*2
EOSINOPHIL # BLD AUTO: 0.43 X10*3/UL (ref 0–0.4)
EOSINOPHIL NFR BLD AUTO: 6 %
ERYTHROCYTE [DISTWIDTH] IN BLOOD BY AUTOMATED COUNT: 13.2 % (ref 11.5–14.5)
GLUCOSE BLD MANUAL STRIP-MCNC: 145 MG/DL (ref 74–99)
GLUCOSE BLD MANUAL STRIP-MCNC: 146 MG/DL (ref 74–99)
GLUCOSE BLD MANUAL STRIP-MCNC: 148 MG/DL (ref 74–99)
GLUCOSE BLD MANUAL STRIP-MCNC: 157 MG/DL (ref 74–99)
GLUCOSE BLD MANUAL STRIP-MCNC: 183 MG/DL (ref 74–99)
GLUCOSE SERPL-MCNC: 156 MG/DL (ref 74–99)
HCT VFR BLD AUTO: 43.3 % (ref 41–52)
HGB BLD-MCNC: 13.6 G/DL (ref 13.5–17.5)
IMM GRANULOCYTES # BLD AUTO: 0.08 X10*3/UL (ref 0–0.5)
IMM GRANULOCYTES NFR BLD AUTO: 1.1 % (ref 0–0.9)
LYMPHOCYTES # BLD AUTO: 0.81 X10*3/UL (ref 0.8–3)
LYMPHOCYTES NFR BLD AUTO: 11.2 %
MAGNESIUM SERPL-MCNC: 2.05 MG/DL (ref 1.6–2.4)
MCH RBC QN AUTO: 31 PG (ref 26–34)
MCHC RBC AUTO-ENTMCNC: 31.4 G/DL (ref 32–36)
MCV RBC AUTO: 99 FL (ref 80–100)
MONOCYTES # BLD AUTO: 0.48 X10*3/UL (ref 0.05–0.8)
MONOCYTES NFR BLD AUTO: 6.6 %
NEUTROPHILS # BLD AUTO: 5.37 X10*3/UL (ref 1.6–5.5)
NEUTROPHILS NFR BLD AUTO: 74.4 %
NRBC BLD-RTO: 0 /100 WBCS (ref 0–0)
PLATELET # BLD AUTO: 134 X10*3/UL (ref 150–450)
POTASSIUM SERPL-SCNC: 3.8 MMOL/L (ref 3.5–5.3)
PROT SERPL-MCNC: 5.3 G/DL (ref 6.4–8.2)
RBC # BLD AUTO: 4.39 X10*6/UL (ref 4.5–5.9)
SODIUM SERPL-SCNC: 147 MMOL/L (ref 136–145)
WBC # BLD AUTO: 7.2 X10*3/UL (ref 4.4–11.3)

## 2024-01-15 PROCEDURE — 80053 COMPREHEN METABOLIC PANEL: CPT

## 2024-01-15 PROCEDURE — 2500000004 HC RX 250 GENERAL PHARMACY W/ HCPCS (ALT 636 FOR OP/ED): Performed by: STUDENT IN AN ORGANIZED HEALTH CARE EDUCATION/TRAINING PROGRAM

## 2024-01-15 PROCEDURE — 83735 ASSAY OF MAGNESIUM: CPT

## 2024-01-15 PROCEDURE — 2500000004 HC RX 250 GENERAL PHARMACY W/ HCPCS (ALT 636 FOR OP/ED): Performed by: INTERNAL MEDICINE

## 2024-01-15 PROCEDURE — 2500000002 HC RX 250 W HCPCS SELF ADMINISTERED DRUGS (ALT 637 FOR MEDICARE OP, ALT 636 FOR OP/ED): Mod: MUE | Performed by: INTERNAL MEDICINE

## 2024-01-15 PROCEDURE — 2500000001 HC RX 250 WO HCPCS SELF ADMINISTERED DRUGS (ALT 637 FOR MEDICARE OP): Performed by: STUDENT IN AN ORGANIZED HEALTH CARE EDUCATION/TRAINING PROGRAM

## 2024-01-15 PROCEDURE — 94640 AIRWAY INHALATION TREATMENT: CPT | Mod: MUE

## 2024-01-15 PROCEDURE — 36415 COLL VENOUS BLD VENIPUNCTURE: CPT

## 2024-01-15 PROCEDURE — 99232 SBSQ HOSP IP/OBS MODERATE 35: CPT

## 2024-01-15 PROCEDURE — 82947 ASSAY GLUCOSE BLOOD QUANT: CPT

## 2024-01-15 PROCEDURE — 1200000002 HC GENERAL ROOM WITH TELEMETRY DAILY

## 2024-01-15 PROCEDURE — 2500000004 HC RX 250 GENERAL PHARMACY W/ HCPCS (ALT 636 FOR OP/ED)

## 2024-01-15 PROCEDURE — 2500000001 HC RX 250 WO HCPCS SELF ADMINISTERED DRUGS (ALT 637 FOR MEDICARE OP): Performed by: INTERNAL MEDICINE

## 2024-01-15 PROCEDURE — 85025 COMPLETE CBC W/AUTO DIFF WBC: CPT

## 2024-01-15 PROCEDURE — 2500000001 HC RX 250 WO HCPCS SELF ADMINISTERED DRUGS (ALT 637 FOR MEDICARE OP)

## 2024-01-15 RX ORDER — DEXTROSE MONOHYDRATE 50 MG/ML
50 INJECTION, SOLUTION INTRAVENOUS CONTINUOUS
Status: DISCONTINUED | OUTPATIENT
Start: 2024-01-15 | End: 2024-01-15

## 2024-01-15 RX ORDER — ALBUTEROL SULFATE 0.83 MG/ML
2.5 SOLUTION RESPIRATORY (INHALATION) EVERY 2 HOUR PRN
Status: DISCONTINUED | OUTPATIENT
Start: 2024-01-15 | End: 2024-01-31 | Stop reason: HOSPADM

## 2024-01-15 RX ADMIN — POLYETHYLENE GLYCOL 3350 17 G: 17 POWDER, FOR SOLUTION ORAL at 09:40

## 2024-01-15 RX ADMIN — HEPARIN SODIUM 5000 UNITS: 5000 INJECTION INTRAVENOUS; SUBCUTANEOUS at 06:43

## 2024-01-15 RX ADMIN — ALBUTEROL SULFATE 2.5 MG: 2.5 SOLUTION RESPIRATORY (INHALATION) at 08:38

## 2024-01-15 RX ADMIN — STANDARDIZED SENNA CONCENTRATE 17.2 MG: 8.6 TABLET ORAL at 09:40

## 2024-01-15 RX ADMIN — METOPROLOL TARTRATE 25 MG: 25 TABLET, FILM COATED ORAL at 22:42

## 2024-01-15 RX ADMIN — LEVOTHYROXINE SODIUM 200 MCG: 75 TABLET ORAL at 06:42

## 2024-01-15 RX ADMIN — INSULIN HUMAN 1 UNITS: 100 INJECTION, SOLUTION PARENTERAL at 23:44

## 2024-01-15 RX ADMIN — SERTRALINE HYDROCHLORIDE 50 MG: 50 TABLET ORAL at 09:40

## 2024-01-15 RX ADMIN — ESOMEPRAZOLE MAGNESIUM 20 MG: 40 FOR SUSPENSION ORAL at 06:43

## 2024-01-15 RX ADMIN — HEPARIN SODIUM 5000 UNITS: 5000 INJECTION INTRAVENOUS; SUBCUTANEOUS at 22:43

## 2024-01-15 RX ADMIN — INSULIN GLARGINE 8 UNITS: 100 INJECTION, SOLUTION SUBCUTANEOUS at 23:43

## 2024-01-15 RX ADMIN — ALBUTEROL SULFATE 2.5 MG: 2.5 SOLUTION RESPIRATORY (INHALATION) at 14:04

## 2024-01-15 RX ADMIN — HEPARIN SODIUM 5000 UNITS: 5000 INJECTION INTRAVENOUS; SUBCUTANEOUS at 15:29

## 2024-01-15 RX ADMIN — METOPROLOL TARTRATE 25 MG: 25 TABLET, FILM COATED ORAL at 09:40

## 2024-01-15 RX ADMIN — DEXTROSE MONOHYDRATE 50 ML/HR: 50 INJECTION, SOLUTION INTRAVENOUS at 17:05

## 2024-01-15 RX ADMIN — INSULIN HUMAN 1 UNITS: 100 INJECTION, SOLUTION PARENTERAL at 18:25

## 2024-01-15 ASSESSMENT — PAIN SCALES - PAIN ASSESSMENT IN ADVANCED DEMENTIA (PAINAD)
BODYLANGUAGE: RELAXED
FACIALEXPRESSION: SMILING OR INEXPRESSIVE
CONSOLABILITY: NO NEED TO CONSOLE
TOTALSCORE: 0
BREATHING: NORMAL

## 2024-01-15 ASSESSMENT — PAIN SCALES - GENERAL: PAINLEVEL_OUTOF10: 0 - NO PAIN

## 2024-01-15 NOTE — PROGRESS NOTES
Subjective   Awakened to voice, will respond to questions initially but slightly somnolent. Will follow instructions, respond to questions with shaking head yes/no. Denies any pain at this time. Overnight spO2 was documented at 82% twice, but team was not notified. This AM was saturating at 90% on 4L. Tube feeds was running at 20.     Objective     Vitals:  Vitals:    01/15/24 0734   BP: 116/56   Pulse: 66   Resp: 16   Temp: 37 °C (98.6 °F)   SpO2: 90%       I/O last 3 completed shifts:  In: 2220 (20.9 mL/kg) [NG/GT:2220]  Out: 1000 (9.4 mL/kg) [Urine:1000 (0.3 mL/kg/hr)]  Weight: 106.3 kg   No intake/output data recorded.    Physical exam:  Constitutional: Well-developed male in no acute distress.  HEENT: Normocephalic, atraumatic. PERRL. EOMI. NJ tube in place  Respiratory: On 4L NC, coarse breath sounds, no wheezing   Cardiovascular: RRR, no murmurs/rubs/gallops  Abdominal: Soft, nondistended, nontender to palpation.   Neuro: will open eyes to voice, Aox1, states name and that he is in hospital. LUE 5/5, RUE 4/5 extension. Will move feet spontaneously.  Skin: scattered echymoses    Medications:  albuterol, 2.5 mg, nebulization, TID  esomeprazole, 20 mg, nasogastric tube, Daily before breakfast  heparin (porcine), 5,000 Units, subcutaneous, q8h KENDRA  insulin glargine, 8 Units, subcutaneous, Nightly  insulin regular, 0-5 Units, subcutaneous, q6h  levothyroxine, 200 mcg, oral, Daily  metoprolol tartrate, 25 mg, nasogastric tube, BID  polyethylene glycol, 17 g, oral, Daily  sennosides, 2 tablet, oral, BID  sertraline, 50 mg, nasogastric tube, Daily             PRN medications: dextrose 10 % in water (D10W), dextrose, diclofenac sodium, glucagon, LORazepam, oxygen, phenoL    Labs:  Results for orders placed or performed during the hospital encounter of 01/01/24 (from the past 24 hour(s))   POCT GLUCOSE   Result Value Ref Range    POCT Glucose 165 (H) 74 - 99 mg/dL   POCT GLUCOSE   Result Value Ref Range    POCT Glucose  146 (H) 74 - 99 mg/dL   POCT GLUCOSE   Result Value Ref Range    POCT Glucose 146 (H) 74 - 99 mg/dL   POCT GLUCOSE   Result Value Ref Range    POCT Glucose 148 (H) 74 - 99 mg/dL       Imaging:  MR cervical spine wo IV contrast    Result Date: 1/9/2024  Interpreted By:  Joey Rizzo, STUDY: MR CERVICAL SPINE WO IV CONTRAST;  1/8/2024 10:18 pm   INDICATION: Signs/Symptoms:R arm weakness.   COMPARISON: None.   ACCESSION NUMBER(S): [id number]   ORDERING CLINICIAN: DIEGO ABARCA   TECHNIQUE: The cervical spine was studied in the sagittal and axial planes utilizing T1 and T2 weighted images. Examination is limited due to motion artifact   FINDINGS: The craniovertebral junction is normal. The cord is normal in size and signal. The marrow signal is normal. Serial axial images reveal the following: C2/C3 There is normal alignment and vertebral body height. The disc space is normal. There is no evidence of canal or foraminal narrowing. There is no evidence of bulging or herniated disc. C3/C4 There is normal alignment and vertebral body height. The disc space is normal. There is no evidence of canal or foraminal narrowing. There is no evidence of bulging or herniated disc. C4/C5 Right greater than left uncovertebral joint hypertrophy with focal right-sided foraminal narrowing. No measurable canal stenosis. C5/C6 Right greater than left uncovertebral joint hypertrophy without canal stenosis. Right-sided foraminal narrowing C6/C7 Bilateral facet hypertrophy without canal or foraminal narrowing C7/T1 Bilateral facet hypertrophy without canal or foraminal narrowing       * Right-sided foraminal narrowing due to uncovertebral joint hypertrophy at C4/C5 and C5/C6 *No measurable canal stenosis or cord compression.   MACRO: none   Signed by: Joey Rizzo 1/9/2024 9:02 AM Dictation workstation:   [id number]       Assessment/Plan   Mr. Gopal Ness is a 74 y/o man with h/o pituitary adenoma s/p resection at [facility], DVT/PE on  Eliquis, COPD (noncompliant with medications)/tobacco use, depresssion, DMII, HLD presenting after being found down i/s/o fall on 12/31 (LKN 1100 on 12/31). Patient's children checked on him after his fall on 12/31 and he was reportedly okay, however was found down and unresponsive the following morning with emesis around him. Brought to OSH, negative CTH, intubated for airway protection. EMS reported witnessing possible seizure activity(?). Also with persistent tachycardia, leukocytosis and infiltrate on chest CT, so treating presumed aspiration pneumonia with antibiotics. Started on Keppra 1g BID and transferred to [facility] for cvEEG and further monitoring. Extubated 1/2. cvEEG did not demonstrate any seizures. Course complicated by covid pnuemonia, LEEANNE, SVT likely in the setting of infection, and RUE weakness in the setting of a fall.      Updates 1/15:  - PT/OT eval   - Continue albuterol nebs and bronchopulmonary hygiene   - Continue with tube feeds, gradually up-titrate to goal 55ml/hr      #Acute hypoxic respiratory failure  #COVID pneumonia, CAP  #COPD  - Maintain O2- 88-92%, currently on 4L HFNC  - Cpap at night  - Elevated CRP, LDH, Ferritin, d-dimer  - Sputum culture - Strep pneumoniae  - s/p 1x cefepime 2g on 1/1, meropenem (1/1-1/2), Vanc 1.5g (1/1-1/2) then ceftriaxone 2g (1/2- 1/8)   - Finished remdesivir (1/6-1/10), dexamethasone 6mg daily (stopped 1/10)  - Pulmonology consulted: Will start albuterol nebs q6h, RT for BPH, chest vest therapy. Duonebs PRN. No abx at this time.     #Hypernatremia   #LEEANNE (baseline Cr 0.8-0.9) - resolved  #Hx of DI   - Hold home tamsulosin 0.4mg daily while NPO  - Hold further D5W, continue with 300ml q6h FWF     #SVT  - Several episodes of SVT, terminated spontaneously, some associated with hypotension down to 80/50s.  - Consulted cardiology, pending recs.         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 New small dural based enhancing masses in the R posterior fossa  - MRI Brain and MRI Sella - residual adenoma unchanged from prior examination and similar to slightly increased size of small dural-based enhancing masses of small dural-based enhancing masses within the R posterior fossa favored to represent meningiomas.  - EEG - mod diffuse encephalopathy. Improvement in degree of encephalopathy compared to the previous day  - Keppra 1000mg BID discontinued  - MRI cervical spine showed right-sided foraminal narrowing d/t uncovertebral joint hypertrophy at C4/C5 and C5/C6  Plan:  - Q2h neuro checks  - Hold home trazadone 50mg at bedtime  - Continue home sertraline 50mg daily when taking PO  - Outpatient neurosurgery/neuro-ophthalmology follow up ordered  - EMG as an outpatient (1/22/23 earliest available), follow up with neurology in 4 weeks  - Repeat CT on 1/14 - atrophy, no acute intercranial pathology      #panhypothyroidism s/p adenoma resection  #Cushing disease  #diabetes mellitus  - Holding home glimepiride 4mg BID, metformin 1000mg BID  - A1C 5.6  - AM free T4 0.92  Plan:  - Mild corrective SSI #1  - POCT glucose q6hrs while on tube feeds, Hypoglycemia protocol  - Continue home levothyroxine 200mcg daily  - Glargine 8u nightly  - follow up outpatient with endocrinologist Dr. Yvonne Lofton in 4 weeks (scheduled by  endocrine)     #hx of DVT/PE  - H/H: 16.9/51 -> 14.3/45.8  - PLT: 148 -> 133 -> 98 > 100  - 4T score: 3, low probability <5%  - Daily CBC    - Hold home Eliquis at this time pending stabilization     F: LR, D5W  E: Replete PRN  N: Nepro  A: PIVs  Lomeli: Trial void  GI ppx: PPI  DVT ppx: SQH     Full Code  NOK/Surrogate: IdaniaDonya (Daughter) [phone]      Patient and plan discussed with Dr. Tamiko Ness is a 73 y.o. male on day 14 of admission presenting with AMS (altered mental status).    Fatmata Kumar MD

## 2024-01-15 NOTE — SIGNIFICANT EVENT
NANCY   01/15/24 1200   Onset Documentation   Rapid Response Initiated By Radar auto page   Pager Time 1200   Arrival Time 1400   Event End Time 1445   Primary Reason for Call Radar auto page     RADAR page auto generated from VS -see documented VS. Initially called and spoke with the bedside nurse and no concerns as VS within pt's range. Upon arrival to see pt-recheck of VS. Pulse ox remains low 90's on 4L NC- pt with recent COVID diagnosis- no s/s of distress and RR even non labored. Pt incontinent with large liquid BM- assisted care of pt to clean and reposition. Pt ok to remain on the floor for continued monitoring- and will call with any concerns.

## 2024-01-15 NOTE — CARE PLAN
The patient's goals for the shift include      The clinical goals for the shift include patient will remain free from injury    Problem: Diabetes  Goal: Achieve decreasing blood glucose levels by end of shift  Outcome: Progressing  Goal: Increase stability of blood glucose readings by end of shift  Outcome: Progressing  Goal: Maintain glucose levels >70mg/dl to <250mg/dl throughout shift  Outcome: Progressing     Problem: Fall/Injury  Goal: Not fall by end of shift  Outcome: Progressing  Goal: Be free from injury by end of the shift  Outcome: Progressing     Problem: Skin  Goal: Prevent/minimize sheer/friction injuries  Outcome: Progressing  Flowsheets (Taken 1/14/2024 1845)  Prevent/minimize sheer/friction injuries:   Turn/reposition every 2 hours/use positioning/transfer devices   HOB 30 degrees or less

## 2024-01-15 NOTE — CARE PLAN
Per resident waiting on P.T. to be able to see patient, difficult with patient's AMS.  Most likely will need SNF as would not be able to cooperate with acute care rehab.  Will continue to follow for P.T. recommendation.

## 2024-01-15 NOTE — PROGRESS NOTES
Physical Therapy                 Therapy Communication Note    Patient Name: Gopal Ness  MRN: [id number]  Today's Date: 1/15/2024     Discipline: Physical Therapy    Missed Visit Reason: Missed Visit Reason:  (pt responding to name initially and following one step commands. Pt then becoming more lethargic, responding to noxious stimuli. Pt not appropriate for PT at this time. RN aware. Will re-attempt as pt appropriate and schedule permits.)    Missed Time: Attempt      01/15/24 at 2:36 PM - Cinthya Eng, PT

## 2024-01-15 NOTE — PROGRESS NOTES
"Gopal Ness is a 73 y.o. male on day 14 of admission presenting with AMS (altered mental status).    Subjective   Patient was not seen today.  Only chart was reviewed.         Objective   Deferred     Last Recorded Vitals  Blood pressure 109/64, pulse 81, temperature 36.7 °C (98.1 °F), temperature source Temporal, resp. rate 16, height 1.829 m (6' 0.01\"), weight 106 kg (234 lb 5.6 oz), SpO2 92 %.  Intake/Output last 3 Shifts:  I/O last 3 completed shifts:  In: 2220 (20.9 mL/kg) [NG/GT:2220]  Out: 1000 (9.4 mL/kg) [Urine:1000 (0.3 mL/kg/hr)]  Weight: 106.3 kg     Relevant Results  Results from last 7 days   Lab Units 01/15/24  1304 01/15/24  1157 01/15/24  0645 01/15/24  0005 01/14/24  1847 01/14/24  1155 01/14/24  0824 01/13/24  1223 01/13/24  0727 01/12/24  0708 01/12/24  0552 01/11/24  1826 01/11/24  1742   POCT GLUCOSE mg/dL  --  145* 148* 146* 146* 165*  --    < >  --    < >  --    < >  --    GLUCOSE mg/dL 156*  --   --   --   --   --  169*  --  169*  --  162*  --  193*    < > = values in this interval not displayed.     Scheduled medications  albuterol, 2.5 mg, nebulization, TID  esomeprazole, 20 mg, nasogastric tube, Daily before breakfast  heparin (porcine), 5,000 Units, subcutaneous, q8h KENDRA  insulin glargine, 8 Units, subcutaneous, Nightly  insulin regular, 0-5 Units, subcutaneous, q6h  levothyroxine, 200 mcg, oral, Daily  metoprolol tartrate, 25 mg, nasogastric tube, BID  polyethylene glycol, 17 g, oral, Daily  sennosides, 2 tablet, oral, BID  sertraline, 50 mg, nasogastric tube, Daily      Continuous medications     PRN medications  PRN medications: dextrose 10 % in water (D10W), dextrose, diclofenac sodium, glucagon, oxygen, phenoL     Results for orders placed or performed during the hospital encounter of 01/01/24 (from the past 24 hour(s))   POCT GLUCOSE   Result Value Ref Range    POCT Glucose 146 (H) 74 - 99 mg/dL   POCT GLUCOSE   Result Value Ref Range    POCT Glucose 146 (H) 74 - 99 mg/dL   POCT " GLUCOSE   Result Value Ref Range    POCT Glucose 148 (H) 74 - 99 mg/dL   POCT GLUCOSE   Result Value Ref Range    POCT Glucose 145 (H) 74 - 99 mg/dL   Magnesium   Result Value Ref Range    Magnesium 2.05 1.60 - 2.40 mg/dL   CBC and Auto Differential   Result Value Ref Range    WBC 7.2 4.4 - 11.3 x10*3/uL    nRBC 0.0 0.0 - 0.0 /100 WBCs    RBC 4.39 (L) 4.50 - 5.90 x10*6/uL    Hemoglobin 13.6 13.5 - 17.5 g/dL    Hematocrit 43.3 41.0 - 52.0 %    MCV 99 80 - 100 fL    MCH 31.0 26.0 - 34.0 pg    MCHC 31.4 (L) 32.0 - 36.0 g/dL    RDW 13.2 11.5 - 14.5 %    Platelets 134 (L) 150 - 450 x10*3/uL    Neutrophils % 74.4 40.0 - 80.0 %    Immature Granulocytes %, Automated 1.1 (H) 0.0 - 0.9 %    Lymphocytes % 11.2 13.0 - 44.0 %    Monocytes % 6.6 2.0 - 10.0 %    Eosinophils % 6.0 0.0 - 6.0 %    Basophils % 0.7 0.0 - 2.0 %    Neutrophils Absolute 5.37 1.60 - 5.50 x10*3/uL    Immature Granulocytes Absolute, Automated 0.08 0.00 - 0.50 x10*3/uL    Lymphocytes Absolute 0.81 0.80 - 3.00 x10*3/uL    Monocytes Absolute 0.48 0.05 - 0.80 x10*3/uL    Eosinophils Absolute 0.43 (H) 0.00 - 0.40 x10*3/uL    Basophils Absolute 0.05 0.00 - 0.10 x10*3/uL   Comprehensive Metabolic Panel   Result Value Ref Range    Glucose 156 (H) 74 - 99 mg/dL    Sodium 147 (H) 136 - 145 mmol/L    Potassium 3.8 3.5 - 5.3 mmol/L    Chloride 108 (H) 98 - 107 mmol/L    Bicarbonate 33 (H) 21 - 32 mmol/L    Anion Gap 10 10 - 20 mmol/L    Urea Nitrogen 27 (H) 6 - 23 mg/dL    Creatinine 1.11 0.50 - 1.30 mg/dL    eGFR 71 >60 mL/min/1.73m*2    Calcium 8.2 (L) 8.6 - 10.6 mg/dL    Albumin 2.6 (L) 3.4 - 5.0 g/dL    Alkaline Phosphatase 75 33 - 136 U/L    Total Protein 5.3 (L) 6.4 - 8.2 g/dL    AST 22 9 - 39 U/L    Bilirubin, Total 0.6 0.0 - 1.2 mg/dL    ALT 27 10 - 52 U/L                   Assessment/Plan   Principal Problem:    AMS (altered mental status)    Gopal Ness is a 73 y.o. male with a h/o pituitary adenoma s/p transphenoidal resection at [facility] (2016) c/b  hypopituitarism (adrenal insufficiency-resolved, hypogonadism, diabetes insipidis reportedly), hypothyroidism on levothyroxine);  DVT/PE on Eliquis, COPD (noncompliant with medications)/tobacco use, depresssion, DMII, HLD presenting after being found down unresponsive on 1/1/24 and subsequently transferred to [facility] on 1/2 with course complicated by intubation for airway protection (now extubated on intermittent BiPAP- HFNC?), concern for possible seizure activity (initially placed on keppra, now off), presumed aspiration pneumonia on antibiotics, hypotension initially on pressors- now resolved, and LEEANNE. Patient also found to have COVID, worsening respiratory status and mentation.     Endocrinology consulted given history of pituitary adenoma resection and whether patient needed to be on steroid replacement therapy.      Patient with known history Cushing disease confirmed biochemically and with biopsy with prior recurrence. Now patient presenting with active Cushing disease again with high AM cortisol and ACTH likely 2/2 residual adenoma. 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 Notably NOT panhypopituitarism as prolactin level was 2.8 on 1/4 and some element of pituitary released hormone suppression may be from Cushing disease's effect on axis. High cortisol would suppress GnRH. Per neurosurgery in 09/2023, additional surgery of residual is not warranted/feasible, and would not result in improvement of ophthalmoplegia. Residual on 01/2024 MRI appears grossly the same as residual on 07/2023 MRI. Unlikely to be cause of seizures. Patient was having persistent SVT, cardiology on board, etiology hypovolemia versus respiratory distress versus hypoxia. As of 1/8, no further episodes of SVT.         #Cushing disease  #Hypopituitarism  ::Patient with sellar mass dx 2011 progressing to vision changes 2016 with Cushing disease now s/p resection 10/2016 (pathology  pituitary adenoma, ACTH cell-type, Ki-67 focally 12%) with MRI and biochemical recurrence of Cushing in 2018 now s/p 5220 cGy in 29 fx now with recent worsening ophthalmoplegia with repeat MRI 2023 thought to be likely unchanged from prior in size based on report (known mass effect on OC). Had been followed at [facility] by endocrinology (see consult note for additional endocrine history).   ::24 FSH <0.9, LH <0.1, prolactin 1.4L, Growth hormone <0.05, IGF1 31 (Z score -2.8), total testosterone 73, free testosterone 11.3  ::24 LH <0.1, prolactin 2.8,   ::24 ACTH 100.0H, 24 AM cortisol 67.6  ::Per chart review, did not start inpatient steroids until 1/3/2024  ::24 AM cortisol 58.2 confirms cushing disease as >10+ hours from prior steroid dose  ::Collected labs pendin/04 ACTH and I-GF  ::Patient was receiving dexamethasone for COVID treatment however given that has significantly elevated endogenous cortisol/glucocorticoids levels we recommended discontinuation.  They were discontinued on 1/10     Recommendations:  -no further inpatient treatment of Cushing disease     #Hypernatremia:  ::2024: Urine sodium 66, urine osm > 300, post IV fluids.  No concern for diabetes insipidus.  - Deferring management of hypernatremia to primary team, c/w free water flushes to 400 every 6 hours and supplementing his free water deficit by going up on his free water flushes rather than placing the patient on D5W as this may worsen his glycemic control   - Also consider obtaining RFP'  daily     #Diabetes  Started on dexamethasone on  for COVID treatment.  Discontinued on 1/10.  Was receiving D5W for treatment of hyponatremia but was discontinued on   ::HgbA1c 5.6 on 24  ::home metformin 1000 mg twice daily, glimeperide 4 mg twice daily      Recommendations:        Tube feeds are being resumed, currently at 20 mill per hour, goal would be 50/h, running over 15 hours [CPAP at night?].  Patient was  having respiratory distress and altered mentation, CT head was negative.     -Continue with glargine 8 units subcutaneously in p.m.  -Would keep sliding scale Regular insulin to #1 (1 unit for every 50 above 150) every 6 hours   -Please inform endocrinology if tube feeds are to be resumed, or rate is changed  -POCT glucose q6hrs while on tube feeds  -hypoglycemia protocol        #Hypothyroidism  ::1/4/24 TSH 0.02L, free T4 0.71L, free T3 1.8L  ::1/9/24 free T4 0.92      Recommendations:  -if not able to give po medication, give IV levothyroxine 100mcg daily   -Continue home oral levothyroxine 200 mcg daily, kindly make sure tube feeds are held 2 hours before, 1 hour after levothyroxine dose  -Patient has follow-up with Dr. Lofton as outpatient on February 7/2024       Endocrinology pager [phone]  Patient discussed with

## 2024-01-16 LAB
ACTH PLAS-MCNC: 49.4 PG/ML (ref 7.2–63.3)
ALBUMIN SERPL BCP-MCNC: 2.6 G/DL (ref 3.4–5)
ALP SERPL-CCNC: 75 U/L (ref 33–136)
ALT SERPL W P-5'-P-CCNC: 24 U/L (ref 10–52)
ANION GAP SERPL CALC-SCNC: 12 MMOL/L (ref 10–20)
ARTERIAL PATENCY WRIST A: POSITIVE
AST SERPL W P-5'-P-CCNC: 22 U/L (ref 9–39)
BASE EXCESS BLDA CALC-SCNC: 8.4 MMOL/L (ref -2–3)
BASOPHILS # BLD AUTO: 0.06 X10*3/UL (ref 0–0.1)
BASOPHILS NFR BLD AUTO: 1 %
BILIRUB SERPL-MCNC: 0.6 MG/DL (ref 0–1.2)
BODY TEMPERATURE: 37 DEGREES CELSIUS
BUN SERPL-MCNC: 22 MG/DL (ref 6–23)
CALCIUM SERPL-MCNC: 8.6 MG/DL (ref 8.6–10.6)
CHLORIDE SERPL-SCNC: 107 MMOL/L (ref 98–107)
CO2 SERPL-SCNC: 29 MMOL/L (ref 21–32)
CREAT SERPL-MCNC: 1.01 MG/DL (ref 0.5–1.3)
EGFRCR SERPLBLD CKD-EPI 2021: 79 ML/MIN/1.73M*2
EOSINOPHIL # BLD AUTO: 0.45 X10*3/UL (ref 0–0.4)
EOSINOPHIL NFR BLD AUTO: 7.4 %
ERYTHROCYTE [DISTWIDTH] IN BLOOD BY AUTOMATED COUNT: 13.2 % (ref 11.5–14.5)
GLUCOSE BLD MANUAL STRIP-MCNC: 145 MG/DL (ref 74–99)
GLUCOSE BLD MANUAL STRIP-MCNC: 156 MG/DL (ref 74–99)
GLUCOSE BLD MANUAL STRIP-MCNC: 161 MG/DL (ref 74–99)
GLUCOSE BLD MANUAL STRIP-MCNC: 166 MG/DL (ref 74–99)
GLUCOSE SERPL-MCNC: 135 MG/DL (ref 74–99)
HCO3 BLDA-SCNC: 34 MMOL/L (ref 22–26)
HCT VFR BLD AUTO: 41.6 % (ref 41–52)
HGB BLD-MCNC: 13 G/DL (ref 13.5–17.5)
IMM GRANULOCYTES # BLD AUTO: 0.1 X10*3/UL (ref 0–0.5)
IMM GRANULOCYTES NFR BLD AUTO: 1.6 % (ref 0–0.9)
INHALED O2 CONCENTRATION: 32 %
LYMPHOCYTES # BLD AUTO: 0.85 X10*3/UL (ref 0.8–3)
LYMPHOCYTES NFR BLD AUTO: 13.9 %
MAGNESIUM SERPL-MCNC: 1.98 MG/DL (ref 1.6–2.4)
MCH RBC QN AUTO: 30.9 PG (ref 26–34)
MCHC RBC AUTO-ENTMCNC: 31.3 G/DL (ref 32–36)
MCV RBC AUTO: 99 FL (ref 80–100)
MONOCYTES # BLD AUTO: 0.39 X10*3/UL (ref 0.05–0.8)
MONOCYTES NFR BLD AUTO: 6.4 %
NEUTROPHILS # BLD AUTO: 4.26 X10*3/UL (ref 1.6–5.5)
NEUTROPHILS NFR BLD AUTO: 69.7 %
NRBC BLD-RTO: 0 /100 WBCS (ref 0–0)
OXYHGB MFR BLDA: 91 % (ref 94–98)
PCO2 BLDA: 50 MM HG (ref 38–42)
PH BLDA: 7.44 PH (ref 7.38–7.42)
PHOSPHATE SERPL-MCNC: 2.6 MG/DL (ref 2.5–4.9)
PLATELET # BLD AUTO: 108 X10*3/UL (ref 150–450)
PO2 BLDA: 62 MM HG (ref 85–95)
POTASSIUM SERPL-SCNC: 3.5 MMOL/L (ref 3.5–5.3)
PROT SERPL-MCNC: 5.4 G/DL (ref 6.4–8.2)
RBC # BLD AUTO: 4.21 X10*6/UL (ref 4.5–5.9)
SAO2 % BLDA: 92 % (ref 94–100)
SODIUM SERPL-SCNC: 144 MMOL/L (ref 136–145)
SPECIMEN DRAWN FROM PATIENT: ABNORMAL
WBC # BLD AUTO: 6.1 X10*3/UL (ref 4.4–11.3)

## 2024-01-16 PROCEDURE — 84100 ASSAY OF PHOSPHORUS: CPT

## 2024-01-16 PROCEDURE — 2500000004 HC RX 250 GENERAL PHARMACY W/ HCPCS (ALT 636 FOR OP/ED): Performed by: STUDENT IN AN ORGANIZED HEALTH CARE EDUCATION/TRAINING PROGRAM

## 2024-01-16 PROCEDURE — 2500000001 HC RX 250 WO HCPCS SELF ADMINISTERED DRUGS (ALT 637 FOR MEDICARE OP): Performed by: STUDENT IN AN ORGANIZED HEALTH CARE EDUCATION/TRAINING PROGRAM

## 2024-01-16 PROCEDURE — 85025 COMPLETE CBC W/AUTO DIFF WBC: CPT

## 2024-01-16 PROCEDURE — 80053 COMPREHEN METABOLIC PANEL: CPT

## 2024-01-16 PROCEDURE — 82947 ASSAY GLUCOSE BLOOD QUANT: CPT

## 2024-01-16 PROCEDURE — 36415 COLL VENOUS BLD VENIPUNCTURE: CPT

## 2024-01-16 PROCEDURE — 83735 ASSAY OF MAGNESIUM: CPT

## 2024-01-16 PROCEDURE — 2500000001 HC RX 250 WO HCPCS SELF ADMINISTERED DRUGS (ALT 637 FOR MEDICARE OP)

## 2024-01-16 PROCEDURE — 1200000002 HC GENERAL ROOM WITH TELEMETRY DAILY

## 2024-01-16 PROCEDURE — 99232 SBSQ HOSP IP/OBS MODERATE 35: CPT

## 2024-01-16 PROCEDURE — 2500000004 HC RX 250 GENERAL PHARMACY W/ HCPCS (ALT 636 FOR OP/ED)

## 2024-01-16 PROCEDURE — 82805 BLOOD GASES W/O2 SATURATION: CPT | Performed by: INTERNAL MEDICINE

## 2024-01-16 RX ORDER — POTASSIUM CHLORIDE 1.5 G/1.58G
40 POWDER, FOR SOLUTION ORAL ONCE
Status: COMPLETED | OUTPATIENT
Start: 2024-01-16 | End: 2024-01-16

## 2024-01-16 RX ADMIN — INSULIN HUMAN 1 UNITS: 100 INJECTION, SOLUTION PARENTERAL at 06:27

## 2024-01-16 RX ADMIN — INSULIN HUMAN 1 UNITS: 100 INJECTION, SOLUTION PARENTERAL at 18:57

## 2024-01-16 RX ADMIN — LEVOTHYROXINE SODIUM 200 MCG: 75 TABLET ORAL at 06:27

## 2024-01-16 RX ADMIN — METOPROLOL TARTRATE 25 MG: 25 TABLET, FILM COATED ORAL at 20:41

## 2024-01-16 RX ADMIN — HEPARIN SODIUM 5000 UNITS: 5000 INJECTION INTRAVENOUS; SUBCUTANEOUS at 06:27

## 2024-01-16 RX ADMIN — HEPARIN SODIUM 5000 UNITS: 5000 INJECTION INTRAVENOUS; SUBCUTANEOUS at 22:31

## 2024-01-16 RX ADMIN — METOPROLOL TARTRATE 25 MG: 25 TABLET, FILM COATED ORAL at 09:39

## 2024-01-16 RX ADMIN — POTASSIUM CHLORIDE 40 MEQ: 1.5 POWDER, FOR SOLUTION ORAL at 13:35

## 2024-01-16 RX ADMIN — STANDARDIZED SENNA CONCENTRATE 17.2 MG: 8.6 TABLET ORAL at 20:41

## 2024-01-16 RX ADMIN — INSULIN GLARGINE 8 UNITS: 100 INJECTION, SOLUTION SUBCUTANEOUS at 20:41

## 2024-01-16 RX ADMIN — SERTRALINE HYDROCHLORIDE 50 MG: 50 TABLET ORAL at 09:39

## 2024-01-16 RX ADMIN — HEPARIN SODIUM 5000 UNITS: 5000 INJECTION INTRAVENOUS; SUBCUTANEOUS at 13:35

## 2024-01-16 RX ADMIN — INSULIN HUMAN 1 UNITS: 100 INJECTION, SOLUTION PARENTERAL at 23:58

## 2024-01-16 ASSESSMENT — COGNITIVE AND FUNCTIONAL STATUS - GENERAL
WALKING IN HOSPITAL ROOM: TOTAL
DRESSING REGULAR LOWER BODY CLOTHING: TOTAL
DRESSING REGULAR UPPER BODY CLOTHING: TOTAL
CLIMB 3 TO 5 STEPS WITH RAILING: TOTAL
TURNING FROM BACK TO SIDE WHILE IN FLAT BAD: TOTAL
MOVING FROM LYING ON BACK TO SITTING ON SIDE OF FLAT BED WITH BEDRAILS: TOTAL
MOBILITY SCORE: 6
CLIMB 3 TO 5 STEPS WITH RAILING: TOTAL
HELP NEEDED FOR BATHING: TOTAL
STANDING UP FROM CHAIR USING ARMS: TOTAL
DAILY ACTIVITIY SCORE: 6
MOVING TO AND FROM BED TO CHAIR: TOTAL
DAILY ACTIVITIY SCORE: 6
TOILETING: TOTAL
PERSONAL GROOMING: TOTAL
EATING MEALS: TOTAL
EATING MEALS: TOTAL
TOILETING: TOTAL
DRESSING REGULAR UPPER BODY CLOTHING: TOTAL
MOBILITY SCORE: 6
WALKING IN HOSPITAL ROOM: TOTAL
HELP NEEDED FOR BATHING: TOTAL
DRESSING REGULAR LOWER BODY CLOTHING: TOTAL
STANDING UP FROM CHAIR USING ARMS: TOTAL
TURNING FROM BACK TO SIDE WHILE IN FLAT BAD: TOTAL
MOVING FROM LYING ON BACK TO SITTING ON SIDE OF FLAT BED WITH BEDRAILS: TOTAL
MOVING TO AND FROM BED TO CHAIR: TOTAL
PERSONAL GROOMING: TOTAL

## 2024-01-16 ASSESSMENT — PAIN SCALES - GENERAL: PAINLEVEL_OUTOF10: 0 - NO PAIN

## 2024-01-16 NOTE — CARE PLAN
The patient's goals for the shift include  patient will be injuries free throughout day shift    The clinical goals for the shift include Patient will tolerate increased tube feed rate.    Over the shift, the patient did not make progress toward the following goals. Barriers to progression include patient not engaging with others and appearing to be lethargic. Recommendations to address these barriers include engage patient with ADL's, turn patient every two hours side to side, provider oral care.

## 2024-01-16 NOTE — SIGNIFICANT EVENT
Rapid Response RN Note     01/16/24 1240   Onset Documentation   Rapid Response Initiated By Radar auto page   Location/Room [facility]  (LK [id number])   Pager Time [phone]   Arrival Time 1240   Event End Time 1255   Primary Reason for Call Radar auto page  (RADAR 7)     Rapid response RN at bedside for RADAR score 7 due to the following VS: T 36.7 °Celsius; HR 71 ; RR 16; /52; SPO2 89% on supplemental oxygen.     Reviewed above VS with bedside RN.  VS at 1245: T 36.6 °Celsius; HR 71 ; RR 16; /58 (78); SPO2 94% on supplemental oxygen.  Somnolent.  Responsive to repeated verbal and tactile stimuli (baseline).  No additional interventions by rapid response team indicated at this time.      Staff to page rapid response for any concerns or acute change in condition/VS.

## 2024-01-16 NOTE — PROGRESS NOTES
Physical Therapy                 Therapy Communication Note    Patient Name: Gopal Ness  MRN: [id number]  Today's Date: 1/16/2024     Discipline: Physical Therapy    Missed Visit Reason: Missed Visit Reason:  (Pt very lethargic at this time. Pt responding to noxious stimuli only, waking briefly, then returning back to sleep. Not appropriate at this time.Will re-attempt as pt appropriate and schedule permits.)    Missed Time: Attempt      01/16/24 at 10:07 AM - Cinthya Eng PT

## 2024-01-16 NOTE — PROGRESS NOTES
Speech-Language Pathology             Therapy Communication Note     Patient Name: Gopal Ness  MRN:  [id number]  Today's Date:  01/16/24     Discipline: Speech Language Pathology     Missed Visit Reason: SLP swallow re-evaluation to be completed once pt mentation/respiratory status improves/stabilizes. However per chart review pt continues to be lethargic, not appropriate to participate. Will continue to hold-off at this time.     Missed Time: 4080

## 2024-01-16 NOTE — CARE PLAN
Problem: Diabetes  Goal: Achieve decreasing blood glucose levels by end of shift  Outcome: Progressing  Goal: Increase stability of blood glucose readings by end of shift  Outcome: Progressing  Goal: Decrease in ketones present in urine by end of shift  Outcome: Progressing  Goal: Maintain electrolyte levels within acceptable range throughout shift  Outcome: Progressing  Goal: Maintain glucose levels >70mg/dl to <250mg/dl throughout shift  Outcome: Progressing  Goal: No changes in neurological exam by end of shift  Outcome: Progressing  Goal: Learn about and adhere to nutrition recommendations by end of shift  Outcome: Progressing  Goal: Vital signs within normal range for age by end of shift  Outcome: Progressing  Goal: Increase self care and/or family involovement by end of shift  Outcome: Progressing  Goal: Receive DSME education by end of shift  Outcome: Progressing     Problem: Fall/Injury  Goal: Not fall by end of shift  Outcome: Progressing  Goal: Be free from injury by end of the shift  Outcome: Progressing  Goal: Verbalize understanding of personal risk factors for fall in the hospital  Outcome: Progressing  Goal: Verbalize understanding of risk factor reduction measures to prevent injury from fall in the home  Outcome: Progressing  Goal: Use assistive devices by end of the shift  Outcome: Progressing  Goal: Pace activities to prevent fatigue by end of the shift  Outcome: Progressing     Problem: Skin  Goal: Decreased wound size/increased tissue granulation at next dressing change  Outcome: Progressing  Goal: Participates in plan/prevention/treatment measures  Outcome: Progressing  Goal: Prevent/manage excess moisture  Outcome: Progressing  Flowsheets (Taken 1/16/2024 0311)  Prevent/manage excess moisture: Cleanse incontinence/protect with barrier cream  Goal: Prevent/minimize sheer/friction injuries  Outcome: Progressing  Goal: Promote/optimize nutrition  Outcome: Progressing  Goal: Promote skin  healing  Outcome: Progressing     Problem: Pain - Adult  Goal: Verbalizes/displays adequate comfort level or baseline comfort level  Outcome: Progressing     Problem: Safety - Adult  Goal: Free from fall injury  Outcome: Progressing     Problem: Discharge Planning  Goal: Discharge to home or other facility with appropriate resources  Outcome: Progressing     Problem: Chronic Conditions and Co-morbidities  Goal: Patient's chronic conditions and co-morbidity symptoms are monitored and maintained or improved  Outcome: Progressing     Problem: Nutrition  Goal: Less than 5 days NPO/clear liquids  Outcome: Progressing  Goal: Oral intake greater than 50%  Outcome: Progressing  Goal: Oral intake greater 75%  Outcome: Progressing  Goal: Consume prescribed supplement  Outcome: Progressing  Goal: Adequate PO fluid intake  Outcome: Progressing  Goal: Nutrition support goals are met within 48 hrs  Outcome: Progressing  Goal: Nutrition support is meeting 75% of nutrient needs  Outcome: Progressing  Goal: Tube feed tolerance  Outcome: Progressing  Goal: BG  mg/dL  Outcome: Progressing  Goal: Lab values WNL  Outcome: Progressing  Goal: Electrolytes WNL  Outcome: Progressing  Goal: Promote healing  Outcome: Progressing  Goal: Maintain stable weight  Outcome: Progressing  Goal: Reduce weight from edema/fluid  Outcome: Progressing  Goal: Gradual weight gain  Outcome: Progressing  Goal: Improve ostomy output  Outcome: Progressing     Problem: Knowledge Deficit  Goal: Patient/family/caregiver demonstrates understanding of disease process, treatment plan, medications, and discharge instructions  Outcome: Progressing     Problem: Mechanical Ventilation  Goal: Patient Will Maintain Patent Airway  Outcome: Progressing  Goal: Oral health is maintained or improved  Outcome: Progressing  Goal: Tracheostomy will be managed safely  Outcome: Progressing  Goal: ET tube will be managed safely  Outcome: Progressing  Goal: Ability to express  needs and understand communication  Outcome: Progressing  Goal: Mobility/activity is maintained at optimum level for patient  Outcome: Progressing       The clinical goals for the shift include Patient will tolerate increased tube feed rate.

## 2024-01-16 NOTE — PROGRESS NOTES
Subjective   Awakened to voice, will respond to questions initially but will fall asleep afterwards. Will follow instructions, respond to questions with shaking head yes/no. Denies any pain at this time. Oriented to name and month.     Objective     Vitals:  Vitals:    01/16/24 0442   BP: 126/66   Pulse: 65   Resp: 17   Temp: 36.4 °C (97.5 °F)   SpO2: 90%       I/O last 3 completed shifts:  In: 2994 (28.2 mL/kg) [I.V.:265 (2.5 mL/kg); NG/GT:2729]  Out: 900 (8.5 mL/kg) [Urine:900 (0.2 mL/kg/hr)]  Weight: 106.3 kg   No intake/output data recorded.    Physical exam:  Constitutional: Well-developed male in no acute distress.  HEENT: Normocephalic, atraumatic. L pupil reactive, R pupil nonreactive. NJ tube in place  Respiratory: On 4L NC, coarse expiratory breath sounds, no wheezing   Cardiovascular: RRR, no murmurs/rubs/gallops  Abdominal: Soft, nondistended, nontender to palpation.   Neuro: will open eyes to voice, Aox2, states name and that it is January. Will move feet spontaneously.  Skin: scattered echymoses, improving rash on chest    Medications:  esomeprazole, 20 mg, nasogastric tube, Daily before breakfast  heparin (porcine), 5,000 Units, subcutaneous, q8h KENDRA  insulin glargine, 8 Units, subcutaneous, Nightly  insulin regular, 0-5 Units, subcutaneous, q6h  levothyroxine, 200 mcg, oral, Daily  metoprolol tartrate, 25 mg, nasogastric tube, BID  polyethylene glycol, 17 g, oral, Daily  sennosides, 2 tablet, oral, BID  sertraline, 50 mg, nasogastric tube, Daily             PRN medications: albuterol, dextrose 10 % in water (D10W), dextrose, diclofenac sodium, glucagon, oxygen, phenoL    Labs:  Results for orders placed or performed during the hospital encounter of 01/01/24 (from the past 24 hour(s))   POCT GLUCOSE   Result Value Ref Range    POCT Glucose 145 (H) 74 - 99 mg/dL   Magnesium   Result Value Ref Range    Magnesium 2.05 1.60 - 2.40 mg/dL   CBC and Auto Differential   Result Value Ref Range    WBC 7.2 4.4 -  11.3 x10*3/uL    nRBC 0.0 0.0 - 0.0 /100 WBCs    RBC 4.39 (L) 4.50 - 5.90 x10*6/uL    Hemoglobin 13.6 13.5 - 17.5 g/dL    Hematocrit 43.3 41.0 - 52.0 %    MCV 99 80 - 100 fL    MCH 31.0 26.0 - 34.0 pg    MCHC 31.4 (L) 32.0 - 36.0 g/dL    RDW 13.2 11.5 - 14.5 %    Platelets 134 (L) 150 - 450 x10*3/uL    Neutrophils % 74.4 40.0 - 80.0 %    Immature Granulocytes %, Automated 1.1 (H) 0.0 - 0.9 %    Lymphocytes % 11.2 13.0 - 44.0 %    Monocytes % 6.6 2.0 - 10.0 %    Eosinophils % 6.0 0.0 - 6.0 %    Basophils % 0.7 0.0 - 2.0 %    Neutrophils Absolute 5.37 1.60 - 5.50 x10*3/uL    Immature Granulocytes Absolute, Automated 0.08 0.00 - 0.50 x10*3/uL    Lymphocytes Absolute 0.81 0.80 - 3.00 x10*3/uL    Monocytes Absolute 0.48 0.05 - 0.80 x10*3/uL    Eosinophils Absolute 0.43 (H) 0.00 - 0.40 x10*3/uL    Basophils Absolute 0.05 0.00 - 0.10 x10*3/uL   Comprehensive Metabolic Panel   Result Value Ref Range    Glucose 156 (H) 74 - 99 mg/dL    Sodium 147 (H) 136 - 145 mmol/L    Potassium 3.8 3.5 - 5.3 mmol/L    Chloride 108 (H) 98 - 107 mmol/L    Bicarbonate 33 (H) 21 - 32 mmol/L    Anion Gap 10 10 - 20 mmol/L    Urea Nitrogen 27 (H) 6 - 23 mg/dL    Creatinine 1.11 0.50 - 1.30 mg/dL    eGFR 71 >60 mL/min/1.73m*2    Calcium 8.2 (L) 8.6 - 10.6 mg/dL    Albumin 2.6 (L) 3.4 - 5.0 g/dL    Alkaline Phosphatase 75 33 - 136 U/L    Total Protein 5.3 (L) 6.4 - 8.2 g/dL    AST 22 9 - 39 U/L    Bilirubin, Total 0.6 0.0 - 1.2 mg/dL    ALT 27 10 - 52 U/L   POCT GLUCOSE   Result Value Ref Range    POCT Glucose 183 (H) 74 - 99 mg/dL   POCT GLUCOSE   Result Value Ref Range    POCT Glucose 157 (H) 74 - 99 mg/dL   POCT GLUCOSE   Result Value Ref Range    POCT Glucose 156 (H) 74 - 99 mg/dL   CBC and Auto Differential   Result Value Ref Range    WBC 6.1 4.4 - 11.3 x10*3/uL    nRBC 0.0 0.0 - 0.0 /100 WBCs    RBC 4.21 (L) 4.50 - 5.90 x10*6/uL    Hemoglobin 13.0 (L) 13.5 - 17.5 g/dL    Hematocrit 41.6 41.0 - 52.0 %    MCV 99 80 - 100 fL    MCH 30.9 26.0 -  34.0 pg    MCHC 31.3 (L) 32.0 - 36.0 g/dL    RDW 13.2 11.5 - 14.5 %    Platelets 108 (L) 150 - 450 x10*3/uL    Neutrophils % 69.7 40.0 - 80.0 %    Immature Granulocytes %, Automated 1.6 (H) 0.0 - 0.9 %    Lymphocytes % 13.9 13.0 - 44.0 %    Monocytes % 6.4 2.0 - 10.0 %    Eosinophils % 7.4 0.0 - 6.0 %    Basophils % 1.0 0.0 - 2.0 %    Neutrophils Absolute 4.26 1.60 - 5.50 x10*3/uL    Immature Granulocytes Absolute, Automated 0.10 0.00 - 0.50 x10*3/uL    Lymphocytes Absolute 0.85 0.80 - 3.00 x10*3/uL    Monocytes Absolute 0.39 0.05 - 0.80 x10*3/uL    Eosinophils Absolute 0.45 (H) 0.00 - 0.40 x10*3/uL    Basophils Absolute 0.06 0.00 - 0.10 x10*3/uL       Imaging:  MR cervical spine wo IV contrast    Result Date: 1/9/2024  Interpreted By:  Joey Rizzo, STUDY: MR CERVICAL SPINE WO IV CONTRAST;  1/8/2024 10:18 pm   INDICATION: Signs/Symptoms:R arm weakness.   COMPARISON: None.   ACCESSION NUMBER(S): [id number]   ORDERING CLINICIAN: DIEGO ABARCA   TECHNIQUE: The cervical spine was studied in the sagittal and axial planes utilizing T1 and T2 weighted images. Examination is limited due to motion artifact   FINDINGS: The craniovertebral junction is normal. The cord is normal in size and signal. The marrow signal is normal. Serial axial images reveal the following: C2/C3 There is normal alignment and vertebral body height. The disc space is normal. There is no evidence of canal or foraminal narrowing. There is no evidence of bulging or herniated disc. C3/C4 There is normal alignment and vertebral body height. The disc space is normal. There is no evidence of canal or foraminal narrowing. There is no evidence of bulging or herniated disc. C4/C5 Right greater than left uncovertebral joint hypertrophy with focal right-sided foraminal narrowing. No measurable canal stenosis. C5/C6 Right greater than left uncovertebral joint hypertrophy without canal stenosis. Right-sided foraminal narrowing C6/C7 Bilateral facet  hypertrophy without canal or foraminal narrowing C7/T1 Bilateral facet hypertrophy without canal or foraminal narrowing       * Right-sided foraminal narrowing due to uncovertebral joint hypertrophy at C4/C5 and C5/C6 *No measurable canal stenosis or cord compression.   MACRO: none   Signed by: Joey Rizzo 1/9/2024 9:02 AM Dictation workstation:   [id number]       Assessment/Plan   Mr. Gopal Ness is a 74 y/o man with h/o pituitary adenoma s/p resection at [facility], DVT/PE on Eliquis, COPD (noncompliant with medications)/tobacco use, depresssion, DMII, HLD presenting after being found down i/s/o fall on 12/31 (LKN 1100 on 12/31). Brought to OSH, negative CTH, intubated for airway protection. EMS reported witnessing possible seizure activity. Also with persistent tachycardia, leukocytosis and infiltrate on chest CT, so treating presumed aspiration pneumonia with antibiotics. Transferred to [facility] for cvEEG which did not demonstrate seizures. MRI brain did not show acute stroke. Extubated 1/2. Course complicated by Covid pnuemonia, LEEANNE, SVT likely in the setting of infection, and RUE weakness in the setting of a fall. Currently admitted for continued altered mental status despite resolution of infection, correction of electrolytes, and lack of structural lesions that would explain his change in mentation.      Updates 1/16:  - PT/OT eval - will re-attempt today in the evening   - Continue albuterol nebs and bronchopulmonary hygiene   - Continue with tube feeds, gradually up-titrate to goal 55ml/hr     #Acute hypoxic respiratory failure  #COVID pneumonia, CAP  #COPD  - Maintain O2- 88-92%, currently on 4L HFNC  - Cpap at night  - Elevated CRP, LDH, Ferritin, d-dimer  - Sputum culture - Strep pneumoniae  - s/p 1x cefepime 2g on 1/1, meropenem (1/1-1/2), Vanc 1.5g (1/1-1/2) then ceftriaxone 2g (1/2- 1/8)   - Finished remdesivir (1/6-1/10), dexamethasone 6mg daily (stopped 1/10)  - Pulmonology consulted: Will start  albuterol nebs q6h, RT for BPH, chest vest therapy. Duonebs PRN. No abx at this time.     #Hypernatremia   #LEEANNE (baseline Cr 0.8-0.9) - resolved  #Hx of DI   - Hold home tamsulosin 0.4mg daily while NPO  - Hold further D5W, continue with 300ml q6h FWF     #SVT  - Several episodes of SVT, terminated spontaneously, some associated with hypotension down to 80/50s.  - Consulted cardiology, pending recs.         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 New small dural based enhancing masses in the R posterior fossa  - MRI Brain and MRI Sella - residual adenoma unchanged from prior examination and similar to slightly increased size of small dural-based enhancing masses of small dural-based enhancing masses within the R posterior fossa favored to represent meningiomas.  - EEG - mod diffuse encephalopathy. Improvement in degree of encephalopathy compared to the previous day  - Keppra 1000mg BID discontinued  - MRI cervical spine showed right-sided foraminal narrowing d/t uncovertebral joint hypertrophy at C4/C5 and C5/C6  Plan:  - Q2h neuro checks  - Hold home trazadone 50mg at bedtime  - Continue home sertraline 50mg daily when taking PO  - Outpatient neurosurgery/neuro-ophthalmology follow up ordered  - EMG as an outpatient (1/22/23 earliest available), follow up  with neurology in 4 weeks  - Repeat CT on 1/14 - atrophy, no acute intercranial pathology      #panhypothyroidism s/p adenoma resection  #Cushing disease  #diabetes mellitus  - Holding home glimepiride 4mg BID, metformin 1000mg BID  - A1C 5.6  - AM free T4 0.92  Plan:  - Mild corrective SSI #1  - POCT glucose q6hrs while on tube feeds, Hypoglycemia protocol  - Continue home levothyroxine 200mcg daily  - Glargine 8u nightly  - follow up outpatient with endocrinologist Dr. Yvonne Lofton in 4 weeks (scheduled by endocrine)     #hx of DVT/PE  - H/H: 16.9/51 -> 14.3/45.8  - PLT: 148 -> 133 -> 98 > 100  - 4T score: 3, low probability <5%  - Daily CBC    - Hold home Eliquis at this time pending stabilization     F: FWF with TF  E: Replete PRN  N: Nepro  A: PIVs  Lomeli: None  GI ppx: PPI  DVT ppx: SQH     Code status: Full Code  NOK/Surrogate: Donya Emerson (Daughter) [phone]      Fatmata Kumar MD

## 2024-01-16 NOTE — CONSULTS
Wound Care Consult     Visit Date: 1/16/2024      Patient Name: Gopal Ness         MRN: [id number]           YOB: 1950     Reason for Consult: Re assessment  of wounds  requested.      Wound History: Patient found down at home prior to admission . Documented pressure injuries to right hip,  and right knee.        Wound Assessment:  Wound 01/13/24 Pressure Injury Hip Right (Active)   Wound Image   01/16/24 1208   Site Assessment Blanchable erythema 01/16/24 1208   Zaria-Wound Assessment Clean;Intact 01/16/24 1208   Pressure Injury Stage O 01/16/24 1208   Shape irregular 01/16/24 1208   Wound Length (cm) 10 cm 01/16/24 1208   Wound Width (cm) 4 cm 01/16/24 1208   Wound Surface Area (cm^2) 40 cm^2 01/16/24 1208   Wound Depth (cm) 0.1 cm 01/16/24 1208   Wound Volume (cm^3) 4 cm^3 01/16/24 1208   Margins Attached edges 01/16/24 1208   Drainage Description None 01/16/24 1208   Drainage Amount None 01/16/24 1208   Dressing Silicone border dressing 01/16/24 1208   Dressing Changed Changed 01/16/24 1208   Dressing Status Clean;Dry 01/16/24 0822       Wound 01/14/24 Skin Tear Elbow Left;Lateral (Active)   Wound Image   01/16/24 1158   Site Assessment [place] 01/16/24 1158   Zaria-Wound Assessment Dry 01/16/24 1158   Non-staged Wound Description Not applicable 01/16/24 1158   Shape Oval 01/16/24 1158   Wound Length (cm) 1.5 cm 01/16/24 1158   Wound Width (cm) 1 cm 01/16/24 1158   Wound Surface Area (cm^2) 1.5 cm^2 01/16/24 1158   Wound Depth (cm) 0.1 cm 01/16/24 1158   Wound Volume (cm^3) 0.15 cm^3 01/16/24 1158   Margins Attached edges 01/16/24 1158   Drainage Description None 01/16/24 1158   Drainage Amount None 01/16/24 1158   Dressing Open to air 01/16/24 0822   Dressing Changed New 01/16/24 1158   Dressing Status Clean;Dry 01/14/24 2100       Wound Team Summary Assessment: Patient sleeping.Did not wake up throughout assessment and turning. Right hip wound now appears to be a fading bruise. Skin is blanching.    Right knee is also healing. Now is a superficial dried discoloration. All skin is blanching.   Patient has a skin tear to his left elbow.     Recommendations:Clean areas with normal saline.  Knee left open to air due to no risk of pressure to area at this time.  On R hip, mepilex border dressing applied.  Change daily and as needed.  Turn and reposition at least every 2 hours.  Place an EHOB air mattress under patient.  Please limit linen layers to one fitted sheet, one draw sheet and 1 disposable chux.  No briefs while in bed.      Provider: If you agree with recommendations above, please write orders for care.      Wound Team Plan:    Wound team does not need to follow. Please reconsult for any changes in wounds.       SUZANNE GERHARDT, RN, BSN, CWOCN  1/16/2024  2:12 PM

## 2024-01-16 NOTE — CARE PLAN
Still waiting for P.T. to be able to see.  P.T. has attempted but patient remains lethargic and unable to cooperate.

## 2024-01-17 LAB
ALBUMIN SERPL BCP-MCNC: 2.9 G/DL (ref 3.4–5)
ANION GAP SERPL CALC-SCNC: 13 MMOL/L (ref 10–20)
BASOPHILS # BLD AUTO: 0.09 X10*3/UL (ref 0–0.1)
BASOPHILS NFR BLD AUTO: 1.3 %
BUN SERPL-MCNC: 20 MG/DL (ref 6–23)
CALCIUM SERPL-MCNC: 8.7 MG/DL (ref 8.6–10.6)
CHLORIDE SERPL-SCNC: 105 MMOL/L (ref 98–107)
CO2 SERPL-SCNC: 31 MMOL/L (ref 21–32)
CREAT SERPL-MCNC: 1.03 MG/DL (ref 0.5–1.3)
EGFRCR SERPLBLD CKD-EPI 2021: 77 ML/MIN/1.73M*2
EOSINOPHIL # BLD AUTO: 0.52 X10*3/UL (ref 0–0.4)
EOSINOPHIL NFR BLD AUTO: 7.5 %
ERYTHROCYTE [DISTWIDTH] IN BLOOD BY AUTOMATED COUNT: 13.1 % (ref 11.5–14.5)
GLUCOSE BLD MANUAL STRIP-MCNC: 106 MG/DL (ref 74–99)
GLUCOSE BLD MANUAL STRIP-MCNC: 131 MG/DL (ref 74–99)
GLUCOSE BLD MANUAL STRIP-MCNC: 157 MG/DL (ref 74–99)
GLUCOSE BLD MANUAL STRIP-MCNC: 160 MG/DL (ref 74–99)
GLUCOSE SERPL-MCNC: 131 MG/DL (ref 74–99)
HCT VFR BLD AUTO: 44.8 % (ref 41–52)
HGB BLD-MCNC: 14 G/DL (ref 13.5–17.5)
IMM GRANULOCYTES # BLD AUTO: 0.11 X10*3/UL (ref 0–0.5)
IMM GRANULOCYTES NFR BLD AUTO: 1.6 % (ref 0–0.9)
LYMPHOCYTES # BLD AUTO: 1.03 X10*3/UL (ref 0.8–3)
LYMPHOCYTES NFR BLD AUTO: 14.8 %
MAGNESIUM SERPL-MCNC: 2.01 MG/DL (ref 1.6–2.4)
MCH RBC QN AUTO: 30.6 PG (ref 26–34)
MCHC RBC AUTO-ENTMCNC: 31.3 G/DL (ref 32–36)
MCV RBC AUTO: 98 FL (ref 80–100)
MONOCYTES # BLD AUTO: 0.42 X10*3/UL (ref 0.05–0.8)
MONOCYTES NFR BLD AUTO: 6.1 %
NEUTROPHILS # BLD AUTO: 4.77 X10*3/UL (ref 1.6–5.5)
NEUTROPHILS NFR BLD AUTO: 68.7 %
NRBC BLD-RTO: 0 /100 WBCS (ref 0–0)
PHOSPHATE SERPL-MCNC: 2.7 MG/DL (ref 2.5–4.9)
PLATELET # BLD AUTO: 111 X10*3/UL (ref 150–450)
POTASSIUM SERPL-SCNC: 3.8 MMOL/L (ref 3.5–5.3)
RBC # BLD AUTO: 4.57 X10*6/UL (ref 4.5–5.9)
SODIUM SERPL-SCNC: 145 MMOL/L (ref 136–145)
WBC # BLD AUTO: 6.9 X10*3/UL (ref 4.4–11.3)

## 2024-01-17 PROCEDURE — 2500000004 HC RX 250 GENERAL PHARMACY W/ HCPCS (ALT 636 FOR OP/ED): Performed by: STUDENT IN AN ORGANIZED HEALTH CARE EDUCATION/TRAINING PROGRAM

## 2024-01-17 PROCEDURE — 1200000002 HC GENERAL ROOM WITH TELEMETRY DAILY

## 2024-01-17 PROCEDURE — 85025 COMPLETE CBC W/AUTO DIFF WBC: CPT

## 2024-01-17 PROCEDURE — 2500000001 HC RX 250 WO HCPCS SELF ADMINISTERED DRUGS (ALT 637 FOR MEDICARE OP)

## 2024-01-17 PROCEDURE — 99223 1ST HOSP IP/OBS HIGH 75: CPT

## 2024-01-17 PROCEDURE — 2500000001 HC RX 250 WO HCPCS SELF ADMINISTERED DRUGS (ALT 637 FOR MEDICARE OP): Performed by: STUDENT IN AN ORGANIZED HEALTH CARE EDUCATION/TRAINING PROGRAM

## 2024-01-17 PROCEDURE — 99232 SBSQ HOSP IP/OBS MODERATE 35: CPT

## 2024-01-17 PROCEDURE — 82947 ASSAY GLUCOSE BLOOD QUANT: CPT

## 2024-01-17 PROCEDURE — 84520 ASSAY OF UREA NITROGEN: CPT

## 2024-01-17 PROCEDURE — 2500000001 HC RX 250 WO HCPCS SELF ADMINISTERED DRUGS (ALT 637 FOR MEDICARE OP): Performed by: INTERNAL MEDICINE

## 2024-01-17 PROCEDURE — 2500000004 HC RX 250 GENERAL PHARMACY W/ HCPCS (ALT 636 FOR OP/ED)

## 2024-01-17 PROCEDURE — 99497 ADVNCD CARE PLAN 30 MIN: CPT

## 2024-01-17 PROCEDURE — 36415 COLL VENOUS BLD VENIPUNCTURE: CPT

## 2024-01-17 PROCEDURE — 99233 SBSQ HOSP IP/OBS HIGH 50: CPT

## 2024-01-17 PROCEDURE — 83735 ASSAY OF MAGNESIUM: CPT

## 2024-01-17 RX ORDER — POTASSIUM CHLORIDE 1.5 G/1.58G
20 POWDER, FOR SOLUTION ORAL ONCE
Status: COMPLETED | OUTPATIENT
Start: 2024-01-17 | End: 2024-01-17

## 2024-01-17 RX ADMIN — POTASSIUM CHLORIDE 20 MEQ: 1.5 POWDER, FOR SOLUTION ORAL at 11:13

## 2024-01-17 RX ADMIN — POLYETHYLENE GLYCOL 3350 17 G: 17 POWDER, FOR SOLUTION ORAL at 09:02

## 2024-01-17 RX ADMIN — SERTRALINE HYDROCHLORIDE 50 MG: 50 TABLET ORAL at 09:02

## 2024-01-17 RX ADMIN — LEVOTHYROXINE SODIUM 200 MCG: 75 TABLET ORAL at 06:14

## 2024-01-17 RX ADMIN — METOPROLOL TARTRATE 25 MG: 25 TABLET, FILM COATED ORAL at 20:52

## 2024-01-17 RX ADMIN — INSULIN GLARGINE 8 UNITS: 100 INJECTION, SOLUTION SUBCUTANEOUS at 20:52

## 2024-01-17 RX ADMIN — STANDARDIZED SENNA CONCENTRATE 17.2 MG: 8.6 TABLET ORAL at 20:52

## 2024-01-17 RX ADMIN — ESOMEPRAZOLE MAGNESIUM 20 MG: 40 FOR SUSPENSION ORAL at 09:02

## 2024-01-17 RX ADMIN — INSULIN HUMAN 1 UNITS: 100 INJECTION, SOLUTION PARENTERAL at 19:02

## 2024-01-17 RX ADMIN — HEPARIN SODIUM 5000 UNITS: 5000 INJECTION INTRAVENOUS; SUBCUTANEOUS at 22:10

## 2024-01-17 RX ADMIN — HEPARIN SODIUM 5000 UNITS: 5000 INJECTION INTRAVENOUS; SUBCUTANEOUS at 06:14

## 2024-01-17 RX ADMIN — STANDARDIZED SENNA CONCENTRATE 17.2 MG: 8.6 TABLET ORAL at 09:02

## 2024-01-17 RX ADMIN — HEPARIN SODIUM 5000 UNITS: 5000 INJECTION INTRAVENOUS; SUBCUTANEOUS at 14:02

## 2024-01-17 RX ADMIN — METOPROLOL TARTRATE 25 MG: 25 TABLET, FILM COATED ORAL at 09:03

## 2024-01-17 ASSESSMENT — PAIN SCALES - PAIN ASSESSMENT IN ADVANCED DEMENTIA (PAINAD)
TOTALSCORE: 0
CONSOLABILITY: NO NEED TO CONSOLE
FACIALEXPRESSION: SMILING OR INEXPRESSIVE
BREATHING: NORMAL
BODYLANGUAGE: RELAXED

## 2024-01-17 ASSESSMENT — COGNITIVE AND FUNCTIONAL STATUS - GENERAL
WALKING IN HOSPITAL ROOM: TOTAL
TOILETING: TOTAL
DRESSING REGULAR UPPER BODY CLOTHING: TOTAL
MOVING TO AND FROM BED TO CHAIR: TOTAL
DAILY ACTIVITIY SCORE: 6
MOVING FROM LYING ON BACK TO SITTING ON SIDE OF FLAT BED WITH BEDRAILS: TOTAL
MOBILITY SCORE: 6
PERSONAL GROOMING: TOTAL
DRESSING REGULAR LOWER BODY CLOTHING: TOTAL
TURNING FROM BACK TO SIDE WHILE IN FLAT BAD: TOTAL
CLIMB 3 TO 5 STEPS WITH RAILING: TOTAL
STANDING UP FROM CHAIR USING ARMS: TOTAL
HELP NEEDED FOR BATHING: TOTAL
EATING MEALS: TOTAL

## 2024-01-17 ASSESSMENT — PAIN SCALES - GENERAL: PAINLEVEL_OUTOF10: 0 - NO PAIN

## 2024-01-17 NOTE — SIGNIFICANT EVENT
RAPID RESPONSE RN NOTE:       01/17/24 1214   Onset Documentation   Rapid Response Initiated By Radar auto page   [place] [facility] [place] [facility]   Pager Time 1214   Arrival Time 1244   Event End Time 1250   Level II Called No   Primary Reason for Call Radar auto page  (RADAR score = 6)     RADAR auto page received.  Temp 36.6, HR 71, RR 20, /59, pulse ox 91%. Resp therapist to bedside for NT suctioning.  No further rapid response RN needs at this time per bedside RN.  Per bedside RN and RT,  pt improved post suctioning.  Please page rapid response for any clinical deterioration or assistance needed.

## 2024-01-17 NOTE — CARE PLAN
The patient's goals for the shift include  patient will remain injuries free throughout day shift    The clinical goals for the shift include Patient will tolerate tube feed at goal rate of 55 during shift.    Over the shift, the patient did not make progress toward the following goals. Barriers to progression include patient does not engage with any physical activities. Recommendations to address these barriers include turn side to side patient every two hours to prevent pressure ulcers while resting in bed.    Problem: Diabetes  Goal: Achieve decreasing blood glucose levels by end of shift  1/17/2024 1722 by Alexis Lopez RN  Outcome: Progressing  1/17/2024 1720 by Alexis Lopez RN  Outcome: Not Progressing  Goal: Increase stability of blood glucose readings by end of shift  1/17/2024 1722 by Alexis Lopez RN  Outcome: Progressing  1/17/2024 1720 by Alexis Lopez RN  Outcome: Not Progressing  Goal: Decrease in ketones present in urine by end of shift  1/17/2024 1722 by Alexis Lopez RN  Outcome: Progressing  1/17/2024 1720 by Alexis Lopez RN  Outcome: Not Progressing  Goal: Maintain electrolyte levels within acceptable range throughout shift  1/17/2024 1722 by Alexis Lopez RN  Outcome: Progressing  1/17/2024 1720 by Alexis Lopez RN  Outcome: Not Progressing  Goal: Maintain glucose levels >70mg/dl to <250mg/dl throughout shift  1/17/2024 1722 by Alexis Lopez RN  Outcome: Progressing  1/17/2024 1720 by Alexis Lopez RN  Outcome: Not Progressing  Goal: No changes in neurological exam by end of shift  1/17/2024 1722 by Alexis Lopez RN  Outcome: Progressing  1/17/2024 1720 by Alexis Lopez RN  Outcome: Not Progressing  Goal: Learn about and adhere to nutrition recommendations by end of shift  1/17/2024 1722 by Alexis Lopez RN  Outcome: Progressing  1/17/2024 1720 by Alexis Lopez RN  Outcome: Not Progressing  Goal: Vital signs within normal range for age by end of shift  1/17/2024 1722 by Alexis  ANASTASIYA Lopez  Outcome: Progressing  1/17/2024 1720 by Alexis Lopez RN  Outcome: Not Progressing  Goal: Increase self care and/or family involovement by end of shift  1/17/2024 1722 by Alexis Lopez RN  Outcome: Progressing  1/17/2024 1720 by Alexis Lopez RN  Outcome: Not Progressing  Goal: Receive DSME education by end of shift  1/17/2024 1722 by Alexis Lopez RN  Outcome: Progressing  1/17/2024 1720 by Alexis Lopez RN  Outcome: Not Progressing     Problem: Fall/Injury  Goal: Not fall by end of shift  1/17/2024 1722 by Alexis Lopez RN  Outcome: Progressing  1/17/2024 1720 by Alexis Lopez RN  Outcome: Not Progressing  Goal: Be free from injury by end of the shift  1/17/2024 1722 by Alexis Lopez RN  Outcome: Progressing  1/17/2024 1720 by Alexis Lopez RN  Outcome: Not Progressing  1/17/2024 1719 by Alexis Lopez RN  Outcome: Progressing  Goal: Verbalize understanding of personal risk factors for fall in the hospital  1/17/2024 1722 by Alexis Lopez RN  Outcome: Progressing  1/17/2024 1720 by Alexis Lopez RN  Outcome: Not Progressing  1/17/2024 1719 by Alexis Lopez RN  Outcome: Progressing  Goal: Verbalize understanding of risk factor reduction measures to prevent injury from fall in the home  1/17/2024 1722 by Alexis Lopez RN  Outcome: Progressing  1/17/2024 1720 by Alexis Lopez RN  Outcome: Not Progressing  1/17/2024 1719 by Alexis Lopez RN  Outcome: Progressing  Goal: Use assistive devices by end of the shift  1/17/2024 1722 by Alexis Lopez RN  Outcome: Progressing  1/17/2024 1720 by Alexis Lopez RN  Outcome: Not Progressing  1/17/2024 1719 by Alexis Lopez RN  Outcome: Progressing  Goal: Pace activities to prevent fatigue by end of the shift  1/17/2024 1722 by Alexis Lopez RN  Outcome: Progressing  1/17/2024 1720 by Alexis Lopez RN  Outcome: Not Progressing  1/17/2024 1719 by Alexis Lopez RN  Outcome: Progressing     Problem: Skin  Goal: Decreased wound size/increased tissue  granulation at next dressing change  1/17/2024 1722 by Alexis Lopez RN  Outcome: Progressing  1/17/2024 1720 by Alexis Lopez RN  Outcome: Not Progressing  1/17/2024 1719 by Alexis Lopez RN  Outcome: Progressing  Goal: Participates in plan/prevention/treatment measures  1/17/2024 1722 by Alexis Lopez RN  Outcome: Progressing  1/17/2024 1720 by Alexis Lopez RN  Outcome: Not Progressing  1/17/2024 1719 by Alexis Lopez RN  Outcome: Progressing  Goal: Prevent/manage excess moisture  1/17/2024 1722 by Alexis Lopez RN  Outcome: Progressing  1/17/2024 1720 by Alexis Lopez RN  Outcome: Not Progressing  1/17/2024 1719 by Alexis Lopez RN  Outcome: Progressing  Goal: Prevent/minimize sheer/friction injuries  1/17/2024 1722 by Alexis Lopez RN  Outcome: Progressing  1/17/2024 1720 by Alexis Lopez RN  Outcome: Not Progressing  1/17/2024 1719 by Alexis Lopez RN  Outcome: Progressing  Goal: Promote/optimize nutrition  1/17/2024 1722 by Alexis Lopez RN  Outcome: Progressing  1/17/2024 1720 by Alexis Lopez RN  Outcome: Not Progressing  1/17/2024 1719 by Alexis Lopez RN  Outcome: Progressing  Goal: Promote skin healing  1/17/2024 1722 by Alexis Lopez RN  Outcome: Progressing  1/17/2024 1720 by Alexis Lopez RN  Outcome: Not Progressing  1/17/2024 1719 by Alexis Lopez RN  Outcome: Progressing     Problem: Pain - Adult  Goal: Verbalizes/displays adequate comfort level or baseline comfort level  1/17/2024 1722 by Alexis Lopez RN  Outcome: Progressing  1/17/2024 1720 by Alexis Lopez RN  Outcome: Not Progressing  1/17/2024 1719 by Alexis Lopez RN  Outcome: Progressing     Problem: Safety - Adult  Goal: Free from fall injury  1/17/2024 1722 by Alexis Lopez RN  Outcome: Progressing  1/17/2024 1720 by Alexis Lopez RN  Outcome: Not Progressing  1/17/2024 1719 by Alexis Lopez RN  Outcome: Progressing     Problem: Discharge Planning  Goal: Discharge to home or other facility with appropriate  resources  1/17/2024 1722 by Alexis Lopez RN  Outcome: Progressing  1/17/2024 1720 by Alexis Lopez RN  Outcome: Not Progressing  1/17/2024 1719 by Alexis Lopez RN  Outcome: Progressing     Problem: Chronic Conditions and Co-morbidities  Goal: Patient's chronic conditions and co-morbidity symptoms are monitored and maintained or improved  1/17/2024 1722 by Alexis Lopez RN  Outcome: Progressing  1/17/2024 1720 by Alexis Lopez RN  Outcome: Not Progressing  1/17/2024 1719 by Alexis Lopez RN  Outcome: Progressing     Problem: Nutrition  Goal: Less than 5 days NPO/clear liquids  1/17/2024 1722 by Alexis Lopez RN  Outcome: Progressing  1/17/2024 1720 by Alexis Lopez RN  Outcome: Not Progressing  1/17/2024 1719 by Alexis Lopez RN  Outcome: Progressing  Goal: Oral intake greater than 50%  1/17/2024 1722 by Alexis Lopez RN  Outcome: Progressing  1/17/2024 1720 by Alexis Lopez RN  Outcome: Not Progressing  1/17/2024 1719 by Alexis Lopez RN  Outcome: Progressing  Goal: Oral intake greater 75%  1/17/2024 1722 by Alexis Lopez RN  Outcome: Progressing  1/17/2024 1720 by Alexis Lopez RN  Outcome: Not Progressing  1/17/2024 1719 by Alexis Lopez RN  Outcome: Progressing  Goal: Consume prescribed supplement  1/17/2024 1722 by Alexis Lopez RN  Outcome: Progressing  1/17/2024 1720 by Alexis Lopez RN  Outcome: Not Progressing  1/17/2024 1719 by Alexis Lopez RN  Outcome: Progressing  Goal: Adequate PO fluid intake  1/17/2024 1722 by Alexis Lopez RN  Outcome: Progressing  1/17/2024 1720 by Alexis Lopez RN  Outcome: Not Progressing  1/17/2024 1719 by Alexis Lopez RN  Outcome: Progressing  Goal: Nutrition support goals are met within 48 hrs  1/17/2024 1722 by Alexis Lopez RN  Outcome: Progressing  1/17/2024 1720 by Alexis Lopez RN  Outcome: Not Progressing  1/17/2024 1719 by Alexis Lopez RN  Outcome: Progressing  Goal: Nutrition support is meeting 75% of nutrient needs  1/17/2024 1722 by Alexis  Jessica, RN  Outcome: Progressing  1/17/2024 1720 by Alexis Lopez RN  Outcome: Not Progressing  1/17/2024 1719 by Alexis Lopez RN  Outcome: Progressing  Goal: Tube feed tolerance  1/17/2024 1722 by Alexis Lopez, RN  Outcome: Progressing  1/17/2024 1720 by Alexis Lopez, RN  Outcome: Not Progressing  1/17/2024 1719 by Alexis Lopez, RN  Outcome: Progressing  Goal: BG  mg/dL  1/17/2024 1722 by Alexis Lopez, RN  Outcome: Progressing  1/17/2024 1720 by Alexis Lopez, RN  Outcome: Not Progressing  1/17/2024 1719 by Alexis Lopez RN  Outcome: Progressing  Goal: Lab values WNL  1/17/2024 1722 by Alexis Lopez, RN  Outcome: Progressing  1/17/2024 1720 by Alexis Lopez, RN  Outcome: Not Progressing  1/17/2024 1719 by Alexis Lopez RN  Outcome: Progressing  Goal: Electrolytes WNL  1/17/2024 1722 by Alexis Lopez, RN  Outcome: Progressing  1/17/2024 1720 by Aleixs Lopez, RN  Outcome: Not Progressing  1/17/2024 1719 by Alexis Lopez RN  Outcome: Progressing  Goal: Promote healing  1/17/2024 1722 by Alexis Lopez, RN  Outcome: Progressing  1/17/2024 1720 by Alexis Lopez, RN  Outcome: Not Progressing  1/17/2024 1719 by Alexis Lopez, RN  Outcome: Progressing  Goal: Maintain stable weight  1/17/2024 1722 by Alexis Lopez, RN  Outcome: Progressing  1/17/2024 1720 by Alexis Lopez, RN  Outcome: Not Progressing  1/17/2024 1719 by Alexis Lopez, RN  Outcome: Progressing  Goal: Reduce weight from edema/fluid  1/17/2024 1722 by Alexis Lopez, RN  Outcome: Progressing  1/17/2024 1720 by Alexis Lopez, RN  Outcome: Not Progressing  1/17/2024 1719 by Alexis Lopez, RN  Outcome: Progressing  Goal: Gradual weight gain  1/17/2024 1722 by Alexis Lopez RN  Outcome: Progressing  1/17/2024 1720 by Alexis Lopez RN  Outcome: Not Progressing  1/17/2024 1719 by Alexis Lopez RN  Outcome: Progressing  Goal: Improve ostomy output  1/17/2024 1722 by Alexis Lopez RN  Outcome: Progressing  1/17/2024 1720 by Alexis Lopez,  RN  Outcome: Not Progressing  1/17/2024 1719 by Alexis Lopez RN  Outcome: Progressing     Problem: Knowledge Deficit  Goal: Patient/family/caregiver demonstrates understanding of disease process, treatment plan, medications, and discharge instructions  1/17/2024 1722 by Alexis Lopez RN  Outcome: Progressing  1/17/2024 1720 by Alexis Lopez RN  Outcome: Not Progressing  1/17/2024 1719 by Alexis Lopez RN  Outcome: Progressing     Problem: Mechanical Ventilation  Goal: Patient Will Maintain Patent Airway  1/17/2024 1722 by Alexis Lopez RN  Outcome: Progressing  1/17/2024 1720 by Alexis Lopez RN  Outcome: Not Progressing  1/17/2024 1719 by Alexis Lopez RN  Outcome: Progressing  Goal: Oral health is maintained or improved  1/17/2024 1722 by Alexis Lopez RN  Outcome: Progressing  1/17/2024 1720 by Alexis Lopez RN  Outcome: Not Progressing  1/17/2024 1719 by Alexis Lopez RN  Outcome: Progressing  Goal: Tracheostomy will be managed safely  1/17/2024 1722 by Alexis Lopez RN  Outcome: Progressing  1/17/2024 1720 by Alexis Lopez RN  Outcome: Not Progressing  1/17/2024 1719 by Alexis Lopez RN  Outcome: Progressing  Goal: ET tube will be managed safely  1/17/2024 1722 by Alexis Lopez RN  Outcome: Progressing  1/17/2024 1720 by Alexis Lopez RN  Outcome: Not Progressing  1/17/2024 1719 by Alexis Lopez RN  Outcome: Progressing  Goal: Ability to express needs and understand communication  1/17/2024 1722 by Alexis Lopez RN  Outcome: Progressing  1/17/2024 1720 by Alexis Lopez RN  Outcome: Not Progressing  1/17/2024 1719 by Alexis Lopez RN  Outcome: Progressing  Goal: Mobility/activity is maintained at optimum level for patient  1/17/2024 1722 by Alexis Lopez RN  Outcome: Progressing  1/17/2024 1720 by Alexis Lopez RN  Outcome: Not Progressing  1/17/2024 1719 by Alexis Lopez RN  Outcome: Progressing

## 2024-01-17 NOTE — PROGRESS NOTES
Transitional Care Coordinator Progress Note:   Palliative care meeting with family today at 1400.  PT/OT attempted work with pt this afternoon but not able to participate in therapy.  Care coordinator will continue to follow for discharge planning needs.     Sherie Montez MSN, RN-BC  Transitional Care Coordinator (TCC)  [phone]

## 2024-01-17 NOTE — PROGRESS NOTES
Occupational Therapy                 Therapy Communication Note    Patient Name: Gopal Ness  MRN: [id number]  Today's Date: 1/17/2024     Discipline: Occupational Therapy    Missed Visit Reason: Pt not appropriate for OT at this time; initially responsive to increased verbal /tactile stimuli, however, quickly with decreased attention /arousal despite max encouragement /effort from therapy services. Will d/c orders and remain available in the event of change in pt functional /mental status.     Missed Time: Attempt    ---------------  Cheryl Henson (OTR/L, OTD)  Inpatient Occupational Therapist   Rehab Office: [phone]

## 2024-01-17 NOTE — ACP (ADVANCE CARE PLANNING)
"Confirming Previous Code Status:   Advance Care Planning Note     Discussion Date: 01/17/24   Discussion Participants: daughter    The patient wishes to discuss Advance Care Planning today and the following is a brief summary of our discussion.     Patient has capacity to make their own medical decisions: No  Health Care Agent/Surrogate Decision Maker documented in chart: Yes    Documents on file and valid:  Advance Directive/Living Will: No   Health Care Power of : No      Communication of Medical Status/Prognosis:   Code status discussion completed today (1/17/24) with pt's daughter, Donya, over the phone. Pt's current hospitalization reviewed, with ongoing AMS with unclear etiology, as CT head was negative, EEG noting encephalopathy, and pt's PNA and metabolic abnormalities have been corrected. Pt's daughter expressed frustration of unknown diagnosis and is hoping to give pt more time. Pt's daughter states that during her visits, the pt has intermittently been alert and interactive; however, they are short-lasting and have not been noted by primary team yet. Pt's daughter understands that pt's mental status may be his new baseline and may not be recoverable, however, she is hoping for potential recovery.     Communication of Treatment Goals/Options:   I expressed my concern of pt's altered mental status as prohibitive for pt's participation in PT/OT and bedside swallow evaluation. Pt's daughter acknowledges a concern for aspiration PNA with current tube feed, however is concerned that he is not getting nutritional support otherwise. Pt's daughter voluntarily participated in an advanced care planning discussion; pt's daughter states that she has had previous conversations with the pt and he has expressed his desire to \"not be a vegetable hooked up to machines long-term\". Pt's daughter agreeable to DNR/DNI at this time. We mutually agreed to discuss daily of pt's current clinical status, and will have " check ins throughout the week to assess any progress, challenges or ongoing altered mental status. Primary team notified; primary to place code status orders.    Treatment Decisions  Goals of Care: quality of life is prioritized; willing to accept low-burden treatments to achieve meaningful goals     Follow Up Plan  Primary team to place code status orders    Team Members  Myself    Time Statement: Total face to face time spent on advance care planning was 60 minutes with 30 minutes spent in counseling, including the explanation.    CHEMA Jackson-CNP  1/17/2024 5:16 PM

## 2024-01-17 NOTE — CARE PLAN
Problem: Diabetes  Goal: Achieve decreasing blood glucose levels by end of shift  Outcome: Progressing  Goal: Increase stability of blood glucose readings by end of shift  Outcome: Progressing  Goal: Decrease in ketones present in urine by end of shift  Outcome: Progressing  Goal: Maintain electrolyte levels within acceptable range throughout shift  Outcome: Progressing  Goal: Maintain glucose levels >70mg/dl to <250mg/dl throughout shift  Outcome: Progressing  Goal: No changes in neurological exam by end of shift  Outcome: Progressing  Goal: Learn about and adhere to nutrition recommendations by end of shift  Outcome: Progressing  Goal: Vital signs within normal range for age by end of shift  Outcome: Progressing  Goal: Increase self care and/or family involovement by end of shift  Outcome: Progressing  Goal: Receive DSME education by end of shift  Outcome: Progressing     Problem: Fall/Injury  Goal: Not fall by end of shift  Outcome: Progressing  Goal: Be free from injury by end of the shift  Outcome: Progressing  Goal: Verbalize understanding of personal risk factors for fall in the hospital  Outcome: Progressing  Goal: Verbalize understanding of risk factor reduction measures to prevent injury from fall in the home  Outcome: Progressing  Goal: Use assistive devices by end of the shift  Outcome: Progressing  Goal: Pace activities to prevent fatigue by end of the shift  Outcome: Progressing     Problem: Skin  Goal: Decreased wound size/increased tissue granulation at next dressing change  Outcome: Progressing  Goal: Participates in plan/prevention/treatment measures  Outcome: Progressing  Goal: Prevent/manage excess moisture  Outcome: Progressing  Flowsheets (Taken 1/17/2024 0353)  Prevent/manage excess moisture: Cleanse incontinence/protect with barrier cream  Goal: Prevent/minimize sheer/friction injuries  Outcome: Progressing  Goal: Promote/optimize nutrition  Outcome: Progressing  Goal: Promote skin  healing  Outcome: Progressing     Problem: Pain - Adult  Goal: Verbalizes/displays adequate comfort level or baseline comfort level  Outcome: Progressing     Problem: Safety - Adult  Goal: Free from fall injury  Outcome: Progressing     Problem: Discharge Planning  Goal: Discharge to home or other facility with appropriate resources  Outcome: Progressing     Problem: Chronic Conditions and Co-morbidities  Goal: Patient's chronic conditions and co-morbidity symptoms are monitored and maintained or improved  Outcome: Progressing     Problem: Nutrition  Goal: Less than 5 days NPO/clear liquids  Outcome: Progressing  Goal: Oral intake greater than 50%  Outcome: Progressing  Goal: Oral intake greater 75%  Outcome: Progressing  Goal: Consume prescribed supplement  Outcome: Progressing  Goal: Adequate PO fluid intake  Outcome: Progressing  Goal: Nutrition support goals are met within 48 hrs  Outcome: Progressing  Goal: Nutrition support is meeting 75% of nutrient needs  Outcome: Progressing  Goal: Tube feed tolerance  Outcome: Progressing  Goal: BG  mg/dL  Outcome: Progressing  Goal: Lab values WNL  Outcome: Progressing  Goal: Electrolytes WNL  Outcome: Progressing  Goal: Promote healing  Outcome: Progressing  Goal: Maintain stable weight  Outcome: Progressing  Goal: Reduce weight from edema/fluid  Outcome: Progressing  Goal: Gradual weight gain  Outcome: Progressing  Goal: Improve ostomy output  Outcome: Progressing     Problem: Knowledge Deficit  Goal: Patient/family/caregiver demonstrates understanding of disease process, treatment plan, medications, and discharge instructions  Outcome: Progressing     Problem: Mechanical Ventilation  Goal: Patient Will Maintain Patent Airway  Outcome: Progressing  Goal: Oral health is maintained or improved  Outcome: Progressing  Goal: Tracheostomy will be managed safely  Outcome: Progressing  Goal: ET tube will be managed safely  Outcome: Progressing  Goal: Ability to express  needs and understand communication  Outcome: Progressing  Goal: Mobility/activity is maintained at optimum level for patient  Outcome: Progressing      The clinical goals for the shift include Patient will tolerate tube feed at goal rate of 55 during shift.

## 2024-01-17 NOTE — PROGRESS NOTES
Subjective   Was more lethargic this AM. Only awakened briefly to sternal rub, but will respond to questions by nodding yes or no. Denies any pain at this time.     Was unable to work with PT/SLP yesterday do to this lethargy. PT attempted twice, once in AM and once later in the afternoon in hopes that the patient would be more awake.     Objective     Vitals:  Vitals:    01/17/24 0433   BP: 118/65   Pulse: 75   Resp: 18   Temp: 36.4 °C (97.5 °F)   SpO2: 91%       I/O last 3 completed shifts:  In: 2719 (27.6 mL/kg) [I.V.:265 (2.7 mL/kg); NG/GT:2454]  Out: 1075 (10.9 mL/kg) [Urine:1075 (0.3 mL/kg/hr)]  Weight: 98.4 kg   No intake/output data recorded.    Physical exam:  Constitutional: laying in bed, no acute distress  HEENT: Normocephalic, atraumatic. R eye fixed, L eye reactive. NJ tube in place on 55ml/hr.   Respiratory: On 4L NC, coarse expiratory breath sounds, no wheezing   Cardiovascular: RRR, no murmurs/rubs/gallops  Abdominal: Soft, nondistended, nontender to palpation.   Neuro: Difficult to arouse, will respond to yes no questions. Dysarthric voice. Will move feet spontaneously.  Skin: scattered echymoses, improving rash on chest    Medications:  esomeprazole, 20 mg, nasogastric tube, Daily before breakfast  heparin (porcine), 5,000 Units, subcutaneous, q8h KENDRA  insulin glargine, 8 Units, subcutaneous, Nightly  insulin regular, 0-5 Units, subcutaneous, q6h  levothyroxine, 200 mcg, oral, Daily  metoprolol tartrate, 25 mg, nasogastric tube, BID  polyethylene glycol, 17 g, oral, Daily  sennosides, 2 tablet, oral, BID  sertraline, 50 mg, nasogastric tube, Daily             PRN medications: albuterol, dextrose 10 % in water (D10W), dextrose, diclofenac sodium, glucagon, oxygen, phenoL    Labs:  Results for orders placed or performed during the hospital encounter of 01/01/24 (from the past 24 hour(s))   POCT GLUCOSE   Result Value Ref Range    POCT Glucose 145 (H) 74 - 99 mg/dL   POCT GLUCOSE   Result Value Ref  Range    POCT Glucose 161 (H) 74 - 99 mg/dL   POCT GLUCOSE   Result Value Ref Range    POCT Glucose 166 (H) 74 - 99 mg/dL   POCT GLUCOSE   Result Value Ref Range    POCT Glucose 157 (H) 74 - 99 mg/dL   CBC and Auto Differential   Result Value Ref Range    WBC 6.9 4.4 - 11.3 x10*3/uL    nRBC 0.0 0.0 - 0.0 /100 WBCs    RBC 4.57 4.50 - 5.90 x10*6/uL    Hemoglobin 14.0 13.5 - 17.5 g/dL    Hematocrit 44.8 41.0 - 52.0 %    MCV 98 80 - 100 fL    MCH 30.6 26.0 - 34.0 pg    MCHC 31.3 (L) 32.0 - 36.0 g/dL    RDW 13.1 11.5 - 14.5 %    Platelets 111 (L) 150 - 450 x10*3/uL    Neutrophils % 68.7 40.0 - 80.0 %    Immature Granulocytes %, Automated 1.6 (H) 0.0 - 0.9 %    Lymphocytes % 14.8 13.0 - 44.0 %    Monocytes % 6.1 2.0 - 10.0 %    Eosinophils % 7.5 0.0 - 6.0 %    Basophils % 1.3 0.0 - 2.0 %    Neutrophils Absolute 4.77 1.60 - 5.50 x10*3/uL    Immature Granulocytes Absolute, Automated 0.11 0.00 - 0.50 x10*3/uL    Lymphocytes Absolute 1.03 0.80 - 3.00 x10*3/uL    Monocytes Absolute 0.42 0.05 - 0.80 x10*3/uL    Eosinophils Absolute 0.52 (H) 0.00 - 0.40 x10*3/uL    Basophils Absolute 0.09 0.00 - 0.10 x10*3/uL   Renal Function Panel   Result Value Ref Range    Glucose 131 (H) 74 - 99 mg/dL    Sodium 145 136 - 145 mmol/L    Potassium 3.8 3.5 - 5.3 mmol/L    Chloride 105 98 - 107 mmol/L    Bicarbonate 31 21 - 32 mmol/L    Anion Gap 13 10 - 20 mmol/L    Urea Nitrogen 20 6 - 23 mg/dL    Creatinine 1.03 0.50 - 1.30 mg/dL    eGFR 77 >60 mL/min/1.73m*2    Calcium 8.7 8.6 - 10.6 mg/dL    Phosphorus 2.7 2.5 - 4.9 mg/dL    Albumin 2.9 (L) 3.4 - 5.0 g/dL   Magnesium   Result Value Ref Range    Magnesium 2.01 1.60 - 2.40 mg/dL   POCT GLUCOSE   Result Value Ref Range    POCT Glucose 131 (H) 74 - 99 mg/dL       Imaging:  MR cervical spine wo IV contrast    Result Date: 1/9/2024  Interpreted By:  Joey Rizzo, STUDY: MR CERVICAL SPINE WO IV CONTRAST;  1/8/2024 10:18 pm   INDICATION: Signs/Symptoms:R arm weakness.   COMPARISON: None.    ACCESSION NUMBER(S): [id number]   ORDERING CLINICIAN: DIEGO ABARCA   TECHNIQUE: The cervical spine was studied in the sagittal and axial planes utilizing T1 and T2 weighted images. Examination is limited due to motion artifact   FINDINGS: The craniovertebral junction is normal. The cord is normal in size and signal. The marrow signal is normal. Serial axial images reveal the following: C2/C3 There is normal alignment and vertebral body height. The disc space is normal. There is no evidence of canal or foraminal narrowing. There is no evidence of bulging or herniated disc. C3/C4 There is normal alignment and vertebral body height. The disc space is normal. There is no evidence of canal or foraminal narrowing. There is no evidence of bulging or herniated disc. C4/C5 Right greater than left uncovertebral joint hypertrophy with focal right-sided foraminal narrowing. No measurable canal stenosis. C5/C6 Right greater than left uncovertebral joint hypertrophy without canal stenosis. Right-sided foraminal narrowing C6/C7 Bilateral facet hypertrophy without canal or foraminal narrowing C7/T1 Bilateral facet hypertrophy without canal or foraminal narrowing       * Right-sided foraminal narrowing due to uncovertebral joint hypertrophy at C4/C5 and C5/C6 *No measurable canal stenosis or cord compression.   MACRO: none   Signed by: Joey Rizzo 1/9/2024 9:02 AM Dictation workstation:   [id number]       Assessment/Plan   Mr. Gopal Ness is a 72 y/o man with h/o pituitary adenoma s/p resection at [facility], DVT/PE on Eliquis, COPD (noncompliant with medications)/tobacco use, depresssion, DMII, HLD presenting after being found down i/s/o fall on 12/31 (LKN 1100 on 12/31). Brought to OSH, negative CTH, intubated for airway protection. EMS reported witnessing possible seizure activity. Also with persistent tachycardia, leukocytosis and infiltrate on chest CT, so treating presumed aspiration pneumonia with antibiotics.  Transferred to [facility] for cvEEG which did not demonstrate seizures. MRI brain did not show acute stroke. Extubated 1/2. Course complicated by Covid pnuemonia, LEEANNE, SVT likely in the setting of infection, and RUE weakness in the setting of a fall. Currently admitted for continued altered mental status despite resolution of infection, correction of electrolytes, and lack of structural lesions that would explain his change in mentation.      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 New small dural based enhancing masses in the R posterior fossa  - MRI Brain and MRI Sella - residual adenoma unchanged from prior examination and similar to slightly increased size of small dural-based enhancing masses of small dural-based enhancing masses within the R posterior fossa favored to represent meningiomas.  - EEG - mod diffuse encephalopathy. Improvement in degree of encephalopathy compared to the previous day  - Keppra 1000mg BID discontinued  - MRI cervical spine showed right-sided foraminal narrowing d/t uncovertebral joint hypertrophy at C4/C5 and C5/C6  Plan:  - Hold home trazadone 50mg at bedtime  - Continue home sertraline 50mg daily   - Outpatient neurosurgery/neuro-ophthalmology follow up ordered  - EMG as an outpatient (1/22/23 earliest available), follow up with neurology in 4 weeks  - Repeat CT on 1/14 - atrophy, no acute intercranial pathology   - Corrected underlying metabolic and infectious causes of encephalopathy. No structural causes seen on imaging.      #Acute hypoxic respiratory failure  #COVID pneumonia, CAP  #COPD  - Maintain O2- 88-92%, currently on 4L NC  - Cpap at night  - Elevated CRP, LDH, Ferritin, d-dimer  - Sputum culture - Strep pneumoniae  - s/p 1x cefepime 2g on 1/1, meropenem (1/1-1/2), Vanc 1.5g (1/1-1/2) then ceftriaxone 2g (1/2- 1/8)   - Finished remdesivir (1/6-1/10), dexamethasone 6mg daily (stopped 1/10)  - Pulmonology consulted: Will start albuterol nebs q6h, RT for BPH, chest vest therapy. Duonebs PRN. No abx at this time.     #Hypernatremia - resolved  #LEEANNE (baseline Cr 0.8-0.9) - resolved  #Hx of DI   - Hold home tamsulosin 0.4mg daily while NPO  - Hold further D5W, continue with 250ml q6h FWF     #SVT  - Several episodes of SVT, terminated spontaneously, some associated with hypotension down to 80/50s.  - Consulted cardiology, pending recs.         -Metoprolol tartrate 25mg BID from 12.5mg q6h        -If recurrent of persistent SVT, start with vagal maneuvers (carotid sinus massage), if      unsuccessful, may consider Adenosine dose and/or IV Metop        -Please continue to monitor patient on telemetry        -Please obtain EKG if there's recurrence of SVT        -Optimal lytes: K>4, Mag>2      #panhypothyroidism s/p adenoma resection  #Cushing disease  #diabetes mellitus  - Holding home glimepiride 4mg BID, metformin 1000mg BID  - A1C 5.6  - AM free T4 0.92  Plan:  - Mild corrective SSI #1  - POCT glucose q6hrs while on tube feeds, Hypoglycemia protocol  - Continue home levothyroxine 200mcg daily  - Glargine 8u nightly  - follow up outpatient with endocrinologist Dr. Yvonne Lofton in 4 weeks (scheduled by endocrine)     #hx of DVT/PE  - H/H: 16.9/51 -> 14.3/45.8  - PLT: 148 -> 133 -> 98 > 100  - 4T score: 3, low probability <5%  - Daily CBC    - Hold home Eliquis at this time      F: FWF with TF  E: Replete PRN  N: Nepro  A: PIVs  Lomeli: None  GI ppx: PPI  DVT ppx: SQH     Code status: Full Code  NOK/Surrogate: Donya Emerson (Daughter)  [phone]      Fatmata Kumar MD

## 2024-01-17 NOTE — PROGRESS NOTES
Physical Therapy                 Therapy Communication Note    Patient Name: Gopal Ness  MRN: [id number]  Today's Date: 1/17/2024     Discipline: Physical Therapy    Missed Visit Reason: Missed Visit Reason:  pt not appropriate for PT at this time. Pt responding initially to verbal stimuli, opening eyes, then closing again. Pt responding briefly to noxious stimuli, then returning to eyes shut and not responding. At this time, will DC order. Please re-consult PT services if pt becomes more alert and able to participate in therapy.       01/17/24 at 1:48 PM - Cinthya Eng, PT

## 2024-01-17 NOTE — CONSULTS
Inpatient consult to Palliative Care  Consult performed by: Aishwarya Deng, APRN-CNP  Consult ordered by: Cinthia Alcala DO          Reason For Consult  Reason for Consult: communication / medical decision making and patient/family support     History Of Present Illness  Gopal Ness is a 73 y.o. male with past medical history of  pituitary adenoma s/p resection at [facility] 2016, DVT/PE, COPD, current tobacco use, depression, DM2, HLD  is presenting with fall and found down at home on 12/31/23; course c/b ARDS requiring intubation (extubated 1/2/24), LEEANNE, PNA, COVID, and imaging c/f new meningiomas, runs of SVT, ongoing AMS. Palliative care consulted for medical decision making.    Symptoms-unable to assess, pt not responsive to verbal stimuli at time of exam    Serious Illness Conversation- completed with pt's daughterDonya, over the phone   Information: pt's daughter prefers both daily updates and big picture approach to medical information   Understanding: pt's daughter has a moderate understanding of pt's current medical condition; expressed frustration regarding unknown etiology of AMS  Prognosis: pt's daughter prefers disclosure of prognosis   Joys: shopping, collectables, family   Goals: improved functionality and mentation   Fears and worries: dying   Minimal acceptable outcome: cognition, independence with toileting, feeding, ambulation  Family: pt's family aware of pt's current medical condition     Advance Care Planning:  Health Care Agent-pt's daughterDonya: [phone]  Advance directives-not on file     Family Hx: Mother: COPD, leukemia. Father: A fib, dementia. 1 brother, 1 sister; unknown medical Hx. 1 daughter; healthy     Social Hx: lives in a mobile home, 3 steps leading into. Does not use assistive devices for ambulation. Previously worked as a  at  and as a tow motor. , 1 daughter. Current tobacco use; 0.5 ppd x 40 years. Denies ETOH or illicit drug  "use.    Spiritual Hx: raised Jain, identifies with Zoroastrian and non-Orthodoxy practices      Information obtained from chart review, discussion with patient/family, and discussion with primary team.      Past Medical History  He has no past medical history on file.    Surgical History  He has a past surgical history that includes Total hip arthroplasty (08/01/2014) and Tonsillectomy (08/01/2014).     Family History  No family history on file.  Allergies  Codeine and Penicillins    Review of Systems   Reason unable to perform ROS: Pt not responsive to verbal or painful stimuli.        Physical Exam  Vitals reviewed.   Constitutional:       General: He is sleeping. He is not in acute distress.     Appearance: He is overweight. He is ill-appearing.   HENT:      Head: Normocephalic and atraumatic.      Nose: Nose normal.      Mouth/Throat:      Mouth: Mucous membranes are dry.   Cardiovascular:      Rate and Rhythm: Normal rate and regular rhythm.   Pulmonary:      Effort: Pulmonary effort is normal.      Breath sounds: Normal breath sounds.   Abdominal:      General: Abdomen is flat. There is distension.      Palpations: Abdomen is soft.   Skin:     General: Skin is warm and dry.   Neurological:      General: No focal deficit present.      Mental Status: He is unresponsive.      Sensory: Sensory deficit present.      Comments: Not responsive to painful stimuli on B/L UE   Psychiatric:         Speech: He is noncommunicative.         Cognition and Memory: Cognition is impaired. Memory is impaired.         Last Recorded Vitals  Blood pressure 117/67, pulse 81, temperature 36.4 °C (97.5 °F), temperature source Temporal, resp. rate 20, height 1.829 m (6' 0.01\"), weight 98.4 kg (216 lb 14.9 oz), SpO2 92 %.    Relevant Results  Scheduled medications  esomeprazole, 20 mg, nasogastric tube, Daily before breakfast  heparin (porcine), 5,000 Units, subcutaneous, q8h KENDRA  insulin glargine, 8 Units, subcutaneous, " Nightly  insulin regular, 0-5 Units, subcutaneous, q6h  levothyroxine, 200 mcg, oral, Daily  metoprolol tartrate, 25 mg, nasogastric tube, BID  polyethylene glycol, 17 g, oral, Daily  sennosides, 2 tablet, oral, BID  sertraline, 50 mg, nasogastric tube, Daily      Continuous medications     PRN medications  PRN medications: albuterol, dextrose 10 % in water (D10W), dextrose, diclofenac sodium, glucagon, oxygen, phenoL    Results for orders placed or performed during the hospital encounter of 01/01/24 (from the past 24 hour(s))   POCT GLUCOSE   Result Value Ref Range    POCT Glucose 161 (H) 74 - 99 mg/dL   POCT GLUCOSE   Result Value Ref Range    POCT Glucose 166 (H) 74 - 99 mg/dL   POCT GLUCOSE   Result Value Ref Range    POCT Glucose 157 (H) 74 - 99 mg/dL   CBC and Auto Differential   Result Value Ref Range    WBC 6.9 4.4 - 11.3 x10*3/uL    nRBC 0.0 0.0 - 0.0 /100 WBCs    RBC 4.57 4.50 - 5.90 x10*6/uL    Hemoglobin 14.0 13.5 - 17.5 g/dL    Hematocrit 44.8 41.0 - 52.0 %    MCV 98 80 - 100 fL    MCH 30.6 26.0 - 34.0 pg    MCHC 31.3 (L) 32.0 - 36.0 g/dL    RDW 13.1 11.5 - 14.5 %    Platelets 111 (L) 150 - 450 x10*3/uL    Neutrophils % 68.7 40.0 - 80.0 %    Immature Granulocytes %, Automated 1.6 (H) 0.0 - 0.9 %    Lymphocytes % 14.8 13.0 - 44.0 %    Monocytes % 6.1 2.0 - 10.0 %    Eosinophils % 7.5 0.0 - 6.0 %    Basophils % 1.3 0.0 - 2.0 %    Neutrophils Absolute 4.77 1.60 - 5.50 x10*3/uL    Immature Granulocytes Absolute, Automated 0.11 0.00 - 0.50 x10*3/uL    Lymphocytes Absolute 1.03 0.80 - 3.00 x10*3/uL    Monocytes Absolute 0.42 0.05 - 0.80 x10*3/uL    Eosinophils Absolute 0.52 (H) 0.00 - 0.40 x10*3/uL    Basophils Absolute 0.09 0.00 - 0.10 x10*3/uL   Renal Function Panel   Result Value Ref Range    Glucose 131 (H) 74 - 99 mg/dL    Sodium 145 136 - 145 mmol/L    Potassium 3.8 3.5 - 5.3 mmol/L    Chloride 105 98 - 107 mmol/L    Bicarbonate 31 21 - 32 mmol/L    Anion Gap 13 10 - 20 mmol/L    Urea Nitrogen 20 6 - 23  mg/dL    Creatinine 1.03 0.50 - 1.30 mg/dL    eGFR 77 >60 mL/min/1.73m*2    Calcium 8.7 8.6 - 10.6 mg/dL    Phosphorus 2.7 2.5 - 4.9 mg/dL    Albumin 2.9 (L) 3.4 - 5.0 g/dL   Magnesium   Result Value Ref Range    Magnesium 2.01 1.60 - 2.40 mg/dL   POCT GLUCOSE   Result Value Ref Range    POCT Glucose 131 (H) 74 - 99 mg/dL   POCT GLUCOSE   Result Value Ref Range    POCT Glucose 106 (H) 74 - 99 mg/dL     CT head wo IV contrast    Result Date: 1/15/2024  Interpreted By:  Dmitry Curtis, STUDY: CT HEAD WO IV CONTRAST;  1/14/2024 3:20 pm   INDICATION: Signs/Symptoms: AMS.   COMPARISON: Head CT and CTA, 01/01/2024   ACCESSION NUMBER(S): [id number]   ORDERING CLINICIAN: SAVANNAH LY   TECHNIQUE: Noncontrast axial CT scan of the head was performed. Angled reformats in brain and bone windows were generated. The images were reviewed in bone, brain, blood and soft tissue windows.   FINDINGS: CSF Spaces: The ventricles, sulci and basal cisterns are appropriate for the degree of volume loss. No abnormal extraaxial fluid collection.   Parenchyma: Parenchymal volume loss most pronounced in the frontal lobes is unchanged. Nonspecific low attenuation in the white matter, similar to previous. The grey-white differentiation is intact. There is no mass effect or midline shift. No acute intracranial hemorrhage.   Calvarium: The calvarium is unremarkable.   Paranasal sinuses and mastoids: Partially visualized nasal enteric tube. Stable changes of transsphenoidal mass resection. Scattered paranasal sinus mucosal thickening with dependent heterogeneous attenuation material in the postoperative sphenoid sinus. Persistent right mastoid effusion.   Orbits: Bilateral lens replacement.       No acute intracranial pathology.   MACRO: None   Signed by: Dmitry Curtis 1/15/2024 8:33 AM Dictation workstation:   [id number]    ECG 12 lead    Result Date: 1/14/2024  Normal sinus rhythm Low voltage QRS Nonspecific ST and T wave abnormality  Abnormal ECG When compared with ECG of 05-JAN-2024 04:29, No significant change was found Confirmed by Veto Chavarria ([id number]) on 1/14/2024 12:00:57 AM    XR chest 1 view    Result Date: 1/12/2024  Interpreted By:  Carmelita Donato, STUDY: XR CHEST 1 VIEW;  1/12/2024 1:00 pm   INDICATION: Signs/Symptoms:cough.   COMPARISON: Radiograph dated 01/06/2024   ACCESSION NUMBER(S): [id number]   ORDERING CLINICIAN: TERRI BRYANT   FINDINGS: Enteric tube is in place with the tip beyond the field of view.   Faint consolidation in lung bases, right more than left and stable to slightly worse compared to prior study. No sizable pleural effusion or pneumothorax.   No acute osseous abnormality.       1. Faint consolidation in lung bases, right more than left with suggestion of slight worsening. Correlate with concern for infection and aspiration.         Signed by: Carmelita Alas 1/12/2024 8:16 PM Dictation workstation:   [id number]    MR cervical spine wo IV contrast    Result Date: 1/9/2024  Interpreted By:  Joey Rizzo, STUDY: MR CERVICAL SPINE WO IV CONTRAST;  1/8/2024 10:18 pm   INDICATION: Signs/Symptoms:R arm weakness.   COMPARISON: None.   ACCESSION NUMBER(S): [id number]   ORDERING CLINICIAN: DIEGO ABARCA   TECHNIQUE: The cervical spine was studied in the sagittal and axial planes utilizing T1 and T2 weighted images. Examination is limited due to motion artifact   FINDINGS: The craniovertebral junction is normal. The cord is normal in size and signal. The marrow signal is normal. Serial axial images reveal the following: C2/C3 There is normal alignment and vertebral body height. The disc space is normal. There is no evidence of canal or foraminal narrowing. There is no evidence of bulging or herniated disc. C3/C4 There is normal alignment and vertebral body height. The disc space is normal. There is no evidence of canal or foraminal narrowing. There is no evidence of bulging or herniated disc.  C4/C5 Right greater than left uncovertebral joint hypertrophy with focal right-sided foraminal narrowing. No measurable canal stenosis. C5/C6 Right greater than left uncovertebral joint hypertrophy without canal stenosis. Right-sided foraminal narrowing C6/C7 Bilateral facet hypertrophy without canal or foraminal narrowing C7/T1 Bilateral facet hypertrophy without canal or foraminal narrowing       * Right-sided foraminal narrowing due to uncovertebral joint hypertrophy at C4/C5 and C5/C6 *No measurable canal stenosis or cord compression.   MACRO: none   Signed by: Joey Rizzo 1/9/2024 9:02 AM Dictation workstation:   [id number]    XR chest 1 view    Result Date: 1/6/2024  Interpreted By:  Zeyad Bell, STUDY: XR CHEST 1 VIEW;  1/6/2024 7:13 am   INDICATION: Signs/Symptoms:pneumonia.   COMPARISON: Exam dated 01/04/2024   ACCESSION NUMBER(S): [id number]   ORDERING CLINICIAN: ALEX BARBOZA   FINDINGS: AP radiograph of the chest was provided.   Enteric tube projects over the esophagus and upper abdomen, tip not visualized.   CARDIOMEDIASTINAL SILHOUETTE: Cardiomediastinal silhouette is stable in size and configuration.   LUNGS: Lung volumes are low with associated perihilar bronchovascular crowding. There is superimposed diffuse interstitial prominence. Interval improvement in right mid lung opacity. No large pleural effusion or evidence of pneumothorax.   ABDOMEN: No remarkable upper abdominal findings.   BONES: No acute osseous changes.       1.  Hypoinflated lungs with superimposed findings suggestive of mild pulmonary edema. Correlate with patient fluid volume status. 2. Interval improvement in right mid lung opacity.       MACRO: None   Signed by: Zeyad Bell 1/6/2024 10:30 AM Dictation workstation:   [id number]    XR abdomen 1 view    Result Date: 1/6/2024  Interpreted By:  Zeyad Bell,  and Abbie Melgar STUDY: XR ABDOMEN 1 VIEW;  1/5/2024 9:02 pm   INDICATION: Signs/Symptoms:Follow up DHT  placement post-adjustment.   COMPARISON: Abdominal radiograph on 01/05/2024.   ACCESSION NUMBER(S): [id number]   ORDERING CLINICIAN: ALEX BARBOZA   FINDINGS: Single AP view of the abdomen.   Enteric tube courses below the diaphragm with tip overlying the expected location of the 2nd portion of the duodenum, retracted compared to prior exam.   Nonobstructive bowel gas pattern. Limited evaluation of pneumoperitoneum on supine imaging, however no gross evidence of free air is noted.   Visualized lungs are clear.   Osseous structures demonstrate no acute bony changes.       1. Enteric tube tip projects over the expected location of the 2nd portion of the duodenum, retracted compared to prior exam. 2. Nonobstructive visualized bowel gas pattern.   I personally reviewed the images/study and I agree with the findings as stated. This study was interpreted at [place], [place].   MACRO: None   Signed by: Zeyad Bell 1/6/2024 8:32 AM Dictation workstation:   [id number]    XR abdomen 1 view    Result Date: 1/5/2024  Interpreted By:  Jamaal Cooper, STUDY: XR ABDOMEN 1 VIEW;  1/5/2024 4:49 pm   INDICATION: Signs/Symptoms:s/p dobhoff advancement.   COMPARISON: Abdominal radiograph 01/04/2024 and CT scan 01/01/2024   ACCESSION NUMBER(S): [id number]   ORDERING CLINICIAN: ROXANA MIMS   FINDINGS: Single AP radiograph of the abdomen. Enteric tube is seen coursing below diaphragm and looping within left upper quadrant and tip projecting over expected location of 2nd segment of duodenum   Nonobstructive bowel gas pattern.Limited evaluation of pneumoperitoneum on supine imaging, however no gross evidence of free air is noted.   Visualized lung bases again demonstrate right basilar atelectasis/consolidation.   Osseous structures demonstrate no acute bony changes.       1. Enteric tube tip projects over expected location of 2nd segment of duodenum. 2. Nonobstructive bowel gas pattern.    Signed by: Jamaal Cooper 1/5/2024 4:52 PM Dictation workstation:   [id number]    Electrocardiogram, 12-lead PRN ACS symptoms    Result Date: 1/5/2024  Normal sinus rhythm Low voltage QRS Borderline ECG When compared with ECG of 04-JAN-2024 20:58, No significant change was found Confirmed by Vel Nevarez ([id number]) on 1/5/2024 1:13:31 PM    Electrocardiogram, 12-lead PRN ACS symptoms    Result Date: 1/5/2024  Normal sinus rhythm Low voltage QRS Borderline ECG When compared with ECG of 01-JAN-2024 17:40, IA interval has decreased T wave inversion no longer evident in Lateral leads Confirmed by Roque Vel ([id number]) on 1/5/2024 1:13:17 PM    XR abdomen 1 view    Result Date: 1/5/2024  Interpreted By:  Jamaal Cooper,  and Serenity Yanes STUDY: XR ABDOMEN 1 VIEW;  1/4/2024 6:59 pm   INDICATION: Signs/Symptoms:dobhoff placement.   COMPARISON: Abdominal radiograph dated 01/04/2024 from 3:55 p.m.. CT of the chest, abdomen, and pelvis dated 01/01/2024   ACCESSION NUMBER(S): [id number]   ORDERING CLINICIAN: ROXANA MIMS   FINDINGS: Single AP radiograph of abdomen.   Similar positioning of Dobbhoff enteric tube with tip overlying the expected position of the gastric cardia/proximal body; consider advancement.   Nonobstructive bowel gas pattern. Limited evaluation of pneumoperitoneum on supine imaging, however no gross evidence of free air is noted.   Redemonstration of hazy bibasilar opacities again favored to represent atelectasis.   Osseous structures demonstrate no acute bony changes.       1.  Dobbhoff enteric tube again overlies the expected position of the gastric cardia/proximal body; consider advancement. 2. Bibasilar atelectasis.   I personally reviewed the images/study with Joaquín Benton MD (Radiology Resident) and I agree with the findings as stated. This study was interpreted at [place], [place].   MACRO: None   Signed by: Jamaal Cooper 1/5/2024 8:10 AM Dictation workstation:    [id number]    XR abdomen 1 view    Result Date: 1/4/2024  Interpreted By:  Saul Holt, STUDY: XR ABDOMEN 1 VIEW;  1/4/2024 4:22 pm   INDICATION: Signs/Symptoms:dobhoff placement.   COMPARISON: 01/04/2024   ACCESSION NUMBER(S): [id number]   ORDERING CLINICIAN: ROXANA MIMS   FINDINGS: Dobbhoff tube  overlies the GE junction. Nonobstructive bowel gas pattern. Limited evaluation of pneumoperitoneum on supine imaging, however no gross evidence of free air is noted.   Bibasilar opacities most likely atelectatic in nature.   Osseous structures demonstrate no acute bony changes.       1.  Dobbhoff tube overlies the GE junction.   Signed by: Saul Holt 1/4/2024 5:05 PM Dictation workstation:   [id number]    XR abdomen 1 view    Result Date: 1/4/2024  Interpreted By:  Saul Holt,  and Rosy Scott STUDY: XR ABDOMEN 1 VIEW;  1/4/2024 1:17 pm   INDICATION: Signs/Symptoms:Dobhoff.   COMPARISON: Abdominal radiograph 01/01/2024   ACCESSION NUMBER(S): [id number]   ORDERING CLINICIAN: ROXANA MIMS   FINDINGS: Enteric tube coursing below level of the diaphragm with the internal tip projecting in the expected region of the gastric fundus.   Nonobstructive bowel gas pattern. Limited evaluation of pneumoperitoneum on supine imaging, however no gross evidence of free air is noted.   Visualized lungs are clear.   Osseous structures demonstrate no acute bony changes.       1.  Enteric tube coursing below level of the diaphragm with the internal tip projecting in the expected region of the gastric fundus.   I personally reviewed the images/study and I agree with the findings as stated by Tyler Richard MD (Radiology Resident). This study was interpreted at [facility], [place], [place].   MACRO: None   Signed by: Saul Holt 1/4/2024 3:44 PM Dictation workstation:   [id number]    MR brain w and wo IV contrast    Addendum Date: 1/4/2024     Interpreted By:  Deion Oates, ADDENDUM: The limited extracranial images again demonstrate postoperative changes compatible with a previous right parotidectomy.   Signed by: Deion Oates 1/4/2024 1:46 PM   -------- ORIGINAL REPORT -------- Dictation workstation:   [id number]    Result Date: 1/4/2024  Interpreted By:  Deion Oates,  and Serenity Yanes STUDY: MR SELLA W AND WO IV CONTRAST; MR BRAIN W AND WO IV CONTRAST; 1/3/2024 11:08 pm; 1/3/2024 11:03 pm   INDICATION: Signs/Symptoms:eval asymmetry; pit tummor; Signs/Symptoms:eval asymmetry on examination; pituitary tumor.   COMPARISON: MRI dated 07/31/2023   ACCESSION NUMBER(S): [id number]; [id number]   ORDERING CLINICIAN: JUVENCIO GARVIN   TECHNIQUE: Axial diffusion, axial T2, axial FLAIR, axial gradient echo T2, axial T1, post gadolinium volumetric T1, as well as post gadolinium axial T1 weighted MRI images of the brain were obtained. In addition, high-resolution coronal T2 as well as pre and post gadolinium coronal and sagittal T1 weighted MRI images through the sella were obtained. The patient received  20 mL of  Dotarem gadolinium intravenously.   FINDINGS: Postoperative changes are again identified compatible with a previous trans-sphenoidal surgery.   There is again evidence of irregular abnormal enhancing soft tissue along the lateral margins of the sella most suspicious for residual neoplasm without significant interval change in size and configuration when compared with the prior study dated 07/31/2023. On the right, there is again evidence of lateral extension into the right cavernous sinus. On the left there is again evidence of lateral extension abutting the medial margin of the cavernous segment of the left internal carotid artery and inferior margin of the ophthalmic segment of the left internal carotid artery. Posterior and superiorly on the left, there is again evidence of enhancing soft tissue suggestive of residual neoplasm surrounding the  left dorsum sellae with impression upon the inferior margin of the left optic tract similar when compared with the prior study. There is again evidence of diffuse atrophy of the optic apparatus. Again, no abnormal narrowing of the flow voids of the carotid siphons is noted.   There is again evidence of lobulated foci of extra-axial dural-based enhancement noted along the inferior right cerebellopontine angle cistern similar in appearance when compared with the prior study dated 07/31/2023. The findings remain suspicious for underlying meningioma. Using similar points of measurement on the current and prior study, the more dorsal dominant nodular extra-axial enhancing lesion measures approximately 12 mm x 11 mm in transaxial dimensions by 14 mm in craniocaudal dimension on both the current and prior exam. The smaller nodular lesion more anterior/medially again measures approximately 9 mm x 6 mm in transaxial dimensions on both the current and prior study.   The diffusion-weighted images fail to demonstrate evidence of abnormal diffusion restriction to suggest acute infarction.   The above findings are superimposed upon moderate brain parenchymal volume loss.   Mild nonspecific white matter changes are noted within the cerebral hemispheres bilaterally which while nonspecific, given the patient's age, likely represent sequelae of more remote small-vessel ischemic change.   There is minimal mucosal thickening within the paranasal sinuses.   There is opacification/partial opacification of scattered mastoid air cells right greater than left.       Postoperative changes are again identified compatible with a previous trans-sphenoidal surgery.   There is again evidence of irregular abnormal enhancing soft tissue along the lateral margins of the sella most suspicious for residual neoplasm without significant interval change in size and configuration when compared with the prior study dated 07/31/2023 as described above.    There is again evidence of lobulated foci of extra-axial dural-based enhancement noted along the inferior right cerebellopontine angle cistern similar in appearance when compared with the prior study dated 07/31/2023 as described above. The findings remain suspicious for underlying meningioma.   I personally reviewed the images/study with Joaquín Benton MD (Radiology Resident) and I agree with the findings as stated. This study was interpreted at [place], [place].   MACRO: None.   Signed by: Deion Oates 1/4/2024 1:43 PM Dictation workstation:   [id number]    MR sella w and wo IV contrast    Addendum Date: 1/4/2024    Interpreted By:  Deion Oates, ADDENDUM: The limited extracranial images again demonstrate postoperative changes compatible with a previous right parotidectomy.   Signed by: Deion Oates 1/4/2024 1:46 PM   -------- ORIGINAL REPORT -------- Dictation workstation:   [id number]    Result Date: 1/4/2024  Interpreted By:  Deion Oates and Kelly Rory STUDY: MR SELLA W AND WO IV CONTRAST; MR BRAIN W AND WO IV CONTRAST; 1/3/2024 11:08 pm; 1/3/2024 11:03 pm   INDICATION: Signs/Symptoms:eval asymmetry; pit tummor; Signs/Symptoms:eval asymmetry on examination; pituitary tumor.   COMPARISON: MRI dated 07/31/2023   ACCESSION NUMBER(S): [id number]; [id number]   ORDERING CLINICIAN: JUVENCIO GARVIN   TECHNIQUE: Axial diffusion, axial T2, axial FLAIR, axial gradient echo T2, axial T1, post gadolinium volumetric T1, as well as post gadolinium axial T1 weighted MRI images of the brain were obtained. In addition, high-resolution coronal T2 as well as pre and post gadolinium coronal and sagittal T1 weighted MRI images through the sella were obtained. The patient received  20 mL of  Dotarem gadolinium intravenously.   FINDINGS: Postoperative changes are again identified compatible with a previous trans-sphenoidal surgery.   There is again evidence of irregular abnormal  enhancing soft tissue along the lateral margins of the sella most suspicious for residual neoplasm without significant interval change in size and configuration when compared with the prior study dated 07/31/2023. On the right, there is again evidence of lateral extension into the right cavernous sinus. On the left there is again evidence of lateral extension abutting the medial margin of the cavernous segment of the left internal carotid artery and inferior margin of the ophthalmic segment of the left internal carotid artery. 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 There is again evidence of diffuse atrophy of the optic apparatus. Again, no abnormal narrowing of the flow voids of the carotid siphons is noted.   There is again evidence of lobulated foci of extra-axial dural-based enhancement noted along the inferior right cerebellopontine angle cistern similar in appearance when compared with the prior study dated 07/31/2023. The findings remain suspicious for underlying meningioma. Using similar points of measurement on the current and prior study, the more dorsal dominant nodular extra-axial enhancing lesion measures approximately 12 mm x 11 mm in transaxial dimensions by 14 mm in craniocaudal dimension on both the current and prior exam. The smaller nodular lesion more anterior/medially again measures approximately 9 mm x 6 mm in transaxial dimensions on both the current and prior study.   The diffusion-weighted images fail to demonstrate evidence of abnormal diffusion restriction to suggest acute infarction.   The above findings are superimposed upon moderate brain parenchymal volume loss.   Mild nonspecific white matter changes are noted within the cerebral hemispheres bilaterally which while  nonspecific, given the patient's age, likely represent sequelae of more remote small-vessel ischemic change.   There is minimal mucosal thickening within the paranasal sinuses.   There is opacification/partial opacification of scattered mastoid air cells right greater than left.       Postoperative changes are again identified compatible with a previous trans-sphenoidal surgery.   There is again evidence of irregular abnormal enhancing soft tissue along the lateral margins of the sella most suspicious for residual neoplasm without significant interval change in size and configuration when compared with the prior study dated 07/31/2023 as described above.   There is again evidence of lobulated foci of extra-axial dural-based enhancement noted along the inferior right cerebellopontine angle cistern similar in appearance when compared with the prior study dated 07/31/2023 as described above. The findings remain suspicious for underlying meningioma.   I personally reviewed the images/study with Joaquín Benton MD (Radiology Resident) and I agree with the findings as stated. This study was interpreted at [place], [place].   MACRO: None.   Signed by: Deion Oates 1/4/2024 1:43 PM Dictation workstation:   [id number]    XR chest 1 view    Result Date: 1/4/2024  Interpreted By:  Saul Holt, STUDY: XR CHEST 1 VIEW;  1/4/2024 3:29 am   INDICATION: Signs/Symptoms:eval lungs.   COMPARISON: 01/03/2024.   ACCESSION NUMBER(S): [id number]   ORDERING CLINICIAN: JUVENCIO GARVIN   FINDINGS:     CARDIOMEDIASTINAL SILHOUETTE: Cardiomediastinal silhouette is normal in size and configuration.   LUNGS: Right midlung and retrocardiac infiltrate/atelectasis again noted. No pneumothorax.   ABDOMEN: No remarkable upper abdominal findings.   BONES: No acute osseous changes.       1.  Continued right perihilar and retrocardiac infiltrate/pneumonia. Continued follow-up to resolution recommended.      Signed by: Saul Holt 1/4/2024 11:29 AM Dictation workstation:   [id number]    XR chest 1 view    Result Date: 1/3/2024  Interpreted By:  Saul Holt,  and Rosy Soctt STUDY: XR CHEST 1 VIEW;  1/3/2024 11:38 am   INDICATION: Signs/Symptoms:Pulmonary edema.   COMPARISON: Chest radiograph 01/02/2024   ACCESSION NUMBER(S): [id number]   ORDERING CLINICIAN: DIEGO GAVIN   FINDINGS: AP radiograph of the chest was provided.       CARDIOMEDIASTINAL SILHOUETTE: Stable enlargement of superior cardiomediastinal silhouette which may be due to patient positioning/film technique.   LUNGS: Low lung volumes contributing to bronchovascular crowding. Again seen are multifocal patchy and consolidative opacities throughout the right lung, which are increased when compared to prior. There is left perihilar prominence and left basilar atelectasis, similar to prior. No pleural effusion or pneumothorax.   ABDOMEN: No remarkable upper abdominal findings.   BONES: No acute osseous changes.       1.  Slightly increased multifocal patchy and consolidative opacities throughout the right lung which may suggest worsening of pulmonary edema or developing pneumonia. Correlate clinically.   I personally reviewed the images/study and I agree with the findings as stated by Tyler Richard. This study was interpreted at [facility], [place], [place].   MACRO: None   Signed by: Saul Holt 1/3/2024 3:18 PM Dictation workstation:   [id number]    EEG    IMPRESSION This vEEG is indicative of moderate diffuse encephalopathy. No epileptiform discharges or lateralizing signs are seen. There is improvement in the degree of encephalopathy compared to the previous day. A full report will be scanned into the patient's chart at a later time. This report has been interpreted and electronically signed by    Transthoracic Echo (TTE) Complete    Result Date: 1/3/2024   [place]  [place] [address]                Tel [phone] and Fax [phone] TRANSTHORACIC ECHOCARDIOGRAM REPORT  Patient Name:      GOYO KEHINDE BETANCOURT      Reading Physician:   [id number] Luc Chavarria MD Study Date:        1/3/2024           Ordering Provider:   [id number] LEILANI ASHLEY MRN/PID:           [id number]           Fellow: Accession#:        [id number]       Nurse: Date of Birth/Age: 1950 / 73      Sonographer:         Aishwarya Dominguez RDCS                    years Gender:            M                  Additional Staff: Height:            182.88 cm          Admit Date:          1/1/2024 Weight:            102.97 kg          Admission Status:    Inpatient - Routine BSA:               2.25 m2            Encounter#:          [id number]                                       Department Location: [facility] Blood Pressure: 125 /73 mmHg Study Type:    TRANSTHORACIC ECHO (TTE) COMPLETE Diagnosis/ICD: Hypotension, unspecified-I95.9 Indication:    Hypotension; Acute metabolic encephalopathy CPT Code:      Echo Complete w Full Doppler-[id number] Patient History: Pertinent History: Hx of pituitary adenoma (2016); DVT/PE; COPD; Tobacco use; DM                    II; HLD. Study Detail: The following Echo studies were performed: 2D, M-Mode, Doppler and               color flow. Technically challenging study due to body habitus,               patient lying in supine position and poor acoustic windows.               Definity used as a contrast agent for endocardial border               definition. Total contrast used for this procedure was 2 mL via IV               push.  PHYSICIAN INTERPRETATION: Left Ventricle: The left ventricular systolic function is normal, with an estimated ejection fraction of 55-60%. The left ventricular cavity size is normal. Abnormal (paradoxical) septal motion, consistent with an intraventricular conduction  delay. Doppler shows a borderline impaired relaxation pattern of left ventricular diastolic filling. Left Atrium: The left atrium is mildly dilated. Right Ventricle: The right ventricle is upper limits of normal in size. There is normal right ventricular global systolic function. Right Atrium: The right atrium was not well visualized. Aortic Valve: The aortic valve was not well visualized. There is minimal aortic valve cusp calcification. There is aortic valve annular calcification. There is trivial aortic valve regurgitation. The peak instantaneous gradient of the aortic valve is 4.2 mmHg. Mitral Valve: The mitral valve is normal in structure. There is mild mitral annular calcification. There is trace mitral valve regurgitation. Tricuspid Valve: The tricuspid valve is structurally normal. There is trace to mild tricuspid regurgitation. Pulmonic Valve: The pulmonic valve is not well visualized. There is trace pulmonic valve regurgitation. Pericardium: There is a trivial pericardial effusion. Aorta: The aortic root is abnormal. There is mild dilatation of the aortic root. Pulmonary Artery: The tricuspid regurgitant velocity is 2.28 m/s, and with an estimated right atrial pressure of 3 mmHg, the estimated pulmonary artery pressure is normal with the RVSP at 23.8 mmHg. Systemic Veins: The inferior vena cava appears to be of normal size. In comparison to the previous echocardiogram(s): Compared with study from 9/1/2021, no significant change.  CONCLUSIONS:  1. Poorly visualized anatomical structures due to suboptimal image quality.  2. Left ventricular systolic function is normal with a 55-60% estimated ejection fraction. QUANTITATIVE DATA SUMMARY: 2D MEASUREMENTS:                    Normal Ranges: Ao Root d: 4.20 cm (2.0-3.7cm) LAs:       4.20 cm (2.7-4.0cm) AORTA MEASUREMENTS:                      Normal Ranges: Ao Sinus, d: 3.90 cm (2.1-3.5cm) LV DIASTOLIC FUNCTION:                               Normal Ranges: MV  Peak E:        0.61 m/s    (0.7-1.2 m/s) MV Peak A:        0.73 m/s    (0.42-0.7 m/s) E/A Ratio:        0.83        (1.0-2.2) MV e'             0.06 m/s    (>8.0) MV lateral e'     0.06 m/s MV medial e'      0.05 m/s MV A Dur:         129.00 msec E/e' Ratio:       11.09       (<8.0) MV DT:            299 msec    (150-240 msec) PulmV Sys Koffi:    25.00 cm/s PulmV Serna Koffi:   27.40 cm/s PulmV S/D Koffi:    0.90 PulmV A Revs Koffi: 27.80 cm/s PulmV A Revs Dur: 132.00 msec MITRAL VALVE:                 Normal Ranges: MV DT: 299 msec (150-240msec) AORTIC VALVE:                         Normal Ranges: AoV Vmax:      1.02 m/s (<=1.7m/s) AoV Peak P.2 mmHg (<20mmHg) LVOT Max Koffi:  0.82 m/s (<=1.1m/s) LVOT VTI:      15.50 cm LVOT Diameter: 2.20 cm  (1.8-2.4cm) AoV Area,Vmax: 3.07 cm2 (2.5-4.5cm2)  RIGHT VENTRICLE: TAPSE: 21.5 mm RV s'  0.12 m/s TRICUSPID VALVE/RVSP:                             Normal Ranges: Peak TR Velocity: 2.28 m/s RV Syst Pressure: 23.8 mmHg (< 30mmHg) IVC Diam:         1.70 cm PULMONIC VALVE:                      Normal Ranges: PV Max Koffi: 0.8 m/s  (0.6-0.9m/s) PV Max P.4 mmHg Pulmonary Veins: PulmV A Revs Dur: 132.00 msec PulmV A Revs Koffi: 27.80 cm/s PulmV Serna Koffi:   27.40 cm/s PulmV S/D Koffi:    0.90 PulmV Sys Koffi:    25.00 cm/s  [id number] Luc Chavarria MD Electronically signed on 1/3/2024 at 10:12:34 AM  ** Final **     Electrocardiogram, 12-lead PRN ACS symptoms    Result Date: 2024   Poor data quality, interpretation may be adversely affected Sinus tachycardia with 1st degree AV block Low voltage QRS ST & T wave abnormality, consider lateral ischemia Abnormal ECG When compared with ECG of 2024 14:05, MO interval has increased Confirmed by Veto Chavarria ([id number]) on 2024 8:06:33 PM    XR chest 1 view    Result Date: 2024  Interpreted By:  Saul Holt,  and Rosy Scott STUDY: XR CHEST 1 VIEW;  2024 2:12 pm   INDICATION: Signs/Symptoms:increased work of  breathing, decreased breath sounds on the left.   COMPARISON: Chest radiograph 01/01/2024 and CT chest abdomen pelvis 01/01/2024   ACCESSION NUMBER(S): [id number]   ORDERING CLINICIAN: ASH KUNZ   FINDINGS: AP radiograph of the chest was provided.   Interval removal of the endotracheal tube.   CARDIOMEDIASTINAL SILHOUETTE: There is interval enlargement of the superior cardiomediastinal silhouette, which may be secondary to patient rotation/film technique.   LUNGS: Slight improvement of multifocal patchy and consolidative opacities throughout the right lung, particularly within the right lower lung. Left perihilar prominence and left basilar atelectasis, slightly worsened when compared to prior. No pleural effusion or pneumothorax.   ABDOMEN: No remarkable upper abdominal findings.   BONES: No acute osseous changes.       1.  Status post extubation with interval improvement of multifocal patchy and consolidative opacities throughout the right lung, particularly within the right lower lung. 2. Left perihilar prominence of the basilar atelectasis, slightly worsened when compared to prior. 3. There is interval enlargement of the superior cardiomediastinal silhouette, which may be secondary to patient rotation/film technique.   I personally reviewed the images/study and I agree with the findings as stated by Tyler Richard. This study was interpreted at [facility], [place], [place].   MACRO: None   Signed by: Saul Holt 1/2/2024 4:58 PM Dictation workstation:   [id number]    XR abdomen 1 view    Result Date: 1/2/2024  Interpreted By:  Carmelita Donato  and Serenity Yanes STUDY: XR CHEST 1 VIEW; XR ABDOMEN 1 VIEW;  1/1/2024 7:40 pm   INDICATION: Signs/Symptoms:ET tube position; Signs/Symptoms:NG and OG placement.   COMPARISON: Chest radiograph dated 01/01/2024 from 2:42 p.m. CT of the chest, abdomen, and pelvis dated 01/01/2024   ACCESSION NUMBER(S):  [id number]; [id number]   ORDERING CLINICIAN: CATY JUAN   TECHNIQUE: Single AP view of the upper abdomen   Single AP view of the chest   FINDINGS: Similar positioning of endotracheal tube with tip 5.0 cm above the merlin. Redemonstration of enteric tube with tip overlying the expected position of the gastric body.   ABDOMEN: There is gaseous distention of the large bowel measuring up to 5.7 cm within the transverse colon. There is gaseous distended loops of small bowel measuring up to 0.0 cm within the right upper quadrant which is similar compared to same day CT.   Osseous structures demonstrate no acute bony abnormalities.   CHEST: Slight interval worsening of multifocal patchy and consolidative opacities throughout the right lung most notable within the right lower lung. No pneumothorax. There is blunting of the right costophrenic angle.   The cardiomediastinal silhouette is stable compared to prior examination.   No acute osseous changes.       1.  Slight interval worsening of multifocal patchy and consolidative opacities throughout the right lung. Correlate with concern for multifocal infection 2. Possible small right pleural effusion. No pneumothorax. 3.  Nonobstructive bowel gas pattern. 4. Medical devices as above.   I personally reviewed the images/study with Joaquín Benton MD (Radiology Resident) and I agree with the findings as stated. This study was interpreted at [facility], [place], [place].   MACRO: None   Signed by: Carmelita Alas 1/2/2024 6:19 AM Dictation workstation:   [id number]    XR chest 1 view    Result Date: 1/2/2024  Interpreted By:  Carmelita Donato,  and Serenity Yanes STUDY: XR CHEST 1 VIEW; XR ABDOMEN 1 VIEW;  1/1/2024 7:40 pm   INDICATION: Signs/Symptoms:ET tube position; Signs/Symptoms:NG and OG placement.   COMPARISON: Chest radiograph dated 01/01/2024 from 2:42 p.m. CT of the chest, abdomen, and pelvis dated 01/01/2024    ACCESSION NUMBER(S): [id number]; [id number]   ORDERING CLINICIAN: CATY JUAN   TECHNIQUE: Single AP view of the upper abdomen   Single AP view of the chest   FINDINGS: Similar positioning of endotracheal tube with tip 5.0 cm above the merlin. Redemonstration of enteric tube with tip overlying the expected position of the gastric body.   ABDOMEN: There is gaseous distention of the large bowel measuring up to 5.7 cm within the transverse colon. There is gaseous distended loops of small bowel measuring up to 0.0 cm within the right upper quadrant which is similar compared to same day CT.   Osseous structures demonstrate no acute bony abnormalities.   CHEST: Slight interval worsening of multifocal patchy and consolidative opacities throughout the right lung most notable within the right lower lung. No pneumothorax. There is blunting of the right costophrenic angle.   The cardiomediastinal silhouette is stable compared to prior examination.   No acute osseous changes.       1.  Slight interval worsening of multifocal patchy and consolidative opacities throughout the right lung. Correlate with concern for multifocal infection 2. Possible small right pleural effusion. No pneumothorax. 3.  Nonobstructive bowel gas pattern. 4. Medical devices as above.   I personally reviewed the images/study with Joaquín Benton MD (Radiology Resident) and I agree with the findings as stated. This study was interpreted at [facility], [place], [place].   MACRO: None   Signed by: Carmelita Alas 1/2/2024 6:19 AM Dictation workstation:   [id number]    CT angio head and neck w and wo IV contrast    Result Date: 1/1/2024  Interpreted By:  Esau Avalos,  and Mac Avery STUDY: CT ANGIO HEAD AND NECK W AND WO IV CONTRAST;  1/1/2024 6:17 pm   INDICATION: Signs/Symptoms:blown pupil.   COMPARISON: Same day CT head, same day CT chest abdomen pelvis   ACCESSION NUMBER(S): [id number]   ORDERING  CLINICIAN: CATY JUAN   TECHNIQUE: Axial images of the head and neck were acquired after uneventful intravenous administration of 80 mL Omnipaque 350. Coronal and sagittal reconstructions were provided for review.   FINDINGS:   CTA HEAD:   Anterior circulation: Noncalcified atherosclerosis of the distal petrosal segment of the left internal carotid artery results in mild stenosis. Moderate calcifications of the cavernous segments of the bilateral internal carotid arteries. The bilateral intracranial internal carotid arteries, bilateral carotid terminals, bilateral proximal anterior and middle cerebral arteries are otherwise normal.   Posterior circulation: Bilateral intracranial vertebral arteries, vertebrobasilar junction, basilar artery and proximal posterior cerebral arteries are normal.   CTA NECK:   Normal three-vessel arch configuration.   Right carotid vessels: Mild calcifications of the common carotid artery. The carotid bifurcation is normal. The internal carotid artery in the neck is normal. There is 0% stenosis by NASCET criteria.   Left carotid vessels: The common carotid artery is normal. Mild calcifications of the carotid bulb. The internal carotid artery in the neck is normal. There is 0% stenosis by NASCET criteria.   Vertebral vessels: The visualized segments of the cervical vertebral arteries are normal in caliber.   Endotracheal tube is visualized. Partially visualized enteric tube. Redemonstration of patchy opacities which in the partially visualized right upper lobe.   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       Noncalcified atherosclerosis of the distal petrosal segment of the left internal carotid artery resulting in mild stenosis. No evidence for large branch vessel cut off, dissection, or pseudoaneurysm of the intracranial vessels.   Mild  atherosclerosis without significant stenosis of the cervical vessels.   I personally reviewed the images/study and I agree with the findings as stated by Gena Watts MD. This study was interpreted at [facility], [place], [place].   MACRO: None   Signed by: Esau Avalos 1/1/2024 6:47 PM Dictation workstation:   [id number]    CT head wo IV contrast    Result Date: 1/1/2024  Interpreted By:  Esau Avalos, STUDY: CT HEAD WO IV CONTRAST;  1/1/2024 6:06 pm   INDICATION: Signs/Symptoms:blown pupils.   COMPARISON: 01/01/2024 head CT   ACCESSION NUMBER(S): [id number]   ORDERING CLINICIAN: HEVER KYLE   TECHNIQUE: Noncontrast axial CT scan of head was performed. Angled reformats in brain and bone windows were generated. The images were reviewed in bone, brain, blood and soft tissue windows.   FINDINGS: CSF Spaces: The sulci and ventricles are mildly prominent similar to the prior exam. There is no extraaxial fluid collection.   Parenchyma: Resection of pituitary adenoma has been performed with thin band of residual tissue up to 4 mm in thickness noted along the right and left sides of the sella turcica. The floor and anterior wall of the sella as well as the planum sphenoidale have been resected with ethmoidectomy and sphenoidotomy as well.     The white matter has a few vague hypodensities similar to the prior exam. No hemorrhage, mass or large acute infarct is noted.   Calvarium: The calvarium is otherwise unremarkable.       No acute infarct, hemorrhage or mass is noted.   The parenchymal hypodensities may be secondary to chronic microvascular ischemic changes among others.   Prior trans-sphenoidal pituitary tumor resection.   MACRO: None   Signed by: Esau Avalos 1/1/2024 6:20 PM Dictation workstation:   [id number]    XR chest 1 view    Result Date: 1/1/2024  Interpreted By:  Hermilo Mckeon, STUDY: XR CHEST 1 VIEW; 1/1/2024 3:02 pm   INDICATION: Post intubation.   COMPARISON:  None.   ACCESSION NUMBER(S): [id number]   ORDERING CLINICIAN: RULA PEREZ   TECHNIQUE: AP view of the chest.   FINDINGS: Lines and tubes:ETT in adequate position. Cardiac silhouette: Normal. Mediastinum: Unremarkable. Pulmonary vascularity: Normal. Lungs: Multi segmental hazy-confluence opacification right lower lobe and scattered infiltrates in left upper lobe.. Pleura: No effusion. Bony structures: Unremarkable.       Right lower lobe consolidation, appreciated to better advantage on CT, compatible with pneumonia. There are also scattered infiltrates in the left upper lobe.   MACRO: None   Signed by: Hermilo Mckeon 1/1/2024 3:08 PM Dictation workstation:   [id number]    CT chest abdomen pelvis w IV contrast    Result Date: 1/1/2024  Interpreted By:  Hermilo Mckeon, STUDY: CT CHEST ABDOMEN PELVIS W IV CONTRAST; 1/1/2024 2:09 pm   INDICATION: Signs/Symptoms:FULL TRAUMA. 74 y/o   M with  fall, on Eliquis.   COMPARISON: None.   ACCESSION NUMBER(S): [id number]   ORDERING CLINICIAN: RODNEY CEVALLOS   TECHNIQUE: Axial CT images through the chest, abdomen, and pelvis with intravenous contrast. Coronal and sagittal reconstruction images generated and submitted for review.   FINDINGS: Chest: Lung and Large Airways: Endotracheal tube in place. Frothy filling defect throughout the right lower lobe bronchi. Consolidation of multiple segments in right lower lobe and patchy confluence opacities and tree-in-bud opacities in dependent portions of right upper lobe. Pleura: within normal limits. Vessels: within normal limits. Heart: Normal size. No pericardial effusion. Mediastinum and Sveta: within normal limits. Chest Wall and Lower Neck: within normal limits.   Abdomen: Liver: Cirrhotic morphology. Bile Ducts: Normal caliber. Gallbladder: No calcified gallstones. Normal caliber wall. Pancreas: within normal limits. Spleen: within normal limits. Adrenals: within normal limits. Kidneys: Small calculi in both kidneys not sure 4.7  cm cyst on the right at 4.1 cm cyst on the left   Pelvis: Reproductive Organs: Obscured by streak artifact arising from bilateral hip replacement Bladder: within normal limits.   Bowel: Normal caliber. Mesenteric Lymph Nodes: No adenopathy. Peritoneum: No ascites or free air, no fluid collection. Vessels: Atherosclerosis abdominal aorta and major branches.. Retroperitoneum: within normal limits. Abdominal Wall: within normal limits. Bones: within normal limits.       Multi segmental consolidation right lower lobe and infiltrates in right upper lobe. Findings most compatible with pneumonia. Lung contusion/hemorrhage may have a similar appearance. Clinical correlation for hemoptysis requested.   Liver cirrhosis. Bilateral renal calculi.     Signed by: Hermilo Mckeon 1/1/2024 2:21 PM Dictation workstation:   [id number]    CT cervical spine wo IV contrast    Result Date: 1/1/2024  Interpreted By:  Junior Portillo, STUDY: CT CERVICAL SPINE WO IV CONTRAST; 1/1/2024 1:57 pm   INDICATION: Signs/Symptoms:fall injury   COMPARISON: None.   ACCESSION NUMBER(S): [id number]   ORDERING CLINICIAN: RODNEY CEVALLOS   TECHNIQUE: Axial unenhanced images were obtained through the cervical spine. Sagittal and coronal post processing reconstruction images were obtained.   All CT examinations are performed with one or more of the following dose reduction techniques: Automated Exposure Control, adjustment of mA and/or kV according to patient size, or use of iterative reconstruction techniques.   FINDINGS: Endotracheal and orogastric tubes are in position. No fracture is seen. The vertebral body heights are maintained. The vertebral alignment is anatomic; this is confirmed on the sagittal reconstruction images. Degenerative changes involve the articulation between the odontoid process and anterior arch of the C1 vertebra. There is narrowing of the C4-C5 and mostly the C5-C6, C6-C7 and C7-T1 discs with marginal osteophytes. There are also  hypertrophic changes of facet joints bilaterally with resultant bilateral foraminal stenosis. Images from the thoracic inlet demonstrate atelectatic changes/infiltrates in the right upper lobe posteriorly.       Cervical spondylosis without acute fracture or facet subluxation.   Signed by: Junior Portillo 1/1/2024 2:05 PM Dictation workstation:   [id number]    CT head W O contrast trauma protocol    Result Date: 1/1/2024  Interpreted By:  Junior Portillo, STUDY: CT HEAD W/O CONTRAST TRAUMA PROTOCOL; 1/1/2024 1:57 pm   INDICATION: Signs/Symptoms:fall, head injury   COMPARISON: October 2016 and July 2023   ACCESSION NUMBER(S): [id number]   ORDERING CLINICIAN: RODNEY CEVALLOS   TECHNIQUE: Axial images were obtained through the brain. No IV contrast was administered.   All CT examinations are performed with one or more of the following dose reduction techniques: Automated Exposure Control, adjustment of mA and/or kV according to patient size, or use of iterative reconstruction techniques.   FINDINGS: The ventricles are mildly prominent but midline in position, consistent with generalized atrophy. Chronic ischemic changes also again noted.. There is no  intracranial hemorrhage. There is interval trans-sphenoidal resection of the previously described large pituitary mass centrally and on the left side. No new masses are identified. The osseous structures are unremarkable.       Age related and postsurgical changes as above without acute intracranial process.   Signed by: Junior Portillo 1/1/2024 2:02 PM Dictation workstation:   [id number]        Assessment/Plan   Introduction to Palliative Care:  Assessed with pt at bedside; not responsive to verbal stimuli; not responsive to painful stimuli of B/L UE. Per primary, these findings are consistent with their physical exam r/t AMS. Spoke with pt's daughter, Donya, over the phone. Patient seemed to appreciate the extra layer of support.   Palliative care was introduced as  a service for patients with serious illness to help with symptoms, assist with goals of care conversations, navigate complex decision making, improve quality of life for patients, and provide support both patients and families.    Supportive Interventions:  Music therapy as needed  Spiritual care    Medical decision making/ Goals of care:   Patient's current clinical condition, including diagnosis, management plan, and goals of care were discussed.   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 Pt's current hospitalization reviewed, with ongoing AMS with unclear etiology, as CT head was negative, EEG noting encephalopathy, and pt's PNA and metabolic abnormalities have been corrected. Pt's daughter expressed frustration of unknown diagnosis and is hoping to give pt more time. Pt's daughter states that during her visits, the pt has intermittently been alert and interactive; however, they are short-lasting and have not been noted by primary team yet. Pt's daughter understands that pt's mental status may be his new baseline and may not be recoverable, however, she is hoping for potential recovery. I expressed my concern of pt's altered mental status as prohibitive for pt's participation in PT/OT and bedside swallow evaluation. Pt's  "daughter acknowledges a concern for aspiration PNA with current tube feed, however is concerned that he is not getting nutritional support otherwise. Pt's daughter voluntarily participated in an advanced care planning discussion; pt's daughter states that she has had previous conversations with the pt and he has expressed his desire to \"not be a vegetable hooked up to machines long-term\". Pt's daughter agreeable to DNR/DNI at this time. We mutually agreed to discuss daily of pt's current clinical status, and will have check ins throughout the week to assess any progress, challenges or ongoing altered mental status. Primary team notified; primary to place code status orders.    Advance Care Planning:   No Advance Directives on file.   Surrogate decision maker is: pt's daughter, Donya: [phone]  Code status: DNR/DNI    Disposition:  Plan pending hospital course    Case discussed with primary team.    Thank you for allowing us to participate in the care of this patient. Palliative Team will continue to follow as needed.  Please contact team with any questions or concerns.    Aishwarya Hyatt CNP   Palliative Care (weekdays - pager [phone])    I spent 90 minutes in the care of this patient which included chart review, interviewing patient/family, discussion with primary team, coordination of care, and documentation.    Medical Decision Making was high level due to high complexity of problems, extensive data review, and high risk of management/treatment.       "

## 2024-01-18 ENCOUNTER — APPOINTMENT (OUTPATIENT)
Dept: RADIOLOGY | Facility: HOSPITAL | Age: 74
DRG: 208 | End: 2024-01-18
Payer: MEDICARE

## 2024-01-18 LAB
ALBUMIN SERPL BCP-MCNC: 2.9 G/DL (ref 3.4–5)
ANION GAP SERPL CALC-SCNC: 10 MMOL/L (ref 10–20)
BASOPHILS # BLD AUTO: 0.06 X10*3/UL (ref 0–0.1)
BASOPHILS NFR BLD AUTO: 0.9 %
BUN SERPL-MCNC: 21 MG/DL (ref 6–23)
CALCIUM SERPL-MCNC: 8.9 MG/DL (ref 8.6–10.6)
CHLORIDE SERPL-SCNC: 108 MMOL/L (ref 98–107)
CO2 SERPL-SCNC: 31 MMOL/L (ref 21–32)
CREAT SERPL-MCNC: 1.06 MG/DL (ref 0.5–1.3)
EGFRCR SERPLBLD CKD-EPI 2021: 75 ML/MIN/1.73M*2
EOSINOPHIL # BLD AUTO: 0.58 X10*3/UL (ref 0–0.4)
EOSINOPHIL NFR BLD AUTO: 8.6 %
ERYTHROCYTE [DISTWIDTH] IN BLOOD BY AUTOMATED COUNT: 13.3 % (ref 11.5–14.5)
GLUCOSE BLD MANUAL STRIP-MCNC: 109 MG/DL (ref 74–99)
GLUCOSE BLD MANUAL STRIP-MCNC: 131 MG/DL (ref 74–99)
GLUCOSE BLD MANUAL STRIP-MCNC: 133 MG/DL (ref 74–99)
GLUCOSE BLD MANUAL STRIP-MCNC: 167 MG/DL (ref 74–99)
GLUCOSE BLD MANUAL STRIP-MCNC: 168 MG/DL (ref 74–99)
GLUCOSE SERPL-MCNC: 170 MG/DL (ref 74–99)
HCT VFR BLD AUTO: 44.3 % (ref 41–52)
HGB BLD-MCNC: 14.4 G/DL (ref 13.5–17.5)
IMM GRANULOCYTES # BLD AUTO: 0.08 X10*3/UL (ref 0–0.5)
IMM GRANULOCYTES NFR BLD AUTO: 1.2 % (ref 0–0.9)
LYMPHOCYTES # BLD AUTO: 0.91 X10*3/UL (ref 0.8–3)
LYMPHOCYTES NFR BLD AUTO: 13.4 %
MAGNESIUM SERPL-MCNC: 2.37 MG/DL (ref 1.6–2.4)
MCH RBC QN AUTO: 32.4 PG (ref 26–34)
MCHC RBC AUTO-ENTMCNC: 32.5 G/DL (ref 32–36)
MCV RBC AUTO: 100 FL (ref 80–100)
MONOCYTES # BLD AUTO: 0.36 X10*3/UL (ref 0.05–0.8)
MONOCYTES NFR BLD AUTO: 5.3 %
NEUTROPHILS # BLD AUTO: 4.78 X10*3/UL (ref 1.6–5.5)
NEUTROPHILS NFR BLD AUTO: 70.6 %
NRBC BLD-RTO: 0 /100 WBCS (ref 0–0)
PHOSPHATE SERPL-MCNC: 2.7 MG/DL (ref 2.5–4.9)
PLATELET # BLD AUTO: 95 X10*3/UL (ref 150–450)
POTASSIUM SERPL-SCNC: 3.8 MMOL/L (ref 3.5–5.3)
RBC # BLD AUTO: 4.45 X10*6/UL (ref 4.5–5.9)
SODIUM SERPL-SCNC: 145 MMOL/L (ref 136–145)
WBC # BLD AUTO: 6.8 X10*3/UL (ref 4.4–11.3)

## 2024-01-18 PROCEDURE — 85025 COMPLETE CBC W/AUTO DIFF WBC: CPT

## 2024-01-18 PROCEDURE — 99233 SBSQ HOSP IP/OBS HIGH 50: CPT

## 2024-01-18 PROCEDURE — 99497 ADVNCD CARE PLAN 30 MIN: CPT

## 2024-01-18 PROCEDURE — 74018 RADEX ABDOMEN 1 VIEW: CPT

## 2024-01-18 PROCEDURE — 99232 SBSQ HOSP IP/OBS MODERATE 35: CPT

## 2024-01-18 PROCEDURE — 2500000001 HC RX 250 WO HCPCS SELF ADMINISTERED DRUGS (ALT 637 FOR MEDICARE OP): Performed by: INTERNAL MEDICINE

## 2024-01-18 PROCEDURE — 2500000004 HC RX 250 GENERAL PHARMACY W/ HCPCS (ALT 636 FOR OP/ED): Performed by: STUDENT IN AN ORGANIZED HEALTH CARE EDUCATION/TRAINING PROGRAM

## 2024-01-18 PROCEDURE — 2500000001 HC RX 250 WO HCPCS SELF ADMINISTERED DRUGS (ALT 637 FOR MEDICARE OP): Performed by: STUDENT IN AN ORGANIZED HEALTH CARE EDUCATION/TRAINING PROGRAM

## 2024-01-18 PROCEDURE — 1200000002 HC GENERAL ROOM WITH TELEMETRY DAILY

## 2024-01-18 PROCEDURE — 97161 PT EVAL LOW COMPLEX 20 MIN: CPT | Mod: GP

## 2024-01-18 PROCEDURE — 82947 ASSAY GLUCOSE BLOOD QUANT: CPT

## 2024-01-18 PROCEDURE — 74018 RADEX ABDOMEN 1 VIEW: CPT | Performed by: RADIOLOGY

## 2024-01-18 PROCEDURE — 2500000001 HC RX 250 WO HCPCS SELF ADMINISTERED DRUGS (ALT 637 FOR MEDICARE OP)

## 2024-01-18 PROCEDURE — 80069 RENAL FUNCTION PANEL: CPT

## 2024-01-18 PROCEDURE — 2500000004 HC RX 250 GENERAL PHARMACY W/ HCPCS (ALT 636 FOR OP/ED)

## 2024-01-18 PROCEDURE — 83735 ASSAY OF MAGNESIUM: CPT

## 2024-01-18 PROCEDURE — 36415 COLL VENOUS BLD VENIPUNCTURE: CPT

## 2024-01-18 RX ORDER — POTASSIUM CHLORIDE 1.5 G/1.58G
20 POWDER, FOR SOLUTION ORAL ONCE
Status: COMPLETED | OUTPATIENT
Start: 2024-01-18 | End: 2024-01-18

## 2024-01-18 RX ADMIN — HEPARIN SODIUM 5000 UNITS: 5000 INJECTION INTRAVENOUS; SUBCUTANEOUS at 06:53

## 2024-01-18 RX ADMIN — LEVOTHYROXINE SODIUM 200 MCG: 75 TABLET ORAL at 06:53

## 2024-01-18 RX ADMIN — HEPARIN SODIUM 5000 UNITS: 5000 INJECTION INTRAVENOUS; SUBCUTANEOUS at 14:12

## 2024-01-18 RX ADMIN — METOPROLOL TARTRATE 25 MG: 25 TABLET, FILM COATED ORAL at 09:10

## 2024-01-18 RX ADMIN — METOPROLOL TARTRATE 25 MG: 25 TABLET, FILM COATED ORAL at 21:13

## 2024-01-18 RX ADMIN — STANDARDIZED SENNA CONCENTRATE 17.2 MG: 8.6 TABLET ORAL at 21:13

## 2024-01-18 RX ADMIN — SERTRALINE HYDROCHLORIDE 50 MG: 50 TABLET ORAL at 09:10

## 2024-01-18 RX ADMIN — ESOMEPRAZOLE MAGNESIUM 20 MG: 40 FOR SUSPENSION ORAL at 09:10

## 2024-01-18 RX ADMIN — POTASSIUM CHLORIDE 20 MEQ: 1.5 POWDER, FOR SOLUTION ORAL at 09:10

## 2024-01-18 RX ADMIN — POLYETHYLENE GLYCOL 3350 17 G: 17 POWDER, FOR SOLUTION ORAL at 09:12

## 2024-01-18 RX ADMIN — INSULIN GLARGINE 8 UNITS: 100 INJECTION, SOLUTION SUBCUTANEOUS at 21:11

## 2024-01-18 RX ADMIN — HEPARIN SODIUM 5000 UNITS: 5000 INJECTION INTRAVENOUS; SUBCUTANEOUS at 21:13

## 2024-01-18 RX ADMIN — STANDARDIZED SENNA CONCENTRATE 17.2 MG: 8.6 TABLET ORAL at 09:10

## 2024-01-18 ASSESSMENT — COGNITIVE AND FUNCTIONAL STATUS - GENERAL
EATING MEALS: TOTAL
MOVING TO AND FROM BED TO CHAIR: TOTAL
DRESSING REGULAR UPPER BODY CLOTHING: TOTAL
MOBILITY SCORE: 6
CLIMB 3 TO 5 STEPS WITH RAILING: TOTAL
DAILY ACTIVITIY SCORE: 6
CLIMB 3 TO 5 STEPS WITH RAILING: TOTAL
MOVING FROM LYING ON BACK TO SITTING ON SIDE OF FLAT BED WITH BEDRAILS: TOTAL
STANDING UP FROM CHAIR USING ARMS: TOTAL
DRESSING REGULAR LOWER BODY CLOTHING: TOTAL
HELP NEEDED FOR BATHING: TOTAL
MOBILITY SCORE: 6
STANDING UP FROM CHAIR USING ARMS: TOTAL
PERSONAL GROOMING: TOTAL
TURNING FROM BACK TO SIDE WHILE IN FLAT BAD: TOTAL
TURNING FROM BACK TO SIDE WHILE IN FLAT BAD: TOTAL
TOILETING: TOTAL
MOVING TO AND FROM BED TO CHAIR: TOTAL
MOVING FROM LYING ON BACK TO SITTING ON SIDE OF FLAT BED WITH BEDRAILS: TOTAL
WALKING IN HOSPITAL ROOM: TOTAL
WALKING IN HOSPITAL ROOM: TOTAL

## 2024-01-18 ASSESSMENT — ACTIVITIES OF DAILY LIVING (ADL): ADL_ASSISTANCE: INDEPENDENT

## 2024-01-18 ASSESSMENT — PAIN SCALES - GENERAL
PAINLEVEL_OUTOF10: 0 - NO PAIN
PAINLEVEL_OUTOF10: 0 - NO PAIN

## 2024-01-18 NOTE — PROGRESS NOTES
"Nutrition Follow Up Assessment  Nutrition Assessment         Patient is a 73 y.o. male presenting with: after being found down i/s/o fall on 12/31      Nutrition History:  Food and Nutrient History: Pallative care team following - pt with prolonged AMS. Pt unable to answer nutrition related questions during visit. Spoke with bedside RN. Reports that pt has been tolerating tube feed with no issues. Noted per chart review tube feed was previously put on hold d/t concern for aspiration. Was put on Nepro 80mL/hr over 15mL/hr for CPAP treatment. Now team working back towards tube feed of 55mL/hr over 22 hours. 1/16 SLP swallow re-evaluation to be completed once pt mentation/respiratory status improves/stabilizes.    Anthropometrics:  Height: 182.9 cm (6' 0.01\")   Weight: 98 kg (216 lb 0.8 oz)   BMI (Calculated): 29.3  IBW/kg (Dietitian Calculated): 80.9 kg  Percent of IBW: 122 %       Admission Weight Trend:  Date/Time Weight   01/18/24 0600 98 kg (216 lb 0.8 oz)   01/17/24 0600 98.4 kg (216 lb 14.9 oz)   01/16/24 0600 97.3 kg (214 lb 8.1 oz)   01/12/24 0600 106 kg (234 lb 5.6 oz)   01/11/24 2019 106 kg (233 lb 0.4 oz)   01/09/24 0600 99.4 kg (219 lb 2.2 oz)   01/07/24 0531 99.1 kg (218 lb 7.6 oz)   01/05/24 0400 101 kg (223 lb 12.3 oz)   01/04/24 0000 104 kg (229 lb 8 oz)     Weight Change %:  Weight History / % Weight Change: Weight flutuations likely partly d/t fluid    Nutrition Focused Physical Exam Findings:  defer: completed on initial assessment  Edema:  Edema: +1 trace  Edema Location: generalized  Physical Findings:  Skin: Positive (Deep tissue PI R hip, skin tear L elbow)  Nutrition Significant Labs:  BMP Trend:   Results from last 7 days   Lab Units 01/18/24  0535 01/17/24  0559 01/16/24  0623 01/15/24  1304   GLUCOSE mg/dL 170* 131* 135* 156*   CALCIUM mg/dL 8.9 8.7 8.6 8.2*   SODIUM mmol/L 145 145 144 147*   POTASSIUM mmol/L 3.8 3.8 3.5 3.8   CO2 mmol/L 31 31 29 33*   CHLORIDE mmol/L 108* 105 107 108*   BUN " mg/dL 21 20 22 27*   CREATININE mg/dL 1.06 1.03 1.01 1.11    , BG POCT trend:   Results from last 7 days   Lab Units 01/18/24  1401 01/18/24  0613 01/18/24  0038 01/17/24  1831 01/17/24  1210   POCT GLUCOSE mg/dL 109* 168* 131* 160* 106*    , Renal Lab Trend:   Results from last 7 days   Lab Units 01/18/24  0535 01/17/24  0559 01/16/24  0623 01/15/24  1304   POTASSIUM mmol/L 3.8 3.8 3.5 3.8   PHOSPHORUS mg/dL 2.7 2.7 2.6  --    SODIUM mmol/L 145 145 144 147*   MAGNESIUM mg/dL 2.37 2.01 1.98 2.05   EGFR mL/min/1.73m*2 75 77 79 71   BUN mg/dL 21 20 22 27*   CREATININE mg/dL 1.06 1.03 1.01 1.11        Nutrition Specific Medications:  Scheduled medications  esomeprazole, 20 mg, nasogastric tube, Daily before breakfast  heparin (porcine), 5,000 Units, subcutaneous, q8h KENDRA  insulin glargine, 8 Units, subcutaneous, Nightly  insulin regular, 0-5 Units, subcutaneous, q6h  levothyroxine, 200 mcg, oral, Daily  metoprolol tartrate, 25 mg, nasogastric tube, BID  polyethylene glycol, 17 g, oral, Daily  sennosides, 2 tablet, oral, BID  sertraline, 50 mg, nasogastric tube, Daily      Continuous medications     PRN medications  PRN medications: albuterol, dextrose 10 % in water (D10W), dextrose, diclofenac sodium, glucagon, oxygen, phenoL     I/O:   Last BM Date: 01/16/24; Stool Appearance: Loose (01/18/24 1130)        Dietary Orders (From admission, onward)       Start     Ordered    01/17/24 1413  Enteral feeding with NPO Nepro; ND (nasoduodenal tube); 20; 400; Water; Every 6 hours  Diet effective now        Comments: Please up titrate 10 ml/hr q8h until goal 55 ml/hr   Question Answer Comment   Tube feeding formula: Nepro    Feeding route: ND (nasoduodenal tube)    Tube feeding cyclic rate (mL/hr): 20    Tube feeding flush (mL): 400    Flush type: Water    Flush frequency: Every 6 hours        01/17/24 1413                     Estimated Needs:   Total Energy Estimated Needs (kCal): 2350 kCal  Method for Estimating Needs: 28-30  kcal/kg (IBW 80.9kg)  Total Protein Estimated Needs (g): 105 g  Method for Estimating Needs: 1.3 g/kg (IBW 80.9kg)  Total Fluid Estimated Needs (mL):  (1mL/kcal or per team)           Nutrition Diagnosis   Malnutrition Diagnosis  Patient has Malnutrition Diagnosis: No    Nutrition Diagnosis  Patient has Nutrition Diagnosis: Yes  Diagnosis Status (1): Ongoing  Nutrition Diagnosis 1: Increased nutrient needs  Related to (1): increased metabolic demand  As Evidenced by (1): critical illness s/p being found down + PNA infection.       Nutrition Interventions/Recommendations         Nutrition Prescription:  Individualized Nutrition Prescription Provided for : Recommend continue current order of Nepro at 55ml/hr over 22hours to provide 1210mL total volume, 2178 calories, 98 gm pro and 880ml free water. Hold tube feed 1 hour pre/post synthroid dose. Additional water flushes per team. May need to consider PEG tube placement for long-term nutrition if medically appropriate and within GOC.        Nutrition Interventions:   Food and/or Nutrient Delivery Interventions  Interventions: Enteral intake  Goal: meet tube feed goal rate, tube feed tolerance, BG 80-180mg/dl, electrolytes WNL.           Nutrition Monitoring and Evaluation   Food/Nutrient Related History Monitoring  Enteral and Parenteral Nutrition Intake: Enteral nutrition formula/solution, Enteral nutrition intake    Body Composition/Growth/Weight History  Monitoring and Evaluation Plan: Weight    Biochemical Data, Medical Tests and Procedures  Monitoring and Evaluation Plan: Electrolyte/renal panel, Glucose/endocrine profile  Electrolyte and Renal Panel: Phosphorus, Potassium    Nutrition Focused Physical Findings  Monitoring and Evaluation Plan: Digestive System  Other: GI tolerance       Time Spent/Follow-up Reminder:   Time Spent (min): 45 minutes  Last Date of Nutrition Visit: 01/18/24  Nutrition Follow-Up Needed?: Dietitian to reassess per policy

## 2024-01-18 NOTE — PROGRESS NOTES
"Gopal Ness is a 73 y.o. male on day 17 of admission presenting with AMS (altered mental status).    Subjective   Pt alert; intermittently following simple commands. Able to state his name, not oriented to place or time. Denies pain, SOB, n/v.  Spoke with pt's daughter, Donya, over the phone.    Objective     Physical Exam  Vitals reviewed.   Constitutional:       Appearance: He is ill-appearing.   HENT:      Head: Normocephalic and atraumatic.      Nose: Nose normal.      Mouth/Throat:      Mouth: Mucous membranes are dry.   Eyes:      Extraocular Movements: Extraocular movements intact.      Pupils: Pupils are equal, round, and reactive to light.   Cardiovascular:      Rate and Rhythm: Normal rate and regular rhythm.   Pulmonary:      Effort: Pulmonary effort is normal.      Breath sounds: Examination of the right-lower field reveals rhonchi. Examination of the left-lower field reveals rhonchi. Rhonchi present.   Abdominal:      General: Bowel sounds are normal. There is distension.      Palpations: Abdomen is soft.   Skin:     General: Skin is warm and dry.   Neurological:      Mental Status: He is easily aroused. He is lethargic and disoriented.   Psychiatric:         Speech: Speech is delayed.         Behavior: Behavior is slowed.         Cognition and Memory: Cognition is impaired. Memory is impaired.         Last Recorded Vitals  Blood pressure 118/63, pulse 80, temperature 36.6 °C (97.9 °F), temperature source Temporal, resp. rate 18, height 1.829 m (6' 0.01\"), weight 98 kg (216 lb 0.8 oz), SpO2 92 %.  Intake/Output last 3 Shifts:  I/O last 3 completed shifts:  In: 845 (8.6 mL/kg) [NG/GT:845]  Out: 1550 (15.8 mL/kg) [Urine:1550 (0.4 mL/kg/hr)]  Weight: 98 kg     Relevant Results  Scheduled medications  esomeprazole, 20 mg, nasogastric tube, Daily before breakfast  heparin (porcine), 5,000 Units, subcutaneous, q8h KENDRA  insulin glargine, 8 Units, subcutaneous, Nightly  insulin regular, 0-5 Units, " subcutaneous, q6h  levothyroxine, 200 mcg, oral, Daily  metoprolol tartrate, 25 mg, nasogastric tube, BID  polyethylene glycol, 17 g, oral, Daily  sennosides, 2 tablet, oral, BID  sertraline, 50 mg, nasogastric tube, Daily      Continuous medications     PRN medications  PRN medications: albuterol, dextrose 10 % in water (D10W), dextrose, diclofenac sodium, glucagon, oxygen, phenoL    Results for orders placed or performed during the hospital encounter of 01/01/24 (from the past 24 hour(s))   POCT GLUCOSE   Result Value Ref Range    POCT Glucose 160 (H) 74 - 99 mg/dL   POCT GLUCOSE   Result Value Ref Range    POCT Glucose 131 (H) 74 - 99 mg/dL   CBC and Auto Differential   Result Value Ref Range    WBC 6.8 4.4 - 11.3 x10*3/uL    nRBC 0.0 0.0 - 0.0 /100 WBCs    RBC 4.45 (L) 4.50 - 5.90 x10*6/uL    Hemoglobin 14.4 13.5 - 17.5 g/dL    Hematocrit 44.3 41.0 - 52.0 %     80 - 100 fL    MCH 32.4 26.0 - 34.0 pg    MCHC 32.5 32.0 - 36.0 g/dL    RDW 13.3 11.5 - 14.5 %    Platelets 95 (L) 150 - 450 x10*3/uL    Neutrophils % 70.6 40.0 - 80.0 %    Immature Granulocytes %, Automated 1.2 (H) 0.0 - 0.9 %    Lymphocytes % 13.4 13.0 - 44.0 %    Monocytes % 5.3 2.0 - 10.0 %    Eosinophils % 8.6 0.0 - 6.0 %    Basophils % 0.9 0.0 - 2.0 %    Neutrophils Absolute 4.78 1.60 - 5.50 x10*3/uL    Immature Granulocytes Absolute, Automated 0.08 0.00 - 0.50 x10*3/uL    Lymphocytes Absolute 0.91 0.80 - 3.00 x10*3/uL    Monocytes Absolute 0.36 0.05 - 0.80 x10*3/uL    Eosinophils Absolute 0.58 (H) 0.00 - 0.40 x10*3/uL    Basophils Absolute 0.06 0.00 - 0.10 x10*3/uL   Renal Function Panel   Result Value Ref Range    Glucose 170 (H) 74 - 99 mg/dL    Sodium 145 136 - 145 mmol/L    Potassium 3.8 3.5 - 5.3 mmol/L    Chloride 108 (H) 98 - 107 mmol/L    Bicarbonate 31 21 - 32 mmol/L    Anion Gap 10 10 - 20 mmol/L    Urea Nitrogen 21 6 - 23 mg/dL    Creatinine 1.06 0.50 - 1.30 mg/dL    eGFR 75 >60 mL/min/1.73m*2    Calcium 8.9 8.6 - 10.6 mg/dL     Phosphorus 2.7 2.5 - 4.9 mg/dL    Albumin 2.9 (L) 3.4 - 5.0 g/dL   Magnesium   Result Value Ref Range    Magnesium 2.37 1.60 - 2.40 mg/dL   POCT GLUCOSE   Result Value Ref Range    POCT Glucose 168 (H) 74 - 99 mg/dL   POCT GLUCOSE   Result Value Ref Range    POCT Glucose 109 (H) 74 - 99 mg/dL     CT head wo IV contrast    Result Date: 1/15/2024  Interpreted By:  Dmitry Curtis, STUDY: CT HEAD WO IV CONTRAST;  1/14/2024 3:20 pm   INDICATION: Signs/Symptoms: AMS.   COMPARISON: Head CT and CTA, 01/01/2024   ACCESSION NUMBER(S): [id number]   ORDERING CLINICIAN: SAVANNAH LY   TECHNIQUE: Noncontrast axial CT scan of the head was performed. Angled reformats in brain and bone windows were generated. The images were reviewed in bone, brain, blood and soft tissue windows.   FINDINGS: CSF Spaces: The ventricles, sulci and basal cisterns are appropriate for the degree of volume loss. No abnormal extraaxial fluid collection.   Parenchyma: Parenchymal volume loss most pronounced in the frontal lobes is unchanged. Nonspecific low attenuation in the white matter, similar to previous. The grey-white differentiation is intact. There is no mass effect or midline shift. No acute intracranial hemorrhage.   Calvarium: The calvarium is unremarkable.   Paranasal sinuses and mastoids: Partially visualized nasal enteric tube. Stable changes of transsphenoidal mass resection. Scattered paranasal sinus mucosal thickening with dependent heterogeneous attenuation material in the postoperative sphenoid sinus. Persistent right mastoid effusion.   Orbits: Bilateral lens replacement.       No acute intracranial pathology.   MACRO: None   Signed by: Dmitry Curtis 1/15/2024 8:33 AM Dictation workstation:   [id number]    ECG 12 lead    Result Date: 1/14/2024  Normal sinus rhythm Low voltage QRS Nonspecific ST and T wave abnormality Abnormal ECG When compared with ECG of 05-JAN-2024 04:29, No significant change was found Confirmed by Aura  Veto ([id number]) on 1/14/2024 12:00:57 AM    XR chest 1 view    Result Date: 1/12/2024  Interpreted By:  Carmelita Donato, STUDY: XR CHEST 1 VIEW;  1/12/2024 1:00 pm   INDICATION: Signs/Symptoms:cough.   COMPARISON: Radiograph dated 01/06/2024   ACCESSION NUMBER(S): [id number]   ORDERING CLINICIAN: TERRI BRYANT   FINDINGS: Enteric tube is in place with the tip beyond the field of view.   Faint consolidation in lung bases, right more than left and stable to slightly worse compared to prior study. No sizable pleural effusion or pneumothorax.   No acute osseous abnormality.       1. Faint consolidation in lung bases, right more than left with suggestion of slight worsening. Correlate with concern for infection and aspiration.         Signed by: Carmelita Alas 1/12/2024 8:16 PM Dictation workstation:   [id number]    MR cervical spine wo IV contrast    Result Date: 1/9/2024  Interpreted By:  Joey Rizzo, STUDY: MR CERVICAL SPINE WO IV CONTRAST;  1/8/2024 10:18 pm   INDICATION: Signs/Symptoms:R arm weakness.   COMPARISON: None.   ACCESSION NUMBER(S): [id number]   ORDERING CLINICIAN: DIEGO ABARCA   TECHNIQUE: The cervical spine was studied in the sagittal and axial planes utilizing T1 and T2 weighted images. Examination is limited due to motion artifact   FINDINGS: The craniovertebral junction is normal. The cord is normal in size and signal. The marrow signal is normal. Serial axial images reveal the following: C2/C3 There is normal alignment and vertebral body height. The disc space is normal. There is no evidence of canal or foraminal narrowing. There is no evidence of bulging or herniated disc. C3/C4 There is normal alignment and vertebral body height. The disc space is normal. There is no evidence of canal or foraminal narrowing. There is no evidence of bulging or herniated disc. C4/C5 Right greater than left uncovertebral joint hypertrophy with focal right-sided foraminal narrowing. No  measurable canal stenosis. C5/C6 Right greater than left uncovertebral joint hypertrophy without canal stenosis. Right-sided foraminal narrowing C6/C7 Bilateral facet hypertrophy without canal or foraminal narrowing C7/T1 Bilateral facet hypertrophy without canal or foraminal narrowing       * Right-sided foraminal narrowing due to uncovertebral joint hypertrophy at C4/C5 and C5/C6 *No measurable canal stenosis or cord compression.   MACRO: none   Signed by: Joey Rizzo 1/9/2024 9:02 AM Dictation workstation:   [id number]    XR chest 1 view    Result Date: 1/6/2024  Interpreted By:  Zeyad Bell, STUDY: XR CHEST 1 VIEW;  1/6/2024 7:13 am   INDICATION: Signs/Symptoms:pneumonia.   COMPARISON: Exam dated 01/04/2024   ACCESSION NUMBER(S): [id number]   ORDERING CLINICIAN: ALEX BARBOZA   FINDINGS: AP radiograph of the chest was provided.   Enteric tube projects over the esophagus and upper abdomen, tip not visualized.   CARDIOMEDIASTINAL SILHOUETTE: Cardiomediastinal silhouette is stable in size and configuration.   LUNGS: Lung volumes are low with associated perihilar bronchovascular crowding. There is superimposed diffuse interstitial prominence. Interval improvement in right mid lung opacity. No large pleural effusion or evidence of pneumothorax.   ABDOMEN: No remarkable upper abdominal findings.   BONES: No acute osseous changes.       1.  Hypoinflated lungs with superimposed findings suggestive of mild pulmonary edema. Correlate with patient fluid volume status. 2. Interval improvement in right mid lung opacity.       MACRO: None   Signed by: Zeyad Bell 1/6/2024 10:30 AM Dictation workstation:   [id number]    XR abdomen 1 view    Result Date: 1/6/2024  Interpreted By:  Zeyad Bell,  and Abbie Melgar STUDY: XR ABDOMEN 1 VIEW;  1/5/2024 9:02 pm   INDICATION: Signs/Symptoms:Follow up DHT placement post-adjustment.   COMPARISON: Abdominal radiograph on 01/05/2024.   ACCESSION NUMBER(S): [id number]    ORDERING CLINICIAN: ALEX BARBOZA   FINDINGS: Single AP view of the abdomen.   Enteric tube courses below the diaphragm with tip overlying the expected location of the 2nd portion of the duodenum, retracted compared to prior exam.   Nonobstructive bowel gas pattern. Limited evaluation of pneumoperitoneum on supine imaging, however no gross evidence of free air is noted.   Visualized lungs are clear.   Osseous structures demonstrate no acute bony changes.       1. Enteric tube tip projects over the expected location of the 2nd portion of the duodenum, retracted compared to prior exam. 2. Nonobstructive visualized bowel gas pattern.   I personally reviewed the images/study and I agree with the findings as stated. This study was interpreted at [place], [place].   MACRO: None   Signed by: Zeyad Bell 1/6/2024 8:32 AM Dictation workstation:   [id number]    XR abdomen 1 view    Result Date: 1/5/2024  Interpreted By:  Jamaal Cooper, STUDY: XR ABDOMEN 1 VIEW;  1/5/2024 4:49 pm   INDICATION: Signs/Symptoms:s/p dobhoff advancement.   COMPARISON: Abdominal radiograph 01/04/2024 and CT scan 01/01/2024   ACCESSION NUMBER(S): [id number]   ORDERING CLINICIAN: ROXANA MIMS   FINDINGS: Single AP radiograph of the abdomen. Enteric tube is seen coursing below diaphragm and looping within left upper quadrant and tip projecting over expected location of 2nd segment of duodenum   Nonobstructive bowel gas pattern.Limited evaluation of pneumoperitoneum on supine imaging, however no gross evidence of free air is noted.   Visualized lung bases again demonstrate right basilar atelectasis/consolidation.   Osseous structures demonstrate no acute bony changes.       1. Enteric tube tip projects over expected location of 2nd segment of duodenum. 2. Nonobstructive bowel gas pattern.   Signed by: Jamaal Cooper 1/5/2024 4:52 PM Dictation workstation:   [id number]    Electrocardiogram, 12-lead PRN ACS  symptoms    Result Date: 1/5/2024  Normal sinus rhythm Low voltage QRS Borderline ECG When compared with ECG of 04-JAN-2024 20:58, No significant change was found Confirmed by Vel Nevarez ([id number]) on 1/5/2024 1:13:31 PM    Electrocardiogram, 12-lead PRN ACS symptoms    Result Date: 1/5/2024  Normal sinus rhythm Low voltage QRS Borderline ECG When compared with ECG of 01-JAN-2024 17:40, IL interval has decreased T wave inversion no longer evident in Lateral leads Confirmed by Vel Nevarez ([id number]) on 1/5/2024 1:13:17 PM    XR abdomen 1 view    Result Date: 1/5/2024  Interpreted By:  Jamaal Cooper and Kelly Rory STUDY: XR ABDOMEN 1 VIEW;  1/4/2024 6:59 pm   INDICATION: Signs/Symptoms:dobhoff placement.   COMPARISON: Abdominal radiograph dated 01/04/2024 from 3:55 p.m.. CT of the chest, abdomen, and pelvis dated 01/01/2024   ACCESSION NUMBER(S): [id number]   ORDERING CLINICIAN: ROXANA MIMS   FINDINGS: Single AP radiograph of abdomen.   Similar positioning of Dobbhoff enteric tube with tip overlying the expected position of the gastric cardia/proximal body; consider advancement.   Nonobstructive bowel gas pattern. Limited evaluation of pneumoperitoneum on supine imaging, however no gross evidence of free air is noted.   Redemonstration of hazy bibasilar opacities again favored to represent atelectasis.   Osseous structures demonstrate no acute bony changes.       1.  Dobbhoff enteric tube again overlies the expected position of the gastric cardia/proximal body; consider advancement. 2. Bibasilar atelectasis.   I personally reviewed the images/study with Joaquín Benton MD (Radiology Resident) and I agree with the findings as stated. This study was interpreted at [facility], [place], [place].   MACRO: None   Signed by: Jamaal Cooper 1/5/2024 8:10 AM Dictation workstation:   [id number]    XR abdomen 1 view    Result Date: 1/4/2024  Interpreted By:  Saul Holt, STUDY: XR ABDOMEN 1 VIEW;   1/4/2024 4:22 pm   INDICATION: Signs/Symptoms:dobhoff placement.   COMPARISON: 01/04/2024   ACCESSION NUMBER(S): [id number]   ORDERING CLINICIAN: ROXANA MIMS   FINDINGS: Dobbhoff tube  overlies the GE junction. Nonobstructive bowel gas pattern. Limited evaluation of pneumoperitoneum on supine imaging, however no gross evidence of free air is noted.   Bibasilar opacities most likely atelectatic in nature.   Osseous structures demonstrate no acute bony changes.       1.  Dobbhoff tube overlies the GE junction.   Signed by: Saul Holt 1/4/2024 5:05 PM Dictation workstation:   [id number]    XR abdomen 1 view    Result Date: 1/4/2024  Interpreted By:  Saul Holt,  and Rosy Scott STUDY: XR ABDOMEN 1 VIEW;  1/4/2024 1:17 pm   INDICATION: Signs/Symptoms:Dobhoff.   COMPARISON: Abdominal radiograph 01/01/2024   ACCESSION NUMBER(S): [id number]   ORDERING CLINICIAN: ROXANA MIMS   FINDINGS: Enteric tube coursing below level of the diaphragm with the internal tip projecting in the expected region of the gastric fundus.   Nonobstructive bowel gas pattern. Limited evaluation of pneumoperitoneum on supine imaging, however no gross evidence of free air is noted.   Visualized lungs are clear.   Osseous structures demonstrate no acute bony changes.       1.  Enteric tube coursing below level of the diaphragm with the internal tip projecting in the expected region of the gastric fundus.   I personally reviewed the images/study and I agree with the findings as stated by Tyler Richard MD (Radiology Resident). This study was interpreted at [place], [place].   MACRO: None   Signed by: Saul Holt 1/4/2024 3:44 PM Dictation workstation:   [id number]    MR brain w and wo IV contrast    Addendum Date: 1/4/2024    Interpreted By:  Deion Oates, ADDENDUM: The limited extracranial images again demonstrate postoperative changes compatible  with a previous right parotidectomy.   Signed by: Deion Oates 1/4/2024 1:46 PM   -------- ORIGINAL REPORT -------- Dictation workstation:   [id number]    Result Date: 1/4/2024  Interpreted By:  Deion Oates  and Serenity Yanes STUDY: MR SELLA W AND WO IV CONTRAST; MR BRAIN W AND WO IV CONTRAST; 1/3/2024 11:08 pm; 1/3/2024 11:03 pm   INDICATION: Signs/Symptoms:eval asymmetry; pit tummor; Signs/Symptoms:eval asymmetry on examination; pituitary tumor.   COMPARISON: MRI dated 07/31/2023   ACCESSION NUMBER(S): [id number]; [id number]   ORDERING CLINICIAN: JUVENCIO GARVIN   TECHNIQUE: Axial diffusion, axial T2, axial FLAIR, axial gradient echo T2, axial T1, post gadolinium volumetric T1, as well as post gadolinium axial T1 weighted MRI images of the brain were obtained. In addition, high-resolution coronal T2 as well as pre and post gadolinium coronal and sagittal T1 weighted MRI images through the sella were obtained. The patient received  20 mL of  Dotarem gadolinium intravenously.   FINDINGS: Postoperative changes are again identified compatible with a previous trans-sphenoidal surgery.   There is again evidence of irregular abnormal enhancing soft tissue along the lateral margins of the sella most suspicious for residual neoplasm without significant interval change in size and configuration when compared with the prior study dated 07/31/2023. On the right, there is again evidence of lateral extension into the right cavernous sinus. On the left there is again evidence of lateral extension abutting the medial margin of the cavernous segment of the left internal carotid artery and inferior margin of the ophthalmic segment of the left internal carotid artery. 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  There is again evidence of diffuse atrophy of the optic apparatus. Again, no abnormal narrowing of the flow voids of the carotid siphons is noted.   There is again evidence of lobulated foci of extra-axial dural-based enhancement noted along the inferior right cerebellopontine angle cistern similar in appearance when compared with the prior study dated 07/31/2023. The findings remain suspicious for underlying meningioma. Using similar points of measurement on the current and prior study, the more dorsal dominant nodular extra-axial enhancing lesion measures approximately 12 mm x 11 mm in transaxial dimensions by 14 mm in craniocaudal dimension on both the current and prior exam. The smaller nodular lesion more anterior/medially again measures approximately 9 mm x 6 mm in transaxial dimensions on both the current and prior study.   The diffusion-weighted images fail to demonstrate evidence of abnormal diffusion restriction to suggest acute infarction.   The above findings are superimposed upon moderate brain parenchymal volume loss.   Mild nonspecific white matter changes are noted within the cerebral hemispheres bilaterally which while nonspecific, given the patient's age, likely represent sequelae of more remote small-vessel ischemic change.   There is minimal mucosal thickening within the paranasal sinuses.   There is opacification/partial opacification of scattered mastoid air cells right greater than left.       Postoperative changes are again identified compatible with a previous trans-sphenoidal surgery.   There is again evidence of irregular abnormal enhancing soft tissue along the lateral margins of the sella most suspicious for residual neoplasm without significant interval change in size and configuration when compared with the prior study dated 07/31/2023 as described above.   There is again evidence of lobulated foci of extra-axial dural-based enhancement noted along the inferior right cerebellopontine  angle cistern similar in appearance when compared with the prior study dated 07/31/2023 as described above. The findings remain suspicious for underlying meningioma.   I personally reviewed the images/study with Joaquín Benton MD (Radiology Resident) and I agree with the findings as stated. This study was interpreted at [place], [place].   MACRO: None.   Signed by: Deion Oates 1/4/2024 1:43 PM Dictation workstation:   [id number]    MR sella w and wo IV contrast    Addendum Date: 1/4/2024    Interpreted By:  Deion Oates, ADDENDUM: The limited extracranial images again demonstrate postoperative changes compatible with a previous right parotidectomy.   Signed by: Deion Oates 1/4/2024 1:46 PM   -------- ORIGINAL REPORT -------- Dictation workstation:   [id number]    Result Date: 1/4/2024  Interpreted By:  Deion Oates and Kelly Rory STUDY: MR SELLA W AND WO IV CONTRAST; MR BRAIN W AND WO IV CONTRAST; 1/3/2024 11:08 pm; 1/3/2024 11:03 pm   INDICATION: Signs/Symptoms:eval asymmetry; pit tummor; Signs/Symptoms:eval asymmetry on examination; pituitary tumor.   COMPARISON: MRI dated 07/31/2023   ACCESSION NUMBER(S): [id number]; [id number]   ORDERING CLINICIAN: JUVENCIO GARVIN   TECHNIQUE: Axial diffusion, axial T2, axial FLAIR, axial gradient echo T2, axial T1, post gadolinium volumetric T1, as well as post gadolinium axial T1 weighted MRI images of the brain were obtained. In addition, high-resolution coronal T2 as well as pre and post gadolinium coronal and sagittal T1 weighted MRI images through the sella were obtained. The patient received  20 mL of  Dotarem gadolinium intravenously.   FINDINGS: Postoperative changes are again identified compatible with a previous trans-sphenoidal surgery.   There is again evidence of irregular abnormal enhancing soft tissue along the lateral margins of the sella most suspicious for residual neoplasm without significant interval  change in size and configuration when compared with the prior study dated 07/31/2023. On the right, there is again evidence of lateral extension into the right cavernous sinus. On the left there is again evidence of lateral extension abutting the medial margin of the cavernous segment of the left internal carotid artery and inferior margin of the ophthalmic segment of the left internal carotid artery. 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 There is again evidence of diffuse atrophy of the optic apparatus. Again, no abnormal narrowing of the flow voids of the carotid siphons is noted.   There is again evidence of lobulated foci of extra-axial dural-based enhancement noted along the inferior right cerebellopontine angle cistern similar in appearance when compared with the prior study dated 07/31/2023. The findings remain suspicious for underlying meningioma. Using similar points of measurement on the current and prior study, the more dorsal dominant nodular extra-axial enhancing lesion measures approximately 12 mm x 11 mm in transaxial dimensions by 14 mm in craniocaudal dimension on both the current and prior exam. The smaller nodular lesion more anterior/medially again measures approximately 9 mm x 6 mm in transaxial dimensions on both the current and prior study.   The diffusion-weighted images fail to demonstrate evidence of abnormal diffusion restriction to suggest acute infarction.   The above findings are superimposed upon moderate brain parenchymal volume loss.   Mild nonspecific white matter changes are noted within the cerebral hemispheres bilaterally which while nonspecific, given the patient's age, likely represent sequelae of more remote small-vessel ischemic change.   There is minimal  mucosal thickening within the paranasal sinuses.   There is opacification/partial opacification of scattered mastoid air cells right greater than left.       Postoperative changes are again identified compatible with a previous trans-sphenoidal surgery.   There is again evidence of irregular abnormal enhancing soft tissue along the lateral margins of the sella most suspicious for residual neoplasm without significant interval change in size and configuration when compared with the prior study dated 07/31/2023 as described above.   There is again evidence of lobulated foci of extra-axial dural-based enhancement noted along the inferior right cerebellopontine angle cistern similar in appearance when compared with the prior study dated 07/31/2023 as described above. The findings remain suspicious for underlying meningioma.   I personally reviewed the images/study with Joaquín Benton MD (Radiology Resident) and I agree with the findings as stated. This study was interpreted at [place], [place].   MACRO: None.   Signed by: Deion Oates 1/4/2024 1:43 PM Dictation workstation:   [id number]    XR chest 1 view    Result Date: 1/4/2024  Interpreted By:  Saul Holt, STUDY: XR CHEST 1 VIEW;  1/4/2024 3:29 am   INDICATION: Signs/Symptoms:eval lungs.   COMPARISON: 01/03/2024.   ACCESSION NUMBER(S): [id number]   ORDERING CLINICIAN: JUVENCIO GARVIN   FINDINGS:     CARDIOMEDIASTINAL SILHOUETTE: Cardiomediastinal silhouette is normal in size and configuration.   LUNGS: Right midlung and retrocardiac infiltrate/atelectasis again noted. No pneumothorax.   ABDOMEN: No remarkable upper abdominal findings.   BONES: No acute osseous changes.       1.  Continued right perihilar and retrocardiac infiltrate/pneumonia. Continued follow-up to resolution recommended.     Signed by: Saul Holt 1/4/2024 11:29 AM Dictation workstation:   [id number]    XR chest 1 view    Result Date:  1/3/2024  Interpreted By:  Saul Holt  and Rosy Scott STUDY: XR CHEST 1 VIEW;  1/3/2024 11:38 am   INDICATION: Signs/Symptoms:Pulmonary edema.   COMPARISON: Chest radiograph 01/02/2024   ACCESSION NUMBER(S): [id number]   ORDERING CLINICIAN: DIEGO GAVIN   FINDINGS: AP radiograph of the chest was provided.       CARDIOMEDIASTINAL SILHOUETTE: Stable enlargement of superior cardiomediastinal silhouette which may be due to patient positioning/film technique.   LUNGS: Low lung volumes contributing to bronchovascular crowding. Again seen are multifocal patchy and consolidative opacities throughout the right lung, which are increased when compared to prior. There is left perihilar prominence and left basilar atelectasis, similar to prior. No pleural effusion or pneumothorax.   ABDOMEN: No remarkable upper abdominal findings.   BONES: No acute osseous changes.       1.  Slightly increased multifocal patchy and consolidative opacities throughout the right lung which may suggest worsening of pulmonary edema or developing pneumonia. Correlate clinically.   I personally reviewed the images/study and I agree with the findings as stated by Tyler Richard. This study was interpreted at [facility], [place], [place].   MACRO: None   Signed by: Saul Holt 1/3/2024 3:18 PM Dictation workstation:   [id number]    EEG    IMPRESSION This vEEG is indicative of moderate diffuse encephalopathy. No epileptiform discharges or lateralizing signs are seen. There is improvement in the degree of encephalopathy compared to the previous day. A full report will be scanned into the patient's chart at a later time. This report has been interpreted and electronically signed by    Transthoracic Echo (TTE) Complete    Result Date: 1/3/2024   [facility], [address] [place] [address]                Tel [phone] and Fax [phone]  TRANSTHORACIC ECHOCARDIOGRAM REPORT  Patient Name:      GOYO BUSBY SERAFIN      Reading Physician:   [id number] Luc Chavarria MD Study Date:        1/3/2024           Ordering Provider:   [id number] LEILANI PANTOJACOTY MRN/PID:           [id number]           Fellow: Accession#:        [id number]       Nurse: Date of Birth/Age: 1950 / 73      Sonographer:         Aishwarya Dominguez RDCS                    years Gender:            M                  Additional Staff: Height:            182.88 cm          Admit Date:          1/1/2024 Weight:            102.97 kg          Admission Status:    Inpatient - Routine BSA:               2.25 m2            Encounter#:          [id number]                                       Department Location: [facility] Blood Pressure: 125 /73 mmHg Study Type:    TRANSTHORACIC ECHO (TTE) COMPLETE Diagnosis/ICD: Hypotension, unspecified-I95.9 Indication:    Hypotension; Acute metabolic encephalopathy CPT Code:      Echo Complete w Full Doppler-55680 Patient History: Pertinent History: Hx of pituitary adenoma (2016); DVT/PE; COPD; Tobacco use; DM                    II; HLD. Study Detail: The following Echo studies were performed: 2D, M-Mode, Doppler and               color flow. Technically challenging study due to body habitus,               patient lying in supine position and poor acoustic windows.               Definity used as a contrast agent for endocardial border               definition. Total contrast used for this procedure was 2 mL via IV               push.  PHYSICIAN INTERPRETATION: Left Ventricle: The left ventricular systolic function is normal, with an estimated ejection fraction of 55-60%. The left ventricular cavity size is normal. Abnormal (paradoxical) septal motion, consistent with an intraventricular conduction delay. Doppler shows a borderline impaired relaxation pattern of left ventricular diastolic filling. Left Atrium:  The left atrium is mildly dilated. Right Ventricle: The right ventricle is upper limits of normal in size. There is normal right ventricular global systolic function. Right Atrium: The right atrium was not well visualized. Aortic Valve: The aortic valve was not well visualized. There is minimal aortic valve cusp calcification. There is aortic valve annular calcification. There is trivial aortic valve regurgitation. The peak instantaneous gradient of the aortic valve is 4.2 mmHg. Mitral Valve: The mitral valve is normal in structure. There is mild mitral annular calcification. There is trace mitral valve regurgitation. Tricuspid Valve: The tricuspid valve is structurally normal. There is trace to mild tricuspid regurgitation. Pulmonic Valve: The pulmonic valve is not well visualized. There is trace pulmonic valve regurgitation. Pericardium: There is a trivial pericardial effusion. Aorta: The aortic root is abnormal. There is mild dilatation of the aortic root. Pulmonary Artery: The tricuspid regurgitant velocity is 2.28 m/s, and with an estimated right atrial pressure of 3 mmHg, the estimated pulmonary artery pressure is normal with the RVSP at 23.8 mmHg. Systemic Veins: The inferior vena cava appears to be of normal size. In comparison to the previous echocardiogram(s): Compared with study from 9/1/2021, no significant change.  CONCLUSIONS:  1. Poorly visualized anatomical structures due to suboptimal image quality.  2. Left ventricular systolic function is normal with a 55-60% estimated ejection fraction. QUANTITATIVE DATA SUMMARY: 2D MEASUREMENTS:                    Normal Ranges: Ao Root d: 4.20 cm (2.0-3.7cm) LAs:       4.20 cm (2.7-4.0cm) AORTA MEASUREMENTS:                      Normal Ranges: Ao Sinus, d: 3.90 cm (2.1-3.5cm) LV DIASTOLIC FUNCTION:                               Normal Ranges: MV Peak E:        0.61 m/s    (0.7-1.2 m/s) MV Peak A:        0.73 m/s    (0.42-0.7 m/s) E/A Ratio:        0.83         (1.0-2.2) MV e'             0.06 m/s    (>8.0) MV lateral e'     0.06 m/s MV medial e'      0.05 m/s MV A Dur:         129.00 msec E/e' Ratio:       11.09       (<8.0) MV DT:            299 msec    (150-240 msec) PulmV Sys Koffi:    25.00 cm/s PulmV Serna Koffi:   27.40 cm/s PulmV S/D Koffi:    0.90 PulmV A Revs Koffi: 27.80 cm/s PulmV A Revs Dur: 132.00 msec MITRAL VALVE:                 Normal Ranges: MV DT: 299 msec (150-240msec) AORTIC VALVE:                         Normal Ranges: AoV Vmax:      1.02 m/s (<=1.7m/s) AoV Peak P.2 mmHg (<20mmHg) LVOT Max Koffi:  0.82 m/s (<=1.1m/s) LVOT VTI:      15.50 cm LVOT Diameter: 2.20 cm  (1.8-2.4cm) AoV Area,Vmax: 3.07 cm2 (2.5-4.5cm2)  RIGHT VENTRICLE: TAPSE: 21.5 mm RV s'  0.12 m/s TRICUSPID VALVE/RVSP:                             Normal Ranges: Peak TR Velocity: 2.28 m/s RV Syst Pressure: 23.8 mmHg (< 30mmHg) IVC Diam:         1.70 cm PULMONIC VALVE:                      Normal Ranges: PV Max Koffi: 0.8 m/s  (0.6-0.9m/s) PV Max P.4 mmHg Pulmonary Veins: PulmV A Revs Dur: 132.00 msec PulmV A Revs Koffi: 27.80 cm/s PulmV Serna Koffi:   27.40 cm/s PulmV S/D Koffi:    0.90 PulmV Sys Koffi:    25.00 cm/s  [id number] Luc Chavarria MD Electronically signed on 1/3/2024 at 10:12:34 AM  ** Final **     Electrocardiogram, 12-lead PRN ACS symptoms    Result Date: 2024   Poor data quality, interpretation may be adversely affected Sinus tachycardia with 1st degree AV block Low voltage QRS ST & T wave abnormality, consider lateral ischemia Abnormal ECG When compared with ECG of 2024 14:05, VA interval has increased Confirmed by Veto Chavarria ([id number]) on 2024 8:06:33 PM    XR chest 1 view    Result Date: 2024  Interpreted By:  Saul Holt  and Rosy Scott STUDY: XR CHEST 1 VIEW;  2024 2:12 pm   INDICATION: Signs/Symptoms:increased work of breathing, decreased breath sounds on the left.   COMPARISON: Chest radiograph 2024 and CT chest abdomen pelvis  01/01/2024   ACCESSION NUMBER(S): [id number]   ORDERING CLINICIAN: ASH KUNZ   FINDINGS: AP radiograph of the chest was provided.   Interval removal of the endotracheal tube.   CARDIOMEDIASTINAL SILHOUETTE: There is interval enlargement of the superior cardiomediastinal silhouette, which may be secondary to patient rotation/film technique.   LUNGS: Slight improvement of multifocal patchy and consolidative opacities throughout the right lung, particularly within the right lower lung. Left perihilar prominence and left basilar atelectasis, slightly worsened when compared to prior. No pleural effusion or pneumothorax.   ABDOMEN: No remarkable upper abdominal findings.   BONES: No acute osseous changes.       1.  Status post extubation with interval improvement of multifocal patchy and consolidative opacities throughout the right lung, particularly within the right lower lung. 2. Left perihilar prominence of the basilar atelectasis, slightly worsened when compared to prior. 3. There is interval enlargement of the superior cardiomediastinal silhouette, which may be secondary to patient rotation/film technique.   I personally reviewed the images/study and I agree with the findings as stated by Tylre Richard. This study was interpreted at [place], [place].   MACRO: None   Signed by: Saul Holt 1/2/2024 4:58 PM Dictation workstation:   [id number]    XR abdomen 1 view    Result Date: 1/2/2024  Interpreted By:  Carmelita Donato and Kelly Rory STUDY: XR CHEST 1 VIEW; XR ABDOMEN 1 VIEW;  1/1/2024 7:40 pm   INDICATION: Signs/Symptoms:ET tube position; Signs/Symptoms:NG and OG placement.   COMPARISON: Chest radiograph dated 01/01/2024 from 2:42 p.m. CT of the chest, abdomen, and pelvis dated 01/01/2024   ACCESSION NUMBER(S): [id number]; [id number]   ORDERING CLINICIAN: CATY JUAN   TECHNIQUE: Single AP view of the upper abdomen    Single AP view of the chest   FINDINGS: Similar positioning of endotracheal tube with tip 5.0 cm above the merlin. Redemonstration of enteric tube with tip overlying the expected position of the gastric body.   ABDOMEN: There is gaseous distention of the large bowel measuring up to 5.7 cm within the transverse colon. There is gaseous distended loops of small bowel measuring up to 0.0 cm within the right upper quadrant which is similar compared to same day CT.   Osseous structures demonstrate no acute bony abnormalities.   CHEST: Slight interval worsening of multifocal patchy and consolidative opacities throughout the right lung most notable within the right lower lung. No pneumothorax. There is blunting of the right costophrenic angle.   The cardiomediastinal silhouette is stable compared to prior examination.   No acute osseous changes.       1.  Slight interval worsening of multifocal patchy and consolidative opacities throughout the right lung. Correlate with concern for multifocal infection 2. Possible small right pleural effusion. No pneumothorax. 3.  Nonobstructive bowel gas pattern. 4. Medical devices as above.   I personally reviewed the images/study with Joaquín Benton MD (Radiology Resident) and I agree with the findings as stated. This study was interpreted at [place], [place].   MACRO: None   Signed by: Carmelita Alas 1/2/2024 6:19 AM Dictation workstation:   [id number]    XR chest 1 view    Result Date: 1/2/2024  Interpreted By:  Carmelita Donato and Kelly Rory STUDY: XR CHEST 1 VIEW; XR ABDOMEN 1 VIEW;  1/1/2024 7:40 pm   INDICATION: Signs/Symptoms:ET tube position; Signs/Symptoms:NG and OG placement.   COMPARISON: Chest radiograph dated 01/01/2024 from 2:42 p.m. CT of the chest, abdomen, and pelvis dated 01/01/2024   ACCESSION NUMBER(S): [id number]; [id number]   ORDERING CLINICIAN: CATY JUAN   TECHNIQUE: Single AP view of the  upper abdomen   Single AP view of the chest   FINDINGS: Similar positioning of endotracheal tube with tip 5.0 cm above the merlin. Redemonstration of enteric tube with tip overlying the expected position of the gastric body.   ABDOMEN: There is gaseous distention of the large bowel measuring up to 5.7 cm within the transverse colon. There is gaseous distended loops of small bowel measuring up to 0.0 cm within the right upper quadrant which is similar compared to same day CT.   Osseous structures demonstrate no acute bony abnormalities.   CHEST: Slight interval worsening of multifocal patchy and consolidative opacities throughout the right lung most notable within the right lower lung. No pneumothorax. There is blunting of the right costophrenic angle.   The cardiomediastinal silhouette is stable compared to prior examination.   No acute osseous changes.       1.  Slight interval worsening of multifocal patchy and consolidative opacities throughout the right lung. Correlate with concern for multifocal infection 2. Possible small right pleural effusion. No pneumothorax. 3.  Nonobstructive bowel gas pattern. 4. Medical devices as above.   I personally reviewed the images/study with Joaquín Benton MD (Radiology Resident) and I agree with the findings as stated. This study was interpreted at [place], [place].   MACRO: None   Signed by: Carmelita Aals 1/2/2024 6:19 AM Dictation workstation:   [id number]    CT angio head and neck w and wo IV contrast    Result Date: 1/1/2024  Interpreted By:  Esau Avalos,  and Mac Avery STUDY: CT ANGIO HEAD AND NECK W AND WO IV CONTRAST;  1/1/2024 6:17 pm   INDICATION: Signs/Symptoms:blown pupil.   COMPARISON: Same day CT head, same day CT chest abdomen pelvis   ACCESSION NUMBER(S): [id number]   ORDERING CLINICIAN: CATY JUAN   TECHNIQUE: Axial images of the head and neck were acquired after uneventful intravenous  administration of 80 mL Omnipaque 350. Coronal and sagittal reconstructions were provided for review.   FINDINGS:   CTA HEAD:   Anterior circulation: Noncalcified atherosclerosis of the distal petrosal segment of the left internal carotid artery results in mild stenosis. Moderate calcifications of the cavernous segments of the bilateral internal carotid arteries. The bilateral intracranial internal carotid arteries, bilateral carotid terminals, bilateral proximal anterior and middle cerebral arteries are otherwise normal.   Posterior circulation: Bilateral intracranial vertebral arteries, vertebrobasilar junction, basilar artery and proximal posterior cerebral arteries are normal.   CTA NECK:   Normal three-vessel arch configuration.   Right carotid vessels: Mild calcifications of the common carotid artery. The carotid bifurcation is normal. The internal carotid artery in the neck is normal. There is 0% stenosis by NASCET criteria.   Left carotid vessels: The common carotid artery is normal. Mild calcifications of the carotid bulb. The internal carotid artery in the neck is normal. There is 0% stenosis by NASCET criteria.   Vertebral vessels: The visualized segments of the cervical vertebral arteries are normal in caliber.   Endotracheal tube is visualized. Partially visualized enteric tube. Redemonstration of patchy opacities which in the partially visualized right upper lobe.   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       Noncalcified atherosclerosis of the distal petrosal segment of the left internal carotid artery resulting in mild stenosis. No evidence for large branch vessel cut off, dissection, or pseudoaneurysm of the intracranial vessels.   Mild atherosclerosis without significant stenosis of the cervical vessels.   I personally reviewed the images/study and I agree with the  findings as stated by Gena Watts MD. This study was interpreted at [facility], [place], [place].   MACRO: None   Signed by: Esau Avalos 1/1/2024 6:47 PM Dictation workstation:   [id number]    CT head wo IV contrast    Result Date: 1/1/2024  Interpreted By:  Esau Avalos, STUDY: CT HEAD WO IV CONTRAST;  1/1/2024 6:06 pm   INDICATION: Signs/Symptoms:blown pupils.   COMPARISON: 01/01/2024 head CT   ACCESSION NUMBER(S): [id number]   ORDERING CLINICIAN: HEVER KYLE   TECHNIQUE: Noncontrast axial CT scan of head was performed. Angled reformats in brain and bone windows were generated. The images were reviewed in bone, brain, blood and soft tissue windows.   FINDINGS: CSF Spaces: The sulci and ventricles are mildly prominent similar to the prior exam. There is no extraaxial fluid collection.   Parenchyma: Resection of pituitary adenoma has been performed with thin band of residual tissue up to 4 mm in thickness noted along the right and left sides of the sella turcica. The floor and anterior wall of the sella as well as the planum sphenoidale have been resected with ethmoidectomy and sphenoidotomy as well.     The white matter has a few vague hypodensities similar to the prior exam. No hemorrhage, mass or large acute infarct is noted.   Calvarium: The calvarium is otherwise unremarkable.       No acute infarct, hemorrhage or mass is noted.   The parenchymal hypodensities may be secondary to chronic microvascular ischemic changes among others.   Prior trans-sphenoidal pituitary tumor resection.   MACRO: None   Signed by: Esau Avalos 1/1/2024 6:20 PM Dictation workstation:   [id number]    XR chest 1 view    Result Date: 1/1/2024  Interpreted By:  Hermilo Mckeon, STUDY: XR CHEST 1 VIEW; 1/1/2024 3:02 pm   INDICATION: Post intubation.   COMPARISON: None.   ACCESSION NUMBER(S): [id number]   ORDERING CLINICIAN: RULA PEREZ   TECHNIQUE: AP view of the chest.   FINDINGS: Lines and  tubes:ETT in adequate position. Cardiac silhouette: Normal. Mediastinum: Unremarkable. Pulmonary vascularity: Normal. Lungs: Multi segmental hazy-confluence opacification right lower lobe and scattered infiltrates in left upper lobe.. Pleura: No effusion. Bony structures: Unremarkable.       Right lower lobe consolidation, appreciated to better advantage on CT, compatible with pneumonia. There are also scattered infiltrates in the left upper lobe.   MACRO: None   Signed by: Hermilo Mckeon 1/1/2024 3:08 PM Dictation workstation:   [id number]    CT chest abdomen pelvis w IV contrast    Result Date: 1/1/2024  Interpreted By:  Hermilo Mckeon, STUDY: CT CHEST ABDOMEN PELVIS W IV CONTRAST; 1/1/2024 2:09 pm   INDICATION: Signs/Symptoms:FULL TRAUMA. 74 y/o   M with  fall, on Eliquis.   COMPARISON: None.   ACCESSION NUMBER(S): [id number]   ORDERING CLINICIAN: RODNEY CEVALLOS   TECHNIQUE: Axial CT images through the chest, abdomen, and pelvis with intravenous contrast. Coronal and sagittal reconstruction images generated and submitted for review.   FINDINGS: Chest: Lung and Large Airways: Endotracheal tube in place. Frothy filling defect throughout the right lower lobe bronchi. Consolidation of multiple segments in right lower lobe and patchy confluence opacities and tree-in-bud opacities in dependent portions of right upper lobe. Pleura: within normal limits. Vessels: within normal limits. Heart: Normal size. No pericardial effusion. Mediastinum and Sveta: within normal limits. Chest Wall and Lower Neck: within normal limits.   Abdomen: Liver: Cirrhotic morphology. Bile Ducts: Normal caliber. Gallbladder: No calcified gallstones. Normal caliber wall. Pancreas: within normal limits. Spleen: within normal limits. Adrenals: within normal limits. Kidneys: Small calculi in both kidneys not sure 4.7 cm cyst on the right at 4.1 cm cyst on the left   Pelvis: Reproductive Organs: Obscured by streak artifact arising from bilateral hip  replacement Bladder: within normal limits.   Bowel: Normal caliber. Mesenteric Lymph Nodes: No adenopathy. Peritoneum: No ascites or free air, no fluid collection. Vessels: Atherosclerosis abdominal aorta and major branches.. Retroperitoneum: within normal limits. Abdominal Wall: within normal limits. Bones: within normal limits.       Multi segmental consolidation right lower lobe and infiltrates in right upper lobe. Findings most compatible with pneumonia. Lung contusion/hemorrhage may have a similar appearance. Clinical correlation for hemoptysis requested.   Liver cirrhosis. Bilateral renal calculi.     Signed by: Hermilo Mckeon 1/1/2024 2:21 PM Dictation workstation:   [id number]    CT cervical spine wo IV contrast    Result Date: 1/1/2024  Interpreted By:  Junior Portillo, STUDY: CT CERVICAL SPINE WO IV CONTRAST; 1/1/2024 1:57 pm   INDICATION: Signs/Symptoms:fall injury   COMPARISON: None.   ACCESSION NUMBER(S): [id number]   ORDERING CLINICIAN: RODNEY CEVALLOS   TECHNIQUE: Axial unenhanced images were obtained through the cervical spine. Sagittal and coronal post processing reconstruction images were obtained.   All CT examinations are performed with one or more of the following dose reduction techniques: Automated Exposure Control, adjustment of mA and/or kV according to patient size, or use of iterative reconstruction techniques.   FINDINGS: Endotracheal and orogastric tubes are in position. No fracture is seen. The vertebral body heights are maintained. The vertebral alignment is anatomic; this is confirmed on the sagittal reconstruction images. Degenerative changes involve the articulation between the odontoid process and anterior arch of the C1 vertebra. There is narrowing of the C4-C5 and mostly the C5-C6, C6-C7 and C7-T1 discs with marginal osteophytes. There are also hypertrophic changes of facet joints bilaterally with resultant bilateral foraminal stenosis. Images from the thoracic inlet demonstrate  atelectatic changes/infiltrates in the right upper lobe posteriorly.       Cervical spondylosis without acute fracture or facet subluxation.   Signed by: Junior Portillo 1/1/2024 2:05 PM Dictation workstation:   [id number]    CT head W O contrast trauma protocol    Result Date: 1/1/2024  Interpreted By:  Junior Portillo, STUDY: CT HEAD W/O CONTRAST TRAUMA PROTOCOL; 1/1/2024 1:57 pm   INDICATION: Signs/Symptoms:fall, head injury   COMPARISON: October 2016 and July 2023   ACCESSION NUMBER(S): [id number]   ORDERING CLINICIAN: RODNEY CEVALLOS   TECHNIQUE: Axial images were obtained through the brain. No IV contrast was administered.   All CT examinations are performed with one or more of the following dose reduction techniques: Automated Exposure Control, adjustment of mA and/or kV according to patient size, or use of iterative reconstruction techniques.   FINDINGS: The ventricles are mildly prominent but midline in position, consistent with generalized atrophy. Chronic ischemic changes also again noted.. There is no  intracranial hemorrhage. There is interval trans-sphenoidal resection of the previously described large pituitary mass centrally and on the left side. No new masses are identified. The osseous structures are unremarkable.       Age related and postsurgical changes as above without acute intracranial process.   Signed by: Junior Portillo 1/1/2024 2:02 PM Dictation workstation:   [id number]         Assessment/Plan   Supportive Interventions:  Music therapy as needed  Spiritual care     Medical decision making/ Goals of care:   Patient's current clinical condition, including diagnosis, management plan, and goals of care were discussed.   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 Additional advanced care planning discussed with pt's daughter over the phone today (1/18/24); pt's daughter states that she wants to give the pt more time to establish pt's overall neurological status and determine if there's a possibility of improvement. I expressed my concern of pt's overall respiratory status as an ongoing challenge. Pt's daughter is agreeable to PEG tube placement and SNF placement at this time.     Advance Care Planning:   No Advance Directives on file.   Surrogate decision maker is: pt's daughterDonya: [phone]  Code status: DNR/DNI     Disposition:  Plan pending hospital course     Case discussed with primary team over the phone.     Thank you for allowing us to participate in the care of this patient. Palliative Team will continue to follow as needed.  Please contact team with any questions or concerns.     Aishwarya Hyatt, CNP   Palliative Care (weekdays - pager [phone])     I spent 60 minutes in the care of this patient which included chart review, interviewing patient/family, discussion with primary team, coordination of care, and documentation.     Medical Decision Making was high level due to high complexity of problems, extensive data review, and high risk of management/treatment.

## 2024-01-18 NOTE — CARE PLAN
The patient's goals for the shift include      The clinical goals for the shift include patient will remain injuries free throughout day shift

## 2024-01-18 NOTE — PROGRESS NOTES
Subjective   No acute events overnight. Tube feeds continue to run at 55ml/hr. Currently on 5L NC saturating 92%. Will nod yes/no to questions. Briefly opened eyes to voice, but then fell asleep again.     Objective     Vitals:  Vitals:    01/18/24 0750   BP: 118/63   Pulse: 80   Resp: 18   Temp: 36.6 °C (97.9 °F)   SpO2: 92%       I/O last 3 completed shifts:  In: 845 (8.6 mL/kg) [NG/GT:845]  Out: 1550 (15.8 mL/kg) [Urine:1550 (0.4 mL/kg/hr)]  Weight: 98 kg   No intake/output data recorded.    Physical exam:  Constitutional: Well-developed male in no acute distress.  HEENT: Normocephalic, atraumatic. NJ tube in place on 55ml/hr.   Respiratory: On 5L NC, coarse breath sounds, diffuse rhonchi, no wheezing   Cardiovascular: RRR, no murmurs/rubs/gallops  Abdominal: Soft, nondistended, no guarding, tender to deep palpation   Neuro: Difficult to arouse, will respond to yes no questions. Dysarthric voice. Will move feet spontaneously.  Skin: scattered echymoses, improving rash on chest    Medications:  esomeprazole, 20 mg, nasogastric tube, Daily before breakfast  heparin (porcine), 5,000 Units, subcutaneous, q8h KENDRA  insulin glargine, 8 Units, subcutaneous, Nightly  insulin regular, 0-5 Units, subcutaneous, q6h  levothyroxine, 200 mcg, oral, Daily  metoprolol tartrate, 25 mg, nasogastric tube, BID  polyethylene glycol, 17 g, oral, Daily  sennosides, 2 tablet, oral, BID  sertraline, 50 mg, nasogastric tube, Daily           PRN medications: albuterol, dextrose 10 % in water (D10W), dextrose, diclofenac sodium, glucagon, oxygen, phenoL    Labs:  Results for orders placed or performed during the hospital encounter of 01/01/24 (from the past 24 hour(s))   POCT GLUCOSE   Result Value Ref Range    POCT Glucose 106 (H) 74 - 99 mg/dL   POCT GLUCOSE   Result Value Ref Range    POCT Glucose 160 (H) 74 - 99 mg/dL   POCT GLUCOSE   Result Value Ref Range    POCT Glucose 131 (H) 74 - 99 mg/dL   CBC and Auto Differential   Result Value  Ref Range    WBC 6.8 4.4 - 11.3 x10*3/uL    nRBC 0.0 0.0 - 0.0 /100 WBCs    RBC 4.45 (L) 4.50 - 5.90 x10*6/uL    Hemoglobin 14.4 13.5 - 17.5 g/dL    Hematocrit 44.3 41.0 - 52.0 %     80 - 100 fL    MCH 32.4 26.0 - 34.0 pg    MCHC 32.5 32.0 - 36.0 g/dL    RDW 13.3 11.5 - 14.5 %    Platelets 95 (L) 150 - 450 x10*3/uL    Neutrophils % 70.6 40.0 - 80.0 %    Immature Granulocytes %, Automated 1.2 (H) 0.0 - 0.9 %    Lymphocytes % 13.4 13.0 - 44.0 %    Monocytes % 5.3 2.0 - 10.0 %    Eosinophils % 8.6 0.0 - 6.0 %    Basophils % 0.9 0.0 - 2.0 %    Neutrophils Absolute 4.78 1.60 - 5.50 x10*3/uL    Immature Granulocytes Absolute, Automated 0.08 0.00 - 0.50 x10*3/uL    Lymphocytes Absolute 0.91 0.80 - 3.00 x10*3/uL    Monocytes Absolute 0.36 0.05 - 0.80 x10*3/uL    Eosinophils Absolute 0.58 (H) 0.00 - 0.40 x10*3/uL    Basophils Absolute 0.06 0.00 - 0.10 x10*3/uL   Renal Function Panel   Result Value Ref Range    Glucose 170 (H) 74 - 99 mg/dL    Sodium 145 136 - 145 mmol/L    Potassium 3.8 3.5 - 5.3 mmol/L    Chloride 108 (H) 98 - 107 mmol/L    Bicarbonate 31 21 - 32 mmol/L    Anion Gap 10 10 - 20 mmol/L    Urea Nitrogen 21 6 - 23 mg/dL    Creatinine 1.06 0.50 - 1.30 mg/dL    eGFR 75 >60 mL/min/1.73m*2    Calcium 8.9 8.6 - 10.6 mg/dL    Phosphorus 2.7 2.5 - 4.9 mg/dL    Albumin 2.9 (L) 3.4 - 5.0 g/dL   Magnesium   Result Value Ref Range    Magnesium 2.37 1.60 - 2.40 mg/dL   POCT GLUCOSE   Result Value Ref Range    POCT Glucose 168 (H) 74 - 99 mg/dL       Imaging:  MR cervical spine wo IV contrast    Result Date: 1/9/2024  Interpreted By:  Joey Rizzo, STUDY: MR CERVICAL SPINE WO IV CONTRAST;  1/8/2024 10:18 pm   INDICATION: Signs/Symptoms:R arm weakness.   COMPARISON: None.   ACCESSION NUMBER(S): [id number]   ORDERING CLINICIAN: DIEGO ABARCA   TECHNIQUE: The cervical spine was studied in the sagittal and axial planes utilizing T1 and T2 weighted images. Examination is limited due to motion artifact    FINDINGS: The craniovertebral junction is normal. The cord is normal in size and signal. The marrow signal is normal. Serial axial images reveal the following: C2/C3 There is normal alignment and vertebral body height. The disc space is normal. There is no evidence of canal or foraminal narrowing. There is no evidence of bulging or herniated disc. C3/C4 There is normal alignment and vertebral body height. The disc space is normal. There is no evidence of canal or foraminal narrowing. There is no evidence of bulging or herniated disc. C4/C5 Right greater than left uncovertebral joint hypertrophy with focal right-sided foraminal narrowing. No measurable canal stenosis. C5/C6 Right greater than left uncovertebral joint hypertrophy without canal stenosis. Right-sided foraminal narrowing C6/C7 Bilateral facet hypertrophy without canal or foraminal narrowing C7/T1 Bilateral facet hypertrophy without canal or foraminal narrowing       * Right-sided foraminal narrowing due to uncovertebral joint hypertrophy at C4/C5 and C5/C6 *No measurable canal stenosis or cord compression.   MACRO: none   Signed by: Joey Rizzo 1/9/2024 9:02 AM Dictation workstation:   [id number]       Assessment/Plan   Mr. Gopal Ness is a 72 y/o man with h/o pituitary adenoma s/p resection at [facility], DVT/PE on Eliquis, COPD (noncompliant with medications)/tobacco use, depresssion, DMII, HLD presenting after being found down i/s/o fall on 12/31 (LKN 1100 on 12/31). Brought to OSH, negative CTH, intubated for airway protection. EMS reported witnessing possible seizure activity. Also with persistent tachycardia, leukocytosis and infiltrate on chest CT, so treating presumed aspiration pneumonia with antibiotics. Transferred to [facility] for cvEEG which did not demonstrate seizures. MRI brain did not show acute stroke. Extubated 1/2. Course complicated by Covid pnuemonia, LEEANNE, SVT likely in the setting of infection, and RUE weakness in the setting of a  fall. Currently admitted for continued altered mental status despite resolution of infection, correction of electrolytes, and lack of structural lesions that would explain his change in mentation. Code status was changed to DNR/DNI 1/17.      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 New small dural based enhancing masses in the R posterior fossa  - MRI Brain and MRI Sella - residual adenoma unchanged from prior examination and similar to slightly increased size of small dural-based enhancing masses of small dural-based enhancing masses within the R posterior fossa favored to represent meningiomas.  - EEG - mod diffuse encephalopathy. Improvement in degree of encephalopathy compared to the previous day  - Keppra 1000mg BID discontinued  - MRI cervical spine showed right-sided foraminal narrowing d/t uncovertebral joint hypertrophy at C4/C5 and C5/C6  Plan:  - Hold home trazadone 50mg at bedtime  - Continue home sertraline 50mg daily   - Outpatient neurosurgery/neuro-ophthalmology follow up ordered  - EMG as an outpatient (1/22/23 earliest available), follow up with neurology in 4 weeks  - Repeat CT on 1/14 - atrophy, no acute intercranial pathology   - Corrected underlying metabolic and infectious causes of encephalopathy. No structural causes seen on imaging.     #Acute hypoxic respiratory failure  #COVID pneumonia, CAP  #COPD  - Maintain O2- 88-92%, currently on 4-5L NC  - Cpap at  night  - Elevated CRP, LDH, Ferritin, d-dimer  - Sputum culture - Strep pneumoniae  - s/p 1x cefepime 2g on 1/1, meropenem (1/1-1/2), Vanc 1.5g (1/1-1/2) then ceftriaxone 2g (1/2- 1/8)   - Finished remdesivir (1/6-1/10), dexamethasone 6mg daily (stopped 1/10)  - Pulmonology consulted: Will start albuterol nebs q6h, RT for BPH, chest vest therapy. Duonebs PRN. No abx at this time.     #Hypernatremia - resolved  #LEEANNE (baseline Cr 0.8-0.9) - resolved  #Hx of DI   - Hold home tamsulosin 0.4mg daily while NPO  - Hold further D5W, continue with 400ml q6h FWF     #SVT  - Several episodes of SVT, terminated spontaneously, some associated with hypotension down to 80/50s.  - Consulted cardiology, pending recs.         -Metoprolol tartrate 25mg BID from 12.5mg q6h        -If recurrent of persistent SVT, start with vagal maneuvers (carotid sinus massage), if      unsuccessful, may consider Adenosine dose and/or IV Metop        -Please continue to monitor patient on telemetry        -Please obtain EKG if there's recurrence of SVT        -Optimal lytes: K>4, Mag>2      #panhypothyroidism s/p adenoma resection  #Cushing disease  #diabetes mellitus  - Holding home glimepiride 4mg BID, metformin 1000mg BID  - A1C 5.6  - AM free T4 0.92  Plan:  - Mild corrective SSI #1  - POCT glucose q6hrs while on tube feeds, Hypoglycemia protocol  - Continue home levothyroxine 200mcg daily  - Glargine 8u nightly  - follow up outpatient with endocrinologist Dr. Yvonne Lofton in 4 weeks (scheduled by endocrine)     #hx of DVT/PE  - H/H: 16.9/51 -> 14.3/45.8  - PLT: 148 -> 133 -> 98 > 100  - 4T score: 3, low probability <5%  - Daily CBC    - Hold home Eliquis at this time      F: FWF with TF  E: Replete PRN  N: Nepro  A: PIVs  Lomeli: None  GI ppx: PPI  DVT ppx: SQH     Code status: DNR/DNI  NOK/Surrogate: Donya Emerson (Daughter) [phone]      Fatmata Kumar MD

## 2024-01-18 NOTE — CARE PLAN
The patient's goals for the shift include  patient will be HDS throughout day shift    The clinical goals for the shift include Pt safety will be maintained    Over the shift, the patient did not make progress toward the following goals. Barriers to progression include patient having physical weakness and not being bale to stay alert when interacting with patient. Recommendations to address these barriers include allow patient to involve in ADL's, stimulate patient to be involved with activities.

## 2024-01-18 NOTE — PROGRESS NOTES
"Gopal Ness is a 73 y.o. male on day 16 of admission presenting with AMS (altered mental status).    Subjective   Patient was not seen and examined today.  Chart was reviewed.       Objective       Last Recorded Vitals  Blood pressure 112/68, pulse 85, temperature 36.4 °C (97.5 °F), temperature source Temporal, resp. rate 18, height 1.829 m (6' 0.01\"), weight 98.4 kg (216 lb 14.9 oz), SpO2 92 %.  Intake/Output last 3 Shifts:  I/O last 3 completed shifts:  In: 845 (8.6 mL/kg) [NG/GT:845]  Out: 1200 (12.2 mL/kg) [Urine:1200 (0.3 mL/kg/hr)]  Weight: 98.4 kg     Relevant Results  Results from last 7 days   Lab Units 01/17/24  1831 01/17/24  1210 01/17/24  0609 01/17/24  0559 01/17/24  0434 01/16/24  2335 01/16/24  1229 01/16/24  0623 01/15/24  1809 01/15/24  1304 01/14/24  1155 01/14/24  0824 01/13/24  1223 01/13/24  0727   POCT GLUCOSE mg/dL 160* 106* 131*  --  157* 166*   < >  --    < >  --    < >  --    < >  --    GLUCOSE mg/dL  --   --   --  131*  --   --   --  135*  --  156*  --  169*  --  169*    < > = values in this interval not displayed.     Scheduled medications  esomeprazole, 20 mg, nasogastric tube, Daily before breakfast  heparin (porcine), 5,000 Units, subcutaneous, q8h KENDRA  insulin glargine, 8 Units, subcutaneous, Nightly  insulin regular, 0-5 Units, subcutaneous, q6h  levothyroxine, 200 mcg, oral, Daily  metoprolol tartrate, 25 mg, nasogastric tube, BID  polyethylene glycol, 17 g, oral, Daily  sennosides, 2 tablet, oral, BID  sertraline, 50 mg, nasogastric tube, Daily      Continuous medications     PRN medications  PRN medications: albuterol, dextrose 10 % in water (D10W), dextrose, diclofenac sodium, glucagon, oxygen, phenoL     Results for orders placed or performed during the hospital encounter of 01/01/24 (from the past 24 hour(s))   POCT GLUCOSE   Result Value Ref Range    POCT Glucose 166 (H) 74 - 99 mg/dL   POCT GLUCOSE   Result Value Ref Range    POCT Glucose 157 (H) 74 - 99 mg/dL   CBC and " Auto Differential   Result Value Ref Range    WBC 6.9 4.4 - 11.3 x10*3/uL    nRBC 0.0 0.0 - 0.0 /100 WBCs    RBC 4.57 4.50 - 5.90 x10*6/uL    Hemoglobin 14.0 13.5 - 17.5 g/dL    Hematocrit 44.8 41.0 - 52.0 %    MCV 98 80 - 100 fL    MCH 30.6 26.0 - 34.0 pg    MCHC 31.3 (L) 32.0 - 36.0 g/dL    RDW 13.1 11.5 - 14.5 %    Platelets 111 (L) 150 - 450 x10*3/uL    Neutrophils % 68.7 40.0 - 80.0 %    Immature Granulocytes %, Automated 1.6 (H) 0.0 - 0.9 %    Lymphocytes % 14.8 13.0 - 44.0 %    Monocytes % 6.1 2.0 - 10.0 %    Eosinophils % 7.5 0.0 - 6.0 %    Basophils % 1.3 0.0 - 2.0 %    Neutrophils Absolute 4.77 1.60 - 5.50 x10*3/uL    Immature Granulocytes Absolute, Automated 0.11 0.00 - 0.50 x10*3/uL    Lymphocytes Absolute 1.03 0.80 - 3.00 x10*3/uL    Monocytes Absolute 0.42 0.05 - 0.80 x10*3/uL    Eosinophils Absolute 0.52 (H) 0.00 - 0.40 x10*3/uL    Basophils Absolute 0.09 0.00 - 0.10 x10*3/uL   Renal Function Panel   Result Value Ref Range    Glucose 131 (H) 74 - 99 mg/dL    Sodium 145 136 - 145 mmol/L    Potassium 3.8 3.5 - 5.3 mmol/L    Chloride 105 98 - 107 mmol/L    Bicarbonate 31 21 - 32 mmol/L    Anion Gap 13 10 - 20 mmol/L    Urea Nitrogen 20 6 - 23 mg/dL    Creatinine 1.03 0.50 - 1.30 mg/dL    eGFR 77 >60 mL/min/1.73m*2    Calcium 8.7 8.6 - 10.6 mg/dL    Phosphorus 2.7 2.5 - 4.9 mg/dL    Albumin 2.9 (L) 3.4 - 5.0 g/dL   Magnesium   Result Value Ref Range    Magnesium 2.01 1.60 - 2.40 mg/dL   POCT GLUCOSE   Result Value Ref Range    POCT Glucose 131 (H) 74 - 99 mg/dL   POCT GLUCOSE   Result Value Ref Range    POCT Glucose 106 (H) 74 - 99 mg/dL   POCT GLUCOSE   Result Value Ref Range    POCT Glucose 160 (H) 74 - 99 mg/dL                   Assessment/Plan   Principal Problem:    AMS (altered mental status)    Gopal Ness is a 73 y.o. male with a h/o pituitary adenoma s/p transphenoidal resection at [facility] (2016) c/b hypopituitarism (adrenal insufficiency-resolved, hypogonadism, diabetes insipidis reportedly),  hypothyroidism on levothyroxine);  DVT/PE on Eliquis, COPD (noncompliant with medications)/tobacco use, depresssion, DMII, HLD presenting after being found down unresponsive on 1/1/24 and subsequently transferred to [facility] on 1/2 with course complicated by intubation for airway protection (now extubated on intermittent BiPAP- HFNC?), concern for possible seizure activity (initially placed on keppra, now off), presumed aspiration pneumonia on antibiotics, hypotension initially on pressors- now resolved, and LEEANNE. Patient also found to have COVID, worsening respiratory status and mentation.     Endocrinology consulted given history of pituitary adenoma resection and whether patient needed to be on steroid replacement therapy.      Patient with known history Cushing disease confirmed biochemically and with biopsy with prior recurrence. Now patient presenting with active Cushing disease again with high AM cortisol and ACTH likely 2/2 residual adenoma. 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 Notably NOT panhypopituitarism as prolactin level was 2.8 on 1/4 and some element of pituitary released hormone suppression may be from Cushing disease's effect on axis. High cortisol would suppress GnRH. Per neurosurgery in 09/2023, additional surgery of residual is not warranted/feasible, and would not result in improvement of ophthalmoplegia. Residual on 01/2024 MRI appears grossly the same as residual on 07/2023 MRI. Unlikely to be cause of seizures. Patient was having persistent SVT, cardiology on board, etiology hypovolemia versus respiratory distress versus hypoxia. As of 1/8, no further episodes of SVT.         #Cushing disease  #Hypopituitarism  ::Patient with sellar mass dx 2011 progressing to vision changes 2016 with Cushing disease now s/p resection 10/2016 (pathology pituitary adenoma, ACTH cell-type, Ki-67 focally 12%) with MRI and biochemical recurrence of  Cushing in 2018 now s/p 5220 cGy in 29 fx now with recent worsening ophthalmoplegia with repeat MRI 2023 thought to be likely unchanged from prior in size based on report (known mass effect on OC). Had been followed at [facility] by endocrinology (see consult note for additional endocrine history).   ::24 FSH <0.9, LH <0.1, prolactin 1.4L, Growth hormone <0.05, IGF1 31 (Z score -2.8), total testosterone 73, free testosterone 11.3  ::24 LH <0.1, prolactin 2.8,   ::24 ACTH 100.0H, 24 AM cortisol 67.6  ::Per chart review, did not start inpatient steroids until 1/3/2024  ::24 AM cortisol 58.2 confirms cushing disease as >10+ hours from prior steroid dose  ::Collected labs pendin/04 ACTH and I-GF  ::Patient was receiving dexamethasone for COVID treatment however given that has significantly elevated endogenous cortisol/glucocorticoids levels we recommended discontinuation.  They were discontinued on 1/10     Recommendations:  -no further inpatient treatment of Cushing disease     #Hypernatremia:  ::2024: Urine sodium 66, urine osm > 300, post IV fluids.  No concern for diabetes insipidus.  - Deferring management of hypernatremia to primary team, c/w free water flushes to 400 every 6 hours and supplementing his free water deficit by going up on his free water flushes rather than placing the patient on D5W as this may worsen his glycemic control   - Also consider obtaining RFP'  daily     #Diabetes  Started on dexamethasone on  for COVID treatment.  Discontinued on 1/10.  Was receiving D5W for treatment of hyponatremia but was discontinued on   ::HgbA1c 5.6 on 24  ::home metformin 1000 mg twice daily, glimeperide 4 mg twice daily      Recommendations:        Tube feeds resumed, goal 50/h, running over 15 hours [CPAP at night?].  Patient was having respiratory distress and altered mentation, CT head was negative.  Palliative on board     -Continue with glargine 8 units subcutaneously in  p.m.  -Would keep sliding scale Regular insulin to #1 (1 unit for every 50 above 150) every 6 hours   -Please inform endocrinology if tube feeds are to be resumed, or rate is changed  -POCT glucose q6hrs while on tube feeds  -hypoglycemia protocol        #Hypothyroidism  ::1/4/24 TSH 0.02L, free T4 0.71L, free T3 1.8L  ::1/9/24 free T4 0.92      Recommendations:  -if not able to give po medication, give IV levothyroxine 100mcg daily   -Continue home oral levothyroxine 200 mcg daily, kindly make sure tube feeds are held 2 hours before, 1 hour after levothyroxine dose  -Patient has follow-up with Dr. Lofton as outpatient on February 7/2024        Endocrinology pager [phone]  Patient discussed with

## 2024-01-18 NOTE — PROGRESS NOTES
Physical Therapy    Physical Therapy Evaluation    Patient Name: Gopal Ness  MRN: [id number]  Today's Date: 1/18/2024   Time Calculation  Start Time: 1150  Stop Time: 1202  Time Calculation (min): 12 min    Assessment/Plan   PT Assessment  Medical Staff Made Aware: Yes  End of Session Communication: Bedside nurse, Care Coordinator  Assessment Comment: Pt requiring dependent assist for all functional mobility this date. Pt with decreased alertness and arousal, impacting ability to participate in therapy. At this time, performing evaluation only, as pt appears to be at new baseline functional mobility. Please re-consult PT if pt has improved mentation and arousal level to allow for participation in therapy.  End of Session Patient Position: On cart, Alarm off, caregiver present  IP OR SWING BED PT PLAN  Inpatient or Swing Bed: Inpatient  PT Plan  PT Plan: PT Eval only  PT Eval Only Reason:  (predict patient at or near new baseline function)  PT Frequency: PT eval only  PT Recommended Transfer Status: Total assist  PT - OK to Discharge: Yes    Subjective   General Visit Information:  Reason for Referral: 73 year old male admitted with continued altered mental status despite resolution of infection, correction of electrolytes, and lack of structural lesions that would explain his change in mentation  Past Medical History Relevant to Rehab: pituitary adenoma s/p resection at [facility], DVT/PE on Eliquis, COPD (noncompliant with medications)/tobacco use, depresssion, DMII, HLD presenting after being found down i/s/o fall on 12/31  Prior to Session Communication: Bedside nurse  Patient Position Received: Bed, 3 rail up, Alarm on  General Comment: Pt supine in bed upon entry to room. Pt opening eyes briefly to verbal stimuli, then returning to eyes closed. Pt responding to noxious stimuli. Pt re-consulted this date to establish functional baseline of patient.   Home Living:  Home Living  Type of Home: House  Lives With:  "Alone  Home Adaptive Equipment: None  Home Layout: One level  Home Access:  (2-3 ANNABELLA)  Bathroom Shower/Tub: Tub/shower unit  Bathroom Equipment: Grab bars in shower  Home Living Comments: PLOF obtained from EMR and previous therapy notes  Prior Level of Function:  Prior Function Per Pt/Caregiver Report  Level of [place]: Independent with ADLs and functional transfers, Independent with homemaking with ambulation  ADL Assistance: Independent  Homemaking Assistance: Independent  Ambulatory Assistance: Independent  Vocational:  (Not presently working)  Leisure: Denies leisure activities  Hand Dominance: Right  Prior Function Comments: PLOF obtained from EMR and previous therapy note  Precautions:  Precautions  Hearing/Visual Limitations: Hearing WFL, vision difficult to assess 2/2 limited command following.  Medical Precautions: Fall precautions  Precautions Comment: Droplet Precautions (Covid+)      Objective   Lines/Tubes/Drains:  NG/OG/Feeding Tube Other (Comment) Left nostril 12 Fr. (Active)   Number of days: 13       External Urinary Catheter Male (Active)   Number of days: 5     Cognition:  Cognition  Overall Cognitive Status: Impaired, Unable to assess  Arousal/Alertness:  (pt responsive ocassionally during session, telling staff that was assisting him to \"stop it\". Pt then returning to eyes closed and not responding to verbal stimuli.)  Orientation Level:  (pt stating \"yes\" to knowing where he was, did not respond further to questions on location)    Extremity/Trunk Assessments:  Strength:    RLE   RLE :  (PROM WFL; unable to assess strength 2/2 to pt decreased command following)  LLE   LLE :  (PROM WFL; unable to assess strength 2/2 to pt decreased command following)    General Assessments:    Activity Tolerance  Endurance: Tolerates less than 10 min exercise, no significant change in vital signs  Sensation  Light Touch: Not tested    Functional Assessments:  Bed Mobility  Bed Mobility: Yes  Bed Mobility " 1  Bed Mobility 1: Rolling right, Rolling left  Level of Assistance 1: Dependent (x3)  Bed Mobility Comments 1: max verbal cuing, pt reaching for bed rail occasionally once rolled on side.  Bed Mobility 2  Bed Mobility  2: Scooting (transfer to transport cart)  Level of Assistance 2: Dependent (x3)  Transfers  Transfer: No (unable to safely progress)  Ambulation/Gait Training  Ambulation/Gait Training Performed: No    Outcome Measures:  [facility] Basic Mobility  Turning from your back to your side while in a flat bed without using bedrails: Total  Moving from lying on your back to sitting on the side of a flat bed without using bedrails: Total  Moving to and from bed to chair (including a wheelchair): Total  Standing up from a chair using your arms (e.g. wheelchair or bedside chair): Total  To walk in hospital room: Total  Climbing 3-5 steps with railing: Total  Basic Mobility - Total Score: 6      Education Documentation  No documentation found.  Education Comments  No comments found.      01/18/24 at 12:42 PM   Cinthya Eng PT   Rehab Office: [phone]

## 2024-01-19 ENCOUNTER — APPOINTMENT (OUTPATIENT)
Dept: RADIOLOGY | Facility: HOSPITAL | Age: 74
DRG: 208 | End: 2024-01-19
Payer: MEDICARE

## 2024-01-19 LAB
ALBUMIN SERPL BCP-MCNC: 3 G/DL (ref 3.4–5)
ANION GAP SERPL CALC-SCNC: 11 MMOL/L (ref 10–20)
BUN SERPL-MCNC: 20 MG/DL (ref 6–23)
CALCIUM SERPL-MCNC: 9 MG/DL (ref 8.6–10.6)
CHLORIDE SERPL-SCNC: 109 MMOL/L (ref 98–107)
CO2 SERPL-SCNC: 32 MMOL/L (ref 21–32)
CREAT SERPL-MCNC: 1.1 MG/DL (ref 0.5–1.3)
EGFRCR SERPLBLD CKD-EPI 2021: 71 ML/MIN/1.73M*2
ERYTHROCYTE [DISTWIDTH] IN BLOOD BY AUTOMATED COUNT: 13.4 % (ref 11.5–14.5)
GLUCOSE BLD MANUAL STRIP-MCNC: 148 MG/DL (ref 74–99)
GLUCOSE BLD MANUAL STRIP-MCNC: 160 MG/DL (ref 74–99)
GLUCOSE BLD MANUAL STRIP-MCNC: 167 MG/DL (ref 74–99)
GLUCOSE BLD MANUAL STRIP-MCNC: 170 MG/DL (ref 74–99)
GLUCOSE SERPL-MCNC: 126 MG/DL (ref 74–99)
HCT VFR BLD AUTO: 42.1 % (ref 41–52)
HGB BLD-MCNC: 13 G/DL (ref 13.5–17.5)
MAGNESIUM SERPL-MCNC: 2.12 MG/DL (ref 1.6–2.4)
MCH RBC QN AUTO: 30.8 PG (ref 26–34)
MCHC RBC AUTO-ENTMCNC: 30.9 G/DL (ref 32–36)
MCV RBC AUTO: 100 FL (ref 80–100)
NRBC BLD-RTO: 0 /100 WBCS (ref 0–0)
PHOSPHATE SERPL-MCNC: 3.1 MG/DL (ref 2.5–4.9)
PLATELET # BLD AUTO: 88 X10*3/UL (ref 150–450)
POTASSIUM SERPL-SCNC: 3.8 MMOL/L (ref 3.5–5.3)
RBC # BLD AUTO: 4.22 X10*6/UL (ref 4.5–5.9)
SODIUM SERPL-SCNC: 148 MMOL/L (ref 136–145)
WBC # BLD AUTO: 7.7 X10*3/UL (ref 4.4–11.3)

## 2024-01-19 PROCEDURE — 2500000002 HC RX 250 W HCPCS SELF ADMINISTERED DRUGS (ALT 637 FOR MEDICARE OP, ALT 636 FOR OP/ED): Performed by: INTERNAL MEDICINE

## 2024-01-19 PROCEDURE — 2500000001 HC RX 250 WO HCPCS SELF ADMINISTERED DRUGS (ALT 637 FOR MEDICARE OP)

## 2024-01-19 PROCEDURE — 2500000001 HC RX 250 WO HCPCS SELF ADMINISTERED DRUGS (ALT 637 FOR MEDICARE OP): Performed by: INTERNAL MEDICINE

## 2024-01-19 PROCEDURE — 1200000002 HC GENERAL ROOM WITH TELEMETRY DAILY

## 2024-01-19 PROCEDURE — 85027 COMPLETE CBC AUTOMATED: CPT

## 2024-01-19 PROCEDURE — 2500000004 HC RX 250 GENERAL PHARMACY W/ HCPCS (ALT 636 FOR OP/ED)

## 2024-01-19 PROCEDURE — 2500000004 HC RX 250 GENERAL PHARMACY W/ HCPCS (ALT 636 FOR OP/ED): Performed by: STUDENT IN AN ORGANIZED HEALTH CARE EDUCATION/TRAINING PROGRAM

## 2024-01-19 PROCEDURE — 80069 RENAL FUNCTION PANEL: CPT

## 2024-01-19 PROCEDURE — 83735 ASSAY OF MAGNESIUM: CPT

## 2024-01-19 PROCEDURE — 36415 COLL VENOUS BLD VENIPUNCTURE: CPT

## 2024-01-19 PROCEDURE — 99232 SBSQ HOSP IP/OBS MODERATE 35: CPT

## 2024-01-19 PROCEDURE — 2500000001 HC RX 250 WO HCPCS SELF ADMINISTERED DRUGS (ALT 637 FOR MEDICARE OP): Performed by: STUDENT IN AN ORGANIZED HEALTH CARE EDUCATION/TRAINING PROGRAM

## 2024-01-19 PROCEDURE — 71045 X-RAY EXAM CHEST 1 VIEW: CPT

## 2024-01-19 PROCEDURE — 82947 ASSAY GLUCOSE BLOOD QUANT: CPT

## 2024-01-19 PROCEDURE — 71045 X-RAY EXAM CHEST 1 VIEW: CPT | Performed by: RADIOLOGY

## 2024-01-19 RX ADMIN — INSULIN HUMAN 1 UNITS: 100 INJECTION, SOLUTION PARENTERAL at 18:06

## 2024-01-19 RX ADMIN — INSULIN GLARGINE 8 UNITS: 100 INJECTION, SOLUTION SUBCUTANEOUS at 21:50

## 2024-01-19 RX ADMIN — METOPROLOL TARTRATE 25 MG: 25 TABLET, FILM COATED ORAL at 08:53

## 2024-01-19 RX ADMIN — ESOMEPRAZOLE MAGNESIUM 20 MG: 40 FOR SUSPENSION ORAL at 07:01

## 2024-01-19 RX ADMIN — ALBUTEROL SULFATE 2.5 MG: 2.5 SOLUTION RESPIRATORY (INHALATION) at 06:18

## 2024-01-19 RX ADMIN — INSULIN HUMAN 1 UNITS: 100 INJECTION, SOLUTION PARENTERAL at 00:02

## 2024-01-19 RX ADMIN — LEVOTHYROXINE SODIUM 200 MCG: 75 TABLET ORAL at 05:34

## 2024-01-19 RX ADMIN — POLYETHYLENE GLYCOL 3350 17 G: 17 POWDER, FOR SOLUTION ORAL at 08:54

## 2024-01-19 RX ADMIN — HEPARIN SODIUM 5000 UNITS: 5000 INJECTION INTRAVENOUS; SUBCUTANEOUS at 06:05

## 2024-01-19 RX ADMIN — HEPARIN SODIUM 5000 UNITS: 5000 INJECTION INTRAVENOUS; SUBCUTANEOUS at 14:31

## 2024-01-19 RX ADMIN — METOPROLOL TARTRATE 25 MG: 25 TABLET, FILM COATED ORAL at 21:43

## 2024-01-19 RX ADMIN — SERTRALINE HYDROCHLORIDE 50 MG: 50 TABLET ORAL at 08:54

## 2024-01-19 RX ADMIN — HEPARIN SODIUM 5000 UNITS: 5000 INJECTION INTRAVENOUS; SUBCUTANEOUS at 21:43

## 2024-01-19 ASSESSMENT — PAIN SCALES - PAIN ASSESSMENT IN ADVANCED DEMENTIA (PAINAD)
FACIALEXPRESSION: SMILING OR INEXPRESSIVE
BREATHING: OCCASIONAL LABORED BREATHING, SHORT PERIOD OF HYPERVENTILATION
BODYLANGUAGE: TENSE, DISTRESSED PACING, FIDGETING

## 2024-01-19 ASSESSMENT — PAIN SCALES - GENERAL: PAINLEVEL_OUTOF10: 0 - NO PAIN

## 2024-01-19 ASSESSMENT — PAIN SCALES - WONG BAKER: WONGBAKER_NUMERICALRESPONSE: NO HURT

## 2024-01-19 NOTE — CARE PLAN
The patient's goals for the shift include      The clinical goals for the shift include patient will remain injuries free throughout day shift    Patient will tolerate tube feeds with no signs of distension or nausea

## 2024-01-19 NOTE — PROGRESS NOTES
"Subjective   Woke up to voice this morning and opened eyes/smiled. Then went closed eyes immediately afterwards. Followed some instructions - gave thumbs up, moved extremities when prompted. Stated his name, and answered questions (shook head yes/no) about year/location (\"hospital\") before falling asleep again. Denied any abdominal pain.     Objective     Vitals:  Vitals:    01/19/24 0622   BP:    Pulse:    Resp:    Temp:    SpO2: 94%       I/O last 3 completed shifts:  In: 0 (0 mL/kg)   Out: 1275 (13 mL/kg) [Urine:1275 (0.4 mL/kg/hr)]  Weight: 98 kg   I/O this shift:  In: 1455 [NG/GT:1455]  Out: 925 [Urine:925]    Physical exam:  Constitutional: Laying comfortably in bed  HEENT: Normocephalic, atraumatic. R eye 5mm and unreactive, L eye 4->2mm. NJ tube in place on 55ml/hr. Mucous membranes dry  Respiratory: On 5L NC, coarse breath sounds, diffuse rhonchi, faint wheezing  Cardiovascular: RRR, no murmurs/rubs/gallops  Abdominal: Soft, nondistended, no guarding, no tenderness to palpation  Neuro: Opens eyes to voice, but will fall asleep immediately, Follows simple instructions, moves all 4 extremities when prompted.  Skin: scattered ecchymoses    Medications:  esomeprazole, 20 mg, nasogastric tube, Daily before breakfast  heparin (porcine), 5,000 Units, subcutaneous, q8h KENDRA  insulin glargine, 8 Units, subcutaneous, Nightly  insulin regular, 0-5 Units, subcutaneous, q6h  levothyroxine, 200 mcg, oral, Daily  metoprolol tartrate, 25 mg, nasogastric tube, BID  polyethylene glycol, 17 g, oral, Daily  sennosides, 2 tablet, oral, BID  sertraline, 50 mg, nasogastric tube, Daily           PRN medications: albuterol, dextrose 10 % in water (D10W), dextrose, diclofenac sodium, glucagon, oxygen, phenoL    Labs:  Results for orders placed or performed during the hospital encounter of 01/01/24 (from the past 24 hour(s))   POCT GLUCOSE   Result Value Ref Range    POCT Glucose 109 (H) 74 - 99 mg/dL   POCT GLUCOSE   Result Value Ref " Range    POCT Glucose 133 (H) 74 - 99 mg/dL   POCT GLUCOSE   Result Value Ref Range    POCT Glucose 167 (H) 74 - 99 mg/dL   POCT GLUCOSE   Result Value Ref Range    POCT Glucose 170 (H) 74 - 99 mg/dL   POCT GLUCOSE   Result Value Ref Range    POCT Glucose 148 (H) 74 - 99 mg/dL       Imaging:  MR cervical spine wo IV contrast    Result Date: 1/9/2024  Interpreted By:  Joey Rizzo, STUDY: MR CERVICAL SPINE WO IV CONTRAST;  1/8/2024 10:18 pm   INDICATION: Signs/Symptoms:R arm weakness.   COMPARISON: None.   ACCESSION NUMBER(S): [id number]   ORDERING CLINICIAN: DIEGO ABARCA   TECHNIQUE: The cervical spine was studied in the sagittal and axial planes utilizing T1 and T2 weighted images. Examination is limited due to motion artifact   FINDINGS: The craniovertebral junction is normal. The cord is normal in size and signal. The marrow signal is normal. Serial axial images reveal the following: C2/C3 There is normal alignment and vertebral body height. The disc space is normal. There is no evidence of canal or foraminal narrowing. There is no evidence of bulging or herniated disc. C3/C4 There is normal alignment and vertebral body height. The disc space is normal. There is no evidence of canal or foraminal narrowing. There is no evidence of bulging or herniated disc. C4/C5 Right greater than left uncovertebral joint hypertrophy with focal right-sided foraminal narrowing. No measurable canal stenosis. C5/C6 Right greater than left uncovertebral joint hypertrophy without canal stenosis. Right-sided foraminal narrowing C6/C7 Bilateral facet hypertrophy without canal or foraminal narrowing C7/T1 Bilateral facet hypertrophy without canal or foraminal narrowing       * Right-sided foraminal narrowing due to uncovertebral joint hypertrophy at C4/C5 and C5/C6 *No measurable canal stenosis or cord compression.   MACRO: none   Signed by: Joey Rizzo 1/9/2024 9:02 AM Dictation workstation:   [id number]        Assessment/Plan   Mr. Gopal Ness is a 74 y/o man with h/o pituitary adenoma s/p resection at [facility], DVT/PE on Eliquis, COPD (noncompliant with medications)/tobacco use, depresssion, DMII, HLD presenting after being found down i/s/o fall on 12/31 (LKN 1100 on 12/31). Brought to OSH, negative CTH, intubated for airway protection. EMS reported witnessing possible seizure activity. Also with persistent tachycardia, leukocytosis and infiltrate on chest CT, so treating presumed aspiration pneumonia with antibiotics. Transferred to [facility] for cvEEG which did not demonstrate seizures. MRI brain did not show acute stroke. Extubated 1/2. Course complicated by Covid pnuemonia, LEEANNE, SVT likely in the setting of infection, and RUE weakness in the setting of a fall. Currently admitted for continued altered mental status despite resolution of infection, correction of electrolytes, and lack of structural lesions that would explain his change in mentation. Code status was changed to DNR/DNI 1/17.      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 New small dural based enhancing masses in the R posterior fossa  - MRI Brain and MRI Sella - residual adenoma unchanged from prior examination and similar to slightly increased size of small dural-based enhancing masses of small dural-based enhancing masses within the R posterior fossa favored to represent meningiomas.  - EEG - mod diffuse encephalopathy.  Improvement in degree of encephalopathy compared to the previous day. Keppra DC.  - MRI cervical spine showed right-sided foraminal narrowing d/t uncovertebral joint hypertrophy at C4/C5 and C5/C6  Plan:  - Hold home trazadone 50mg at bedtime  - Continue home sertraline 50mg daily   - Outpatient neurosurgery/neuro-ophthalmology follow up  - EMG as an outpatient (1/22/23 earliest available), follow up with neurology in 4 weeks  - Repeat CT on 1/14 - atrophy, no acute intercranial pathology   - Corrected underlying metabolic and infectious causes of encephalopathy. No structural causes seen on imaging.     #Acute hypoxic respiratory failure  #COVID pneumonia, CAP  #COPD  - Maintain O2- 88-92%, currently on 4-5L NC  - Cpap at night  - Elevated CRP, LDH, Ferritin, d-dimer  - Sputum culture - Strep pneumoniae  - s/p 1x cefepime 2g on 1/1, meropenem (1/1-1/2), Vanc 1.5g (1/1-1/2) then ceftriaxone 2g (1/2- 1/8)   - Finished remdesivir (1/6-1/10), dexamethasone 6mg daily (stopped 1/10)  - Pulmonology consulted: Will start albuterol nebs q6h, RT for BPH, chest vest therapy. Duonebs PRN. No abx at this time.     #Hypernatremia - resolved  #LEEANNE (baseline Cr 0.8-0.9) - resolved  #Hx of DI   - Hold home tamsulosin 0.4mg daily while NPO  - Hold further D5W, continue with 400ml q6h FWF     #SVT  - Several episodes of SVT, terminated spontaneously, some associated with hypotension down to 80/50s.  - Consulted cardiology, pending recs.         -Metoprolol tartrate 25mg BID from 12.5mg q6h        -If recurrent of persistent SVT, start with vagal maneuvers (carotid sinus massage), if      unsuccessful, may consider Adenosine dose and/or IV Metop        -Please continue to monitor patient on telemetry        -Please obtain EKG if there's recurrence of SVT        -Optimal lytes: K>4, Mag>2      #panhypothyroidism s/p adenoma resection  #Cushing disease  #diabetes mellitus  - Holding home glimepiride 4mg BID, metformin 1000mg BID  - A1C  5.6  - AM free T4 0.92  Plan:  - Mild corrective SSI #1  - POCT glucose q6hrs while on tube feeds, Hypoglycemia protocol  - Continue home levothyroxine 200mcg daily  - Glargine 8u nightly  - follow up outpatient with endocrinologist Dr. Yvonne Lofton in 4 weeks (scheduled by endocrine)     #hx of DVT/PE  - H/H: 16.9/51 -> 14.3/45.8  - PLT: 148 -> 133 -> 98 > 100 stable  - Daily CBC    - Hold home Eliquis at this time      F: FWF with TF  E: Replete PRN  N: Nepro  A: PIVs  L[place]eli: None  GI ppx: PPI  DVT ppx: SQH     Code status: DNR/DNI  NOK/Surrogate: Donya Emerson (Daughter) [phone]      Fatmata Kumar MD

## 2024-01-19 NOTE — PROGRESS NOTES
1/19/24 0935 Transitional Care Coordinator Notes:    Patient will be scheduled for a peg tube placement. Daughter wishes is for patient to discharge to SNF. Will coordinate with palliative care regarding discharge needs.                  Assessment/Plan   Principal Problem:    AMS (altered mental status)    Discharge Plans: discharge to SNF            Annalisa Whittaker RN

## 2024-01-20 LAB
ALBUMIN SERPL BCP-MCNC: 2.8 G/DL (ref 3.4–5)
ANION GAP SERPL CALC-SCNC: 12 MMOL/L (ref 10–20)
BASOPHILS # BLD AUTO: 0.06 X10*3/UL (ref 0–0.1)
BASOPHILS NFR BLD AUTO: 0.9 %
BUN SERPL-MCNC: 18 MG/DL (ref 6–23)
CALCIUM SERPL-MCNC: 8.9 MG/DL (ref 8.6–10.6)
CHLORIDE SERPL-SCNC: 105 MMOL/L (ref 98–107)
CO2 SERPL-SCNC: 31 MMOL/L (ref 21–32)
CREAT SERPL-MCNC: 0.97 MG/DL (ref 0.5–1.3)
EGFRCR SERPLBLD CKD-EPI 2021: 83 ML/MIN/1.73M*2
EOSINOPHIL # BLD AUTO: 0.72 X10*3/UL (ref 0–0.4)
EOSINOPHIL NFR BLD AUTO: 11 %
ERYTHROCYTE [DISTWIDTH] IN BLOOD BY AUTOMATED COUNT: 13.5 % (ref 11.5–14.5)
GLUCOSE BLD MANUAL STRIP-MCNC: 142 MG/DL (ref 74–99)
GLUCOSE BLD MANUAL STRIP-MCNC: 153 MG/DL (ref 74–99)
GLUCOSE BLD MANUAL STRIP-MCNC: 160 MG/DL (ref 74–99)
GLUCOSE BLD MANUAL STRIP-MCNC: 168 MG/DL (ref 74–99)
GLUCOSE BLD MANUAL STRIP-MCNC: 181 MG/DL (ref 74–99)
GLUCOSE SERPL-MCNC: 158 MG/DL (ref 74–99)
HCT VFR BLD AUTO: 42.5 % (ref 41–52)
HGB BLD-MCNC: 13.3 G/DL (ref 13.5–17.5)
IMM GRANULOCYTES # BLD AUTO: 0.05 X10*3/UL (ref 0–0.5)
IMM GRANULOCYTES NFR BLD AUTO: 0.8 % (ref 0–0.9)
LYMPHOCYTES # BLD AUTO: 1.08 X10*3/UL (ref 0.8–3)
LYMPHOCYTES NFR BLD AUTO: 16.5 %
MAGNESIUM SERPL-MCNC: 2.04 MG/DL (ref 1.6–2.4)
MCH RBC QN AUTO: 31.6 PG (ref 26–34)
MCHC RBC AUTO-ENTMCNC: 31.3 G/DL (ref 32–36)
MCV RBC AUTO: 101 FL (ref 80–100)
MONOCYTES # BLD AUTO: 0.45 X10*3/UL (ref 0.05–0.8)
MONOCYTES NFR BLD AUTO: 6.9 %
NEUTROPHILS # BLD AUTO: 4.19 X10*3/UL (ref 1.6–5.5)
NEUTROPHILS NFR BLD AUTO: 63.9 %
NRBC BLD-RTO: 0 /100 WBCS (ref 0–0)
PHOSPHATE SERPL-MCNC: 3.1 MG/DL (ref 2.5–4.9)
PLATELET # BLD AUTO: 73 X10*3/UL (ref 150–450)
POTASSIUM SERPL-SCNC: 3.7 MMOL/L (ref 3.5–5.3)
RBC # BLD AUTO: 4.21 X10*6/UL (ref 4.5–5.9)
SODIUM SERPL-SCNC: 144 MMOL/L (ref 136–145)
WBC # BLD AUTO: 6.6 X10*3/UL (ref 4.4–11.3)

## 2024-01-20 PROCEDURE — 85025 COMPLETE CBC W/AUTO DIFF WBC: CPT

## 2024-01-20 PROCEDURE — 80069 RENAL FUNCTION PANEL: CPT

## 2024-01-20 PROCEDURE — 2500000001 HC RX 250 WO HCPCS SELF ADMINISTERED DRUGS (ALT 637 FOR MEDICARE OP): Performed by: INTERNAL MEDICINE

## 2024-01-20 PROCEDURE — 36415 COLL VENOUS BLD VENIPUNCTURE: CPT

## 2024-01-20 PROCEDURE — 2500000004 HC RX 250 GENERAL PHARMACY W/ HCPCS (ALT 636 FOR OP/ED): Performed by: STUDENT IN AN ORGANIZED HEALTH CARE EDUCATION/TRAINING PROGRAM

## 2024-01-20 PROCEDURE — 2500000002 HC RX 250 W HCPCS SELF ADMINISTERED DRUGS (ALT 637 FOR MEDICARE OP, ALT 636 FOR OP/ED)

## 2024-01-20 PROCEDURE — 83735 ASSAY OF MAGNESIUM: CPT

## 2024-01-20 PROCEDURE — 1200000002 HC GENERAL ROOM WITH TELEMETRY DAILY

## 2024-01-20 PROCEDURE — 2500000001 HC RX 250 WO HCPCS SELF ADMINISTERED DRUGS (ALT 637 FOR MEDICARE OP): Performed by: STUDENT IN AN ORGANIZED HEALTH CARE EDUCATION/TRAINING PROGRAM

## 2024-01-20 PROCEDURE — 2500000001 HC RX 250 WO HCPCS SELF ADMINISTERED DRUGS (ALT 637 FOR MEDICARE OP)

## 2024-01-20 PROCEDURE — 82947 ASSAY GLUCOSE BLOOD QUANT: CPT

## 2024-01-20 PROCEDURE — 2500000004 HC RX 250 GENERAL PHARMACY W/ HCPCS (ALT 636 FOR OP/ED)

## 2024-01-20 PROCEDURE — 99232 SBSQ HOSP IP/OBS MODERATE 35: CPT

## 2024-01-20 RX ORDER — POTASSIUM CHLORIDE 1.5 G/1.58G
40 POWDER, FOR SOLUTION ORAL ONCE
Status: COMPLETED | OUTPATIENT
Start: 2024-01-20 | End: 2024-01-20

## 2024-01-20 RX ORDER — ENOXAPARIN SODIUM 100 MG/ML
40 INJECTION SUBCUTANEOUS DAILY
Status: DISCONTINUED | OUTPATIENT
Start: 2024-01-20 | End: 2024-01-31 | Stop reason: HOSPADM

## 2024-01-20 RX ADMIN — LEVOTHYROXINE SODIUM 200 MCG: 75 TABLET ORAL at 05:48

## 2024-01-20 RX ADMIN — INSULIN HUMAN 1 UNITS: 100 INJECTION, SOLUTION PARENTERAL at 12:55

## 2024-01-20 RX ADMIN — INSULIN GLARGINE 8 UNITS: 100 INJECTION, SOLUTION SUBCUTANEOUS at 23:57

## 2024-01-20 RX ADMIN — INSULIN HUMAN 1 UNITS: 100 INJECTION, SOLUTION PARENTERAL at 00:50

## 2024-01-20 RX ADMIN — HEPARIN SODIUM 5000 UNITS: 5000 INJECTION INTRAVENOUS; SUBCUTANEOUS at 05:47

## 2024-01-20 RX ADMIN — POTASSIUM CHLORIDE 40 MEQ: 1.5 POWDER, FOR SOLUTION ORAL at 10:10

## 2024-01-20 RX ADMIN — ENOXAPARIN SODIUM 40 MG: 100 INJECTION SUBCUTANEOUS at 10:10

## 2024-01-20 RX ADMIN — INSULIN HUMAN 1 UNITS: 100 INJECTION, SOLUTION PARENTERAL at 17:53

## 2024-01-20 RX ADMIN — METOPROLOL TARTRATE 25 MG: 25 TABLET, FILM COATED ORAL at 10:10

## 2024-01-20 RX ADMIN — SERTRALINE HYDROCHLORIDE 50 MG: 50 TABLET ORAL at 10:10

## 2024-01-20 RX ADMIN — INSULIN HUMAN 1 UNITS: 100 INJECTION, SOLUTION PARENTERAL at 05:47

## 2024-01-20 RX ADMIN — ESOMEPRAZOLE MAGNESIUM 20 MG: 40 FOR SUSPENSION ORAL at 06:01

## 2024-01-20 ASSESSMENT — COGNITIVE AND FUNCTIONAL STATUS - GENERAL
STANDING UP FROM CHAIR USING ARMS: TOTAL
DAILY ACTIVITIY SCORE: 6
MOVING FROM LYING ON BACK TO SITTING ON SIDE OF FLAT BED WITH BEDRAILS: TOTAL
HELP NEEDED FOR BATHING: TOTAL
CLIMB 3 TO 5 STEPS WITH RAILING: TOTAL
PERSONAL GROOMING: TOTAL
MOVING TO AND FROM BED TO CHAIR: TOTAL
TURNING FROM BACK TO SIDE WHILE IN FLAT BAD: TOTAL
TOILETING: TOTAL
EATING MEALS: TOTAL
MOBILITY SCORE: 6
WALKING IN HOSPITAL ROOM: TOTAL
DRESSING REGULAR LOWER BODY CLOTHING: TOTAL
DRESSING REGULAR UPPER BODY CLOTHING: TOTAL

## 2024-01-20 ASSESSMENT — PAIN SCALES - GENERAL: PAINLEVEL_OUTOF10: 0 - NO PAIN

## 2024-01-20 ASSESSMENT — PAIN - FUNCTIONAL ASSESSMENT: PAIN_FUNCTIONAL_ASSESSMENT: 0-10

## 2024-01-20 NOTE — PROGRESS NOTES
Subjective   Will respond to voice without opening eyes. Can answer questions by nodding/shaking head yes/no. Denied any pain or SOB. Moved extremities when prompted multiple times. Attempted to say daughter's/grandchildren's name.     Discussion with Dr. Davis and family yesterday. Family is agreeable to reassessing needs for PEG at facility/outpatient and the need for placement given his prolonged hospitalization without significant improvement in mental status. Will continue to work with SW in regards to discharge planning.     Objective     Vitals:  Vitals:    01/20/24 0747   BP: 108/63   Pulse: 79   Resp: 17   Temp: 36.5 °C (97.7 °F)   SpO2: 90%       I/O last 3 completed shifts:  In: 3227 (33.2 mL/kg) [NG/GT:3227]  Out: 1847 (19 mL/kg) [Urine:1847 (0.5 mL/kg/hr)]  Weight: 97.3 kg   No intake/output data recorded.    Physical exam:  Constitutional: Laying comfortably in bed  HEENT: Normocephalic, atraumatic. R eye 5mm and unreactive, L eye 4->2mm. NJ tube in place on 55ml/hr. Mucous membranes dry  Respiratory: On 5L NC, coarse breath sounds, diffuse rhonchi, no wheezing  Cardiovascular: RRR, no murmurs/rubs/gallops  Abdominal: Soft, nondistended, no guarding, no tenderness to palpation  Neuro: Will nod yes/no to question, but then fall asleep immediately, Follows simple instructions, moves all 4 extremities when prompted.  Skin: scattered ecchymoses    Medications:  enoxaparin, 40 mg, subcutaneous, Daily  esomeprazole, 20 mg, nasogastric tube, Daily before breakfast  insulin glargine, 8 Units, subcutaneous, Nightly  insulin regular, 0-5 Units, subcutaneous, q6h  levothyroxine, 200 mcg, oral, Daily  metoprolol tartrate, 25 mg, nasogastric tube, BID  polyethylene glycol, 17 g, oral, Daily  sennosides, 2 tablet, oral, BID  sertraline, 50 mg, nasogastric tube, Daily           PRN medications: albuterol, dextrose 10 % in water (D10W), dextrose, diclofenac sodium, glucagon, oxygen, phenoL    Labs:  Results for orders  placed or performed during the hospital encounter of 01/01/24 (from the past 24 hour(s))   POCT GLUCOSE   Result Value Ref Range    POCT Glucose 160 (H) 74 - 99 mg/dL   POCT GLUCOSE   Result Value Ref Range    POCT Glucose 167 (H) 74 - 99 mg/dL   POCT GLUCOSE   Result Value Ref Range    POCT Glucose 181 (H) 74 - 99 mg/dL   POCT GLUCOSE   Result Value Ref Range    POCT Glucose 168 (H) 74 - 99 mg/dL   CBC and Auto Differential   Result Value Ref Range    WBC 6.6 4.4 - 11.3 x10*3/uL    nRBC 0.0 0.0 - 0.0 /100 WBCs    RBC 4.21 (L) 4.50 - 5.90 x10*6/uL    Hemoglobin 13.3 (L) 13.5 - 17.5 g/dL    Hematocrit 42.5 41.0 - 52.0 %     (H) 80 - 100 fL    MCH 31.6 26.0 - 34.0 pg    MCHC 31.3 (L) 32.0 - 36.0 g/dL    RDW 13.5 11.5 - 14.5 %    Platelets 73 (L) 150 - 450 x10*3/uL    Neutrophils % 63.9 40.0 - 80.0 %    Immature Granulocytes %, Automated 0.8 0.0 - 0.9 %    Lymphocytes % 16.5 13.0 - 44.0 %    Monocytes % 6.9 2.0 - 10.0 %    Eosinophils % 11.0 0.0 - 6.0 %    Basophils % 0.9 0.0 - 2.0 %    Neutrophils Absolute 4.19 1.60 - 5.50 x10*3/uL    Immature Granulocytes Absolute, Automated 0.05 0.00 - 0.50 x10*3/uL    Lymphocytes Absolute 1.08 0.80 - 3.00 x10*3/uL    Monocytes Absolute 0.45 0.05 - 0.80 x10*3/uL    Eosinophils Absolute 0.72 (H) 0.00 - 0.40 x10*3/uL    Basophils Absolute 0.06 0.00 - 0.10 x10*3/uL   Renal Function Panel   Result Value Ref Range    Glucose 158 (H) 74 - 99 mg/dL    Sodium 144 136 - 145 mmol/L    Potassium 3.7 3.5 - 5.3 mmol/L    Chloride 105 98 - 107 mmol/L    Bicarbonate 31 21 - 32 mmol/L    Anion Gap 12 10 - 20 mmol/L    Urea Nitrogen 18 6 - 23 mg/dL    Creatinine 0.97 0.50 - 1.30 mg/dL    eGFR 83 >60 mL/min/1.73m*2    Calcium 8.9 8.6 - 10.6 mg/dL    Phosphorus 3.1 2.5 - 4.9 mg/dL    Albumin 2.8 (L) 3.4 - 5.0 g/dL   Magnesium   Result Value Ref Range    Magnesium 2.04 1.60 - 2.40 mg/dL   POCT GLUCOSE   Result Value Ref Range    POCT Glucose 142 (H) 74 - 99 mg/dL       Imaging:  MR cervical  spine wo IV contrast    Result Date: 1/9/2024  Interpreted By:  Joey Rizzo, STUDY: MR CERVICAL SPINE WO IV CONTRAST;  1/8/2024 10:18 pm   INDICATION: Signs/Symptoms:R arm weakness.   COMPARISON: None.   ACCESSION NUMBER(S): [id number]   ORDERING CLINICIAN: DIEGO ABARCA   TECHNIQUE: The cervical spine was studied in the sagittal and axial planes utilizing T1 and T2 weighted images. Examination is limited due to motion artifact   FINDINGS: The craniovertebral junction is normal. The cord is normal in size and signal. The marrow signal is normal. Serial axial images reveal the following: C2/C3 There is normal alignment and vertebral body height. The disc space is normal. There is no evidence of canal or foraminal narrowing. There is no evidence of bulging or herniated disc. C3/C4 There is normal alignment and vertebral body height. The disc space is normal. There is no evidence of canal or foraminal narrowing. There is no evidence of bulging or herniated disc. C4/C5 Right greater than left uncovertebral joint hypertrophy with focal right-sided foraminal narrowing. No measurable canal stenosis. C5/C6 Right greater than left uncovertebral joint hypertrophy without canal stenosis. Right-sided foraminal narrowing C6/C7 Bilateral facet hypertrophy without canal or foraminal narrowing C7/T1 Bilateral facet hypertrophy without canal or foraminal narrowing       * Right-sided foraminal narrowing due to uncovertebral joint hypertrophy at C4/C5 and C5/C6 *No measurable canal stenosis or cord compression.   MACRO: none   Signed by: Joey Rizzo 1/9/2024 9:02 AM Dictation workstation:   [id number]       Assessment/Plan   Mr. Gopal Ness is a 74 y/o man with h/o pituitary adenoma s/p resection at [facility], DVT/PE on Eliquis, COPD (noncompliant with medications)/tobacco use, depresssion, DMII, HLD presenting after being found down i/s/o fall on 12/31 (LKN 1100 on 12/31). Brought to OSH, negative CTH, intubated for  airway protection. EMS reported witnessing possible seizure activity. Also with persistent tachycardia, leukocytosis and infiltrate on chest CT, so treating presumed aspiration pneumonia with antibiotics. Transferred to [facility] for cvEEG which did not demonstrate seizures. MRI brain did not show acute stroke. Extubated 1/2. Course complicated by Covid pnuemonia, LEEANNE, SVT likely in the setting of infection, and RUE weakness in the setting of a fall. Currently admitted for continued altered mental status despite resolution of infection, correction of electrolytes, and lack of structural lesions that would explain his change in mentation. Code status was changed to DNR/DNI 1/17.      Updates 1/20:  - Continue albuterol nebs and bronchopulmonary hygiene with RT, oral care  - Continue with tube feeds, at goal 55ml/hr, continue with  q4h   - CXR from 1/19 shoes improved aeration of b/l lung bases  - Discussion with family yesterday - agreeable with holding off PEG until he is discharged to a facility. Will continue to work with SW regarding dispo.     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 New small dural based enhancing masses in the R posterior fossa  - MRI Brain and MRI Sella - residual adenoma unchanged from prior examination and similar to slightly increased size of small dural-based enhancing masses of small dural-based enhancing masses within the R posterior fossa favored to represent meningiomas.  - EEG - mod diffuse encephalopathy. Improvement in degree of encephalopathy compared to the previous day. Keppra DC.  - MRI cervical spine showed right-sided foraminal narrowing d/t uncovertebral joint  hypertrophy at C4/C5 and C5/C6  Plan:  - Hold home trazadone 50mg at bedtime  - Continue home sertraline 50mg daily   - Outpatient neurosurgery/neuro-ophthalmology follow up  - EMG as an outpatient (1/22/23 earliest available), follow up with neurology in 4 weeks  - Repeat CT on 1/14 - atrophy, no acute intercranial pathology   - Corrected underlying metabolic and infectious causes of encephalopathy. No structural causes seen on imaging.     #Acute hypoxic respiratory failure  #COVID pneumonia, CAP  #COPD  - Maintain O2- 88-92%, currently on 4-5L NC  - Cpap at night  - Elevated CRP, LDH, Ferritin, d-dimer  - Sputum culture - Strep pneumoniae  - s/p 1x cefepime 2g on 1/1, meropenem (1/1-1/2), Vanc 1.5g (1/1-1/2) then ceftriaxone 2g (1/2- 1/8)   - Finished remdesivir (1/6-1/10), dexamethasone 6mg daily (stopped 1/10)  - Pulmonology consulted: Will start albuterol nebs q6h, RT for BPH, chest vest therapy. Duonebs PRN. No abx at this time.     #Hypernatremia - resolved  #LEEANNE (baseline Cr 0.8-0.9) - resolved  #Hx of DI   - Hold home tamsulosin 0.4mg daily while NPO  - Hold further D5W, continue with 400ml q6h FWF     #SVT  - Several episodes of SVT, terminated spontaneously, some associated with hypotension down to 80/50s.  - Consulted cardiology, pending recs.         -Metoprolol tartrate 25mg BID from 12.5mg q6h        -If recurrent of persistent SVT, start with vagal maneuvers (carotid sinus massage), if      unsuccessful, may consider Adenosine dose and/or IV Metop        -Please continue to monitor patient on telemetry        -Please obtain EKG if there's recurrence of SVT        -Optimal lytes: K>4, Mag>2      #panhypothyroidism s/p adenoma resection  #Cushing disease  #diabetes mellitus  - Holding home glimepiride 4mg BID, metformin 1000mg BID  - A1C 5.6  - AM free T4 0.92  Plan:  - Mild corrective SSI #1  - POCT glucose q6hrs while on tube feeds, Hypoglycemia protocol  - Continue home levothyroxine 200mcg  daily  - Glargine 8u nightly  - follow up outpatient with endocrinologist Dr. Yvonne Lofton in 4 weeks (scheduled by endocrine)     #hx of DVT/PE  - H/H: 16.9/51 -> 14.3/45.8  - PLT: 148 -> 133 -> 98 > 100 stable  - Daily CBC    - Hold home Eliquis at this time      F: FWF with TF  E: Replete PRN  N: Nepro  A: CRUZs  Lomeli: None  GI ppx: PPI  DVT ppx: lovenox     Code status: DNR/DNI  NOK/Surrogate: Donya Emerson (Daughter) [phone]      Fatmata Kumar MD

## 2024-01-21 LAB
GLUCOSE BLD MANUAL STRIP-MCNC: 126 MG/DL (ref 74–99)
GLUCOSE BLD MANUAL STRIP-MCNC: 141 MG/DL (ref 74–99)
GLUCOSE BLD MANUAL STRIP-MCNC: 172 MG/DL (ref 74–99)
GLUCOSE BLD MANUAL STRIP-MCNC: 175 MG/DL (ref 74–99)

## 2024-01-21 PROCEDURE — 2500000001 HC RX 250 WO HCPCS SELF ADMINISTERED DRUGS (ALT 637 FOR MEDICARE OP): Performed by: STUDENT IN AN ORGANIZED HEALTH CARE EDUCATION/TRAINING PROGRAM

## 2024-01-21 PROCEDURE — 2500000004 HC RX 250 GENERAL PHARMACY W/ HCPCS (ALT 636 FOR OP/ED)

## 2024-01-21 PROCEDURE — 2500000001 HC RX 250 WO HCPCS SELF ADMINISTERED DRUGS (ALT 637 FOR MEDICARE OP): Performed by: INTERNAL MEDICINE

## 2024-01-21 PROCEDURE — 99232 SBSQ HOSP IP/OBS MODERATE 35: CPT

## 2024-01-21 PROCEDURE — 2500000004 HC RX 250 GENERAL PHARMACY W/ HCPCS (ALT 636 FOR OP/ED): Performed by: STUDENT IN AN ORGANIZED HEALTH CARE EDUCATION/TRAINING PROGRAM

## 2024-01-21 PROCEDURE — 1200000002 HC GENERAL ROOM WITH TELEMETRY DAILY

## 2024-01-21 PROCEDURE — 2500000001 HC RX 250 WO HCPCS SELF ADMINISTERED DRUGS (ALT 637 FOR MEDICARE OP)

## 2024-01-21 PROCEDURE — 82947 ASSAY GLUCOSE BLOOD QUANT: CPT

## 2024-01-21 RX ADMIN — ENOXAPARIN SODIUM 40 MG: 100 INJECTION SUBCUTANEOUS at 08:19

## 2024-01-21 RX ADMIN — ESOMEPRAZOLE MAGNESIUM 20 MG: 40 FOR SUSPENSION ORAL at 08:20

## 2024-01-21 RX ADMIN — METOPROLOL TARTRATE 25 MG: 25 TABLET, FILM COATED ORAL at 08:19

## 2024-01-21 RX ADMIN — STANDARDIZED SENNA CONCENTRATE 17.2 MG: 8.6 TABLET ORAL at 08:19

## 2024-01-21 RX ADMIN — STANDARDIZED SENNA CONCENTRATE 17.2 MG: 8.6 TABLET ORAL at 21:06

## 2024-01-21 RX ADMIN — INSULIN HUMAN 1 UNITS: 100 INJECTION, SOLUTION PARENTERAL at 18:10

## 2024-01-21 RX ADMIN — METOPROLOL TARTRATE 25 MG: 25 TABLET, FILM COATED ORAL at 21:06

## 2024-01-21 RX ADMIN — INSULIN GLARGINE 8 UNITS: 100 INJECTION, SOLUTION SUBCUTANEOUS at 21:07

## 2024-01-21 RX ADMIN — LEVOTHYROXINE SODIUM 200 MCG: 75 TABLET ORAL at 05:54

## 2024-01-21 RX ADMIN — SERTRALINE HYDROCHLORIDE 50 MG: 50 TABLET ORAL at 08:19

## 2024-01-21 RX ADMIN — POLYETHYLENE GLYCOL 3350 17 G: 17 POWDER, FOR SOLUTION ORAL at 08:19

## 2024-01-21 RX ADMIN — INSULIN HUMAN 1 UNITS: 100 INJECTION, SOLUTION PARENTERAL at 00:27

## 2024-01-21 ASSESSMENT — PAIN SCALES - GENERAL
PAINLEVEL_OUTOF10: 0 - NO PAIN

## 2024-01-21 ASSESSMENT — COGNITIVE AND FUNCTIONAL STATUS - GENERAL
STANDING UP FROM CHAIR USING ARMS: TOTAL
CLIMB 3 TO 5 STEPS WITH RAILING: TOTAL
STANDING UP FROM CHAIR USING ARMS: TOTAL
DAILY ACTIVITIY SCORE: 6
TURNING FROM BACK TO SIDE WHILE IN FLAT BAD: TOTAL
MOVING FROM LYING ON BACK TO SITTING ON SIDE OF FLAT BED WITH BEDRAILS: TOTAL
EATING MEALS: TOTAL
DAILY ACTIVITIY SCORE: 6
TOILETING: TOTAL
TOILETING: TOTAL
MOVING TO AND FROM BED TO CHAIR: TOTAL
DRESSING REGULAR UPPER BODY CLOTHING: TOTAL
CLIMB 3 TO 5 STEPS WITH RAILING: TOTAL
MOBILITY SCORE: 6
DRESSING REGULAR LOWER BODY CLOTHING: TOTAL
HELP NEEDED FOR BATHING: TOTAL
MOVING FROM LYING ON BACK TO SITTING ON SIDE OF FLAT BED WITH BEDRAILS: TOTAL
WALKING IN HOSPITAL ROOM: TOTAL
TURNING FROM BACK TO SIDE WHILE IN FLAT BAD: TOTAL
HELP NEEDED FOR BATHING: TOTAL
HELP NEEDED FOR BATHING: TOTAL
CLIMB 3 TO 5 STEPS WITH RAILING: TOTAL
WALKING IN HOSPITAL ROOM: TOTAL
DAILY ACTIVITIY SCORE: 6
MOBILITY SCORE: 6
EATING MEALS: TOTAL
DRESSING REGULAR UPPER BODY CLOTHING: TOTAL
PERSONAL GROOMING: TOTAL
MOVING TO AND FROM BED TO CHAIR: TOTAL
STANDING UP FROM CHAIR USING ARMS: TOTAL
MOBILITY SCORE: 6
MOVING TO AND FROM BED TO CHAIR: TOTAL
DRESSING REGULAR UPPER BODY CLOTHING: TOTAL
DRESSING REGULAR LOWER BODY CLOTHING: TOTAL
EATING MEALS: TOTAL
WALKING IN HOSPITAL ROOM: TOTAL
TURNING FROM BACK TO SIDE WHILE IN FLAT BAD: TOTAL
PERSONAL GROOMING: TOTAL
DRESSING REGULAR LOWER BODY CLOTHING: TOTAL
MOVING FROM LYING ON BACK TO SITTING ON SIDE OF FLAT BED WITH BEDRAILS: TOTAL
TOILETING: TOTAL
PERSONAL GROOMING: TOTAL

## 2024-01-21 ASSESSMENT — PAIN SCALES - WONG BAKER
WONGBAKER_NUMERICALRESPONSE: NO HURT
WONGBAKER_NUMERICALRESPONSE: NO HURT

## 2024-01-21 NOTE — PROGRESS NOTES
Subjective   No acute events overnight. Pt responds to voice and answers questions appropriately, though with brief (typically 1 word) responses. Denies any chest pain, SOB, fevers/chills this morning.     Objective     Vitals:  Vitals:    01/21/24 0744   BP: 94/57   Pulse: 91   Resp: 19   Temp: 36.4 °C (97.5 °F)   SpO2: 91%       I/O last 3 completed shifts:  In: 1540 (15.5 mL/kg) [NG/GT:1540]  Out: 1300 (13.1 mL/kg) [Urine:1300 (0.4 mL/kg/hr)]  Weight: 99.5 kg   I/O this shift:  In: -   Out: 650 [Urine:650]    Physical exam:  Constitutional: Laying comfortably in bed  HEENT: Normocephalic, atraumatic. R eye 5mm and unreactive, L eye 4->2mm. NJ tube in place on 55ml/hr. Mucous membranes dry  Respiratory: On 5L NC, slightly coarse breath sounds, minimal upper airway congestion diffuse rhonchi, no wheezing  Cardiovascular: RRR, no murmurs/rubs/gallops  Abdominal: Soft, nondistended, no guarding, no tenderness to palpation  Neuro: Will nod yes/no to question, but then fall asleep immediately, Follows simple instructions, moves all 4 extremities when prompted.  Skin: scattered ecchymoses    Medications:  enoxaparin, 40 mg, subcutaneous, Daily  esomeprazole, 20 mg, nasogastric tube, Daily before breakfast  insulin glargine, 8 Units, subcutaneous, Nightly  insulin regular, 0-5 Units, subcutaneous, q6h  levothyroxine, 200 mcg, oral, Daily  metoprolol tartrate, 25 mg, nasogastric tube, BID  polyethylene glycol, 17 g, oral, Daily  sennosides, 2 tablet, oral, BID  sertraline, 50 mg, nasogastric tube, Daily           PRN medications: albuterol, dextrose 10 % in water (D10W), dextrose, diclofenac sodium, glucagon, oxygen, phenoL    Labs:  Results for orders placed or performed during the hospital encounter of 01/01/24 (from the past 24 hour(s))   POCT GLUCOSE   Result Value Ref Range    POCT Glucose 153 (H) 74 - 99 mg/dL   POCT GLUCOSE   Result Value Ref Range    POCT Glucose 160 (H) 74 - 99 mg/dL   POCT GLUCOSE   Result Value  Ref Range    POCT Glucose 172 (H) 74 - 99 mg/dL   POCT GLUCOSE   Result Value Ref Range    POCT Glucose 141 (H) 74 - 99 mg/dL       Imaging:  MR cervical spine wo IV contrast    Result Date: 1/9/2024  Interpreted By:  Joey Rizzo, STUDY: MR CERVICAL SPINE WO IV CONTRAST;  1/8/2024 10:18 pm   INDICATION: Signs/Symptoms:R arm weakness.   COMPARISON: None.   ACCESSION NUMBER(S): [id number]   ORDERING CLINICIAN: DIEGO ABARCA   TECHNIQUE: The cervical spine was studied in the sagittal and axial planes utilizing T1 and T2 weighted images. Examination is limited due to motion artifact   FINDINGS: The craniovertebral junction is normal. The cord is normal in size and signal. The marrow signal is normal. Serial axial images reveal the following: C2/C3 There is normal alignment and vertebral body height. The disc space is normal. There is no evidence of canal or foraminal narrowing. There is no evidence of bulging or herniated disc. C3/C4 There is normal alignment and vertebral body height. The disc space is normal. There is no evidence of canal or foraminal narrowing. There is no evidence of bulging or herniated disc. C4/C5 Right greater than left uncovertebral joint hypertrophy with focal right-sided foraminal narrowing. No measurable canal stenosis. C5/C6 Right greater than left uncovertebral joint hypertrophy without canal stenosis. Right-sided foraminal narrowing C6/C7 Bilateral facet hypertrophy without canal or foraminal narrowing C7/T1 Bilateral facet hypertrophy without canal or foraminal narrowing       * Right-sided foraminal narrowing due to uncovertebral joint hypertrophy at C4/C5 and C5/C6 *No measurable canal stenosis or cord compression.   MACRO: none   Signed by: Joey Rizzo 1/9/2024 9:02 AM Dictation workstation:   [id number]       Assessment/Plan   Mr. Gopal Ness is a 72 y/o man with h/o pituitary adenoma s/p resection at [facility], DVT/PE on Eliquis, COPD (noncompliant with  medications)/tobacco use, depresssion, DMII, HLD presenting after being found down i/s/o fall on 12/31 (LKN 1100 on 12/31). Brought to OSH, negative CTH, intubated for airway protection. EMS reported witnessing possible seizure activity. Also with persistent tachycardia, leukocytosis and infiltrate on chest CT, so treating presumed aspiration pneumonia with antibiotics. Transferred to [facility] for cvEEG which did not demonstrate seizures. MRI brain did not show acute stroke. Extubated 1/2. Course complicated by Covid pnuemonia, LEEANNE, SVT likely in the setting of infection, and RUE weakness in the setting of a fall. Currently admitted for continued altered mental status despite resolution of infection, correction of electrolytes, and lack of structural lesions that would explain his change in mentation. Code status was changed to DNR/DNI 1/17.      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 New small dural based enhancing masses in the R posterior fossa  - MRI Brain and MRI Sella - residual adenoma unchanged from prior examination and similar to slightly increased size of small dural-based enhancing masses of small dural-based enhancing masses within the R posterior fossa favored to represent meningiomas.  - EEG - mod diffuse encephalopathy. Improvement in degree of encephalopathy compared to the previous day. Keppra DC.  - MRI cervical spine showed right-sided foraminal narrowing d/t uncovertebral joint hypertrophy at C4/C5 and C5/C6  Plan:  - Hold home  trazadone 50mg at bedtime  - Continue home sertraline 50mg daily   - Outpatient neurosurgery/neuro-ophthalmology follow up  - EMG as an outpatient (1/22/23 earliest available), follow up with neurology in 4 weeks  - Repeat CT on 1/14 - atrophy, no acute intercranial pathology   - Corrected underlying metabolic and infectious causes of encephalopathy. No structural causes seen on imaging.     #Acute hypoxic respiratory failure  #COVID pneumonia, CAP  #COPD  - Maintain O2- 88-92%, currently on 4-5L NC  - Cpap at night  - Elevated CRP, LDH, Ferritin, d-dimer  - Sputum culture - Strep pneumoniae  - s/p 1x cefepime 2g on 1/1, meropenem (1/1-1/2), Vanc 1.5g (1/1-1/2) then ceftriaxone 2g (1/2- 1/8)   - Finished remdesivir (1/6-1/10), dexamethasone 6mg daily (stopped 1/10)  - Pulmonology consulted: Will start albuterol nebs q6h, RT for BPH, chest vest therapy. Duonebs PRN. No abx at this time.     #Hypernatremia - resolved  #LEEANNE (baseline Cr 0.8-0.9) - resolved  #Hx of DI   - Hold home tamsulosin 0.4mg daily while NPO  - Hold further D5W, continue with 400ml q6h FWF     #SVT  - Several episodes of SVT, terminated spontaneously, some associated with hypotension down to 80/50s.  - Consulted cardiology, pending recs.         -Metoprolol tartrate 25mg BID from 12.5mg q6h        -If recurrent of persistent SVT, start with vagal maneuvers (carotid sinus massage), if      unsuccessful, may consider Adenosine dose and/or IV Metop        -Please continue to monitor patient on telemetry        -Please obtain EKG if there's recurrence of SVT        -Optimal lytes: K>4, Mag>2      #panhypothyroidism s/p adenoma resection  #Cushing disease  #diabetes mellitus  - Holding home glimepiride 4mg BID, metformin 1000mg BID  - A1C 5.6  - AM free T4 0.92  Plan:  - Mild corrective SSI #1  - POCT glucose q6hrs while on tube feeds, Hypoglycemia protocol  - Continue home levothyroxine 200mcg daily  - Glargine 8u nightly  - follow up outpatient with  endocrinologist Dr. Yvonne Lofton in 4 weeks (scheduled by endocrine)     #hx of DVT/PE  - H/H: 16.9/51 -> 14.3/45.8  - PLT: 148 -> 133 -> 98 > 100 stable  - Daily CBC    - Hold home Eliquis at this time      F: FWF with TF  E: Replete PRN  N: Nepro  A: PIVs  Lomeli: None  GI ppx: PPI  DVT ppx: lovenox     Code status: DNR/DNI  NOK/Surrogate: Donya Emerson (Daughter) [phone]      Axel Hassan MD

## 2024-01-21 NOTE — SIGNIFICANT EVENT
01/21/24 0745   Onset Documentation   Rapid Response Initiated By Radar auto page   Location/Room [facility]   Pager Time [phone]   Arrival Time 0745   Event End Time 0749   Level II Called No   Primary Reason for Call Radar auto page     Rapid Response Note    Radar auto-page received for a radar score of 6 with the following vital signs: 36.4, 91, 19, 94/57, 91%.  Vital signs were confirmed and reviewed with primary RN who has no concerns at this time.  Patient is at his baseline.  RN advised to contact Rapid Response with any future concerns or signs of clinical deterioration.

## 2024-01-21 NOTE — CARE PLAN
The patient's goals for the shift include  patient will be turned side to side while in bed every two hours    The clinical goals for the shift include Pt will remain safe and tolerate tube feeding.    Over the shift, the patient did not make progress toward the following goals. Barriers to progression include patient like to refuse to be turned at times. Recommendations to address these barriers include provide education, use wedges for comfort, pillows.

## 2024-01-22 LAB
ALBUMIN SERPL BCP-MCNC: 2.8 G/DL (ref 3.4–5)
ALP SERPL-CCNC: 97 U/L (ref 33–136)
ALT SERPL W P-5'-P-CCNC: 22 U/L (ref 10–52)
ANION GAP SERPL CALC-SCNC: 9 MMOL/L (ref 10–20)
AST SERPL W P-5'-P-CCNC: 24 U/L (ref 9–39)
BASOPHILS # BLD AUTO: 0.04 X10*3/UL (ref 0–0.1)
BASOPHILS NFR BLD AUTO: 0.7 %
BILIRUB SERPL-MCNC: 0.3 MG/DL (ref 0–1.2)
BUN SERPL-MCNC: 19 MG/DL (ref 6–23)
CALCIUM SERPL-MCNC: 9.3 MG/DL (ref 8.6–10.6)
CHLORIDE SERPL-SCNC: 103 MMOL/L (ref 98–107)
CO2 SERPL-SCNC: 33 MMOL/L (ref 21–32)
CREAT SERPL-MCNC: 0.99 MG/DL (ref 0.5–1.3)
EGFRCR SERPLBLD CKD-EPI 2021: 81 ML/MIN/1.73M*2
EOSINOPHIL # BLD AUTO: 0.93 X10*3/UL (ref 0–0.4)
EOSINOPHIL NFR BLD AUTO: 16.7 %
ERYTHROCYTE [DISTWIDTH] IN BLOOD BY AUTOMATED COUNT: 13.2 % (ref 11.5–14.5)
GLUCOSE BLD MANUAL STRIP-MCNC: 125 MG/DL (ref 74–99)
GLUCOSE BLD MANUAL STRIP-MCNC: 145 MG/DL (ref 74–99)
GLUCOSE BLD MANUAL STRIP-MCNC: 158 MG/DL (ref 74–99)
GLUCOSE BLD MANUAL STRIP-MCNC: 159 MG/DL (ref 74–99)
GLUCOSE BLD MANUAL STRIP-MCNC: 187 MG/DL (ref 74–99)
GLUCOSE SERPL-MCNC: 175 MG/DL (ref 74–99)
HCT VFR BLD AUTO: 40.6 % (ref 41–52)
HGB BLD-MCNC: 12.8 G/DL (ref 13.5–17.5)
IMM GRANULOCYTES # BLD AUTO: 0.13 X10*3/UL (ref 0–0.5)
IMM GRANULOCYTES NFR BLD AUTO: 2.3 % (ref 0–0.9)
LYMPHOCYTES # BLD AUTO: 1.03 X10*3/UL (ref 0.8–3)
LYMPHOCYTES NFR BLD AUTO: 18.5 %
MAGNESIUM SERPL-MCNC: 1.97 MG/DL (ref 1.6–2.4)
MCH RBC QN AUTO: 31 PG (ref 26–34)
MCHC RBC AUTO-ENTMCNC: 31.5 G/DL (ref 32–36)
MCV RBC AUTO: 98 FL (ref 80–100)
MONOCYTES # BLD AUTO: 0.38 X10*3/UL (ref 0.05–0.8)
MONOCYTES NFR BLD AUTO: 6.8 %
NEUTROPHILS # BLD AUTO: 3.06 X10*3/UL (ref 1.6–5.5)
NEUTROPHILS NFR BLD AUTO: 55 %
NRBC BLD-RTO: 0 /100 WBCS (ref 0–0)
PLATELET # BLD AUTO: 71 X10*3/UL (ref 150–450)
POTASSIUM SERPL-SCNC: 3.7 MMOL/L (ref 3.5–5.3)
PROT SERPL-MCNC: 6.3 G/DL (ref 6.4–8.2)
RBC # BLD AUTO: 4.13 X10*6/UL (ref 4.5–5.9)
SODIUM SERPL-SCNC: 141 MMOL/L (ref 136–145)
WBC # BLD AUTO: 5.6 X10*3/UL (ref 4.4–11.3)

## 2024-01-22 PROCEDURE — 99232 SBSQ HOSP IP/OBS MODERATE 35: CPT

## 2024-01-22 PROCEDURE — 2500000001 HC RX 250 WO HCPCS SELF ADMINISTERED DRUGS (ALT 637 FOR MEDICARE OP)

## 2024-01-22 PROCEDURE — 2500000004 HC RX 250 GENERAL PHARMACY W/ HCPCS (ALT 636 FOR OP/ED)

## 2024-01-22 PROCEDURE — 2500000001 HC RX 250 WO HCPCS SELF ADMINISTERED DRUGS (ALT 637 FOR MEDICARE OP): Performed by: STUDENT IN AN ORGANIZED HEALTH CARE EDUCATION/TRAINING PROGRAM

## 2024-01-22 PROCEDURE — 2500000001 HC RX 250 WO HCPCS SELF ADMINISTERED DRUGS (ALT 637 FOR MEDICARE OP): Performed by: INTERNAL MEDICINE

## 2024-01-22 PROCEDURE — 36415 COLL VENOUS BLD VENIPUNCTURE: CPT

## 2024-01-22 PROCEDURE — 82947 ASSAY GLUCOSE BLOOD QUANT: CPT

## 2024-01-22 PROCEDURE — 85025 COMPLETE CBC W/AUTO DIFF WBC: CPT

## 2024-01-22 PROCEDURE — 83735 ASSAY OF MAGNESIUM: CPT

## 2024-01-22 PROCEDURE — 80053 COMPREHEN METABOLIC PANEL: CPT

## 2024-01-22 PROCEDURE — 1200000002 HC GENERAL ROOM WITH TELEMETRY DAILY

## 2024-01-22 RX ORDER — POTASSIUM CHLORIDE 1.5 G/1.58G
20 POWDER, FOR SOLUTION ORAL ONCE
Status: COMPLETED | OUTPATIENT
Start: 2024-01-22 | End: 2024-01-22

## 2024-01-22 RX ORDER — MULTIVIT-MIN/IRON FUM/FOLIC AC 7.5 MG-4
1 TABLET ORAL DAILY
Status: DISCONTINUED | OUTPATIENT
Start: 2024-01-22 | End: 2024-01-31 | Stop reason: HOSPADM

## 2024-01-22 RX ADMIN — ESOMEPRAZOLE MAGNESIUM 20 MG: 40 FOR SUSPENSION ORAL at 06:25

## 2024-01-22 RX ADMIN — ENOXAPARIN SODIUM 40 MG: 100 INJECTION SUBCUTANEOUS at 09:12

## 2024-01-22 RX ADMIN — INSULIN HUMAN 1 UNITS: 100 INJECTION, SOLUTION PARENTERAL at 18:15

## 2024-01-22 RX ADMIN — SERTRALINE HYDROCHLORIDE 50 MG: 50 TABLET ORAL at 09:12

## 2024-01-22 RX ADMIN — POTASSIUM CHLORIDE 20 MEQ: 1.5 POWDER, FOR SOLUTION ORAL at 13:00

## 2024-01-22 RX ADMIN — METOPROLOL TARTRATE 25 MG: 25 TABLET, FILM COATED ORAL at 09:26

## 2024-01-22 RX ADMIN — LEVOTHYROXINE SODIUM 200 MCG: 75 TABLET ORAL at 06:24

## 2024-01-22 RX ADMIN — INSULIN GLARGINE 8 UNITS: 100 INJECTION, SOLUTION SUBCUTANEOUS at 20:36

## 2024-01-22 RX ADMIN — METOPROLOL TARTRATE 25 MG: 25 TABLET, FILM COATED ORAL at 20:35

## 2024-01-22 RX ADMIN — INSULIN HUMAN 1 UNITS: 100 INJECTION, SOLUTION PARENTERAL at 06:22

## 2024-01-22 ASSESSMENT — COGNITIVE AND FUNCTIONAL STATUS - GENERAL
DRESSING REGULAR UPPER BODY CLOTHING: TOTAL
PERSONAL GROOMING: TOTAL
CLIMB 3 TO 5 STEPS WITH RAILING: TOTAL
DAILY ACTIVITIY SCORE: 6
HELP NEEDED FOR BATHING: TOTAL
MOVING TO AND FROM BED TO CHAIR: TOTAL
STANDING UP FROM CHAIR USING ARMS: TOTAL
MOVING FROM LYING ON BACK TO SITTING ON SIDE OF FLAT BED WITH BEDRAILS: TOTAL
TOILETING: TOTAL
MOBILITY SCORE: 6
EATING MEALS: TOTAL
DRESSING REGULAR LOWER BODY CLOTHING: TOTAL
WALKING IN HOSPITAL ROOM: TOTAL
TURNING FROM BACK TO SIDE WHILE IN FLAT BAD: TOTAL

## 2024-01-22 NOTE — PROGRESS NOTES
Music Therapy Note    Gopal Ness     Therapy Session  Referral Type: New referral this admission  Visit Type: New visit  Session Start Time: 1455  Session End Time: 1455  Intervention Delivery: In-person  Conflict of Service: Working with other staff               Treatment/Interventions       Post-assessment  Total Session Time (min): 0 minutes    Narrative  Assessment Detail: Patient working with other staff at time of MT's arrival. MT unable to make additional attempt this date.  Follow-up: MT to reattempt at another time as applicable.    Education Documentation  No documentation found.

## 2024-01-22 NOTE — CARE PLAN
Problem: Diabetes  Goal: Increase stability of blood glucose readings by end of shift  Outcome: Progressing     Problem: Pain - Adult  Goal: Verbalizes/displays adequate comfort level or baseline comfort level  Outcome: Progressing       The clinical goals for the shift include Pt pulse ox will remain > 88% throughout shift.

## 2024-01-22 NOTE — NURSING NOTE
Nurse at bedside with daughter Donya. Pt woke up and was talking with eyes open. Was oriented and answering yes and no questions appropriately. Patient even stated his last 4 of his social security number appropriately. Daughter asked nurse to relay to team to have pt ot start working with patient again but to see him later in the day and not so early when the patient is still sleeping in the morning. Daughter believes that around 1500 is patients wake window and when he wakes up and talks.

## 2024-01-22 NOTE — PROGRESS NOTES
Subjective   No acute events overnight. Pt responding intermittently to questions, can provide name and knows he is in a hospital but unable to provide further detail. This is apparently how he has been over his hospital course.    Objective     Vitals:  Vitals:    01/22/24 0926   BP: 115/68   Pulse: 80   Resp: 18   Temp: 36.6 °C (97.9 °F)   SpO2: 91%       I/O last 3 completed shifts:  In: 400 (4 mL/kg) [NG/GT:400]  Out: 1530 (15.4 mL/kg) [Urine:1530 (0.4 mL/kg/hr)]  Weight: 99.5 kg   No intake/output data recorded.    Physical exam:  General: in and out of sleep, will awake to questions and answer with mostly one word responses, follows commands  HEENT: pupils equal and round, no scleral icterus  Skin: no suspect lesions or rashes noted on visible skin  Chest: On 5L NC O2, lung sounds coarse, no wheezing appreciated  Cardiac: regular rate, normal s1, s2, no M/R/G  Abdomen: soft, ND, NT, no involuntary guarding  : no flank pain or indwelling urinary catheter  EXT: no peripheral edema, no asymmetry noted  MSK: no focal joint swelling noted  Neuro: Will nod yes/no to question, but then fall asleep immediately, Follows simple instructions, moves all 4 extremities when prompted. AxO x1-2  Psych: Unable to assess    Medications:  enoxaparin, 40 mg, subcutaneous, Daily  esomeprazole, 20 mg, nasogastric tube, Daily before breakfast  insulin glargine, 8 Units, subcutaneous, Nightly  insulin regular, 0-5 Units, subcutaneous, q6h  levothyroxine, 200 mcg, oral, Daily  metoprolol tartrate, 25 mg, nasogastric tube, BID  polyethylene glycol, 17 g, oral, Daily  sennosides, 2 tablet, oral, BID  sertraline, 50 mg, nasogastric tube, Daily           PRN medications: albuterol, dextrose 10 % in water (D10W), dextrose, diclofenac sodium, glucagon, oxygen, phenoL    Labs:  Results for orders placed or performed during the hospital encounter of 01/01/24 (from the past 24 hour(s))   POCT GLUCOSE   Result Value Ref Range    POCT Glucose  175 (H) 74 - 99 mg/dL   POCT GLUCOSE   Result Value Ref Range    POCT Glucose 145 (H) 74 - 99 mg/dL   POCT GLUCOSE   Result Value Ref Range    POCT Glucose 187 (H) 74 - 99 mg/dL   Magnesium   Result Value Ref Range    Magnesium 1.97 1.60 - 2.40 mg/dL   CBC and Auto Differential   Result Value Ref Range    WBC 5.6 4.4 - 11.3 x10*3/uL    nRBC 0.0 0.0 - 0.0 /100 WBCs    RBC 4.13 (L) 4.50 - 5.90 x10*6/uL    Hemoglobin 12.8 (L) 13.5 - 17.5 g/dL    Hematocrit 40.6 (L) 41.0 - 52.0 %    MCV 98 80 - 100 fL    MCH 31.0 26.0 - 34.0 pg    MCHC 31.5 (L) 32.0 - 36.0 g/dL    RDW 13.2 11.5 - 14.5 %    Platelets 71 (L) 150 - 450 x10*3/uL    Neutrophils % 55.0 40.0 - 80.0 %    Immature Granulocytes %, Automated 2.3 (H) 0.0 - 0.9 %    Lymphocytes % 18.5 13.0 - 44.0 %    Monocytes % 6.8 2.0 - 10.0 %    Eosinophils % 16.7 0.0 - 6.0 %    Basophils % 0.7 0.0 - 2.0 %    Neutrophils Absolute 3.06 1.60 - 5.50 x10*3/uL    Immature Granulocytes Absolute, Automated 0.13 0.00 - 0.50 x10*3/uL    Lymphocytes Absolute 1.03 0.80 - 3.00 x10*3/uL    Monocytes Absolute 0.38 0.05 - 0.80 x10*3/uL    Eosinophils Absolute 0.93 (H) 0.00 - 0.40 x10*3/uL    Basophils Absolute 0.04 0.00 - 0.10 x10*3/uL   Comprehensive Metabolic Panel   Result Value Ref Range    Glucose 175 (H) 74 - 99 mg/dL    Sodium 141 136 - 145 mmol/L    Potassium 3.7 3.5 - 5.3 mmol/L    Chloride 103 98 - 107 mmol/L    Bicarbonate 33 (H) 21 - 32 mmol/L    Anion Gap 9 (L) 10 - 20 mmol/L    Urea Nitrogen 19 6 - 23 mg/dL    Creatinine 0.99 0.50 - 1.30 mg/dL    eGFR 81 >60 mL/min/1.73m*2    Calcium 9.3 8.6 - 10.6 mg/dL    Albumin 2.8 (L) 3.4 - 5.0 g/dL    Alkaline Phosphatase 97 33 - 136 U/L    Total Protein 6.3 (L) 6.4 - 8.2 g/dL    AST 24 9 - 39 U/L    Bilirubin, Total 0.3 0.0 - 1.2 mg/dL    ALT 22 10 - 52 U/L   POCT GLUCOSE   Result Value Ref Range    POCT Glucose 159 (H) 74 - 99 mg/dL       Imaging:  MR cervical spine wo IV contrast    Result Date: 1/9/2024  Interpreted By:  So  Joey, STUDY: MR CERVICAL SPINE WO IV CONTRAST;  1/8/2024 10:18 pm   INDICATION: Signs/Symptoms:R arm weakness.   COMPARISON: None.   ACCESSION NUMBER(S): [id number]   ORDERING CLINICIAN: DIEGO ABARCA   TECHNIQUE: The cervical spine was studied in the sagittal and axial planes utilizing T1 and T2 weighted images. Examination is limited due to motion artifact   FINDINGS: The craniovertebral junction is normal. The cord is normal in size and signal. The marrow signal is normal. Serial axial images reveal the following: C2/C3 There is normal alignment and vertebral body height. The disc space is normal. There is no evidence of canal or foraminal narrowing. There is no evidence of bulging or herniated disc. C3/C4 There is normal alignment and vertebral body height. The disc space is normal. There is no evidence of canal or foraminal narrowing. There is no evidence of bulging or herniated disc. C4/C5 Right greater than left uncovertebral joint hypertrophy with focal right-sided foraminal narrowing. No measurable canal stenosis. C5/C6 Right greater than left uncovertebral joint hypertrophy without canal stenosis. Right-sided foraminal narrowing C6/C7 Bilateral facet hypertrophy without canal or foraminal narrowing C7/T1 Bilateral facet hypertrophy without canal or foraminal narrowing       * Right-sided foraminal narrowing due to uncovertebral joint hypertrophy at C4/C5 and C5/C6 *No measurable canal stenosis or cord compression.   MACRO: none   Signed by: Joey Rizzo 1/9/2024 9:02 AM Dictation workstation:   [id number]       Assessment/Plan   Mr. Gopal Ness is a 72 y/o man with h/o pituitary adenoma s/p resection at [facility], DVT/PE on Eliquis, COPD (noncompliant with medications)/tobacco use, depresssion, DMII, HLD presenting after being found down i/s/o fall on 12/31 (LKN 1100 on 12/31). Brought to OSH, negative CTH, intubated for airway protection. EMS reported witnessing possible seizure activity. Also  with persistent tachycardia, leukocytosis and infiltrate on chest CT, so treating presumed aspiration pneumonia with antibiotics. Transferred to [facility] for cvEEG which did not demonstrate seizures. MRI brain did not show acute stroke. Extubated 1/2. Course complicated by Covid pnuemonia, LEEANNE, SVT likely in the setting of infection, and RUE weakness in the setting of a fall. Currently admitted for continued altered mental status despite resolution of infection, correction of electrolytes, and lack of structural lesions that would explain his change in mentation. Code status was changed to DNR/DNI 1/17.      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 New small dural based enhancing masses in the R posterior fossa  - MRI Brain and MRI Sella - residual adenoma unchanged from prior examination and similar to slightly increased size of small dural-based enhancing masses of small dural-based enhancing masses within the R posterior fossa favored to represent meningiomas.  - EEG - mod diffuse encephalopathy. Improvement in degree of encephalopathy compared to the previous day. Keppra DC.  - MRI cervical spine showed right-sided foraminal narrowing d/t uncovertebral joint hypertrophy at C4/C5 and C5/C6  Plan:  - Hold home trazadone 50mg at bedtime  - Continue home sertraline 50mg daily   - Outpatient neurosurgery/neuro-ophthalmology follow up  - EMG as an outpatient (1/22/23  earliest available), follow up with neurology in 4 weeks  - Repeat CT on 1/14 - atrophy, no acute intercranial pathology   - Corrected underlying metabolic and infectious causes of encephalopathy. No structural causes seen on imaging.     #Acute hypoxic respiratory failure  #COVID pneumonia, CAP  #COPD  - Maintain O2- 88-92%, currently on 4-5L NC  - Cpap at night  - Elevated CRP, LDH, Ferritin, d-dimer  - Sputum culture - Strep pneumoniae  - s/p 1x cefepime 2g on 1/1, meropenem (1/1-1/2), Vanc 1.5g (1/1-1/2) then ceftriaxone 2g (1/2- 1/8)   - Finished remdesivir (1/6-1/10), dexamethasone 6mg daily (stopped 1/10)  - Pulmonology consulted: Will start albuterol nebs q6h, RT for BPH, chest vest therapy. Duonebs PRN. No abx at this time.     #Hypernatremia - resolved  #LEEANNE (baseline Cr 0.8-0.9) - resolved  #Hx of DI   - Hold home tamsulosin 0.4mg daily while NPO  - Hold further D5W, continue with 400ml q6h FWF     #SVT  - Several episodes of SVT, terminated spontaneously, some associated with hypotension down to 80/50s.  - Consulted cardiology, pending recs.         -Metoprolol tartrate 25mg BID from 12.5mg q6h        -If recurrent of persistent SVT, start with vagal maneuvers (carotid sinus massage), if unsuccessful, may consider Adenosine dose and/or IV Metop        -Please continue to monitor patient on telemetry        -Please obtain EKG if there's recurrence of SVT        -Optimal lytes: K>4, Mag>2      #panhypothyroidism s/p adenoma resection  #Cushing disease  #diabetes mellitus  - Holding home glimepiride 4mg BID, metformin 1000mg BID  - A1C 5.6  - AM free T4 0.92  Plan:  - Mild corrective SSI #1  - POCT glucose q6hrs while on tube feeds, Hypoglycemia protocol  - Continue home levothyroxine 200mcg daily  - Glargine 8u nightly  - follow up outpatient with endocrinologist Dr. Yvonne Lofton in 4 weeks (scheduled by endocrine)     #hx of DVT/PE  #thrombocytopenia  - H/H: 16.9/51 -> 14.3/45.8  - PLT: consistently  ~ while here. Over past two days have been 70s.  - Daily CBC    - Hold home Eliquis at this time   - Lovenox 40mg subcutaneous for prophylaxis     F: FWF with TF  E: Replete PRN  N: Nepro  A: PIVs    Lomeli: None  GI ppx: PPI  DVT ppx: lovenox     Code status: DNR/DNI  NOK/Surrogate: Donya Emerson (Daughter) [phone]      Ej Ventura MD  PGY-1 Internal Medicine

## 2024-01-22 NOTE — PROGRESS NOTES
1/22/24 7382 Transitional Care Coordinator Notes:    Spoke with daughter Donya and emailed her a list of facilities. List of facilities emailed to [email or web address]. Daughter is interested in facilities in the [facility] area. Will follow up with her for choices.                    Assessment/Plan   Principal Problem:    AMS (altered mental status)    Discharge Plans: discharge to SNF            Annalisa Whittaker RN

## 2024-01-23 LAB
ALBUMIN SERPL BCP-MCNC: 2.8 G/DL (ref 3.4–5)
ANION GAP SERPL CALC-SCNC: 10 MMOL/L (ref 10–20)
BUN SERPL-MCNC: 20 MG/DL (ref 6–23)
CALCIUM SERPL-MCNC: 9.2 MG/DL (ref 8.6–10.6)
CHLORIDE SERPL-SCNC: 103 MMOL/L (ref 98–107)
CO2 SERPL-SCNC: 32 MMOL/L (ref 21–32)
CREAT SERPL-MCNC: 0.99 MG/DL (ref 0.5–1.3)
EGFRCR SERPLBLD CKD-EPI 2021: 81 ML/MIN/1.73M*2
GLUCOSE BLD MANUAL STRIP-MCNC: 157 MG/DL (ref 74–99)
GLUCOSE BLD MANUAL STRIP-MCNC: 164 MG/DL (ref 74–99)
GLUCOSE BLD MANUAL STRIP-MCNC: 173 MG/DL (ref 74–99)
GLUCOSE BLD MANUAL STRIP-MCNC: 181 MG/DL (ref 74–99)
GLUCOSE SERPL-MCNC: 184 MG/DL (ref 74–99)
MAGNESIUM SERPL-MCNC: 1.87 MG/DL (ref 1.6–2.4)
PHOSPHATE SERPL-MCNC: 3.1 MG/DL (ref 2.5–4.9)
POTASSIUM SERPL-SCNC: 4 MMOL/L (ref 3.5–5.3)
SODIUM SERPL-SCNC: 141 MMOL/L (ref 136–145)

## 2024-01-23 PROCEDURE — 82947 ASSAY GLUCOSE BLOOD QUANT: CPT

## 2024-01-23 PROCEDURE — 2500000001 HC RX 250 WO HCPCS SELF ADMINISTERED DRUGS (ALT 637 FOR MEDICARE OP)

## 2024-01-23 PROCEDURE — 2500000001 HC RX 250 WO HCPCS SELF ADMINISTERED DRUGS (ALT 637 FOR MEDICARE OP): Performed by: INTERNAL MEDICINE

## 2024-01-23 PROCEDURE — 36415 COLL VENOUS BLD VENIPUNCTURE: CPT

## 2024-01-23 PROCEDURE — 2500000001 HC RX 250 WO HCPCS SELF ADMINISTERED DRUGS (ALT 637 FOR MEDICARE OP): Performed by: STUDENT IN AN ORGANIZED HEALTH CARE EDUCATION/TRAINING PROGRAM

## 2024-01-23 PROCEDURE — 1200000002 HC GENERAL ROOM WITH TELEMETRY DAILY

## 2024-01-23 PROCEDURE — 85025 COMPLETE CBC W/AUTO DIFF WBC: CPT

## 2024-01-23 PROCEDURE — 84100 ASSAY OF PHOSPHORUS: CPT

## 2024-01-23 PROCEDURE — 99232 SBSQ HOSP IP/OBS MODERATE 35: CPT

## 2024-01-23 PROCEDURE — 2500000004 HC RX 250 GENERAL PHARMACY W/ HCPCS (ALT 636 FOR OP/ED)

## 2024-01-23 PROCEDURE — 83735 ASSAY OF MAGNESIUM: CPT

## 2024-01-23 RX ADMIN — METOPROLOL TARTRATE 25 MG: 25 TABLET, FILM COATED ORAL at 20:49

## 2024-01-23 RX ADMIN — METOPROLOL TARTRATE 25 MG: 25 TABLET, FILM COATED ORAL at 10:09

## 2024-01-23 RX ADMIN — LEVOTHYROXINE SODIUM 200 MCG: 75 TABLET ORAL at 06:07

## 2024-01-23 RX ADMIN — Medication 1 TABLET: at 10:09

## 2024-01-23 RX ADMIN — ENOXAPARIN SODIUM 40 MG: 100 INJECTION SUBCUTANEOUS at 10:09

## 2024-01-23 RX ADMIN — INSULIN HUMAN 1 UNITS: 100 INJECTION, SOLUTION PARENTERAL at 06:07

## 2024-01-23 RX ADMIN — ESOMEPRAZOLE MAGNESIUM 20 MG: 40 FOR SUSPENSION ORAL at 06:07

## 2024-01-23 RX ADMIN — SERTRALINE HYDROCHLORIDE 50 MG: 50 TABLET ORAL at 10:09

## 2024-01-23 RX ADMIN — INSULIN HUMAN 1 UNITS: 100 INJECTION, SOLUTION PARENTERAL at 11:48

## 2024-01-23 RX ADMIN — INSULIN GLARGINE 8 UNITS: 100 INJECTION, SOLUTION SUBCUTANEOUS at 20:49

## 2024-01-23 RX ADMIN — INSULIN HUMAN 1 UNITS: 100 INJECTION, SOLUTION PARENTERAL at 18:08

## 2024-01-23 ASSESSMENT — COGNITIVE AND FUNCTIONAL STATUS - GENERAL
CLIMB 3 TO 5 STEPS WITH RAILING: TOTAL
WALKING IN HOSPITAL ROOM: TOTAL
MOBILITY SCORE: 6
STANDING UP FROM CHAIR USING ARMS: TOTAL
TURNING FROM BACK TO SIDE WHILE IN FLAT BAD: TOTAL
MOVING FROM LYING ON BACK TO SITTING ON SIDE OF FLAT BED WITH BEDRAILS: TOTAL
MOVING TO AND FROM BED TO CHAIR: TOTAL

## 2024-01-23 ASSESSMENT — PAIN SCALES - PAIN ASSESSMENT IN ADVANCED DEMENTIA (PAINAD)
TOTALSCORE: 0
CONSOLABILITY: NO NEED TO CONSOLE
BREATHING: NORMAL
BODYLANGUAGE: RELAXED
FACIALEXPRESSION: SMILING OR INEXPRESSIVE

## 2024-01-23 ASSESSMENT — PAIN SCALES - GENERAL: PAINLEVEL_OUTOF10: 0 - NO PAIN

## 2024-01-23 ASSESSMENT — PAIN SCALES - WONG BAKER: WONGBAKER_NUMERICALRESPONSE: NO HURT

## 2024-01-23 NOTE — PROGRESS NOTES
1/23/24 3402 Transitional Care Coordinator Notes:    Spoke with daughter Donya and she would like referrals placed with: [facility], [facility], and [facility]. Referrals placed, will monitor for any accepting facilities.                    Assessment/Plan   Principal Problem:    AMS (altered mental status)    Discharge Plans: discharge to SNF            Annalisa Whittaker RN

## 2024-01-23 NOTE — PROGRESS NOTES
Subjective   No acute events overnight. Pt responding intermittently to questions with one word answers. Exam is relatively consistent with previously.    Objective     Vitals:  Vitals:    01/23/24 0744   BP:    Pulse:    Resp:    Temp:    SpO2: 91%       I/O last 3 completed shifts:  In: 1200 (12 mL/kg) [NG/GT:1200]  Out: 600 (6 mL/kg) [Urine:600 (0.2 mL/kg/hr)]  Weight: 100 kg   I/O this shift:  In: 1520 [NG/GT:1520]  Out: -     Physical exam:  General: in and out of sleep, will awake to questions and answer with mostly one word responses, follows commands  HEENT: pupils equal and round, no scleral icterus  Skin: no suspect lesions or rashes noted on visible skin  Chest: On 5L NC O2, lung sounds coarse, no wheezing appreciated  Cardiac: regular rate, normal s1, s2, no M/R/G  Abdomen: soft, ND, NT, no involuntary guarding  : no flank pain or indwelling urinary catheter  EXT: no peripheral edema, no asymmetry noted  MSK: no focal joint swelling noted  Neuro: Will nod yes/no to question, but then fall asleep promptly, Follows simple instructions, moves all 4 extremities when prompted. AxO x1-2  Psych: Unable to assess    Medications:  enoxaparin, 40 mg, subcutaneous, Daily  esomeprazole, 20 mg, nasogastric tube, Daily before breakfast  insulin glargine, 8 Units, subcutaneous, Nightly  insulin regular, 0-5 Units, subcutaneous, q6h  levothyroxine, 200 mcg, oral, Daily  metoprolol tartrate, 25 mg, nasogastric tube, BID  multivitamin with minerals, 1 tablet, oral, Daily  polyethylene glycol, 17 g, oral, Daily  sennosides, 2 tablet, oral, BID  sertraline, 50 mg, nasogastric tube, Daily         PRN medications: albuterol, dextrose 10 % in water (D10W), dextrose, diclofenac sodium, glucagon, oxygen, phenoL    Labs:  Results for orders placed or performed during the hospital encounter of 01/01/24 (from the past 24 hour(s))   POCT GLUCOSE   Result Value Ref Range    POCT Glucose 158 (H) 74 - 99 mg/dL   POCT GLUCOSE   Result  Value Ref Range    POCT Glucose 125 (H) 74 - 99 mg/dL   POCT GLUCOSE   Result Value Ref Range    POCT Glucose 181 (H) 74 - 99 mg/dL   Magnesium   Result Value Ref Range    Magnesium 1.87 1.60 - 2.40 mg/dL   Renal Function Panel   Result Value Ref Range    Glucose 184 (H) 74 - 99 mg/dL    Sodium 141 136 - 145 mmol/L    Potassium 4.0 3.5 - 5.3 mmol/L    Chloride 103 98 - 107 mmol/L    Bicarbonate 32 21 - 32 mmol/L    Anion Gap 10 10 - 20 mmol/L    Urea Nitrogen 20 6 - 23 mg/dL    Creatinine 0.99 0.50 - 1.30 mg/dL    eGFR 81 >60 mL/min/1.73m*2    Calcium 9.2 8.6 - 10.6 mg/dL    Phosphorus 3.1 2.5 - 4.9 mg/dL    Albumin 2.8 (L) 3.4 - 5.0 g/dL   CBC and Auto Differential   Result Value Ref Range    WBC 5.1 4.4 - 11.3 x10*3/uL    nRBC 0.0 0.0 - 0.0 /100 WBCs    RBC 4.03 (L) 4.50 - 5.90 x10*6/uL    Hemoglobin 12.6 (L) 13.5 - 17.5 g/dL    Hematocrit 39.4 (L) 41.0 - 52.0 %    MCV 98 80 - 100 fL    MCH 31.3 26.0 - 34.0 pg    MCHC 32.0 32.0 - 36.0 g/dL    RDW 13.2 11.5 - 14.5 %    Platelets 64 (L) 150 - 450 x10*3/uL    Neutrophils % 52.3 40.0 - 80.0 %    Immature Granulocytes %, Automated 1.6 (H) 0.0 - 0.9 %    Lymphocytes % 19.1 13.0 - 44.0 %    Monocytes % 8.2 2.0 - 10.0 %    Eosinophils % 18.0 0.0 - 6.0 %    Basophils % 0.8 0.0 - 2.0 %    Neutrophils Absolute 2.68 1.60 - 5.50 x10*3/uL    Immature Granulocytes Absolute, Automated 0.08 0.00 - 0.50 x10*3/uL    Lymphocytes Absolute 0.98 0.80 - 3.00 x10*3/uL    Monocytes Absolute 0.42 0.05 - 0.80 x10*3/uL    Eosinophils Absolute 0.92 (H) 0.00 - 0.40 x10*3/uL    Basophils Absolute 0.04 0.00 - 0.10 x10*3/uL   POCT GLUCOSE   Result Value Ref Range    POCT Glucose 164 (H) 74 - 99 mg/dL       Imaging:  MR cervical spine wo IV contrast    Result Date: 1/9/2024  Interpreted By:  Joey Rizzo, STUDY: MR CERVICAL SPINE WO IV CONTRAST;  1/8/2024 10:18 pm   INDICATION: Signs/Symptoms:R arm weakness.   COMPARISON: None.   ACCESSION NUMBER(S): [id number]   ORDERING CLINICIAN:  DIEGO ABARCA   TECHNIQUE: The cervical spine was studied in the sagittal and axial planes utilizing T1 and T2 weighted images. Examination is limited due to motion artifact   FINDINGS: The craniovertebral junction is normal. The cord is normal in size and signal. The marrow signal is normal. Serial axial images reveal the following: C2/C3 There is normal alignment and vertebral body height. The disc space is normal. There is no evidence of canal or foraminal narrowing. There is no evidence of bulging or herniated disc. C3/C4 There is normal alignment and vertebral body height. The disc space is normal. There is no evidence of canal or foraminal narrowing. There is no evidence of bulging or herniated disc. C4/C5 Right greater than left uncovertebral joint hypertrophy with focal right-sided foraminal narrowing. No measurable canal stenosis. C5/C6 Right greater than left uncovertebral joint hypertrophy without canal stenosis. Right-sided foraminal narrowing C6/C7 Bilateral facet hypertrophy without canal or foraminal narrowing C7/T1 Bilateral facet hypertrophy without canal or foraminal narrowing       * Right-sided foraminal narrowing due to uncovertebral joint hypertrophy at C4/C5 and C5/C6 *No measurable canal stenosis or cord compression.   MACRO: none   Signed by: Joey Rizzo 1/9/2024 9:02 AM Dictation workstation:   [id number]       Assessment/Plan   Mr. Gopal Ness is a 72 y/o man with h/o pituitary adenoma s/p resection at [facility], DVT/PE on Eliquis, COPD (noncompliant with medications)/tobacco use, depresssion, DMII, HLD presenting after being found down i/s/o fall on 12/31 (LKN 1100 on 12/31). Brought to OSH, negative CTH, intubated for airway protection. EMS reported witnessing possible seizure activity. Also with persistent tachycardia, leukocytosis and infiltrate on chest CT, so treating presumed aspiration pneumonia with antibiotics. Transferred to [facility] for cvEEG which did not demonstrate  seizures. MRI brain did not show acute stroke. Extubated 1/2. Course complicated by Covid pnuemonia, LEEANNE, SVT likely in the setting of infection, and RUE weakness in the setting of a fall. Currently admitted for continued altered mental status despite resolution of infection, correction of electrolytes, and lack of structural lesions that would explain his change in mentation. Code status was changed to DNR/DNI 1/17.      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 New small dural based enhancing masses in the R posterior fossa  - MRI Brain and MRI Sella - residual adenoma unchanged from prior examination and similar to slightly increased size of small dural-based enhancing masses of small dural-based enhancing masses within the R posterior fossa favored to represent meningiomas.  - EEG - mod diffuse encephalopathy. Improvement in degree of encephalopathy compared to the previous day. Keppra DC.  - MRI cervical spine showed right-sided foraminal narrowing d/t uncovertebral joint hypertrophy at C4/C5 and C5/C6  - Repeat CT on 1/14 - atrophy, no acute intercranial pathology   Plan:  - Hold home trazadone 50mg at bedtime  - Continue home sertraline 50mg daily   - Outpatient  neurosurgery/neuro-ophthalmology follow up  - EMG as an outpatient, follow up with neurology in 4 weeks  - Corrected underlying metabolic and infectious causes of encephalopathy. No structural causes seen on imaging.     #Acute hypoxic respiratory failure  #COVID pneumonia, CAP  #COPD  - Maintain O2- 88-92%, currently on 4-5L NC  - Cpap at night  - Elevated CRP, LDH, Ferritin, d-dimer  - Sputum culture - Strep pneumoniae  - s/p 1x cefepime 2g on 1/1, meropenem (1/1-1/2), Vanc 1.5g (1/1-1/2) then ceftriaxone 2g (1/2- 1/8)   - Finished remdesivir (1/6-1/10), dexamethasone 6mg daily (stopped 1/10)  - Pulmonology consulted: Will start albuterol nebs q6h, RT for BPH, chest vest therapy. Duonebs PRN. No abx at this time.     #Hypernatremia - resolved  #LEEANNE (baseline Cr 0.8-0.9) - resolved  #Hx of DI   - Hold home tamsulosin 0.4mg daily while NPO  - Hold further D5W, continue with 400ml q6h FWF     #SVT  - Several episodes of SVT, terminated spontaneously, some associated with hypotension down to 80/50s.  - Consulted cardiology, pending recs.         -Metoprolol tartrate 25mg BID from 12.5mg q6h        -If recurrent of persistent SVT, start with vagal maneuvers (carotid sinus massage), if unsuccessful, may consider Adenosine dose and/or IV Metop        -Please continue to monitor patient on telemetry        -Please obtain EKG if there's recurrence of SVT        -Optimal lytes: K>4, Mag>2    #panhypothyroidism s/p adenoma resection  #Cushing disease  #diabetes mellitus  - Holding home glimepiride 4mg BID, metformin 1000mg BID  - A1C 5.6  - AM free T4 0.92  Plan:  - Mild corrective SSI #1  - POCT glucose q6hrs while on tube feeds, Hypoglycemia protocol  - Continue home levothyroxine 200mcg daily  - Glargine 8u nightly  - follow up outpatient with endocrinologist Dr. Yvonne Lofton in 4 weeks (scheduled by endocrine)     #hx of DVT/PE  #thrombocytopenia  #Eosinophilia  - H/H: 16.9/51 -> 14.3/45.8  - PLT: consistently ~  while here. Over past two days have been 70s.  - Daily CBC    - Hold home Eliquis at this time   - Lovenox 40mg subcutaneous for prophylaxis  - Peripheral smear added 1/23 for evaluation of eosinophilia/thrombocytopenia     F: FWF with TF  E: Replete PRN  N: Nepro tube feeds  A: PIVs    Lomeli: None  GI ppx: PPI  DVT ppx: lovenox     Code status: DNR/DNI  NOK/Surrogate: Donya Emerson (Daughter) [phone]      Ej Ventura MD  PGY-1 Internal Medicine

## 2024-01-23 NOTE — CARE PLAN
The patient's goals for the shift include  PEPPER    The clinical goals for the shift include Patient SPO2 will maintain 88% or more    Over the shift, the patient had an uneventful day. Patient still oriented to self and follows simple commands and will respond when asked a question.

## 2024-01-24 LAB
ALBUMIN SERPL BCP-MCNC: 2.9 G/DL (ref 3.4–5)
ANION GAP SERPL CALC-SCNC: 11 MMOL/L (ref 10–20)
BASOPHILS # BLD AUTO: 0.04 X10*3/UL (ref 0–0.1)
BASOPHILS # BLD AUTO: 0.04 X10*3/UL (ref 0–0.1)
BASOPHILS NFR BLD AUTO: 0.8 %
BASOPHILS NFR BLD AUTO: 0.8 %
BUN SERPL-MCNC: 20 MG/DL (ref 6–23)
CALCIUM SERPL-MCNC: 9.2 MG/DL (ref 8.6–10.6)
CHLORIDE SERPL-SCNC: 101 MMOL/L (ref 98–107)
CO2 SERPL-SCNC: 31 MMOL/L (ref 21–32)
CREAT SERPL-MCNC: 0.94 MG/DL (ref 0.5–1.3)
EGFRCR SERPLBLD CKD-EPI 2021: 86 ML/MIN/1.73M*2
EOSINOPHIL # BLD AUTO: 0.92 X10*3/UL (ref 0–0.4)
EOSINOPHIL # BLD AUTO: 1.09 X10*3/UL (ref 0–0.4)
EOSINOPHIL NFR BLD AUTO: 18 %
EOSINOPHIL NFR BLD AUTO: 21.6 %
ERYTHROCYTE [DISTWIDTH] IN BLOOD BY AUTOMATED COUNT: 13.2 % (ref 11.5–14.5)
ERYTHROCYTE [DISTWIDTH] IN BLOOD BY AUTOMATED COUNT: 13.2 % (ref 11.5–14.5)
GLUCOSE BLD MANUAL STRIP-MCNC: 174 MG/DL (ref 74–99)
GLUCOSE BLD MANUAL STRIP-MCNC: 174 MG/DL (ref 74–99)
GLUCOSE SERPL-MCNC: 162 MG/DL (ref 74–99)
HCT VFR BLD AUTO: 37.6 % (ref 41–52)
HCT VFR BLD AUTO: 39.4 % (ref 41–52)
HGB BLD-MCNC: 12.2 G/DL (ref 13.5–17.5)
HGB BLD-MCNC: 12.6 G/DL (ref 13.5–17.5)
IMM GRANULOCYTES # BLD AUTO: 0.07 X10*3/UL (ref 0–0.5)
IMM GRANULOCYTES # BLD AUTO: 0.08 X10*3/UL (ref 0–0.5)
IMM GRANULOCYTES NFR BLD AUTO: 1.4 % (ref 0–0.9)
IMM GRANULOCYTES NFR BLD AUTO: 1.6 % (ref 0–0.9)
LYMPHOCYTES # BLD AUTO: 0.98 X10*3/UL (ref 0.8–3)
LYMPHOCYTES # BLD AUTO: 1.01 X10*3/UL (ref 0.8–3)
LYMPHOCYTES NFR BLD AUTO: 19.1 %
LYMPHOCYTES NFR BLD AUTO: 20 %
MAGNESIUM SERPL-MCNC: 1.94 MG/DL (ref 1.6–2.4)
MCH RBC QN AUTO: 31.3 PG (ref 26–34)
MCH RBC QN AUTO: 31.3 PG (ref 26–34)
MCHC RBC AUTO-ENTMCNC: 32 G/DL (ref 32–36)
MCHC RBC AUTO-ENTMCNC: 32.4 G/DL (ref 32–36)
MCV RBC AUTO: 96 FL (ref 80–100)
MCV RBC AUTO: 98 FL (ref 80–100)
MONOCYTES # BLD AUTO: 0.42 X10*3/UL (ref 0.05–0.8)
MONOCYTES # BLD AUTO: 0.43 X10*3/UL (ref 0.05–0.8)
MONOCYTES NFR BLD AUTO: 8.2 %
MONOCYTES NFR BLD AUTO: 8.5 %
NEUTROPHILS # BLD AUTO: 2.4 X10*3/UL (ref 1.6–5.5)
NEUTROPHILS # BLD AUTO: 2.68 X10*3/UL (ref 1.6–5.5)
NEUTROPHILS NFR BLD AUTO: 47.7 %
NEUTROPHILS NFR BLD AUTO: 52.3 %
NRBC BLD-RTO: 0 /100 WBCS (ref 0–0)
NRBC BLD-RTO: 0 /100 WBCS (ref 0–0)
PHOSPHATE SERPL-MCNC: 3.3 MG/DL (ref 2.5–4.9)
PLATELET # BLD AUTO: 61 X10*3/UL (ref 150–450)
PLATELET # BLD AUTO: 64 X10*3/UL (ref 150–450)
POTASSIUM SERPL-SCNC: 3.7 MMOL/L (ref 3.5–5.3)
RBC # BLD AUTO: 3.9 X10*6/UL (ref 4.5–5.9)
RBC # BLD AUTO: 4.03 X10*6/UL (ref 4.5–5.9)
RBC MORPH BLD: NORMAL
SODIUM SERPL-SCNC: 139 MMOL/L (ref 136–145)
WBC # BLD AUTO: 5 X10*3/UL (ref 4.4–11.3)
WBC # BLD AUTO: 5.1 X10*3/UL (ref 4.4–11.3)

## 2024-01-24 PROCEDURE — 97168 OT RE-EVAL EST PLAN CARE: CPT | Mod: GO

## 2024-01-24 PROCEDURE — 97164 PT RE-EVAL EST PLAN CARE: CPT | Mod: GP

## 2024-01-24 PROCEDURE — 83735 ASSAY OF MAGNESIUM: CPT

## 2024-01-24 PROCEDURE — 80069 RENAL FUNCTION PANEL: CPT

## 2024-01-24 PROCEDURE — 2500000004 HC RX 250 GENERAL PHARMACY W/ HCPCS (ALT 636 FOR OP/ED)

## 2024-01-24 PROCEDURE — 36415 COLL VENOUS BLD VENIPUNCTURE: CPT

## 2024-01-24 PROCEDURE — 2500000001 HC RX 250 WO HCPCS SELF ADMINISTERED DRUGS (ALT 637 FOR MEDICARE OP)

## 2024-01-24 PROCEDURE — 99232 SBSQ HOSP IP/OBS MODERATE 35: CPT

## 2024-01-24 PROCEDURE — 2500000001 HC RX 250 WO HCPCS SELF ADMINISTERED DRUGS (ALT 637 FOR MEDICARE OP): Performed by: INTERNAL MEDICINE

## 2024-01-24 PROCEDURE — 2500000001 HC RX 250 WO HCPCS SELF ADMINISTERED DRUGS (ALT 637 FOR MEDICARE OP): Performed by: STUDENT IN AN ORGANIZED HEALTH CARE EDUCATION/TRAINING PROGRAM

## 2024-01-24 PROCEDURE — 82947 ASSAY GLUCOSE BLOOD QUANT: CPT

## 2024-01-24 PROCEDURE — 1200000002 HC GENERAL ROOM WITH TELEMETRY DAILY

## 2024-01-24 PROCEDURE — 97530 THERAPEUTIC ACTIVITIES: CPT | Mod: GP

## 2024-01-24 PROCEDURE — 85025 COMPLETE CBC W/AUTO DIFF WBC: CPT

## 2024-01-24 RX ORDER — POTASSIUM CHLORIDE 1.5 G/1.58G
20 POWDER, FOR SOLUTION ORAL ONCE
Status: COMPLETED | OUTPATIENT
Start: 2024-01-24 | End: 2024-01-24

## 2024-01-24 RX ADMIN — INSULIN HUMAN 1 UNITS: 100 INJECTION, SOLUTION PARENTERAL at 01:16

## 2024-01-24 RX ADMIN — INSULIN HUMAN 1 UNITS: 100 INJECTION, SOLUTION PARENTERAL at 12:00

## 2024-01-24 RX ADMIN — INSULIN HUMAN 1 UNITS: 100 INJECTION, SOLUTION PARENTERAL at 17:28

## 2024-01-24 RX ADMIN — SERTRALINE HYDROCHLORIDE 50 MG: 50 TABLET ORAL at 09:53

## 2024-01-24 RX ADMIN — POTASSIUM CHLORIDE 20 MEQ: 1.5 POWDER, FOR SOLUTION ORAL at 17:28

## 2024-01-24 RX ADMIN — ENOXAPARIN SODIUM 40 MG: 100 INJECTION SUBCUTANEOUS at 09:52

## 2024-01-24 RX ADMIN — Medication 1 TABLET: at 09:53

## 2024-01-24 RX ADMIN — INSULIN GLARGINE 8 UNITS: 100 INJECTION, SOLUTION SUBCUTANEOUS at 21:17

## 2024-01-24 RX ADMIN — LEVOTHYROXINE SODIUM 200 MCG: 75 TABLET ORAL at 06:37

## 2024-01-24 RX ADMIN — METOPROLOL TARTRATE 25 MG: 25 TABLET, FILM COATED ORAL at 21:15

## 2024-01-24 RX ADMIN — METOPROLOL TARTRATE 25 MG: 25 TABLET, FILM COATED ORAL at 09:53

## 2024-01-24 RX ADMIN — ESOMEPRAZOLE MAGNESIUM 20 MG: 40 FOR SUSPENSION ORAL at 09:53

## 2024-01-24 ASSESSMENT — COGNITIVE AND FUNCTIONAL STATUS - GENERAL
WALKING IN HOSPITAL ROOM: TOTAL
MOVING FROM LYING ON BACK TO SITTING ON SIDE OF FLAT BED WITH BEDRAILS: TOTAL
HELP NEEDED FOR BATHING: TOTAL
MOBILITY SCORE: 6
WALKING IN HOSPITAL ROOM: TOTAL
MOVING TO AND FROM BED TO CHAIR: TOTAL
CLIMB 3 TO 5 STEPS WITH RAILING: TOTAL
HELP NEEDED FOR BATHING: TOTAL
PERSONAL GROOMING: TOTAL
DRESSING REGULAR UPPER BODY CLOTHING: TOTAL
TURNING FROM BACK TO SIDE WHILE IN FLAT BAD: TOTAL
DAILY ACTIVITIY SCORE: 7
MOBILITY SCORE: 6
TOILETING: TOTAL
DRESSING REGULAR UPPER BODY CLOTHING: TOTAL
PERSONAL GROOMING: A LOT
TURNING FROM BACK TO SIDE WHILE IN FLAT BAD: TOTAL
TOILETING: TOTAL
MOVING TO AND FROM BED TO CHAIR: TOTAL
TURNING FROM BACK TO SIDE WHILE IN FLAT BAD: TOTAL
CLIMB 3 TO 5 STEPS WITH RAILING: TOTAL
CLIMB 3 TO 5 STEPS WITH RAILING: TOTAL
STANDING UP FROM CHAIR USING ARMS: TOTAL
MOVING TO AND FROM BED TO CHAIR: TOTAL
EATING MEALS: TOTAL
STANDING UP FROM CHAIR USING ARMS: TOTAL
MOBILITY SCORE: 6
MOVING FROM LYING ON BACK TO SITTING ON SIDE OF FLAT BED WITH BEDRAILS: TOTAL
STANDING UP FROM CHAIR USING ARMS: TOTAL
EATING MEALS: TOTAL
MOVING FROM LYING ON BACK TO SITTING ON SIDE OF FLAT BED WITH BEDRAILS: TOTAL
DRESSING REGULAR LOWER BODY CLOTHING: TOTAL
WALKING IN HOSPITAL ROOM: TOTAL

## 2024-01-24 ASSESSMENT — ACTIVITIES OF DAILY LIVING (ADL)
ADL_ASSISTANCE: INDEPENDENT
ADL_ASSISTANCE: INDEPENDENT

## 2024-01-24 ASSESSMENT — PAIN - FUNCTIONAL ASSESSMENT
PAIN_FUNCTIONAL_ASSESSMENT: UNABLE TO SELF-REPORT
PAIN_FUNCTIONAL_ASSESSMENT: WONG-BAKER FACES

## 2024-01-24 ASSESSMENT — PAIN SCALES - WONG BAKER: WONGBAKER_NUMERICALRESPONSE: NO HURT

## 2024-01-24 NOTE — PROGRESS NOTES
Subjective   No acute events overnight. Pt responding intermittently to questions with one word answers. Exam is relatively consistent with previously. PT reports patient with some minimal improvement, more interactive/engaged.     Objective     Vitals:  Vitals:    01/24/24 1107   BP: 121/69   Pulse: 70   Resp: 20   Temp:    SpO2: 92%       I/O last 3 completed shifts:  In: 2940 (29.4 mL/kg) [NG/GT:2940]  Out: 1600 (16 mL/kg) [Urine:1600 (0.4 mL/kg/hr)]  Weight: 100 kg   I/O this shift:  In: 520 [NG/GT:520]  Out: 700 [Urine:700]    Physical exam:  General: in and out of sleep, will awake to questions and answer with mostly one word responses, follows commands  HEENT: pupils equal and round, no scleral icterus  Skin: no suspect lesions or rashes noted on visible skin  Chest: On 4-5L NC O2, lung sounds coarse, no wheezing appreciated  Cardiac: regular rate, normal s1, s2, no M/R/G  Abdomen: soft, ND, NT, no involuntary guarding  : no flank pain or indwelling urinary catheter  EXT: no peripheral edema, no asymmetry noted  MSK: no focal joint swelling noted  Neuro: Will nod yes/no to question, but remains somnolent, follows simple instructions, moves all 4 extremities when prompted. AxO x1-2  Psych: Unable to assess    Medications:  enoxaparin, 40 mg, subcutaneous, Daily  esomeprazole, 20 mg, nasogastric tube, Daily before breakfast  insulin glargine, 8 Units, subcutaneous, Nightly  insulin regular, 0-5 Units, subcutaneous, q6h  levothyroxine, 200 mcg, oral, Daily  metoprolol tartrate, 25 mg, nasogastric tube, BID  multivitamin with minerals, 1 tablet, oral, Daily  polyethylene glycol, 17 g, oral, Daily  potassium chloride, 20 mEq, oral, Once  sennosides, 2 tablet, oral, BID  sertraline, 50 mg, nasogastric tube, Daily         PRN medications: albuterol, dextrose 10 % in water (D10W), dextrose, diclofenac sodium, glucagon, oxygen, phenoL    Labs:  Results for orders placed or performed during the hospital encounter of  01/01/24 (from the past 24 hour(s))   POCT GLUCOSE   Result Value Ref Range    POCT Glucose 157 (H) 74 - 99 mg/dL   POCT GLUCOSE   Result Value Ref Range    POCT Glucose 173 (H) 74 - 99 mg/dL   Magnesium   Result Value Ref Range    Magnesium 1.94 1.60 - 2.40 mg/dL   Renal Function Panel   Result Value Ref Range    Glucose 162 (H) 74 - 99 mg/dL    Sodium 139 136 - 145 mmol/L    Potassium 3.7 3.5 - 5.3 mmol/L    Chloride 101 98 - 107 mmol/L    Bicarbonate 31 21 - 32 mmol/L    Anion Gap 11 10 - 20 mmol/L    Urea Nitrogen 20 6 - 23 mg/dL    Creatinine 0.94 0.50 - 1.30 mg/dL    eGFR 86 >60 mL/min/1.73m*2    Calcium 9.2 8.6 - 10.6 mg/dL    Phosphorus 3.3 2.5 - 4.9 mg/dL    Albumin 2.9 (L) 3.4 - 5.0 g/dL   CBC and Auto Differential   Result Value Ref Range    WBC 5.0 4.4 - 11.3 x10*3/uL    nRBC 0.0 0.0 - 0.0 /100 WBCs    RBC 3.90 (L) 4.50 - 5.90 x10*6/uL    Hemoglobin 12.2 (L) 13.5 - 17.5 g/dL    Hematocrit 37.6 (L) 41.0 - 52.0 %    MCV 96 80 - 100 fL    MCH 31.3 26.0 - 34.0 pg    MCHC 32.4 32.0 - 36.0 g/dL    RDW 13.2 11.5 - 14.5 %    Platelets 61 (L) 150 - 450 x10*3/uL    Neutrophils % 47.7 40.0 - 80.0 %    Immature Granulocytes %, Automated 1.4 (H) 0.0 - 0.9 %    Lymphocytes % 20.0 13.0 - 44.0 %    Monocytes % 8.5 2.0 - 10.0 %    Eosinophils % 21.6 0.0 - 6.0 %    Basophils % 0.8 0.0 - 2.0 %    Neutrophils Absolute 2.40 1.60 - 5.50 x10*3/uL    Immature Granulocytes Absolute, Automated 0.07 0.00 - 0.50 x10*3/uL    Lymphocytes Absolute 1.01 0.80 - 3.00 x10*3/uL    Monocytes Absolute 0.43 0.05 - 0.80 x10*3/uL    Eosinophils Absolute 1.09 (H) 0.00 - 0.40 x10*3/uL    Basophils Absolute 0.04 0.00 - 0.10 x10*3/uL   POCT GLUCOSE   Result Value Ref Range    POCT Glucose 174 (H) 74 - 99 mg/dL       Imaging:  MR cervical spine wo IV contrast    Result Date: 1/9/2024  Interpreted By:  Joey Rizzo, STUDY: MR CERVICAL SPINE WO IV CONTRAST;  1/8/2024 10:18 pm   INDICATION: Signs/Symptoms:R arm weakness.   COMPARISON: None.    ACCESSION NUMBER(S): [id number]   ORDERING CLINICIAN: DIEGO ABARCA   TECHNIQUE: The cervical spine was studied in the sagittal and axial planes utilizing T1 and T2 weighted images. Examination is limited due to motion artifact   FINDINGS: The craniovertebral junction is normal. The cord is normal in size and signal. The marrow signal is normal. Serial axial images reveal the following: C2/C3 There is normal alignment and vertebral body height. The disc space is normal. There is no evidence of canal or foraminal narrowing. There is no evidence of bulging or herniated disc. C3/C4 There is normal alignment and vertebral body height. The disc space is normal. There is no evidence of canal or foraminal narrowing. There is no evidence of bulging or herniated disc. C4/C5 Right greater than left uncovertebral joint hypertrophy with focal right-sided foraminal narrowing. No measurable canal stenosis. C5/C6 Right greater than left uncovertebral joint hypertrophy without canal stenosis. Right-sided foraminal narrowing C6/C7 Bilateral facet hypertrophy without canal or foraminal narrowing C7/T1 Bilateral facet hypertrophy without canal or foraminal narrowing       * Right-sided foraminal narrowing due to uncovertebral joint hypertrophy at C4/C5 and C5/C6 *No measurable canal stenosis or cord compression.   MACRO: none   Signed by: Joey Rizzo 1/9/2024 9:02 AM Dictation workstation:   [id number]       Assessment/Plan   Mr. Gopal Ness is a 72 y/o man with h/o pituitary adenoma s/p resection at [facility], DVT/PE on Eliquis, COPD (noncompliant with medications)/tobacco use, depresssion, DMII, HLD presenting after being found down i/s/o fall on 12/31 (LKN 1100 on 12/31). Brought to OSH, negative CTH, intubated for airway protection. EMS reported witnessing possible seizure activity. Also with persistent tachycardia, leukocytosis and infiltrate on chest CT, so treating presumed aspiration pneumonia with antibiotics.  Transferred to [facility] for cvEEG which did not demonstrate seizures. MRI brain did not show acute stroke. Extubated 1/2. Course complicated by Covid pnuemonia, LEEANNE, SVT likely in the setting of infection, and RUE weakness in the setting of a fall. Currently admitted for continued altered mental status despite resolution of infection, correction of electrolytes, and lack of structural lesions that would explain his change in mentation. Code status was changed to DNR/DNI 1/17.      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 New small dural based enhancing masses in the R posterior fossa  - MRI Brain and MRI Sella - residual adenoma unchanged from prior examination and similar to slightly increased size of small dural-based enhancing masses of small dural-based enhancing masses within the R posterior fossa favored to represent meningiomas.  - EEG - mod diffuse encephalopathy. Improvement in degree of encephalopathy compared to the previous day. Keppra DC.  - MRI cervical spine showed right-sided foraminal narrowing d/t uncovertebral joint hypertrophy at C4/C5 and C5/C6  - Repeat CT on 1/14 - atrophy, no acute intercranial pathology   Plan:  - Hold home trazadone 50mg at bedtime  -  Continue home sertraline 50mg daily   - Outpatient neurosurgery/neuro-ophthalmology follow up  - EMG as an outpatient, follow up with neurology in 4 weeks  - Corrected underlying metabolic and infectious causes of encephalopathy. No structural causes seen on imaging.     #Acute hypoxic respiratory failure  #COVID pneumonia, CAP  #COPD  - Maintain O2- 88-92%, currently on 4-5L NC  - Cpap at night  - Elevated CRP, LDH, Ferritin, d-dimer  - Sputum culture - Strep pneumoniae  - s/p 1x cefepime 2g on 1/1, meropenem (1/1-1/2), Vanc 1.5g (1/1-1/2) then ceftriaxone 2g (1/2- 1/8)   - Finished remdesivir (1/6-1/10), dexamethasone 6mg daily (stopped 1/10)  - Pulmonology consulted: Will start albuterol nebs q6h, RT for BPH, chest vest therapy. Duonebs PRN. No abx at this time.     #Hypernatremia - resolved  #LEEANNE (baseline Cr 0.8-0.9) - resolved  #Hx of DI   - Hold home tamsulosin 0.4mg daily while NPO  - Hold further D5W, continue with 400ml q6h FWF     #SVT  - Several episodes of SVT, terminated spontaneously, some associated with hypotension down to 80/50s.  - Consulted cardiology, pending recs.         -Metoprolol tartrate 25mg BID from 12.5mg q6h        -If recurrent of persistent SVT, start with vagal maneuvers (carotid sinus massage), if unsuccessful, may consider Adenosine dose and/or IV Metop        -Please continue to monitor patient on telemetry        -Please obtain EKG if there's recurrence of SVT        -Optimal lytes: K>4, Mag>2    #panhypothyroidism s/p adenoma resection  #Cushing disease  #diabetes mellitus  - Holding home glimepiride 4mg BID, metformin 1000mg BID  - A1C 5.6  - AM free T4 0.92  Plan:  - Mild corrective SSI #1  - POCT glucose q6hrs while on tube feeds, Hypoglycemia protocol  - Continue home levothyroxine 200mcg daily  - Glargine 8u nightly  - follow up outpatient with endocrinologist Dr. Yvonne Lofton in 4 weeks (scheduled by endocrine)     #hx of DVT/PE  #thrombocytopenia  #Eosinophilia  - H/H:  16.9/51 -> 14.3/45.8  - PLT: consistently ~ while here. Over past two days have been 70s.  - Daily CBC    - Hold home Eliquis at this time   - Lovenox 40mg subcutaneous for prophylaxis  - Peripheral smear added 1/23 for evaluation of eosinophilia/thrombocytopenia, pending     F: FWF with TF  E: Replete PRN  N: Nepro tube feeds  A: PIVs    Lomeli: None  GI ppx: PPI  DVT ppx: lovenox     Code status: DNR/DNI  NOK/Surrogate: Donya Emerson (Daughter) [phone]      Ej Ventura MD  PGY-1 Internal Medicine

## 2024-01-24 NOTE — PROGRESS NOTES
Occupational Therapy    Re-Evaluation    Patient Name: Gopal Ness  MRN: [id number]  Today's Date: 1/24/2024  Time Calculation  Start Time: 1105  Stop Time: 1134  Time Calculation (min): 29 min    Assessment  IP OT Assessment  OT Assessment:  (Pt presents with overall deficits in cognition, ADL'/IADL's, functional mobility, functional deficits, however, with noted improvement this date as evidenced by increased arousal and ability to functionally participate in therapy and sitting EOB today.)  Evaluation/Treatment Tolerance:  (Improved from most recent attempted therapy sessions.)  End of Session Communication: Bedside nurse  End of Session Patient Position: Bed, 3 rail up, Alarm on  Plan:  Treatment Interventions: ADL retraining, Functional transfer training, UE strengthening/ROM, Endurance training, Cognitive reorientation, Patient/family training, Equipment evaluation/education, Compensatory technique education, Continued evaluation  OT Frequency: 3 times per week  OT Discharge Recommendations: Moderate intensity level of continued care  Equipment Recommended upon Discharge: Wheeled walker  OT - OK to Discharge:  (OT Re-Evaluation completed.)    Subjective   Current Problem:  1. AMS (altered mental status)  EEG    Insert arterial line    Arterial Line Insertion      2. Tachycardia        3. Acute metabolic encephalopathy  Transthoracic Echo (TTE) Complete    Transthoracic Echo (TTE) Complete      4. Hypotension, unspecified hypotension type  Transthoracic Echo (TTE) Complete    Transthoracic Echo (TTE) Complete      5. Impaired mobility        6. Pituitary adenoma (CMS/HCC)  Referral to Neurosurgery      7. SVT (supraventricular tachycardia)  CANCELED: Transthoracic Echo (TTE) Complete    CANCELED: Transthoracic Echo (TTE) Complete      8. Arrhythmia, vagal  CANCELED: Transthoracic Echo (TTE) Complete    CANCELED: Transthoracic Echo (TTE) Complete      9. Ophthalmoplegia  Referral to Neurology      10. Vision  loss of right eye  Referral to Neurology        General:  Reason for Referral: 73 year old male admitted with continued altered mental status despite resolution of infection, correction of electrolytes, and lack of structural lesions that would explain his change in mentation  Past Medical History Relevant to Rehab: pituitary adenoma s/p resection at [facility], DVT/PE on Eliquis, COPD (noncompliant with medications)/tobacco use, depresssion, DMII, HLD presenting after being found down i/s/o fall on 12/31  Co-Treatment: PT  Co-Treatment Reason: Seen with PT as pt requires heavy skilled assist x2 to safely mobilize.  Prior to Session Communication: Bedside nurse  Patient Position Received: Bed, 3 rail up, Alarm on  General Comment: Pt received in supine asleep, arousable to touch and voice, tended to keep eyes closed in supine, increased arousal when pt mobilized to EOB.   Precautions:  Medical Precautions: Fall precautions, Swallowing precautions  Vital Signs:  Heart Rate: 71  SpO2: 93 %  BP: 120/73  Pain:  Pain Assessment  Pain Assessment: Feliciano-Baker FACES  Feliciano-Baker FACES Pain Rating: No hurt (Pt did repeatedly attempt to scratch his back in session while seated on EOB, RN made aware, mepilex applied to backside by RN.)  Lines/Tubes/Drains:  NG/OG/Feeding Tube Other (Comment) Left nostril 12 Fr. (Active)   Number of days: 20       External Urinary Catheter Male (Active)   Number of days: 11         Objective   Cognition:  Overall Cognitive Status: Impaired  Arousal/Alertness: Delayed responses to stimuli (Initially received with eyes closed, increased eye opening and command following when assisted to sitting on EOB.)  Orientation Level: Disoriented to place, Disoriented to time, Disoriented to situation (Pt did not answer orientation questions, pt did respond with facial expression when name called.)  Following Commands:  (Pt followed one step simple commands approximately 50% with increased time, sometimes required  repeated verbal/tactile cues to respond.)  Processing Speed: Delayed           Home Living:  Type of Home: House  Lives With: Alone  Home Adaptive Equipment: None  Home Layout: One level  Home Access: Level entry  Bathroom Shower/Tub: Tub/shower unit  Bathroom Equipment: Grab bars in shower   Prior Function:  Level of [place]: Independent with ADLs and functional transfers, Independent with homemaking with ambulation  ADL Assistance: Independent  Homemaking Assistance: Independent  Ambulatory Assistance: Independent  Hand Dominance: Right  Prior Function Comments: PLOF obtained from EMR and previous therapy note       ADL:  Grooming Assistance:  (Pt able to wash face with CGA/Min assist while seated on EOB after cloth placed in hand and pt provded with repeated verbal and tactile cues.)  UE Dressing Assistance:  (Anticipate at least mod to max assist after setup.)  LE Dressing Assistance: Total  LE Dressing Deficit: Don/doff R shoe (In supine)  Activity Tolerance:  Endurance: Tolerates 10 - 20 min exercise with multiple rests  Balance:  Static Sitting Balance  Static Sitting-Level of Assistance:  (Max assist initially 2' posterior leaning, progressed to CGA for static sitting with verbal and tactile cues. Pt able to maintain static sitting approximately 12 minutes on EOB with CGA/Min assist.)  Bed Mobility/Transfers: Bed Mobility  Bed Mobility: Yes  Bed Mobility 1  Bed Mobility 1: Rolling right, Rolling left  Level of Assistance 1: Maximum assistance (x2, pt able to reach for either bed rail with verbal and tactile cues to initiate.)  Bed Mobility 2  Bed Mobility  2: Supine to sitting, Sitting to supine  Level of Assistance 2: Maximum assistance (x2, HOB elevated, draw sheet utliized.)   and Transfers  Transfer: Yes  Transfer 1  Transfer From 1: Sit to, Stand to  Transfer to 1: Stand, Sit  Technique 1: Sit to stand, Stand to sit  Transfer Device 1:  (B/L arm in arm assist)  Transfer Level of Assistance 1:  Maximum assistance, Arm in arm assistance (x2, able to achieve 3/4 standing at EOB with B/L knees blocked.)     Vision: Vision - Basic Assessment  Current Vision: Wears glasses all the time (Taken from prior therapy note -- no glassess noted in room. Pt able to read large print on OT's name tag, will continue to assess.)   and Vision - Complex Assessment  Vision Comments: pt with dysconjugate gaze with exotropia noted in B eyes. Pt unable to track appropriately and unable to read clock on wall.    Strength:  Strength Comments: Grossly 3 to 3-/5 throughout B/L UE's as observed in functional tasks. Will continue to assess as pt's cognition improves to formally assess.      Hand Function:  Hand Function  Gross Grasp:  (R  3-/5, L  4/5)  Extremities: RUE   RUE :  (Pt noted to have PROM WFL, AROM decreased in end ranges 2' decreased strength throughout, TBE further.), ENOCE   LUE:  (PROM WFL, AROM limited in end ranges 2' decreased overall strength, will continue to assess.),  , and      Outcome Measures: [facility] Daily Activity  Putting on and taking off regular lower body clothing: Total  Bathing (including washing, rinsing, drying): Total  Putting on and taking off regular upper body clothing: Total  Toileting, which includes using toilet, bedpan or urinal: Total  Taking care of personal grooming such as brushing teeth: A lot  Eating Meals: Total  Daily Activity - Total Score: 7         ,     OT Adult Other Outcome Measures  4AT: Unable to fully assess 2' pt's limited cognition, TBE further.    Education Documentation  Precautions, taught by Jacque Morgan OT at 1/24/2024  2:12 PM.  Learner: Patient  Readiness: Acceptance  Method: Explanation  Response: Needs Reinforcement    ADL Training, taught by Jacque Morgan OT at 1/24/2024  2:12 PM.  Learner: Patient  Readiness: Acceptance  Method: Explanation  Response: Needs Reinforcement    Education Comments  No comments found.        Goals:   Encounter Problems        Encounter Problems (Active)       ADLs       Patient will perform UB and LB bathing with Mod A using AE and adaptive tech as needed. (Progressing)       Start:  01/04/24    Expected End:  02/07/24            Patient with complete upper body dressing with Min A seated in B/S chair vs. EOB after setup with VC's. (Progressing)       Start:  01/04/24    Expected End:  02/07/24            Patient with complete lower body dressing with Mod A using AE and adaptive tech as needed. (Progressing)       Start:  01/04/24    Expected End:  02/07/24            Patient will complete daily grooming tasks with CGA and AE as needed after set up while seated on EOB. (Progressing)       Start:  01/04/24    Expected End:  02/07/24            Patient will complete toileting including hygiene clothing management/hygiene with Mod A using raised toilet vs. BSC pending progress with mobility/transfers (Progressing)       Start:  01/04/24    Expected End:  02/07/24               BALANCE       Pt will maintain tolerate sitting EOB >15 min with SBA without LOB during ADLs. (Progressing)       Start:  01/04/24    Expected End:  02/07/24               Balance       Patient will stand with UE support of LRD and MOD A x1 at least 5 min to improve balance required for self-care tasks.  (Progressing)       Start:  01/04/24    Expected End:  01/07/24               COGNITION/SAFETY       Pt will be alert and oriented x 3 and follow one step simple commands 75-90% of time. (Progressing)       Start:  01/04/24    Expected End:  02/07/24               Mobility       Patient will ambulate at least 10 ft. with </= MAX A x1 and LRD to improve tolerance of household distances.  (Progressing)       Start:  01/04/24    Expected End:  01/07/24            Patient will perform bed mobility with </= MIN A x1 to reduce risk of developing decubitus ulcers.  (Progressing)       Start:  01/04/24    Expected End:  01/07/24             LLE >/= 4+/5, RLE >/= 3+/5   (Progressing)       Start:  01/04/24    Expected End:  01/07/24               TRANSFERS       Patient will perform bed mobility with Mod A. (Progressing)       Start:  01/04/24    Expected End:  02/07/24            Patient will complete functional transfers with Mod A using LRAD. (Progressing)       Start:  01/04/24    Expected End:  02/07/24               Transfers       Patient will perform sit to stand and stand to sit transfers with </= MAX A x1 and LRD to increase functional strength.  (Progressing)       Start:  01/04/24    Expected End:  01/07/24 01/24/24 at 2:14 PM   Jacque Morgan OT   Rehab Office: [phone]

## 2024-01-24 NOTE — PROGRESS NOTES
Physical Therapy    Physical Therapy Re-Evaluation & Treatment    Patient Name: Gopal Ness  MRN: [id number]  Today's Date: 1/24/2024   Time Calculation  Start Time: 1104  Stop Time: 1134  Time Calculation (min): 30 min    Assessment/Plan   PT Assessment  PT Assessment Results: Decreased strength, Decreased endurance, Impaired balance, Decreased mobility, Decreased cognition, Impaired judgement, Decreased safety awareness  Rehab Prognosis: Good  Evaluation/Treatment Tolerance: Patient limited by fatigue  Medical Staff Made Aware: Yes  Strengths: Premorbid level of function  Barriers to Participation:  (cognition)  End of Session Communication: Bedside nurse  Assessment Comment: 73 year old male admitted with continued altered mental status despite resolution of infection, correction of electrolytes, and lack of structural lesions that would explain his change in mentation Pt presents with decreased strength, endurance and balance impacting functional mobility. Pt would benefit from continued PT while in hospital to improve functional mobility and return to PLOF. Pt with noted improvement in mentation and is able to follow commands and participate in therapy. Recommend MOD intensity PT after DC.  End of Session Patient Position: Bed, 3 rail up, Alarm on   IP OR SWING BED PT PLAN  Inpatient or Swing Bed: Inpatient  PT Plan  Treatment/Interventions: Bed mobility, Transfer training, Gait training, Stair training, Balance training, Neuromuscular re-education, Strengthening, Endurance training, Therapeutic exercise, Therapeutic activity, Home exercise program, Positioning, Postural re-education  PT Plan: Skilled PT  PT Eval Only Reason:  (predict patient at or near new baseline function)  PT Frequency: 3 times per week  PT Discharge Recommendations: Moderate intensity level of continued care  Equipment Recommended upon Discharge: Wheeled walker  PT Recommended Transfer Status: Total assist  PT - OK to Discharge: Yes (PT  eval complete and DC rec made)    Subjective     General Visit Information:  General  Reason for Referral: 73 year old male admitted with continued altered mental status despite resolution of infection, correction of electrolytes, and lack of structural lesions that would explain his change in mentation  Past Medical History Relevant to Rehab: pituitary adenoma s/p resection at [facility], DVT/PE on Eliquis, COPD (noncompliant with medications)/tobacco use, depresssion, DMII, HLD presenting after being found down i/s/o fall on 12/31  Co-Treatment: OT  Co-Treatment Reason: to safely progress functional mobility  Prior to Session Communication: Bedside nurse  Patient Position Received: Bed, 3 rail up, Alarm on  General Comment: Pt supine in bed upon entry to room. Pt initially sleeping, but responding to verbal stimuli and following commands with eyes closed. noted dobhoff.  Home Living:  Home Living  Type of Home: House  Lives With: Alone  Home Adaptive Equipment: None  Home Layout: One level  Home Access: Level entry  Bathroom Shower/Tub: Tub/shower unit  Prior Level of Function:  Prior Function Per Pt/Caregiver Report  Level of [place]: Independent with ADLs and functional transfers, Independent with homemaking with ambulation  ADL Assistance: Independent  Homemaking Assistance: Independent  Ambulatory Assistance: Independent  Hand Dominance: Right  Prior Function Comments: PLOF obtained from EMR and previous therapy note  Precautions:  Precautions  Medical Precautions: Fall precautions, Oxygen therapy device and L/min  Vital Signs:  Vital Signs  Heart Rate: 71  SpO2: 93 %  BP: 120/73    Objective   Pain:  Pain Assessment  Pain Assessment: Unable to self-report (pt did not display any signs of discomfort or pain throughout session)  Cognition:  Cognition  Overall Cognitive Status: Impaired  Arousal/Alertness: Delayed responses to stimuli  Orientation Level: Disoriented to place, Disoriented to situation, Disoriented  to time (pt respoding to name; did not answer orientation questions)  Following Commands:  (pt following one step commands >75% of the time with increased time and repetition)  Cognition Comments: pt with eyes closed in supine; pt with increaed eye opening and responsiveness with sitting EOB and upright positioning    General Assessments:  General Observation  General Observation: pt noted to immediately start scratching his own back when sititng EOB; noted minor skin tear on back after pt scratched; RN notified and in room to apply mepliex.    Activity Tolerance  Endurance: Tolerates 10 - 20 min exercise with multiple rests    Static Sitting Balance  Static Sitting-Comment/Number of Minutes: cga-max assist with pt flucuating throughout session  Dynamic Sitting Balance  Dynamic Sitting-Comments: cga-max assist with pt flucuating throughout session    Static Standing Balance  Static Standing-Comment/Number of Minutes: max x2  Functional Assessments:  Bed Mobility  Bed Mobility: Yes  Bed Mobility 1  Bed Mobility 1: Supine to sitting, Sitting to supine  Level of Assistance 1: Maximum assistance, Maximum verbal cues, Maximum tactile cues  Bed Mobility Comments 1: x2; HOB elevated, use of draw sheet  Bed Mobility 2  Bed Mobility  2: Rolling right, Rolling left  Level of Assistance 2: Maximum assistance, Maximum verbal cues, Maximum tactile cues  Bed Mobility Comments 2: x2; pt reaching to side rail and holding on to side rail    Transfers  Transfer: Yes  Transfer 1  Technique 1: Sit to stand, Stand to sit  Transfer Device 1:  (B arm in arm assist with knee blocking)  Transfer Level of Assistance 1: Maximum assistance, Maximum tactile cues, Maximum verbal cues  Trials/Comments 1: x2; achieved ~75% of full stand    Extremity/Trunk Assessments:  RLE   RLE :  (grossly 3+/5 based on functional observation)  LLE   LLE :  (grossly 3+/5 based on functional observation)  Treatments:  Therapeutic Activity  Therapeutic Activity  Performed: Yes  Therapeutic Activity 1: pt sititng EOB for ~10 minutes with cga-max assist. Pt requring verbal cuing to maintain upright posture, with pt able to maintain with cga. Noted pt to require more assist with fatigue and when focused on another task.  Outcome Measures:  [facility] Basic Mobility  Turning from your back to your side while in a flat bed without using bedrails: Total  Moving from lying on your back to sitting on the side of a flat bed without using bedrails: Total  Moving to and from bed to chair (including a wheelchair): Total  Standing up from a chair using your arms (e.g. wheelchair or bedside chair): Total  To walk in hospital room: Total  Climbing 3-5 steps with railing: Total  Basic Mobility - Total Score: 6    Encounter Problems       Encounter Problems (Active)       Balance       Patient will stand with UE support of LRD and MOD A x1 at least 5 min to improve balance required for self-care tasks.  (Progressing)       Start:  01/04/24    Expected End:  01/07/24               Mobility       Patient will ambulate at least 10 ft. with </= MAX A x1 and LRD to improve tolerance of household distances.  (Progressing)       Start:  01/04/24    Expected End:  01/07/24            Patient will perform bed mobility with </= MIN A x1 to reduce risk of developing decubitus ulcers.  (Progressing)       Start:  01/04/24    Expected End:  01/07/24             LLE >/= 4+/5, RLE >/= 3+/5  (Progressing)       Start:  01/04/24    Expected End:  01/07/24               Pain - Adult          Transfers       Patient will perform sit to stand and stand to sit transfers with </= MAX A x1 and LRD to increase functional strength.  (Progressing)       Start:  01/04/24    Expected End:  01/07/24                   Education Documentation  Mobility Training, taught by Cinthya Eng PT at 1/24/2024  4:19 PM.  Learner: Patient  Readiness: Acceptance  Method: Explanation  Response: Needs Reinforcement    Education  Comments  No comments found.

## 2024-01-24 NOTE — PROGRESS NOTES
1/24/24 1700 Transitional Care Coordinator Notes:    Updated therapy and progress notes sent to [facility] for review. Will continue to monitor for update.           Assessment/Plan   Principal Problem:    AMS (altered mental status)    Discharge Plans: discharge to SNF            Annalisa Whittaker RN

## 2024-01-25 LAB
ALBUMIN SERPL BCP-MCNC: 2.8 G/DL (ref 3.4–5)
ANION GAP SERPL CALC-SCNC: 11 MMOL/L (ref 10–20)
BASOPHILS # BLD MANUAL: 0 X10*3/UL (ref 0–0.1)
BASOPHILS NFR BLD MANUAL: 0 %
BUN SERPL-MCNC: 17 MG/DL (ref 6–23)
CALCIUM SERPL-MCNC: 9.2 MG/DL (ref 8.6–10.6)
CHLORIDE SERPL-SCNC: 99 MMOL/L (ref 98–107)
CO2 SERPL-SCNC: 31 MMOL/L (ref 21–32)
CREAT SERPL-MCNC: 1.08 MG/DL (ref 0.5–1.3)
EGFRCR SERPLBLD CKD-EPI 2021: 73 ML/MIN/1.73M*2
EOSINOPHIL # BLD MANUAL: 1.2 X10*3/UL (ref 0–0.4)
EOSINOPHIL NFR BLD MANUAL: 23 %
ERYTHROCYTE [DISTWIDTH] IN BLOOD BY AUTOMATED COUNT: 13.2 % (ref 11.5–14.5)
GLUCOSE BLD MANUAL STRIP-MCNC: 131 MG/DL (ref 74–99)
GLUCOSE BLD MANUAL STRIP-MCNC: 145 MG/DL (ref 74–99)
GLUCOSE BLD MANUAL STRIP-MCNC: 148 MG/DL (ref 74–99)
GLUCOSE BLD MANUAL STRIP-MCNC: 160 MG/DL (ref 74–99)
GLUCOSE BLD MANUAL STRIP-MCNC: 162 MG/DL (ref 74–99)
GLUCOSE SERPL-MCNC: 132 MG/DL (ref 74–99)
HCT VFR BLD AUTO: 37.6 % (ref 41–52)
HGB BLD-MCNC: 12.3 G/DL (ref 13.5–17.5)
IMM GRANULOCYTES # BLD AUTO: 0.1 X10*3/UL (ref 0–0.5)
IMM GRANULOCYTES NFR BLD AUTO: 1.9 % (ref 0–0.9)
LYMPHOCYTES # BLD MANUAL: 0.78 X10*3/UL (ref 0.8–3)
LYMPHOCYTES NFR BLD MANUAL: 15 %
MAGNESIUM SERPL-MCNC: 1.99 MG/DL (ref 1.6–2.4)
MCH RBC QN AUTO: 31 PG (ref 26–34)
MCHC RBC AUTO-ENTMCNC: 32.7 G/DL (ref 32–36)
MCV RBC AUTO: 95 FL (ref 80–100)
MONOCYTES # BLD MANUAL: 0.42 X10*3/UL (ref 0.05–0.8)
MONOCYTES NFR BLD MANUAL: 8 %
MYELOCYTES # BLD MANUAL: 0.1 X10*3/UL
MYELOCYTES NFR BLD MANUAL: 2 %
NEUTROPHILS # BLD MANUAL: 2.7 X10*3/UL (ref 1.6–5.5)
NEUTS BAND # BLD MANUAL: 0.1 X10*3/UL (ref 0–0.5)
NEUTS BAND NFR BLD MANUAL: 2 %
NEUTS SEG # BLD MANUAL: 2.6 X10*3/UL (ref 1.6–5)
NEUTS SEG NFR BLD MANUAL: 50 %
NRBC BLD-RTO: 0 /100 WBCS (ref 0–0)
PHOSPHATE SERPL-MCNC: 3.5 MG/DL (ref 2.5–4.9)
PLATELET # BLD AUTO: 69 X10*3/UL (ref 150–450)
POTASSIUM SERPL-SCNC: 4.2 MMOL/L (ref 3.5–5.3)
RBC # BLD AUTO: 3.97 X10*6/UL (ref 4.5–5.9)
RBC MORPH BLD: ABNORMAL
SODIUM SERPL-SCNC: 137 MMOL/L (ref 136–145)
TOTAL CELLS COUNTED BLD: 100
WBC # BLD AUTO: 5.2 X10*3/UL (ref 4.4–11.3)

## 2024-01-25 PROCEDURE — 2500000004 HC RX 250 GENERAL PHARMACY W/ HCPCS (ALT 636 FOR OP/ED)

## 2024-01-25 PROCEDURE — 85007 BL SMEAR W/DIFF WBC COUNT: CPT

## 2024-01-25 PROCEDURE — 2500000001 HC RX 250 WO HCPCS SELF ADMINISTERED DRUGS (ALT 637 FOR MEDICARE OP): Performed by: INTERNAL MEDICINE

## 2024-01-25 PROCEDURE — 2500000001 HC RX 250 WO HCPCS SELF ADMINISTERED DRUGS (ALT 637 FOR MEDICARE OP)

## 2024-01-25 PROCEDURE — 80069 RENAL FUNCTION PANEL: CPT

## 2024-01-25 PROCEDURE — 99233 SBSQ HOSP IP/OBS HIGH 50: CPT

## 2024-01-25 PROCEDURE — 97530 THERAPEUTIC ACTIVITIES: CPT | Mod: GP

## 2024-01-25 PROCEDURE — 97530 THERAPEUTIC ACTIVITIES: CPT | Mod: GO

## 2024-01-25 PROCEDURE — 82947 ASSAY GLUCOSE BLOOD QUANT: CPT

## 2024-01-25 PROCEDURE — 83735 ASSAY OF MAGNESIUM: CPT

## 2024-01-25 PROCEDURE — 1100000001 HC PRIVATE ROOM DAILY

## 2024-01-25 PROCEDURE — 99232 SBSQ HOSP IP/OBS MODERATE 35: CPT

## 2024-01-25 PROCEDURE — 2500000001 HC RX 250 WO HCPCS SELF ADMINISTERED DRUGS (ALT 637 FOR MEDICARE OP): Performed by: STUDENT IN AN ORGANIZED HEALTH CARE EDUCATION/TRAINING PROGRAM

## 2024-01-25 PROCEDURE — 97535 SELF CARE MNGMENT TRAINING: CPT | Mod: GO

## 2024-01-25 PROCEDURE — 85025 COMPLETE CBC W/AUTO DIFF WBC: CPT

## 2024-01-25 PROCEDURE — 36415 COLL VENOUS BLD VENIPUNCTURE: CPT

## 2024-01-25 RX ADMIN — ESOMEPRAZOLE MAGNESIUM 20 MG: 40 FOR SUSPENSION ORAL at 06:02

## 2024-01-25 RX ADMIN — LEVOTHYROXINE SODIUM 200 MCG: 75 TABLET ORAL at 06:14

## 2024-01-25 RX ADMIN — INSULIN GLARGINE 8 UNITS: 100 INJECTION, SOLUTION SUBCUTANEOUS at 20:12

## 2024-01-25 RX ADMIN — METOPROLOL TARTRATE 25 MG: 25 TABLET, FILM COATED ORAL at 20:12

## 2024-01-25 RX ADMIN — SERTRALINE HYDROCHLORIDE 50 MG: 50 TABLET ORAL at 08:30

## 2024-01-25 RX ADMIN — Medication 1 TABLET: at 08:30

## 2024-01-25 RX ADMIN — METOPROLOL TARTRATE 25 MG: 25 TABLET, FILM COATED ORAL at 08:30

## 2024-01-25 RX ADMIN — ENOXAPARIN SODIUM 40 MG: 100 INJECTION SUBCUTANEOUS at 08:30

## 2024-01-25 ASSESSMENT — COGNITIVE AND FUNCTIONAL STATUS - GENERAL
DRESSING REGULAR LOWER BODY CLOTHING: TOTAL
DRESSING REGULAR UPPER BODY CLOTHING: TOTAL
MOVING FROM LYING ON BACK TO SITTING ON SIDE OF FLAT BED WITH BEDRAILS: TOTAL
DRESSING REGULAR UPPER BODY CLOTHING: TOTAL
PERSONAL GROOMING: TOTAL
DRESSING REGULAR UPPER BODY CLOTHING: TOTAL
HELP NEEDED FOR BATHING: TOTAL
EATING MEALS: TOTAL
PERSONAL GROOMING: TOTAL
DRESSING REGULAR LOWER BODY CLOTHING: TOTAL
HELP NEEDED FOR BATHING: TOTAL
CLIMB 3 TO 5 STEPS WITH RAILING: TOTAL
TURNING FROM BACK TO SIDE WHILE IN FLAT BAD: TOTAL
DAILY ACTIVITIY SCORE: 6
DAILY ACTIVITIY SCORE: 6
TOILETING: TOTAL
TOILETING: TOTAL
TURNING FROM BACK TO SIDE WHILE IN FLAT BAD: TOTAL
WALKING IN HOSPITAL ROOM: TOTAL
HELP NEEDED FOR BATHING: TOTAL
MOBILITY SCORE: 6
CLIMB 3 TO 5 STEPS WITH RAILING: TOTAL
MOVING FROM LYING ON BACK TO SITTING ON SIDE OF FLAT BED WITH BEDRAILS: TOTAL
PERSONAL GROOMING: TOTAL
TOILETING: TOTAL
STANDING UP FROM CHAIR USING ARMS: TOTAL
DRESSING REGULAR LOWER BODY CLOTHING: TOTAL
EATING MEALS: TOTAL
PERSONAL GROOMING: TOTAL
TOILETING: TOTAL
DRESSING REGULAR UPPER BODY CLOTHING: TOTAL
CLIMB 3 TO 5 STEPS WITH RAILING: TOTAL
DRESSING REGULAR LOWER BODY CLOTHING: TOTAL
HELP NEEDED FOR BATHING: TOTAL
MOVING FROM LYING ON BACK TO SITTING ON SIDE OF FLAT BED WITH BEDRAILS: TOTAL
WALKING IN HOSPITAL ROOM: TOTAL
TURNING FROM BACK TO SIDE WHILE IN FLAT BAD: TOTAL
EATING MEALS: TOTAL
DAILY ACTIVITIY SCORE: 6
MOBILITY SCORE: 6
STANDING UP FROM CHAIR USING ARMS: TOTAL
MOVING TO AND FROM BED TO CHAIR: TOTAL
CLIMB 3 TO 5 STEPS WITH RAILING: TOTAL
MOBILITY SCORE: 6
MOVING TO AND FROM BED TO CHAIR: TOTAL
TURNING FROM BACK TO SIDE WHILE IN FLAT BAD: TOTAL
MOVING TO AND FROM BED TO CHAIR: TOTAL
DAILY ACTIVITIY SCORE: 6
MOVING TO AND FROM BED TO CHAIR: TOTAL
MOBILITY SCORE: 6
EATING MEALS: TOTAL
STANDING UP FROM CHAIR USING ARMS: TOTAL
STANDING UP FROM CHAIR USING ARMS: TOTAL
WALKING IN HOSPITAL ROOM: TOTAL
MOVING FROM LYING ON BACK TO SITTING ON SIDE OF FLAT BED WITH BEDRAILS: TOTAL
WALKING IN HOSPITAL ROOM: TOTAL

## 2024-01-25 ASSESSMENT — ACTIVITIES OF DAILY LIVING (ADL): HOME_MANAGEMENT_TIME_ENTRY: 9

## 2024-01-25 ASSESSMENT — PAIN SCALES - GENERAL
PAINLEVEL_OUTOF10: 0 - NO PAIN

## 2024-01-25 ASSESSMENT — PAIN SCALES - PAIN ASSESSMENT IN ADVANCED DEMENTIA (PAINAD)
FACIALEXPRESSION: SMILING OR INEXPRESSIVE
BODYLANGUAGE: RELAXED
CONSOLABILITY: NO NEED TO CONSOLE
TOTALSCORE: 0
BREATHING: NORMAL

## 2024-01-25 ASSESSMENT — PAIN SCALES - WONG BAKER
WONGBAKER_NUMERICALRESPONSE: NO HURT

## 2024-01-25 ASSESSMENT — PAIN - FUNCTIONAL ASSESSMENT
PAIN_FUNCTIONAL_ASSESSMENT: PAINAD (PAIN ASSESSMENT IN ADVANCED DEMENTIA SCALE)
PAIN_FUNCTIONAL_ASSESSMENT: 0-10

## 2024-01-25 NOTE — PROGRESS NOTES
Subjective   No acute events overnight. Exam similar to previously. Answers questions intermittently, typically one word responses. BP somewhat soft overnight while sleeping ~100s systolic.    Objective     Vitals:  Vitals:    01/25/24 0731   BP: 108/62   Pulse: 75   Resp: 18   Temp: 36.4 °C (97.5 °F)   SpO2: 91%       I/O last 3 completed shifts:  In: 2120 (21.3 mL/kg) [NG/GT:2120]  Out: 2700 (27.1 mL/kg) [Urine:2700 (0.8 mL/kg/hr)]  Weight: 99.5 kg   No intake/output data recorded.    Physical exam:  General: in and out of sleep, will awake to questions and answer with mostly one word responses, follows commands  HEENT: pupils equal and round, no scleral icterus  Skin: no suspect lesions or rashes noted on visible skin  Chest: On 4-5L NC O2, lung sounds coarse, no wheezing appreciated  Cardiac: regular rate, normal s1, s2, no M/R/G  Abdomen: soft, ND, NT, no involuntary guarding  : no flank pain or indwelling urinary catheter  EXT: no peripheral edema, no asymmetry noted  MSK: no focal joint swelling noted  Neuro: Will nod yes/no to question, but remains somnolent, follows simple instructions, moves all 4 extremities when prompted. AxO x1-2  Psych: Unable to assess    Medications:  enoxaparin, 40 mg, subcutaneous, Daily  esomeprazole, 20 mg, nasogastric tube, Daily before breakfast  insulin glargine, 8 Units, subcutaneous, Nightly  insulin regular, 0-5 Units, subcutaneous, q6h  levothyroxine, 200 mcg, oral, Daily  metoprolol tartrate, 25 mg, nasogastric tube, BID  multivitamin with minerals, 1 tablet, oral, Daily  polyethylene glycol, 17 g, oral, Daily  sennosides, 2 tablet, oral, BID  sertraline, 50 mg, nasogastric tube, Daily         PRN medications: albuterol, dextrose 10 % in water (D10W), dextrose, diclofenac sodium, glucagon, oxygen, phenoL    Labs:  Results for orders placed or performed during the hospital encounter of 01/01/24 (from the past 24 hour(s))   Magnesium   Result Value Ref Range    Magnesium  1.94 1.60 - 2.40 mg/dL   Renal Function Panel   Result Value Ref Range    Glucose 162 (H) 74 - 99 mg/dL    Sodium 139 136 - 145 mmol/L    Potassium 3.7 3.5 - 5.3 mmol/L    Chloride 101 98 - 107 mmol/L    Bicarbonate 31 21 - 32 mmol/L    Anion Gap 11 10 - 20 mmol/L    Urea Nitrogen 20 6 - 23 mg/dL    Creatinine 0.94 0.50 - 1.30 mg/dL    eGFR 86 >60 mL/min/1.73m*2    Calcium 9.2 8.6 - 10.6 mg/dL    Phosphorus 3.3 2.5 - 4.9 mg/dL    Albumin 2.9 (L) 3.4 - 5.0 g/dL   CBC and Auto Differential   Result Value Ref Range    WBC 5.0 4.4 - 11.3 x10*3/uL    nRBC 0.0 0.0 - 0.0 /100 WBCs    RBC 3.90 (L) 4.50 - 5.90 x10*6/uL    Hemoglobin 12.2 (L) 13.5 - 17.5 g/dL    Hematocrit 37.6 (L) 41.0 - 52.0 %    MCV 96 80 - 100 fL    MCH 31.3 26.0 - 34.0 pg    MCHC 32.4 32.0 - 36.0 g/dL    RDW 13.2 11.5 - 14.5 %    Platelets 61 (L) 150 - 450 x10*3/uL    Neutrophils % 47.7 40.0 - 80.0 %    Immature Granulocytes %, Automated 1.4 (H) 0.0 - 0.9 %    Lymphocytes % 20.0 13.0 - 44.0 %    Monocytes % 8.5 2.0 - 10.0 %    Eosinophils % 21.6 0.0 - 6.0 %    Basophils % 0.8 0.0 - 2.0 %    Neutrophils Absolute 2.40 1.60 - 5.50 x10*3/uL    Immature Granulocytes Absolute, Automated 0.07 0.00 - 0.50 x10*3/uL    Lymphocytes Absolute 1.01 0.80 - 3.00 x10*3/uL    Monocytes Absolute 0.43 0.05 - 0.80 x10*3/uL    Eosinophils Absolute 1.09 (H) 0.00 - 0.40 x10*3/uL    Basophils Absolute 0.04 0.00 - 0.10 x10*3/uL   POCT GLUCOSE   Result Value Ref Range    POCT Glucose 174 (H) 74 - 99 mg/dL   POCT GLUCOSE   Result Value Ref Range    POCT Glucose 174 (H) 74 - 99 mg/dL   POCT GLUCOSE   Result Value Ref Range    POCT Glucose 131 (H) 74 - 99 mg/dL   POCT GLUCOSE   Result Value Ref Range    POCT Glucose 145 (H) 74 - 99 mg/dL   Magnesium   Result Value Ref Range    Magnesium 1.99 1.60 - 2.40 mg/dL   Renal Function Panel   Result Value Ref Range    Glucose 132 (H) 74 - 99 mg/dL    Sodium 137 136 - 145 mmol/L    Potassium 4.2 3.5 - 5.3 mmol/L    Chloride 99 98 - 107 mmol/L     Bicarbonate 31 21 - 32 mmol/L    Anion Gap 11 10 - 20 mmol/L    Urea Nitrogen 17 6 - 23 mg/dL    Creatinine 1.08 0.50 - 1.30 mg/dL    eGFR 73 >60 mL/min/1.73m*2    Calcium 9.2 8.6 - 10.6 mg/dL    Phosphorus 3.5 2.5 - 4.9 mg/dL    Albumin 2.8 (L) 3.4 - 5.0 g/dL   CBC and Auto Differential   Result Value Ref Range    WBC 5.2 4.4 - 11.3 x10*3/uL    nRBC 0.0 0.0 - 0.0 /100 WBCs    RBC 3.97 (L) 4.50 - 5.90 x10*6/uL    Hemoglobin 12.3 (L) 13.5 - 17.5 g/dL    Hematocrit 37.6 (L) 41.0 - 52.0 %    MCV 95 80 - 100 fL    MCH 31.0 26.0 - 34.0 pg    MCHC 32.7 32.0 - 36.0 g/dL    RDW 13.2 11.5 - 14.5 %    Platelets 69 (L) 150 - 450 x10*3/uL    Neutrophils % 45.9 40.0 - 80.0 %    Immature Granulocytes %, Automated 1.9 (H) 0.0 - 0.9 %    Lymphocytes % 20.8 13.0 - 44.0 %    Monocytes % 9.4 2.0 - 10.0 %    Eosinophils % 21.2 0.0 - 6.0 %    Basophils % 0.8 0.0 - 2.0 %    Neutrophils Absolute 2.38 1.60 - 5.50 x10*3/uL    Immature Granulocytes Absolute, Automated 0.10 0.00 - 0.50 x10*3/uL    Lymphocytes Absolute 1.08 0.80 - 3.00 x10*3/uL    Monocytes Absolute 0.49 0.05 - 0.80 x10*3/uL    Eosinophils Absolute 1.10 (H) 0.00 - 0.40 x10*3/uL    Basophils Absolute 0.04 0.00 - 0.10 x10*3/uL       Imaging:  MR cervical spine wo IV contrast    Result Date: 1/9/2024  Interpreted By:  Joey Rizzo, STUDY: MR CERVICAL SPINE WO IV CONTRAST;  1/8/2024 10:18 pm   INDICATION: Signs/Symptoms:R arm weakness.   COMPARISON: None.   ACCESSION NUMBER(S): [id number]   ORDERING CLINICIAN: DIEGO ABARCA   TECHNIQUE: The cervical spine was studied in the sagittal and axial planes utilizing T1 and T2 weighted images. Examination is limited due to motion artifact   FINDINGS: The craniovertebral junction is normal. The cord is normal in size and signal. The marrow signal is normal. Serial axial images reveal the following: C2/C3 There is normal alignment and vertebral body height. The disc space is normal. There is no evidence of canal or foraminal  narrowing. There is no evidence of bulging or herniated disc. C3/C4 There is normal alignment and vertebral body height. The disc space is normal. There is no evidence of canal or foraminal narrowing. There is no evidence of bulging or herniated disc. C4/C5 Right greater than left uncovertebral joint hypertrophy with focal right-sided foraminal narrowing. No measurable canal stenosis. C5/C6 Right greater than left uncovertebral joint hypertrophy without canal stenosis. Right-sided foraminal narrowing C6/C7 Bilateral facet hypertrophy without canal or foraminal narrowing C7/T1 Bilateral facet hypertrophy without canal or foraminal narrowing       * Right-sided foraminal narrowing due to uncovertebral joint hypertrophy at C4/C5 and C5/C6 *No measurable canal stenosis or cord compression.   MACRO: none   Signed by: Joey Rizzo 1/9/2024 9:02 AM Dictation workstation:   [id number]       Assessment/Plan   Mr. Gopal Ness is a 72 y/o man with h/o pituitary adenoma s/p resection at [facility], DVT/PE on Eliquis, COPD (noncompliant with medications)/tobacco use, depresssion, DMII, HLD presenting after being found down i/s/o fall on 12/31 (LKN 1100 on 12/31). Brought to OSH, negative CTH, intubated for airway protection. EMS reported witnessing possible seizure activity. Also with persistent tachycardia, leukocytosis and infiltrate on chest CT, so treating presumed aspiration pneumonia with antibiotics. Transferred to [facility] for cvEEG which did not demonstrate seizures. MRI brain did not show acute stroke. Extubated 1/2. Course complicated by Covid pnuemonia, LEEANNE, SVT likely in the setting of infection, and RUE weakness in the setting of a fall. Currently admitted for continued altered mental status despite resolution of infection, correction of electrolytes, and lack of structural lesions that would explain his change in mentation. Code status was changed to DNR/DNI 1/17.      Updates 1/25:  - Continue breathing treatments  and bronchopulmonary hygiene  - Ongoing discussions with family regarding appropriate dispo and placement  - PEG tube placement to be considered in future, likely at LTAC, per patient's family's wishes  - Patient with some mild improvement in interactiveness   - Patient unable to perform ADLs and requires ongoing assistance, given persistent encephalopathy  - Eosinophilia - persistent over last several days and continuing to increase, peripheral smear ordered again 1/25 and pending    #Encephalopathy  #hx of pituitary adenoma, c/f new meningiomas  #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 New small dural based enhancing masses in the R posterior fossa  - MRI Brain and MRI Sella - residual adenoma unchanged from prior examination and similar to slightly increased size of small dural-based enhancing masses of small dural-based enhancing masses within the R posterior fossa favored to represent meningiomas.  - EEG - mod diffuse encephalopathy. Improvement in degree of encephalopathy compared to the previous day. Keppra DC.  - MRI cervical spine showed right-sided foraminal narrowing d/t uncovertebral joint hypertrophy at C4/C5 and C5/C6  - Repeat CT on 1/14 - atrophy, no acute intercranial pathology   Plan:  - Hold home trazadone 50mg at bedtime  - Continue home sertraline 50mg daily   - Outpatient neurosurgery/neuro-ophthalmology follow up  - EMG as an outpatient, follow up with neurology in 4 weeks  - Corrected underlying metabolic and infectious causes of encephalopathy. No structural causes seen on imaging.     #Acute hypoxic respiratory failure  #COVID pneumonia, CAP  #COPD  - Maintain O2- 88-92%, currently on 4-5L NC  - Cpap at night  - Elevated CRP, LDH, Ferritin,  d-dimer  - Sputum culture - Strep pneumoniae  - s/p 1x cefepime 2g on 1/1, meropenem (1/1-1/2), Vanc 1.5g (1/1-1/2) then ceftriaxone 2g (1/2- 1/8)   - Finished remdesivir (1/6-1/10), dexamethasone 6mg daily (stopped 1/10)  - Pulmonology consulted: Will start albuterol nebs q6h, RT for BPH, chest vest therapy. Duonebs PRN. No abx at this time.     #Hypernatremia - resolved  #LEEANNE (baseline Cr 0.8-0.9) - resolved  #Hx of DI   - Hold home tamsulosin 0.4mg daily while NPO  - Hold further D5W, continue with 400ml q6h FWF     #SVT  - Several episodes of SVT, terminated spontaneously, some associated with hypotension down to 80/50s.  - Consulted cardiology, pending recs.         -Metoprolol tartrate 25mg BID from 12.5mg q6h        -If recurrent of persistent SVT, start with vagal maneuvers (carotid sinus massage), if unsuccessful, may consider Adenosine dose and/or IV Metop        -Please continue to monitor patient on telemetry        -Please obtain EKG if there's recurrence of SVT        -Optimal lytes: K>4, Mag>2    #panhypothyroidism s/p adenoma resection  #Cushing disease  #diabetes mellitus  - Holding home glimepiride 4mg BID, metformin 1000mg BID  - A1C 5.6  - AM free T4 0.92  Plan:  - Mild corrective SSI #1  - POCT glucose q6hrs while on tube feeds, Hypoglycemia protocol  - Continue home levothyroxine 200mcg daily  - Glargine 8u nightly  - follow up outpatient with endocrinologist Dr. Yvonne Lofton in 4 weeks (scheduled by endocrine)     #hx of DVT/PE  #thrombocytopenia  #Eosinophilia  - H/H: 16.9/51 -> 14.3/45.8  - PLT: consistently ~ while here. Over past two days have been 70s.  - Daily CBC    - Hold home Eliquis at this time   - Lovenox 40mg subcutaneous for prophylaxis  - Peripheral smear added 1/25 for evaluation of eosinophilia/thrombocytopenia, pending     F: FWF with TF  E: Replete PRN  N: Nepro tube feeds  A: PIVs    Lomeli: None  GI ppx: PPI  DVT ppx: lovenox     Code status: DNR/DNI  NOK/Surrogate:  Donya Emerson (Daughter) [phone]      Ej Ventura MD  PGY-1 Internal Medicine

## 2024-01-25 NOTE — CARE PLAN
The patient's goals for the shift include      The clinical goals for the shift include Patient will get adequate rest to meet PT goals tomorrow    Patient will remain safe and will be rounded on frequently

## 2024-01-25 NOTE — PROGRESS NOTES
"Gopal Ness is a 73 y.o. male on day 24 of admission presenting with AMS (altered mental status).    Subjective   Pt alert; intermittently following simple commands and move extremities. Able to state his name, not oriented to place or time. Denies pain, SOB, n/v.      Objective     Physical Exam  Vitals reviewed.   Constitutional:       Appearance: He is ill-appearing.   HENT:      Head: Normocephalic and atraumatic.      Nose: Nose normal.      Mouth/Throat:      Mouth: Mucous membranes are dry.   Eyes:      Extraocular Movements: Extraocular movements intact.      Pupils: Pupils are equal, round, and reactive to light.   Cardiovascular:      Rate and Rhythm: Normal rate and regular rhythm.   Pulmonary:      Effort: Pulmonary effort is normal.      Breath sounds: Examination of the right-lower field reveals rhonchi. Examination of the left-lower field reveals rhonchi. Rhonchi present.   Abdominal:      General: Bowel sounds are normal. There is distension.      Palpations: Abdomen is soft.   Skin:     General: Skin is warm and dry.   Neurological:      Mental Status: He is easily aroused. He is lethargic and disoriented.   Psychiatric:         Speech: Speech is delayed.         Behavior: Behavior is slowed.         Cognition and Memory: Cognition is impaired. Memory is impaired.         Last Recorded Vitals  Blood pressure 101/58, pulse 81, temperature 36.4 °C (97.5 °F), resp. rate 18, height 1.829 m (6' 0.01\"), weight 99.5 kg (219 lb 5.7 oz), SpO2 94 %.  Intake/Output last 3 Shifts:  I/O last 3 completed shifts:  In: 2120 (21.3 mL/kg) [NG/GT:2120]  Out: 2700 (27.1 mL/kg) [Urine:2700 (0.8 mL/kg/hr)]  Weight: 99.5 kg     Relevant Results  Scheduled medications  enoxaparin, 40 mg, subcutaneous, Daily  esomeprazole, 20 mg, nasogastric tube, Daily before breakfast  insulin glargine, 8 Units, subcutaneous, Nightly  insulin regular, 0-5 Units, subcutaneous, q6h  levothyroxine, 200 mcg, oral, Daily  metoprolol " tartrate, 25 mg, nasogastric tube, BID  multivitamin with minerals, 1 tablet, oral, Daily  polyethylene glycol, 17 g, oral, Daily  sennosides, 2 tablet, oral, BID  sertraline, 50 mg, nasogastric tube, Daily      Continuous medications     PRN medications  PRN medications: albuterol, dextrose 10 % in water (D10W), dextrose, diclofenac sodium, glucagon, oxygen, phenoL    Results for orders placed or performed during the hospital encounter of 01/01/24 (from the past 24 hour(s))   POCT GLUCOSE   Result Value Ref Range    POCT Glucose 174 (H) 74 - 99 mg/dL   POCT GLUCOSE   Result Value Ref Range    POCT Glucose 131 (H) 74 - 99 mg/dL   POCT GLUCOSE   Result Value Ref Range    POCT Glucose 145 (H) 74 - 99 mg/dL   Magnesium   Result Value Ref Range    Magnesium 1.99 1.60 - 2.40 mg/dL   Renal Function Panel   Result Value Ref Range    Glucose 132 (H) 74 - 99 mg/dL    Sodium 137 136 - 145 mmol/L    Potassium 4.2 3.5 - 5.3 mmol/L    Chloride 99 98 - 107 mmol/L    Bicarbonate 31 21 - 32 mmol/L    Anion Gap 11 10 - 20 mmol/L    Urea Nitrogen 17 6 - 23 mg/dL    Creatinine 1.08 0.50 - 1.30 mg/dL    eGFR 73 >60 mL/min/1.73m*2    Calcium 9.2 8.6 - 10.6 mg/dL    Phosphorus 3.5 2.5 - 4.9 mg/dL    Albumin 2.8 (L) 3.4 - 5.0 g/dL   CBC and Auto Differential   Result Value Ref Range    WBC 5.2 4.4 - 11.3 x10*3/uL    nRBC 0.0 0.0 - 0.0 /100 WBCs    RBC 3.97 (L) 4.50 - 5.90 x10*6/uL    Hemoglobin 12.3 (L) 13.5 - 17.5 g/dL    Hematocrit 37.6 (L) 41.0 - 52.0 %    MCV 95 80 - 100 fL    MCH 31.0 26.0 - 34.0 pg    MCHC 32.7 32.0 - 36.0 g/dL    RDW 13.2 11.5 - 14.5 %    Platelets 69 (L) 150 - 450 x10*3/uL    Neutrophils % 45.9 40.0 - 80.0 %    Immature Granulocytes %, Automated 1.9 (H) 0.0 - 0.9 %    Lymphocytes % 20.8 13.0 - 44.0 %    Monocytes % 9.4 2.0 - 10.0 %    Eosinophils % 21.2 0.0 - 6.0 %    Basophils % 0.8 0.0 - 2.0 %    Neutrophils Absolute 2.38 1.60 - 5.50 x10*3/uL    Immature Granulocytes Absolute, Automated 0.10 0.00 - 0.50 x10*3/uL     Lymphocytes Absolute 1.08 0.80 - 3.00 x10*3/uL    Monocytes Absolute 0.49 0.05 - 0.80 x10*3/uL    Eosinophils Absolute 1.10 (H) 0.00 - 0.40 x10*3/uL    Basophils Absolute 0.04 0.00 - 0.10 x10*3/uL   POCT GLUCOSE   Result Value Ref Range    POCT Glucose 162 (H) 74 - 99 mg/dL     CT head wo IV contrast    Result Date: 1/15/2024  Interpreted By:  Dmitry [facility], STUDY: CT HEAD WO IV CONTRAST;  1/14/2024 3:20 pm   INDICATION: Signs/Symptoms: AMS.   COMPARISON: Head CT and CTA, 01/01/2024   ACCESSION NUMBER(S): [id number]   ORDERING CLINICIAN: SAVANNAH LY   TECHNIQUE: Noncontrast axial CT scan of the head was performed. Angled reformats in brain and bone windows were generated. The images were reviewed in bone, brain, blood and soft tissue windows.   FINDINGS: CSF Spaces: The ventricles, sulci and basal cisterns are appropriate for the degree of volume loss. No abnormal extraaxial fluid collection.   Parenchyma: Parenchymal volume loss most pronounced in the frontal lobes is unchanged. Nonspecific low attenuation in the white matter, similar to previous. The grey-white differentiation is intact. There is no mass effect or midline shift. No acute intracranial hemorrhage.   Calvarium: The calvarium is unremarkable.   Paranasal sinuses and mastoids: Partially visualized nasal enteric tube. Stable changes of transsphenoidal mass resection. Scattered paranasal sinus mucosal thickening with dependent heterogeneous attenuation material in the postoperative sphenoid sinus. Persistent right mastoid effusion.   Orbits: Bilateral lens replacement.       No acute intracranial pathology.   MACRO: None   Signed by: Dmitry [facility] 1/15/2024 8:33 AM Dictation workstation:   [id number]    ECG 12 lead    Result Date: 1/14/2024  Normal sinus rhythm Low voltage QRS Nonspecific ST and T wave abnormality Abnormal ECG When compared with ECG of 05-JAN-2024 04:29, No significant change was found Confirmed by Veto Chavarria ([id number]) on  1/14/2024 12:00:57 AM    XR chest 1 view    Result Date: 1/12/2024  Interpreted By:  Carmelita Donato, STUDY: XR CHEST 1 VIEW;  1/12/2024 1:00 pm   INDICATION: Signs/Symptoms:cough.   COMPARISON: Radiograph dated 01/06/2024   ACCESSION NUMBER(S): [id number]   ORDERING CLINICIAN: TERRI BRYANT   FINDINGS: Enteric tube is in place with the tip beyond the field of view.   Faint consolidation in lung bases, right more than left and stable to slightly worse compared to prior study. No sizable pleural effusion or pneumothorax.   No acute osseous abnormality.       1. Faint consolidation in lung bases, right more than left with suggestion of slight worsening. Correlate with concern for infection and aspiration.         Signed by: Carmelita Alas 1/12/2024 8:16 PM Dictation workstation:   [id number]    MR cervical spine wo IV contrast    Result Date: 1/9/2024  Interpreted By:  Joey iRzzo, STUDY: MR CERVICAL SPINE WO IV CONTRAST;  1/8/2024 10:18 pm   INDICATION: Signs/Symptoms:R arm weakness.   COMPARISON: None.   ACCESSION NUMBER(S): [id number]   ORDERING CLINICIAN: DIEGO ABARCA   TECHNIQUE: The cervical spine was studied in the sagittal and axial planes utilizing T1 and T2 weighted images. Examination is limited due to motion artifact   FINDINGS: The craniovertebral junction is normal. The cord is normal in size and signal. The marrow signal is normal. Serial axial images reveal the following: C2/C3 There is normal alignment and vertebral body height. The disc space is normal. There is no evidence of canal or foraminal narrowing. There is no evidence of bulging or herniated disc. C3/C4 There is normal alignment and vertebral body height. The disc space is normal. There is no evidence of canal or foraminal narrowing. There is no evidence of bulging or herniated disc. C4/C5 Right greater than left uncovertebral joint hypertrophy with focal right-sided foraminal narrowing. No measurable canal  stenosis. C5/C6 Right greater than left uncovertebral joint hypertrophy without canal stenosis. Right-sided foraminal narrowing C6/C7 Bilateral facet hypertrophy without canal or foraminal narrowing C7/T1 Bilateral facet hypertrophy without canal or foraminal narrowing       * Right-sided foraminal narrowing due to uncovertebral joint hypertrophy at C4/C5 and C5/C6 *No measurable canal stenosis or cord compression.   MACRO: none   Signed by: Joey Rizzo 1/9/2024 9:02 AM Dictation workstation:   [id number]    XR chest 1 view    Result Date: 1/6/2024  Interpreted By:  Zeyad Bell, STUDY: XR CHEST 1 VIEW;  1/6/2024 7:13 am   INDICATION: Signs/Symptoms:pneumonia.   COMPARISON: Exam dated 01/04/2024   ACCESSION NUMBER(S): [id number]   ORDERING CLINICIAN: ALEX BARBOZA   FINDINGS: AP radiograph of the chest was provided.   Enteric tube projects over the esophagus and upper abdomen, tip not visualized.   CARDIOMEDIASTINAL SILHOUETTE: Cardiomediastinal silhouette is stable in size and configuration.   LUNGS: Lung volumes are low with associated perihilar bronchovascular crowding. There is superimposed diffuse interstitial prominence. Interval improvement in right mid lung opacity. No large pleural effusion or evidence of pneumothorax.   ABDOMEN: No remarkable upper abdominal findings.   BONES: No acute osseous changes.       1.  Hypoinflated lungs with superimposed findings suggestive of mild pulmonary edema. Correlate with patient fluid volume status. 2. Interval improvement in right mid lung opacity.       MACRO: None   Signed by: Zeyad Bell 1/6/2024 10:30 AM Dictation workstation:   [id number]    XR abdomen 1 view    Result Date: 1/6/2024  Interpreted By:  Zeyad Bell,  and Abbie Melgar STUDY: XR ABDOMEN 1 VIEW;  1/5/2024 9:02 pm   INDICATION: Signs/Symptoms:Follow up DHT placement post-adjustment.   COMPARISON: Abdominal radiograph on 01/05/2024.   ACCESSION NUMBER(S): [id number]   ORDERING  CLINICIAN: ALEX BARBOZA   FINDINGS: Single AP view of the abdomen.   Enteric tube courses below the diaphragm with tip overlying the expected location of the 2nd portion of the duodenum, retracted compared to prior exam.   Nonobstructive bowel gas pattern. Limited evaluation of pneumoperitoneum on supine imaging, however no gross evidence of free air is noted.   Visualized lungs are clear.   Osseous structures demonstrate no acute bony changes.       1. Enteric tube tip projects over the expected location of the 2nd portion of the duodenum, retracted compared to prior exam. 2. Nonobstructive visualized bowel gas pattern.   I personally reviewed the images/study and I agree with the findings as stated. This study was interpreted at [place], [place].   MACRO: None   Signed by: Zeyad Bell 1/6/2024 8:32 AM Dictation workstation:   [id number]    XR abdomen 1 view    Result Date: 1/5/2024  Interpreted By:  Jamaal Cooper, STUDY: XR ABDOMEN 1 VIEW;  1/5/2024 4:49 pm   INDICATION: Signs/Symptoms:s/p dobhoff advancement.   COMPARISON: Abdominal radiograph 01/04/2024 and CT scan 01/01/2024   ACCESSION NUMBER(S): [id number]   ORDERING CLINICIAN: ROXANA MIMS   FINDINGS: Single AP radiograph of the abdomen. Enteric tube is seen coursing below diaphragm and looping within left upper quadrant and tip projecting over expected location of 2nd segment of duodenum   Nonobstructive bowel gas pattern.Limited evaluation of pneumoperitoneum on supine imaging, however no gross evidence of free air is noted.   Visualized lung bases again demonstrate right basilar atelectasis/consolidation.   Osseous structures demonstrate no acute bony changes.       1. Enteric tube tip projects over expected location of 2nd segment of duodenum. 2. Nonobstructive bowel gas pattern.   Signed by: Jamaal Cooper 1/5/2024 4:52 PM Dictation workstation:   [id number]    Electrocardiogram, 12-lead PRN ACS  symptoms    Result Date: 1/5/2024  Normal sinus rhythm Low voltage QRS Borderline ECG When compared with ECG of 04-JAN-2024 20:58, No significant change was found Confirmed by Vel Nevarez ([id number]) on 1/5/2024 1:13:31 PM    Electrocardiogram, 12-lead PRN ACS symptoms    Result Date: 1/5/2024  Normal sinus rhythm Low voltage QRS Borderline ECG When compared with ECG of 01-JAN-2024 17:40, AR interval has decreased T wave inversion no longer evident in Lateral leads Confirmed by Vel Nevarez ([id number]) on 1/5/2024 1:13:17 PM    XR abdomen 1 view    Result Date: 1/5/2024  Interpreted By:  Jamaal Cooper and Kelly Rory STUDY: XR ABDOMEN 1 VIEW;  1/4/2024 6:59 pm   INDICATION: Signs/Symptoms:dobhoff placement.   COMPARISON: Abdominal radiograph dated 01/04/2024 from 3:55 p.m.. CT of the chest, abdomen, and pelvis dated 01/01/2024   ACCESSION NUMBER(S): [id number]   ORDERING CLINICIAN: ROXANA MIMS   FINDINGS: Single AP radiograph of abdomen.   Similar positioning of Dobbhoff enteric tube with tip overlying the expected position of the gastric cardia/proximal body; consider advancement.   Nonobstructive bowel gas pattern. Limited evaluation of pneumoperitoneum on supine imaging, however no gross evidence of free air is noted.   Redemonstration of hazy bibasilar opacities again favored to represent atelectasis.   Osseous structures demonstrate no acute bony changes.       1.  Dobbhoff enteric tube again overlies the expected position of the gastric cardia/proximal body; consider advancement. 2. Bibasilar atelectasis.   I personally reviewed the images/study with Joaquín Benton MD (Radiology Resident) and I agree with the findings as stated. This study was interpreted at [facility], [place], [place].   MACRO: None   Signed by: Jamaal Cooper 1/5/2024 8:10 AM Dictation workstation:   [id number]    XR abdomen 1 view    Result Date: 1/4/2024  Interpreted By:  Saul Holt, STUDY: XR ABDOMEN 1 VIEW;   1/4/2024 4:22 pm   INDICATION: Signs/Symptoms:dobhoff placement.   COMPARISON: 01/04/2024   ACCESSION NUMBER(S): [id number]   ORDERING CLINICIAN: ROXANA MIMS   FINDINGS: Dobbhoff tube  overlies the GE junction. Nonobstructive bowel gas pattern. Limited evaluation of pneumoperitoneum on supine imaging, however no gross evidence of free air is noted.   Bibasilar opacities most likely atelectatic in nature.   Osseous structures demonstrate no acute bony changes.       1.  Dobbhoff tube overlies the GE junction.   Signed by: Saul Holt 1/4/2024 5:05 PM Dictation workstation:   [id number]    XR abdomen 1 view    Result Date: 1/4/2024  Interpreted By:  Saul Holt,  and Rosy Scott STUDY: XR ABDOMEN 1 VIEW;  1/4/2024 1:17 pm   INDICATION: Signs/Symptoms:Dobhoff.   COMPARISON: Abdominal radiograph 01/01/2024   ACCESSION NUMBER(S): [id number]   ORDERING CLINICIAN: ROXANA MIMS   FINDINGS: Enteric tube coursing below level of the diaphragm with the internal tip projecting in the expected region of the gastric fundus.   Nonobstructive bowel gas pattern. Limited evaluation of pneumoperitoneum on supine imaging, however no gross evidence of free air is noted.   Visualized lungs are clear.   Osseous structures demonstrate no acute bony changes.       1.  Enteric tube coursing below level of the diaphragm with the internal tip projecting in the expected region of the gastric fundus.   I personally reviewed the images/study and I agree with the findings as stated by Tyler Richard MD (Radiology Resident). This study was interpreted at [place], [place].   MACRO: None   Signed by: Saul Holt 1/4/2024 3:44 PM Dictation workstation:   [id number]    MR brain w and wo IV contrast    Addendum Date: 1/4/2024    Interpreted By:  Deion Oates, ADDENDUM: The limited extracranial images again demonstrate postoperative changes compatible  with a previous right parotidectomy.   Signed by: Deion Oates 1/4/2024 1:46 PM   -------- ORIGINAL REPORT -------- Dictation workstation:   [id number]    Result Date: 1/4/2024  Interpreted By:  Deion Oates  and Serenity Yanes STUDY: MR SELLA W AND WO IV CONTRAST; MR BRAIN W AND WO IV CONTRAST; 1/3/2024 11:08 pm; 1/3/2024 11:03 pm   INDICATION: Signs/Symptoms:eval asymmetry; pit tummor; Signs/Symptoms:eval asymmetry on examination; pituitary tumor.   COMPARISON: MRI dated 07/31/2023   ACCESSION NUMBER(S): [id number]; [id number]   ORDERING CLINICIAN: JUVENCIO GARVIN   TECHNIQUE: Axial diffusion, axial T2, axial FLAIR, axial gradient echo T2, axial T1, post gadolinium volumetric T1, as well as post gadolinium axial T1 weighted MRI images of the brain were obtained. In addition, high-resolution coronal T2 as well as pre and post gadolinium coronal and sagittal T1 weighted MRI images through the sella were obtained. The patient received  20 mL of  Dotarem gadolinium intravenously.   FINDINGS: Postoperative changes are again identified compatible with a previous trans-sphenoidal surgery.   There is again evidence of irregular abnormal enhancing soft tissue along the lateral margins of the sella most suspicious for residual neoplasm without significant interval change in size and configuration when compared with the prior study dated 07/31/2023. On the right, there is again evidence of lateral extension into the right cavernous sinus. On the left there is again evidence of lateral extension abutting the medial margin of the cavernous segment of the left internal carotid artery and inferior margin of the ophthalmic segment of the left internal carotid artery. 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  There is again evidence of diffuse atrophy of the optic apparatus. Again, no abnormal narrowing of the flow voids of the carotid siphons is noted.   There is again evidence of lobulated foci of extra-axial dural-based enhancement noted along the inferior right cerebellopontine angle cistern similar in appearance when compared with the prior study dated 07/31/2023. The findings remain suspicious for underlying meningioma. Using similar points of measurement on the current and prior study, the more dorsal dominant nodular extra-axial enhancing lesion measures approximately 12 mm x 11 mm in transaxial dimensions by 14 mm in craniocaudal dimension on both the current and prior exam. The smaller nodular lesion more anterior/medially again measures approximately 9 mm x 6 mm in transaxial dimensions on both the current and prior study.   The diffusion-weighted images fail to demonstrate evidence of abnormal diffusion restriction to suggest acute infarction.   The above findings are superimposed upon moderate brain parenchymal volume loss.   Mild nonspecific white matter changes are noted within the cerebral hemispheres bilaterally which while nonspecific, given the patient's age, likely represent sequelae of more remote small-vessel ischemic change.   There is minimal mucosal thickening within the paranasal sinuses.   There is opacification/partial opacification of scattered mastoid air cells right greater than left.       Postoperative changes are again identified compatible with a previous trans-sphenoidal surgery.   There is again evidence of irregular abnormal enhancing soft tissue along the lateral margins of the sella most suspicious for residual neoplasm without significant interval change in size and configuration when compared with the prior study dated 07/31/2023 as described above.   There is again evidence of lobulated foci of extra-axial dural-based enhancement noted along the inferior right cerebellopontine  angle cistern similar in appearance when compared with the prior study dated 07/31/2023 as described above. The findings remain suspicious for underlying meningioma.   I personally reviewed the images/study with Joaquín Benton MD (Radiology Resident) and I agree with the findings as stated. This study was interpreted at [place], [place].   MACRO: None.   Signed by: Deion Oates 1/4/2024 1:43 PM Dictation workstation:   [id number]    MR sella w and wo IV contrast    Addendum Date: 1/4/2024    Interpreted By:  Deion Oates, ADDENDUM: The limited extracranial images again demonstrate postoperative changes compatible with a previous right parotidectomy.   Signed by: Deion [place]ates 1/4/2024 1:46 PM   -------- ORIGINAL REPORT -------- Dictation workstation:   [id number]    Result Date: 1/4/2024  Interpreted By:  Deion [place] and Kelly Rory STUDY: MR SELLA W AND WO IV CONTRAST; MR BRAIN W AND WO IV CONTRAST; 1/3/2024 11:08 pm; 1/3/2024 11:03 pm   INDICATION: Signs/Symptoms:eval asymmetry; pit tummor; Signs/Symptoms:eval asymmetry on examination; pituitary tumor.   COMPARISON: MRI dated 07/31/2023   ACCESSION NUMBER(S): [id number]; [id number]   ORDERING CLINICIAN: JUVENCIO GARVIN   TECHNIQUE: Axial diffusion, axial T2, axial FLAIR, axial gradient echo T2, axial T1, post gadolinium volumetric T1, as well as post gadolinium axial T1 weighted MRI images of the brain were obtained. In addition, high-resolution coronal T2 as well as pre and post gadolinium coronal and sagittal T1 weighted MRI images through the sella were obtained. The patient received  20 mL of  Dotarem gadolinium intravenously.   FINDINGS: Postoperative changes are again identified compatible with a previous trans-sphenoidal surgery.   There is again evidence of irregular abnormal enhancing soft tissue along the lateral margins of the sella most suspicious for residual neoplasm without significant interval  change in size and configuration when compared with the prior study dated 07/31/2023. On the right, there is again evidence of lateral extension into the right cavernous sinus. On the left there is again evidence of lateral extension abutting the medial margin of the cavernous segment of the left internal carotid artery and inferior margin of the ophthalmic segment of the left internal carotid artery. 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 There is again evidence of diffuse atrophy of the optic apparatus. Again, no abnormal narrowing of the flow voids of the carotid siphons is noted.   There is again evidence of lobulated foci of extra-axial dural-based enhancement noted along the inferior right cerebellopontine angle cistern similar in appearance when compared with the prior study dated 07/31/2023. The findings remain suspicious for underlying meningioma. Using similar points of measurement on the current and prior study, the more dorsal dominant nodular extra-axial enhancing lesion measures approximately 12 mm x 11 mm in transaxial dimensions by 14 mm in craniocaudal dimension on both the current and prior exam. The smaller nodular lesion more anterior/medially again measures approximately 9 mm x 6 mm in transaxial dimensions on both the current and prior study.   The diffusion-weighted images fail to demonstrate evidence of abnormal diffusion restriction to suggest acute infarction.   The above findings are superimposed upon moderate brain parenchymal volume loss.   Mild nonspecific white matter changes are noted within the cerebral hemispheres bilaterally which while nonspecific, given the patient's age, likely represent sequelae of more remote small-vessel ischemic change.   There is minimal  mucosal thickening within the paranasal sinuses.   There is opacification/partial opacification of scattered mastoid air cells right greater than left.       Postoperative changes are again identified compatible with a previous trans-sphenoidal surgery.   There is again evidence of irregular abnormal enhancing soft tissue along the lateral margins of the sella most suspicious for residual neoplasm without significant interval change in size and configuration when compared with the prior study dated 07/31/2023 as described above.   There is again evidence of lobulated foci of extra-axial dural-based enhancement noted along the inferior right cerebellopontine angle cistern similar in appearance when compared with the prior study dated 07/31/2023 as described above. The findings remain suspicious for underlying meningioma.   I personally reviewed the images/study with Joaquín Benton MD (Radiology Resident) and I agree with the findings as stated. This study was interpreted at [place], [place].   MACRO: None.   Signed by: Deion Oates 1/4/2024 1:43 PM Dictation workstation:   [id number]    XR chest 1 view    Result Date: 1/4/2024  Interpreted By:  Saul Holt, STUDY: XR CHEST 1 VIEW;  1/4/2024 3:29 am   INDICATION: Signs/Symptoms:eval lungs.   COMPARISON: 01/03/2024.   ACCESSION NUMBER(S): [id number]   ORDERING CLINICIAN: JUVENCIO GARVIN   FINDINGS:     CARDIOMEDIASTINAL SILHOUETTE: Cardiomediastinal silhouette is normal in size and configuration.   LUNGS: Right midlung and retrocardiac infiltrate/atelectasis again noted. No pneumothorax.   ABDOMEN: No remarkable upper abdominal findings.   BONES: No acute osseous changes.       1.  Continued right perihilar and retrocardiac infiltrate/pneumonia. Continued follow-up to resolution recommended.     Signed by: Saul Holt 1/4/2024 11:29 AM Dictation workstation:   [id number]    XR chest 1 view    Result Date:  1/3/2024  Interpreted By:  Saul Holt  and Rosy Scott STUDY: XR CHEST 1 VIEW;  1/3/2024 11:38 am   INDICATION: Signs/Symptoms:Pulmonary edema.   COMPARISON: Chest radiograph 01/02/2024   ACCESSION NUMBER(S): [id number]   ORDERING CLINICIAN: DIEGO GAVIN   FINDINGS: AP radiograph of the chest was provided.       CARDIOMEDIASTINAL SILHOUETTE: Stable enlargement of superior cardiomediastinal silhouette which may be due to patient positioning/film technique.   LUNGS: Low lung volumes contributing to bronchovascular crowding. Again seen are multifocal patchy and consolidative opacities throughout the right lung, which are increased when compared to prior. There is left perihilar prominence and left basilar atelectasis, similar to prior. No pleural effusion or pneumothorax.   ABDOMEN: No remarkable upper abdominal findings.   BONES: No acute osseous changes.       1.  Slightly increased multifocal patchy and consolidative opacities throughout the right lung which may suggest worsening of pulmonary edema or developing pneumonia. Correlate clinically.   I personally reviewed the images/study and I agree with the findings as stated by Tyler Richard. This study was interpreted at [facility], [place], [place].   MACRO: None   Signed by: Saul Holt 1/3/2024 3:18 PM Dictation workstation:   [id number]    EEG    IMPRESSION This vEEG is indicative of moderate diffuse encephalopathy. No epileptiform discharges or lateralizing signs are seen. There is improvement in the degree of encephalopathy compared to the previous day. A full report will be scanned into the patient's chart at a later time. This report has been interpreted and electronically signed by    Transthoracic Echo (TTE) Complete    Result Date: 1/3/2024   [facility], [address] [place] [address]                Tel [phone] and Fax [phone]  TRANSTHORACIC ECHOCARDIOGRAM REPORT  Patient Name:      GOYO BUSBY SERAFIN      Reading Physician:   [id number] Luc Chavarria MD Study Date:        1/3/2024           Ordering Provider:   [id number] LEILANI PANTOJACOTY MRN/PID:           [id number]           Fellow: Accession#:        [id number]       Nurse: Date of Birth/Age: 1950 / 73      Sonographer:         Aishwarya Dominguez RDCS                    years Gender:            M                  Additional Staff: Height:            182.88 cm          Admit Date:          1/1/2024 Weight:            102.97 kg          Admission Status:    Inpatient - Routine BSA:               2.25 m2            Encounter#:          [id number]                                       Department Location: [facility] Blood Pressure: 125 /73 mmHg Study Type:    TRANSTHORACIC ECHO (TTE) COMPLETE Diagnosis/ICD: Hypotension, unspecified-I95.9 Indication:    Hypotension; Acute metabolic encephalopathy CPT Code:      Echo Complete w Full [id number] Patient History: Pertinent History: Hx of pituitary adenoma (2016); DVT/PE; COPD; Tobacco use; DM                    II; HLD. Study Detail: The following Echo studies were performed: 2D, M-Mode, Doppler and               color flow. Technically challenging study due to body habitus,               patient lying in supine position and poor acoustic windows.               Definity used as a contrast agent for endocardial border               definition. Total contrast used for this procedure was 2 mL via IV               push.  PHYSICIAN INTERPRETATION: Left Ventricle: The left ventricular systolic function is normal, with an estimated ejection fraction of 55-60%. The left ventricular cavity size is normal. Abnormal (paradoxical) septal motion, consistent with an intraventricular conduction delay. Doppler shows a borderline impaired relaxation pattern of left ventricular diastolic filling. Left Atrium:  The left atrium is mildly dilated. Right Ventricle: The right ventricle is upper limits of normal in size. There is normal right ventricular global systolic function. Right Atrium: The right atrium was not well visualized. Aortic Valve: The aortic valve was not well visualized. There is minimal aortic valve cusp calcification. There is aortic valve annular calcification. There is trivial aortic valve regurgitation. The peak instantaneous gradient of the aortic valve is 4.2 mmHg. Mitral Valve: The mitral valve is normal in structure. There is mild mitral annular calcification. There is trace mitral valve regurgitation. Tricuspid Valve: The tricuspid valve is structurally normal. There is trace to mild tricuspid regurgitation. Pulmonic Valve: The pulmonic valve is not well visualized. There is trace pulmonic valve regurgitation. Pericardium: There is a trivial pericardial effusion. Aorta: The aortic root is abnormal. There is mild dilatation of the aortic root. Pulmonary Artery: The tricuspid regurgitant velocity is 2.28 m/s, and with an estimated right atrial pressure of 3 mmHg, the estimated pulmonary artery pressure is normal with the RVSP at 23.8 mmHg. Systemic Veins: The inferior vena cava appears to be of normal size. In comparison to the previous echocardiogram(s): Compared with study from 9/1/2021, no significant change.  CONCLUSIONS:  1. Poorly visualized anatomical structures due to suboptimal image quality.  2. Left ventricular systolic function is normal with a 55-60% estimated ejection fraction. QUANTITATIVE DATA SUMMARY: 2D MEASUREMENTS:                    Normal Ranges: Ao Root d: 4.20 cm (2.0-3.7cm) LAs:       4.20 cm (2.7-4.0cm) AORTA MEASUREMENTS:                      Normal Ranges: Ao Sinus, d: 3.90 cm (2.1-3.5cm) LV DIASTOLIC FUNCTION:                               Normal Ranges: MV Peak E:        0.61 m/s    (0.7-1.2 m/s) MV Peak A:        0.73 m/s    (0.42-0.7 m/s) E/A Ratio:        0.83         (1.0-2.2) MV e'             0.06 m/s    (>8.0) MV lateral e'     0.06 m/s MV medial e'      0.05 m/s MV A Dur:         129.00 msec E/e' Ratio:       11.09       (<8.0) MV DT:            299 msec    (150-240 msec) PulmV Sys Koffi:    25.00 cm/s PulmV Serna Koffi:   27.40 cm/s PulmV S/D Koffi:    0.90 PulmV A Revs Koffi: 27.80 cm/s PulmV A Revs Dur: 132.00 msec MITRAL VALVE:                 Normal Ranges: MV DT: 299 msec (150-240msec) AORTIC VALVE:                         Normal Ranges: AoV Vmax:      1.02 m/s (<=1.7m/s) AoV Peak P.2 mmHg (<20mmHg) LVOT Max Koffi:  0.82 m/s (<=1.1m/s) LVOT VTI:      15.50 cm LVOT Diameter: 2.20 cm  (1.8-2.4cm) AoV Area,Vmax: 3.07 cm2 (2.5-4.5cm2)  RIGHT VENTRICLE: TAPSE: 21.5 mm RV s'  0.12 m/s TRICUSPID VALVE/RVSP:                             Normal Ranges: Peak TR Velocity: 2.28 m/s RV Syst Pressure: 23.8 mmHg (< 30mmHg) IVC Diam:         1.70 cm PULMONIC VALVE:                      Normal Ranges: PV Max Koffi: 0.8 m/s  (0.6-0.9m/s) PV Max P.4 mmHg Pulmonary Veins: PulmV A Revs Dur: 132.00 msec PulmV A Revs Koffi: 27.80 cm/s PulmV Serna Koffi:   27.40 cm/s PulmV S/D Koffi:    0.90 PulmV Sys Koffi:    25.00 cm/s  [id number] Luc Chavarria MD Electronically signed on 1/3/2024 at 10:12:34 AM  ** Final **     Electrocardiogram, 12-lead PRN ACS symptoms    Result Date: 2024   Poor data quality, interpretation may be adversely affected Sinus tachycardia with 1st degree AV block Low voltage QRS ST & T wave abnormality, consider lateral ischemia Abnormal ECG When compared with ECG of 2024 14:05, MN interval has increased Confirmed by Veto Chavarria ([id number]) on 2024 8:06:33 PM    XR chest 1 view    Result Date: 2024  Interpreted By:  Saul Holt  and Rosy Scott STUDY: XR CHEST 1 VIEW;  2024 2:12 pm   INDICATION: Signs/Symptoms:increased work of breathing, decreased breath sounds on the left.   COMPARISON: Chest radiograph 2024 and CT chest abdomen pelvis  01/01/2024   ACCESSION NUMBER(S): [id number]   ORDERING CLINICIAN: ASH KUNZ   FINDINGS: AP radiograph of the chest was provided.   Interval removal of the endotracheal tube.   CARDIOMEDIASTINAL SILHOUETTE: There is interval enlargement of the superior cardiomediastinal silhouette, which may be secondary to patient rotation/film technique.   LUNGS: Slight improvement of multifocal patchy and consolidative opacities throughout the right lung, particularly within the right lower lung. Left perihilar prominence and left basilar atelectasis, slightly worsened when compared to prior. No pleural effusion or pneumothorax.   ABDOMEN: No remarkable upper abdominal findings.   BONES: No acute osseous changes.       1.  Status post extubation with interval improvement of multifocal patchy and consolidative opacities throughout the right lung, particularly within the right lower lung. 2. Left perihilar prominence of the basilar atelectasis, slightly worsened when compared to prior. 3. There is interval enlargement of the superior cardiomediastinal silhouette, which may be secondary to patient rotation/film technique.   I personally reviewed the images/study and I agree with the findings as stated by Tyler Richard. This study was interpreted at [place], [place].   MACRO: None   Signed by: Saul Holt 1/2/2024 4:58 PM Dictation workstation:   [id number]    XR abdomen 1 view    Result Date: 1/2/2024  Interpreted By:  Carmelita Donato and Kelly Rory STUDY: XR CHEST 1 VIEW; XR ABDOMEN 1 VIEW;  1/1/2024 7:40 pm   INDICATION: Signs/Symptoms:ET tube position; Signs/Symptoms:NG and OG placement.   COMPARISON: Chest radiograph dated 01/01/2024 from 2:42 p.m. CT of the chest, abdomen, and pelvis dated 01/01/2024   ACCESSION NUMBER(S): [id number]; [id number]   ORDERING CLINICIAN: CATY JUAN   TECHNIQUE: Single AP view of the upper abdomen    Single AP view of the chest   FINDINGS: Similar positioning of endotracheal tube with tip 5.0 cm above the merlin. Redemonstration of enteric tube with tip overlying the expected position of the gastric body.   ABDOMEN: There is gaseous distention of the large bowel measuring up to 5.7 cm within the transverse colon. There is gaseous distended loops of small bowel measuring up to 0.0 cm within the right upper quadrant which is similar compared to same day CT.   Osseous structures demonstrate no acute bony abnormalities.   CHEST: Slight interval worsening of multifocal patchy and consolidative opacities throughout the right lung most notable within the right lower lung. No pneumothorax. There is blunting of the right costophrenic angle.   The cardiomediastinal silhouette is stable compared to prior examination.   No acute osseous changes.       1.  Slight interval worsening of multifocal patchy and consolidative opacities throughout the right lung. Correlate with concern for multifocal infection 2. Possible small right pleural effusion. No pneumothorax. 3.  Nonobstructive bowel gas pattern. 4. Medical devices as above.   I personally reviewed the images/study with Joaquín Benton MD (Radiology Resident) and I agree with the findings as stated. This study was interpreted at [place], [place].   MACRO: None   Signed by: Carmelita Alas 1/2/2024 6:19 AM Dictation workstation:   [id number]    XR chest 1 view    Result Date: 1/2/2024  Interpreted By:  Carmelita Donato and Kelly Rory STUDY: XR CHEST 1 VIEW; XR ABDOMEN 1 VIEW;  1/1/2024 7:40 pm   INDICATION: Signs/Symptoms:ET tube position; Signs/Symptoms:NG and OG placement.   COMPARISON: Chest radiograph dated 01/01/2024 from 2:42 p.m. CT of the chest, abdomen, and pelvis dated 01/01/2024   ACCESSION NUMBER(S): [id number]; [id number]   ORDERING CLINICIAN: CATY JUAN   TECHNIQUE: Single AP view of the  upper abdomen   Single AP view of the chest   FINDINGS: Similar positioning of endotracheal tube with tip 5.0 cm above the merlin. Redemonstration of enteric tube with tip overlying the expected position of the gastric body.   ABDOMEN: There is gaseous distention of the large bowel measuring up to 5.7 cm within the transverse colon. There is gaseous distended loops of small bowel measuring up to 0.0 cm within the right upper quadrant which is similar compared to same day CT.   Osseous structures demonstrate no acute bony abnormalities.   CHEST: Slight interval worsening of multifocal patchy and consolidative opacities throughout the right lung most notable within the right lower lung. No pneumothorax. There is blunting of the right costophrenic angle.   The cardiomediastinal silhouette is stable compared to prior examination.   No acute osseous changes.       1.  Slight interval worsening of multifocal patchy and consolidative opacities throughout the right lung. Correlate with concern for multifocal infection 2. Possible small right pleural effusion. No pneumothorax. 3.  Nonobstructive bowel gas pattern. 4. Medical devices as above.   I personally reviewed the images/study with Joaquín Benton MD (Radiology Resident) and I agree with the findings as stated. This study was interpreted at [place], [place].   MACRO: None   Signed by: Carmelita Alas 1/2/2024 6:19 AM Dictation workstation:   [id number]    CT angio head and neck w and wo IV contrast    Result Date: 1/1/2024  Interpreted By:  Esau Avalos,  and Mac Avery STUDY: CT ANGIO HEAD AND NECK W AND WO IV CONTRAST;  1/1/2024 6:17 pm   INDICATION: Signs/Symptoms:blown pupil.   COMPARISON: Same day CT head, same day CT chest abdomen pelvis   ACCESSION NUMBER(S): [id number]   ORDERING CLINICIAN: CATY JUAN   TECHNIQUE: Axial images of the head and neck were acquired after uneventful intravenous  administration of 80 mL Omnipaque 350. Coronal and sagittal reconstructions were provided for review.   FINDINGS:   CTA HEAD:   Anterior circulation: Noncalcified atherosclerosis of the distal petrosal segment of the left internal carotid artery results in mild stenosis. Moderate calcifications of the cavernous segments of the bilateral internal carotid arteries. The bilateral intracranial internal carotid arteries, bilateral carotid terminals, bilateral proximal anterior and middle cerebral arteries are otherwise normal.   Posterior circulation: Bilateral intracranial vertebral arteries, vertebrobasilar junction, basilar artery and proximal posterior cerebral arteries are normal.   CTA NECK:   Normal three-vessel arch configuration.   Right carotid vessels: Mild calcifications of the common carotid artery. The carotid bifurcation is normal. The internal carotid artery in the neck is normal. There is 0% stenosis by NASCET criteria.   Left carotid vessels: The common carotid artery is normal. Mild calcifications of the carotid bulb. The internal carotid artery in the neck is normal. There is 0% stenosis by NASCET criteria.   Vertebral vessels: The visualized segments of the cervical vertebral arteries are normal in caliber.   Endotracheal tube is visualized. Partially visualized enteric tube. Redemonstration of patchy opacities which in the partially visualized right upper lobe.   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       Noncalcified atherosclerosis of the distal petrosal segment of the left internal carotid artery resulting in mild stenosis. No evidence for large branch vessel cut off, dissection, or pseudoaneurysm of the intracranial vessels.   Mild atherosclerosis without significant stenosis of the cervical vessels.   I personally reviewed the images/study and I agree with the  findings as stated by Gena Watts MD. This study was interpreted at [facility], [place], [place].   MACRO: None   Signed by: Esau Avalos 1/1/2024 6:47 PM Dictation workstation:   [id number]    CT head wo IV contrast    Result Date: 1/1/2024  Interpreted By:  Esau Avalos, STUDY: CT HEAD WO IV CONTRAST;  1/1/2024 6:06 pm   INDICATION: Signs/Symptoms:blown pupils.   COMPARISON: 01/01/2024 head CT   ACCESSION NUMBER(S): [id number]   ORDERING CLINICIAN: HEVER KYLE   TECHNIQUE: Noncontrast axial CT scan of head was performed. Angled reformats in brain and bone windows were generated. The images were reviewed in bone, brain, blood and soft tissue windows.   FINDINGS: CSF Spaces: The sulci and ventricles are mildly prominent similar to the prior exam. There is no extraaxial fluid collection.   Parenchyma: Resection of pituitary adenoma has been performed with thin band of residual tissue up to 4 mm in thickness noted along the right and left sides of the sella turcica. The floor and anterior wall of the sella as well as the planum sphenoidale have been resected with ethmoidectomy and sphenoidotomy as well.     The white matter has a few vague hypodensities similar to the prior exam. No hemorrhage, mass or large acute infarct is noted.   Calvarium: The calvarium is otherwise unremarkable.       No acute infarct, hemorrhage or mass is noted.   The parenchymal hypodensities may be secondary to chronic microvascular ischemic changes among others.   Prior trans-sphenoidal pituitary tumor resection.   MACRO: None   Signed by: Esau vAalos 1/1/2024 6:20 PM Dictation workstation:   [id number]    XR chest 1 view    Result Date: 1/1/2024  Interpreted By:  Hermilo Mckeon, STUDY: XR CHEST 1 VIEW; 1/1/2024 3:02 pm   INDICATION: Post intubation.   COMPARISON: None.   ACCESSION NUMBER(S): [id number]   ORDERING CLINICIAN: RULA PEREZ   TECHNIQUE: AP view of the chest.   FINDINGS: Lines and  tubes:ETT in adequate position. Cardiac silhouette: Normal. Mediastinum: Unremarkable. Pulmonary vascularity: Normal. Lungs: Multi segmental hazy-confluence opacification right lower lobe and scattered infiltrates in left upper lobe.. Pleura: No effusion. Bony structures: Unremarkable.       Right lower lobe consolidation, appreciated to better advantage on CT, compatible with pneumonia. There are also scattered infiltrates in the left upper lobe.   MACRO: None   Signed by: Hermilo Mckeon 1/1/2024 3:08 PM Dictation workstation:   [id number]    CT chest abdomen pelvis w IV contrast    Result Date: 1/1/2024  Interpreted By:  Hermilo Mckeon, STUDY: CT CHEST ABDOMEN PELVIS W IV CONTRAST; 1/1/2024 2:09 pm   INDICATION: Signs/Symptoms:FULL TRAUMA. 74 y/o   M with  fall, on Eliquis.   COMPARISON: None.   ACCESSION NUMBER(S): [id number]   ORDERING CLINICIAN: RODNEY CEVALLOS   TECHNIQUE: Axial CT images through the chest, abdomen, and pelvis with intravenous contrast. Coronal and sagittal reconstruction images generated and submitted for review.   FINDINGS: Chest: Lung and Large Airways: Endotracheal tube in place. Frothy filling defect throughout the right lower lobe bronchi. Consolidation of multiple segments in right lower lobe and patchy confluence opacities and tree-in-bud opacities in dependent portions of right upper lobe. Pleura: within normal limits. Vessels: within normal limits. Heart: Normal size. No pericardial effusion. Mediastinum and Sveta: within normal limits. Chest Wall and Lower Neck: within normal limits.   Abdomen: Liver: Cirrhotic morphology. Bile Ducts: Normal caliber. Gallbladder: No calcified gallstones. Normal caliber wall. Pancreas: within normal limits. Spleen: within normal limits. Adrenals: within normal limits. Kidneys: Small calculi in both kidneys not sure 4.7 cm cyst on the right at 4.1 cm cyst on the left   Pelvis: Reproductive Organs: Obscured by streak artifact arising from bilateral hip  replacement Bladder: within normal limits.   Bowel: Normal caliber. Mesenteric Lymph Nodes: No adenopathy. Peritoneum: No ascites or free air, no fluid collection. Vessels: Atherosclerosis abdominal aorta and major branches.. Retroperitoneum: within normal limits. Abdominal Wall: within normal limits. Bones: within normal limits.       Multi segmental consolidation right lower lobe and infiltrates in right upper lobe. Findings most compatible with pneumonia. Lung contusion/hemorrhage may have a similar appearance. Clinical correlation for hemoptysis requested.   Liver cirrhosis. Bilateral renal calculi.     Signed by: Hermilo Mckeon 1/1/2024 2:21 PM Dictation workstation:   [id number]    CT cervical spine wo IV contrast    Result Date: 1/1/2024  Interpreted By:  Junior Portillo, STUDY: CT CERVICAL SPINE WO IV CONTRAST; 1/1/2024 1:57 pm   INDICATION: Signs/Symptoms:fall injury   COMPARISON: None.   ACCESSION NUMBER(S): [id number]   ORDERING CLINICIAN: RODNEY CEVALLOS   TECHNIQUE: Axial unenhanced images were obtained through the cervical spine. Sagittal and coronal post processing reconstruction images were obtained.   All CT examinations are performed with one or more of the following dose reduction techniques: Automated Exposure Control, adjustment of mA and/or kV according to patient size, or use of iterative reconstruction techniques.   FINDINGS: Endotracheal and orogastric tubes are in position. No fracture is seen. The vertebral body heights are maintained. The vertebral alignment is anatomic; this is confirmed on the sagittal reconstruction images. Degenerative changes involve the articulation between the odontoid process and anterior arch of the C1 vertebra. There is narrowing of the C4-C5 and mostly the C5-C6, C6-C7 and C7-T1 discs with marginal osteophytes. There are also hypertrophic changes of facet joints bilaterally with resultant bilateral foraminal stenosis. Images from the thoracic inlet demonstrate  atelectatic changes/infiltrates in the right upper lobe posteriorly.       Cervical spondylosis without acute fracture or facet subluxation.   Signed by: Junior Portillo 1/1/2024 2:05 PM Dictation workstation:   [id number]    CT head W O contrast trauma protocol    Result Date: 1/1/2024  Interpreted By:  Junior Portillo, STUDY: CT HEAD W/O CONTRAST TRAUMA PROTOCOL; 1/1/2024 1:57 pm   INDICATION: Signs/Symptoms:fall, head injury   COMPARISON: October 2016 and July 2023   ACCESSION NUMBER(S): [id number]   ORDERING CLINICIAN: RODNEY CEVALLOS   TECHNIQUE: Axial images were obtained through the brain. No IV contrast was administered.   All CT examinations are performed with one or more of the following dose reduction techniques: Automated Exposure Control, adjustment of mA and/or kV according to patient size, or use of iterative reconstruction techniques.   FINDINGS: The ventricles are mildly prominent but midline in position, consistent with generalized atrophy. Chronic ischemic changes also again noted.. There is no  intracranial hemorrhage. There is interval trans-sphenoidal resection of the previously described large pituitary mass centrally and on the left side. No new masses are identified. The osseous structures are unremarkable.       Age related and postsurgical changes as above without acute intracranial process.   Signed by: Junior Portillo 1/1/2024 2:02 PM Dictation workstation:   [id number]         Assessment/Plan   Supportive Interventions:  Music therapy as needed  Spiritual care     Medical decision making/ Goals of care:   Patient's current clinical condition, including diagnosis, management plan, and goals of care were discussed.   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 Additional advanced care planning discussed with pt's daughter over the phone 1/18/24; pt's daughter states that she wants to give the pt more time to establish pt's overall neurological status and determine if there's a possibility of improvement. I expressed my concern of pt's overall respiratory status as an ongoing challenge. Pt's daughter is agreeable to PEG tube placement and SNF placement at this time.     Advance Care Planning:   No Advance Directives on file.   Surrogate decision maker is: pt's Donya phipps: [phone]  Code status: DNR/DNI     Disposition:  Plan pending hospital course     Case discussed with primary team over the phone.     Thank you for allowing us to participate in the care of this patient. Palliative Team will continue to follow as needed.  Please contact team with any questions or concerns.     Aishwarya Hyatt, CNP   Palliative Care (weekdays - pager [phone])     I spent 60 minutes in the care of this patient which included chart review, interviewing patient/family, discussion with primary team, coordination of care, and documentation.     Medical Decision Making was high level due to high complexity of problems, extensive data review, and high risk of management/treatment.

## 2024-01-25 NOTE — PROGRESS NOTES
"Gopal Ness is a 73 y.o. male on day 24 of admission presenting with AMS (altered mental status).    Subjective     I have reviewed histories, allergies and medications have been reviewed and there are no changes     Last Recorded Vitals  Blood pressure 101/58, pulse 81, temperature 36.4 °C (97.5 °F), resp. rate 18, height 1.829 m (6' 0.01\"), weight 99.5 kg (219 lb 5.7 oz), SpO2 94 %.  Intake/Output last 3 Shifts:  I/O last 3 completed shifts:  In: 2120 (21.3 mL/kg) [NG/GT:2120]  Out: 2700 (27.1 mL/kg) [Urine:2700 (0.8 mL/kg/hr)]  Weight: 99.5 kg     Relevant Results  Results from last 7 days   Lab Units 01/25/24  1210 01/25/24  0751 01/25/24  0610 01/25/24  0607 01/25/24  0004 01/24/24  1727 01/24/24  1107 01/24/24  0914 01/23/24  1143 01/23/24  0605 01/22/24  1228 01/22/24  0639 01/20/24  0806 01/20/24  0556   POCT GLUCOSE mg/dL 148* 162*  --  145* 131* 174*   < >  --    < >  --    < >  --    < >  --    GLUCOSE mg/dL  --   --  132*  --   --   --   --  162*  --  184*  --  175*  --  158*    < > = values in this interval not displayed.     Lab Results   Component Value Date    ANIONGAP 11 01/25/2024    BUN 17 01/25/2024    CO2 31 01/25/2024    CREATININE 1.08 01/25/2024    K 4.2 01/25/2024     01/25/2024    CL 99 01/25/2024    PHOS 3.5 01/25/2024    GLUCOSE 132 (H) 01/25/2024    CALCIUM 9.2 01/25/2024    EGFR 73 01/25/2024    ALBUMIN 2.8 (L) 01/25/2024    VITD25 36 01/24/2023      Lab Results   Component Value Date    CORTISOL 32.9 (H) 01/13/2024    ACTH 49.4 01/13/2024    TSH <0.01 (L) 01/12/2024    T3FREE 1.8 (L) 01/01/2024    FREET4 1.04 01/12/2024    FSH <0.9 01/04/2024    LH <0.1 01/04/2024    PROLACTIN 2.8 01/04/2024     Results from last 7 days   Lab Units 01/25/24  0610 01/23/24  0605 01/22/24  0639   SODIUM mmol/L 137   < > 141   POTASSIUM mmol/L 4.2   < > 3.7   CHLORIDE mmol/L 99   < > 103   CO2 mmol/L 31   < > 33*   BUN mg/dL 17   < > 19   CREATININE mg/dL 1.08   < > 0.99   CALCIUM mg/dL 9.2   < > " 9.3   ALBUMIN g/dL 2.8*   < > 2.8*   PROTEIN TOTAL g/dL  --   --  6.3*   BILIRUBIN TOTAL mg/dL  --   --  0.3   ALK PHOS U/L  --   --  97   ALT U/L  --   --  22   AST U/L  --   --  24   GLUCOSE mg/dL 132*   < > 175*    < > = values in this interval not displayed.       Current Facility-Administered Medications   Medication Dose Route Frequency Provider Last Rate Last Admin    albuterol 2.5 mg /3 mL (0.083 %) nebulizer solution 2.5 mg  2.5 mg nebulization q2h PRN Cinthia Alcala, DO   2.5 mg at 01/19/24 0618    dextrose 10 % in water (D10W) infusion  0.3 g/kg/hr intravenous Once PRN Albert Bhat MD        dextrose 50 % injection 25 g  25 g intravenous q15 min PRN Albert Bhat MD        diclofenac sodium (Voltaren) 1 % gel 1 Application  4 g Topical BID PRN Albert Bhat MD   1 Application at 01/05/24 0054    enoxaparin (Lovenox) syringe 40 mg  40 mg subcutaneous Daily Fatmata Kumar MD   40 mg at 01/25/24 0830    esomeprazole (NexIUM) suspension 20 mg  20 mg nasogastric tube Daily before breakfast Donnell Lane MD   20 mg at 01/25/24 0602    glucagon (Glucagen) injection 1 mg  1 mg intramuscular q15 min PRN Albert Bhat MD        insulin glargine (Lantus) injection 8 Units  8 Units subcutaneous Nightly Axel Hassan MD   8 Units at 01/24/24 2117    insulin regular (HumuLIN R) injection 0-5 Units  0-5 Units subcutaneous q6h Aura Serra MD   1 Units at 01/24/24 1728    levothyroxine (Synthroid, Levoxyl) tablet 200 mcg  200 mcg oral Daily Albert Bhat MD   200 mcg at 01/25/24 0614    metoprolol tartrate (Lopressor) tablet 25 mg  25 mg nasogastric tube BID Fatmata Kumar MD   25 mg at 01/25/24 0830    multivitamin with minerals 1 tablet  1 tablet oral Daily Axel Hassan MD   1 tablet at 01/25/24 0830    oxygen (O2) therapy   inhalation Continuous PRN - O2/gases Albert Bhat MD   2 L/min at 01/11/24 0400    phenoL (Chloraseptic) 1.4 % mouth/throat spray 1 spray  1 spray Mouth/Throat q2h PRN Albert Bhat MD   1 spray  at 01/04/24 2150    polyethylene glycol (Glycolax, Miralax) packet 17 g  17 g oral Daily Albert Bhat MD   17 g at 01/21/24 0819    sennosides (Senokot) tablet 17.2 mg  2 tablet oral BID Albert Bhat MD   17.2 mg at 01/21/24 2106    sertraline (Zoloft) tablet 50 mg  50 mg nasogastric tube Daily Fatmata Kumar MD   50 mg at 01/25/24 0830             Assessment/Plan   Principal Problem:    AMS (altered mental status)    Gopal Ness is a 73 years old male with a h/o pituitary adenoma s/p transphenoidal resection at [facility] (2016) c/b hypopituitarism (adrenal insufficiency-resolved, hypogonadism, diabetes insipidis reportedly), hypothyroidism on levothyroxine);  DVT/PE on Eliquis, COPD (noncompliant with medications)/tobacco use, depresssion, DMII, HLD presenting after being found down unresponsive on 1/1/24 and subsequently transferred to [facility] on 1/2 with course complicated by intubation for airway protection (now extubated), concern for possible seizure activity (initially placed on keppra, now off), presumed aspiration pneumonia on antibiotics, hypotension initially on pressors- now resolved, and LEEANNE. Patient also found to have COVID, worsening respiratory status and mentation.     Endocrinology consulted given history of pituitary adenoma resection and whether patient needed to be on steroid replacement therapy.      Patient with known history Cushing disease confirmed biochemically and with biopsy with prior recurrence. Now patient presenting with active Cushing disease again with high AM cortisol and ACTH likely 2/2 residual adenoma. 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 Notably NOT panhypopituitarism as prolactin level was 2.8 on 1/4 and some element of pituitary released hormone suppression may be from Cushing disease's effect on axis. High cortisol would suppress GnRH. Per neurosurgery in 09/2023, additional surgery of residual is not  warranted/feasible, and would not result in improvement of ophthalmoplegia. Residual on 01/2024 MRI appears grossly the same as residual on 07/2023 MRI. Unlikely to be cause of seizures. Patient was having persistent SVT, cardiology on board, etiology hypovolemia versus respiratory distress versus hypoxia. As of 1/8, no further episodes of SVT.      #Cushing disease  #Hypopituitarism  ::Patient with sellar mass dx 2011 progressing to vision changes 2016 with Cushing disease now s/p resection 10/2016 (pathology pituitary adenoma, ACTH cell-type, Ki-67 focally 12%) with MRI and biochemical recurrence of Cushing in 2018 now s/p 5220 cGy in 29 fx now with recent worsening ophthalmoplegia with repeat MRI 07/2023 thought to be likely unchanged from prior in size based on report (known mass effect on OC). Had been followed at [facility] by endocrinology (see consult note for additional endocrine history).   ::1/1/24 FSH <0.9, LH <0.1, prolactin 1.4L, Growth hormone <0.05, IGF1 31 (Z score -2.8), total testosterone 73, free testosterone 11.3  ::1/4/24 LH <0.1, prolactin 2.8,   ::1/1/24 ACTH 100.0H, 1/2/24 AM cortisol 67.6  ::Per chart review, did not start inpatient steroids until 1/3/2024  ::1/5/24 AM cortisol 58.2 confirms cushing disease as >10+ hours from prior steroid dose  :: ACTH 100 < 91 < 82 < 49.4  ::Patient was receiving dexamethasone for COVID treatment however given that has significantly elevated endogenous cortisol/glucocorticoids levels we recommended discontinuation.  They were discontinued on 1/10     Recommendations:  -no further inpatient treatment of Cushing disease     #Hypernatremia: resolved.  Serum Na today: 137 mmol/L     #Diabetes  Started on dexamethasone on 1/6 for COVID treatment.  Discontinued on 1/10.  Was receiving D5W for treatment of hyponatremia but was discontinued on 1/11  ::HgbA1c 5.6 on 1/1/24  ::home metformin 1000 mg twice daily, glimeperide 4 mg twice daily        Tube feeds resumed, goal  50/h, running over 15 hours [CPAP at night?].  Palliative on board   His POC FS stable on current regimen.     -Continue with glargine 8 units subcutaneously in p.m.  -Would keep sliding scale Regular insulin to #1 (1 unit for every 50 above 150) every 6 hours   -POCT glucose q6hrs while on tube feeds  -hypoglycemia protocol        #Hypothyroidism  ::1/4/24 TSH 0.02L, free T4 0.71L, free T3 1.8L  ::1/9/24 free T4 0.92      Recommendations:  - Continue home oral levothyroxine 200 mcg daily, kindly make sure tube feeds are held 2 hours before, 1 hour after levothyroxine dose  -Patient has follow-up with Dr. Lofton as outpatient on February 7/2024  - Endocrine service will sign off today, please do not hesitate to contact us in case of any endocrine query about this pt.     Patient  discussed with attending - Dr. Brandon.        Hyun Hurley MD

## 2024-01-25 NOTE — PROGRESS NOTES
"Nutrition Follow Up Assessment  Nutrition Assessment         Patient is a 73 y.o. male presenting with AMS.      Nutrition History:  Food and Nutrient History: Pt with continued AMS. SLP 1/25 recommending continue NPO and alternative means of nutrition/hydration. Palliative team following - pt's daughter is agreeable to PEG tube placement and SNF placement at this time. During visit pt able to answer yes/no questions. Reports he is tolerating current tube feed regimen. Denies nausea, vomiting, diarrhea, constipation, abdominal pain. Nepro running at goal of 55mL/hr per pump during visit - LEEANNE resolved per chart review.    Anthropometrics:  Height: 182.9 cm (6' 0.01\")   Weight: 99.5 kg (219 lb 5.7 oz)   BMI (Calculated): 29.74  IBW/kg (Dietitian Calculated): 80.9 kg  Percent of IBW: 122 %       Admission Weight Trend:  Date/Time Weight   01/25/24 0600 99.5 kg (219 lb 5.7 oz)   01/23/24 0600 100 kg (220 lb 7.4 oz)   01/21/24 0558 99.5 kg (219 lb 5.7 oz)   01/20/24 0548 97.3 kg (214 lb 8.1 oz)   01/19/24 0600 98 kg (216 lb 0.8 oz)   01/18/24 0600 98 kg (216 lb 0.8 oz)   01/17/24 0600 98.4 kg (216 lb 14.9 oz)   01/16/24 0600 97.3 kg (214 lb 8.1 oz)   01/12/24 0600 106 kg (234 lb 5.6 oz)   01/11/24 2019 106 kg (233 lb 0.4 oz)     Weight Change %:  Weight History / % Weight Change: Weight stable x1 week.    Nutrition Focused Physical Exam Findings:  defer: completed on initial assessment.  Edema:  Edema:  (non-pitting)  Edema Location: generalized  Physical Findings:  Skin: Positive (Deep tissue PI R hip, skin tear L elbow)    Nutrition Significant Labs:  CBC Trend:   Results from last 7 days   Lab Units 01/25/24  0610 01/24/24  0914 01/23/24  0605 01/22/24  0639   WBC AUTO x10*3/uL 5.2 5.0 5.1 5.6   RBC AUTO x10*6/uL 3.97* 3.90* 4.03* 4.13*   HEMOGLOBIN g/dL 12.3* 12.2* 12.6* 12.8*   HEMATOCRIT % 37.6* 37.6* 39.4* 40.6*   MCV fL 95 96 98 98   PLATELETS AUTO x10*3/uL 69* 61* 64* 71*    , BMP Trend:   Results from last 7 " days   Lab Units 01/25/24  0610 01/24/24  0914 01/23/24  0605 01/22/24  0639   GLUCOSE mg/dL 132* 162* 184* 175*   CALCIUM mg/dL 9.2 9.2 9.2 9.3   SODIUM mmol/L 137 139 141 141   POTASSIUM mmol/L 4.2 3.7 4.0 3.7   CO2 mmol/L 31 31 32 33*   CHLORIDE mmol/L 99 101 103 103   BUN mg/dL 17 20 20 19   CREATININE mg/dL 1.08 0.94 0.99 0.99    , BG POCT trend:   Results from last 7 days   Lab Units 01/25/24  1210 01/25/24  0751 01/25/24  0607 01/25/24  0004 01/24/24  1727   POCT GLUCOSE mg/dL 148* 162* 145* 131* 174*    , Renal Lab Trend:   Results from last 7 days   Lab Units 01/25/24  0610 01/24/24  0914 01/23/24  0605 01/22/24  0639   POTASSIUM mmol/L 4.2 3.7 4.0 3.7   PHOSPHORUS mg/dL 3.5 3.3 3.1  --    SODIUM mmol/L 137 139 141 141   MAGNESIUM mg/dL 1.99 1.94 1.87 1.97   EGFR mL/min/1.73m*2 73 86 81 81   BUN mg/dL 17 20 20 19   CREATININE mg/dL 1.08 0.94 0.99 0.99        Nutrition Specific Medications:  Scheduled medications  enoxaparin, 40 mg, subcutaneous, Daily  esomeprazole, 20 mg, nasogastric tube, Daily before breakfast  insulin glargine, 8 Units, subcutaneous, Nightly  insulin regular, 0-5 Units, subcutaneous, q6h  levothyroxine, 200 mcg, oral, Daily  metoprolol tartrate, 25 mg, nasogastric tube, BID  multivitamin with minerals, 1 tablet, oral, Daily  polyethylene glycol, 17 g, oral, Daily  sennosides, 2 tablet, oral, BID  sertraline, 50 mg, nasogastric tube, Daily      Continuous medications     PRN medications  PRN medications: albuterol, dextrose 10 % in water (D10W), dextrose, diclofenac sodium, glucagon, oxygen, phenoL     I/O:   Last BM Date: 01/25/24; Stool Appearance: Tarry (01/25/24 0404)        Dietary Orders (From admission, onward)       Start     Ordered    01/17/24 1413  Enteral feeding with NPO Nepro; ND (nasoduodenal tube); 20; 400; Water; Every 6 hours  Diet effective now        Comments: Please up titrate 10 ml/hr q8h until goal 55 ml/hr   Question Answer Comment   Tube feeding formula: Nepro     Feeding route: ND (nasoduodenal tube)    Tube feeding cyclic rate (mL/hr): 20    Tube feeding flush (mL): 400    Flush type: Water    Flush frequency: Every 6 hours        01/17/24 1413                     Estimated Needs:   Total Energy Estimated Needs (kCal): 2150 kCal  Method for Estimating Needs: OSH=4645 using 99.5kg (SF 1.2)  Total Protein Estimated Needs (g): 105 g  Method for Estimating Needs: 1.3 g/kg (IBW 80.9kg)  Total Fluid Estimated Needs (mL):  (1mL/kcal or per team)           Nutrition Diagnosis   Malnutrition Diagnosis  Patient has Malnutrition Diagnosis: No    Nutrition Diagnosis  Patient has Nutrition Diagnosis: Yes  Diagnosis Status (1): Ongoing  Nutrition Diagnosis 1: Increased nutrient needs  Related to (1): increased metabolic demand  As Evidenced by (1): critical illness s/p being found down + PNA infection.       Nutrition Interventions/Recommendations         Nutrition Prescription:  Recommend switch to Glucerna 1.5 tube feed formula - renal labs WNL and Nepro tube feed is not needed at this time.  Recommend Glucerna 1.5 at 65mL/hr over 22 hours (provides 1430mL total volume, 2145kcal, 118g protein, 1085mL free water.  Initiate Glucerna 1.5 at 10mL/hr increasing rate by 10mL/hr q4h or as tolerated to goal of 65mL/hr.  Additional water flushes per team.  Hold tube feed 1 hour pre/post synthroid dose.          Nutrition Interventions:   Food and/or Nutrient Delivery Interventions  Interventions: Enteral intake  Goal: meet tube feed goal rate, tube feed tolerance, BG 80-180mg/dl, electrolytes WNL.         Nutrition Monitoring and Evaluation   Food/Nutrient Related History Monitoring  Enteral and Parenteral Nutrition Intake: Enteral nutrition formula/solution, Enteral nutrition intake    Body Composition/Growth/Weight History  Monitoring and Evaluation Plan: Weight    Biochemical Data, Medical Tests and Procedures  Monitoring and Evaluation Plan: Electrolyte/renal panel, Glucose/endocrine  profile    Nutrition Focused Physical Findings  Monitoring and Evaluation Plan: Digestive System  Other: GI tolerance       Time Spent/Follow-up Reminder:   Time Spent (min): 45 minutes  Last Date of Nutrition Visit: 01/25/24  Nutrition Follow-Up Needed?: Dietitian to reassess per policy

## 2024-01-25 NOTE — PROGRESS NOTES
Occupational Therapy    OT Treatment    Patient Name: Gopal Ness  MRN: [id number]  Today's Date: 1/25/2024  Time Calculation  Start Time: 1306  Stop Time: 1345  Time Calculation (min): 39 min         Assessment:  OT Assessment: Pt participating in OT treatment bedside. Bed mobility to sit EOB with MAX Ax 2. EOB sitting ~ 20 minutes with CGA to MAX A, requiring cuing for sitting balance. Sit>stand x1 from EOB. Education provided to daughter regarding progress and improving alertness and orientation. Pt continues to benefit from moderate intensity OT services following hospital stay.  Prognosis: Good  Evaluation/Treatment Tolerance: Patient tolerated treatment well  End of Session Communication: Bedside nurse  End of Session Patient Position: Bed, 3 rail up, Alarm on  Prognosis: Good  Evaluation/Treatment Tolerance: Patient tolerated treatment well  Plan:  Treatment Interventions: ADL retraining, Functional transfer training, UE strengthening/ROM, Endurance training, Cognitive reorientation, Patient/family training, Equipment evaluation/education, Compensatory technique education, Continued evaluation  OT Frequency: 3 times per week  OT Discharge Recommendations: Moderate intensity level of continued care  Equipment Recommended upon Discharge: Wheeled walker  OT - OK to Discharge:  (OT Re-Evaluation completed.)  Treatment Interventions: ADL retraining, Functional transfer training, UE strengthening/ROM, Endurance training, Cognitive reorientation, Patient/family training, Equipment evaluation/education, Compensatory technique education, Continued evaluation    Subjective   Previous Visit Info:  OT Last Visit  OT Received On: 01/25/24  General:  General  Family/Caregiver Present: Yes  Caregiver Feedback: Daughter present at bedside to provide information regarding leisure and recovery  Co-Treatment: PT  Co-Treatment Reason: to optimize mobility and safety to progress mobility requiring 2 skilled therapists while  focusing on discipline specific needs  Prior to Session Communication: Bedside nurse  Patient Position Received: Bed, 3 rail up, Alarm on  General Comment: Pt supine with HOB elevated upon arrival. Daughter present throughout session. Pt with limited arousal in supine, agreeable to working with therapy. Improved alertness with EOB sitting.  Precautions:  Medical Precautions: Fall precautions, Swallowing precautions, Oxygen therapy device and L/min  Precautions Comment: Dobb wilfredo present  Vital Signs:  Vital Signs  Heart Rate: 87  SpO2: 91 % (5L)  Pain:  Pain Assessment  Pain Assessment: 0-10  Pain Score: 0 - No pain    Objective    Cognition:  Cognition  Overall Cognitive Status: Impaired  Arousal/Alertness: Delayed responses to stimuli (Primarily maintained eyes closed during session)  Orientation Level: Disoriented to time, Disoriented to situation  Following Commands: Follows one step commands with repetition  Safety Judgment: Decreased awareness of need for assistance  Problem Solving: Assistance required to identify errors made  Cognition Comments: Pt confused. Able to answer questions, primarily answering yes/no questions.  Coordination:  Coordination Comment: Pt frequently using L hand to perform tasks. When asked to perform knee extension with RLE, pt with difficulty to coordinate.  Activities of Daily Living: Grooming  Grooming Comments: Pt distracted by itching. TOTAL A for donning lotion.    Toileting  Toileting Comments: External catheter present  Functional Standing Tolerance:     Bed Mobility/Transfers: Bed Mobility  Bed Mobility: Yes  Bed Mobility 1  Bed Mobility 1: Supine to sitting, Sitting to supine  Level of Assistance 1: Maximum assistance, Moderate verbal cues (2 person)  Bed Mobility Comments 1: HOB elevated, draw sheet/TAP system used.  Bed Mobility 2  Bed Mobility  2: Scooting  Level of Assistance 2: Dependent (2 person)    Transfers  Transfer: Yes  Transfer 1  Transfer From 1: Sit to, Stand  to  Transfer to 1: Stand, Sit  Technique 1: Sit to stand, Stand to sit  Transfer Level of Assistance 1: Arm in arm assistance, Maximum assistance, Moderate verbal cues (2 person)  Trials/Comments 1: reached ~ 80% stand    Sitting Balance:  Static Sitting Balance  Static Sitting-Balance Support: Bilateral upper extremity supported  Static Sitting-Level of Assistance:  (Intermittent CGA for sitting balance, Min-MAX A for sitting balance EOB)  Dynamic Sitting Balance  Dynamic Sitting-Balance Support: Bilateral upper extremity supported  Dynamic Sitting-Comments: Maximum assistance  Standing Balance:  Static Standing Balance  Static Standing-Balance Support: Bilateral upper extremity supported  Static Standing-Level of Assistance: Maximum assistance (2 person)       Therapy/Activity: Therapeutic Activity  Therapeutic Activity Performed: Yes  Therapeutic Activity 1: EOB sitting ~20 minutes with CGA-MAX A. MOD verbal cuing for sitting balance.  Therapeutic Activity 2: sit>stand for static stand ~ 20 seconds with MAX A x2, pt able to tolerate ~ 80% stand with B knee blocking  Therapeutic Activity 3: Encouraged to participate in conversation and activity with daughter. Provided with stress balls and attempted to throw to daughter, utilizing L hand. Daughter reported pt is R handed, no attention to R hand to attempt to throw.  Therapeutic Activity 4: Education provided to daughter regarding promoting interaction during visit including talking and participating in supine actvities. Discussed strategies for sleep/wake cycle including having lights on and re-orientation.         Outcome Measures:[facility] Daily Activity  Putting on and taking off regular lower body clothing: Total  Bathing (including washing, rinsing, drying): Total  Putting on and taking off regular upper body clothing: Total  Toileting, which includes using toilet, bedpan or urinal: Total  Taking care of personal grooming such as brushing teeth: Total  Eating  Meals: Total  Daily Activity - Total Score: 6         and OT Adult Other Outcome Measures  4AT: Unable to fully assess due to limited cognition    Education Documentation  Precautions, taught by Lisbeth Gerber OT at 1/25/2024  3:10 PM.  Learner: Patient  Readiness: Acceptance  Method: Explanation  Response: Needs Reinforcement    ADL Training, taught by Lisbeth Gerber OT at 1/25/2024  3:10 PM.  Learner: Patient  Readiness: Acceptance  Method: Explanation  Response: Needs Reinforcement    Education Comments  No comments found.        OP EDUCATION:       Goals:  Encounter Problems       Encounter Problems (Active)       ADLs       Patient will perform UB and LB bathing with Mod A using AE and adaptive tech as needed. (Progressing)       Start:  01/04/24    Expected End:  02/07/24            Patient with complete upper body dressing with Min A seated in B/S chair vs. EOB after setup with VC's. (Progressing)       Start:  01/04/24    Expected End:  02/07/24            Patient with complete lower body dressing with Mod A using AE and adaptive tech as needed. (Progressing)       Start:  01/04/24    Expected End:  02/07/24            Patient will complete daily grooming tasks with CGA and AE as needed after set up while seated on EOB. (Progressing)       Start:  01/04/24    Expected End:  02/07/24            Patient will complete toileting including hygiene clothing management/hygiene with Mod A using raised toilet vs. BSC pending progress with mobility/transfers (Progressing)       Start:  01/04/24    Expected End:  02/07/24               Balance       Patient will stand with UE support of LRD and MOD A x1 at least 5 min to improve balance required for self-care tasks.  (Progressing)       Start:  01/04/24    Expected End:  02/07/24               COGNITION/SAFETY       Pt will be alert and oriented x 3 and follow one step simple commands 75-90% of time. (Progressing)       Start:  01/04/24    Expected End:  02/07/24                TRANSFERS       Patient will perform bed mobility with Mod A. (Progressing)       Start:  01/04/24    Expected End:  02/07/24            Patient will complete functional transfers with Mod A using LRAD. (Progressing)       Start:  01/04/24    Expected End:  02/07/24               Transfers       Patient will perform sit to stand and stand to sit transfers with </= MAX A x1 and LRD to increase functional strength.  (Progressing)       Start:  01/04/24    Expected End:  02/07/24

## 2024-01-25 NOTE — PROGRESS NOTES
1/25/24 1110 Transitional Care Coordinator Notes:    Updated therapy and progress notes sent to [facility]. [facility] currently do not have any beds available. Messaged [facility] to see if they have any bed available. Will continue to follow for discharge updates.               Assessment/Plan   Principal Problem:    AMS (altered mental status)    Discharge Plans: discharge to SNF            Annalisa Whittaker RN

## 2024-01-25 NOTE — PROGRESS NOTES
Physical Therapy    Physical Therapy Treatment    Patient Name: Gopal Ness  MRN: [id number]  Today's Date: 1/25/2024  Time Calculation  Start Time: 1305  Stop Time: 1344  Time Calculation (min): 39 min       Assessment/Plan   PT Assessment  Assessment Comment: Pt alert and participating in session this date. continue to recommnend MOD intensity Pt after DC.  End of Session Patient Position: Bed, 3 rail up, Alarm on  PT Plan  Inpatient/Swing Bed or Outpatient: Inpatient  PT Plan  Treatment/Interventions: Bed mobility, Transfer training, Gait training, Stair training, Balance training, Neuromuscular re-education, Strengthening, Endurance training, Therapeutic exercise, Therapeutic activity, Home exercise program, Positioning, Postural re-education  PT Plan: Skilled PT  PT Eval Only Reason:  (predict patient at or near new baseline function)  PT Frequency: 3 times per week  PT Discharge Recommendations: Moderate intensity level of continued care  Equipment Recommended upon Discharge: Wheeled walker  PT Recommended Transfer Status: Total assist  PT - OK to Discharge: Yes (PT eval complete and DC rec made)      General Visit Information:   PT  Visit  PT Received On: 01/25/24  Response to Previous Treatment: Patient unable to report, no changes reported from family or staff  General  Family/Caregiver Present: Yes  Caregiver Feedback: daughter present during session  Co-Treatment: OT  Co-Treatment Reason: to safely progress functional mobility  Prior to Session Communication: Bedside nurse  Patient Position Received: Bed, 3 rail up, Alarm on  General Comment: Pt supine in bed upon entry to room. Pt pleasant, cooperative and willing to work with PT. Noted dobhoff, tele, NC, external catheter.    Subjective   Precautions:  Precautions  Medical Precautions: Fall precautions, Oxygen therapy device and L/min  Vital Signs:  Vital Signs  Heart Rate: 87  SpO2: 91 %    Objective   Pain:  Pain Assessment  Pain Assessment:  0-10  Pain Score: 0 - No pain  Cognition:  Cognition  Overall Cognitive Status: Impaired  Arousal/Alertness: Delayed responses to stimuli  Orientation Level: Disoriented to time, Disoriented to situation  Following Commands: Follows one step commands with increased time  Cognition Comments: pt with moments of confusion  Postural Control:  Postural Control  Posture Comment: pt with noted slouched posture, pt reaching for bed to hold onto upon sititng up; verbal cuing for pt to lift head, with pt noted to have overall slouched posture and flexed head.  Static Sitting Balance  Static Sitting-Comment/Number of Minutes: cga-max assist, pt flucuating with assist level throughout session    Activity Tolerance:  Activity Tolerance  Endurance: Tolerates 10 - 20 min exercise with multiple rests  Treatments:  Therapeutic Activity  Therapeutic Activity Performed: Yes  Therapeutic Activity 1: pt sitting EOB for ~20 minutes with cga- max assist with pt complete LLE LAQ x10. Verbal cuing provided throughout seated balance for upright posture.  Therapeutic Activity 2: Additional education was provided to pt's daughter throughout session on increasing participation of pt when there are visitors and to improve sleep/wake cycle. Daughter educated on delrium and things to help with it.    Bed Mobility  Bed Mobility: Yes  Bed Mobility 1  Bed Mobility 1: Supine to sitting, Sitting to supine  Level of Assistance 1: Maximum assistance, Maximum verbal cues, Maximum tactile cues (x2)  Bed Mobility Comments 1: HOB elevated, use of draw sheet  Bed Mobility 2  Bed Mobility  2:  (boost)  Level of Assistance 2: Dependent (x2)    Transfers  Transfer: Yes  Transfer 1  Technique 1: Sit to stand, Stand to sit  Transfer Device 1:  (B arm in arm assist with B knee blocking)  Transfer Level of Assistance 1: Maximum verbal cues, Maximum assistance  Trials/Comments 1: x2; pt achieving ~80% of stand before returning to sitting    Outcome Measures:  [facility]  Basic Mobility  Turning from your back to your side while in a flat bed without using bedrails: Total  Moving from lying on your back to sitting on the side of a flat bed without using bedrails: Total  Moving to and from bed to chair (including a wheelchair): Total  Standing up from a chair using your arms (e.g. wheelchair or bedside chair): Total  To walk in hospital room: Total  Climbing 3-5 steps with railing: Total  Basic Mobility - Total Score: 6    Education Documentation  Mobility Training, taught by Cinthya Eng PT at 1/25/2024  4:05 PM.  Learner: Patient  Readiness: Acceptance  Method: Explanation  Response: Needs Reinforcement    Education Comments  No comments found.        OP EDUCATION:       Encounter Problems       Encounter Problems (Active)       Balance       Patient will stand with UE support of LRD and MOD A x1 at least 5 min to improve balance required for self-care tasks.  (Progressing)       Start:  01/04/24    Expected End:  02/07/24               Mobility       Patient will ambulate at least 10 ft. with </= MAX A x1 and LRD to improve tolerance of household distances.  (Not Progressing)       Start:  01/04/24    Expected End:  02/07/24            Patient will perform bed mobility with </= MIN A x1 to reduce risk of developing decubitus ulcers.  (Progressing)       Start:  01/04/24    Expected End:  02/07/24             LLE >/= 4+/5, RLE >/= 3+/5  (Progressing)       Start:  01/04/24    Expected End:  02/07/24               Pain - Adult          Transfers       Patient will perform sit to stand and stand to sit transfers with </= MAX A x1 and LRD to increase functional strength.  (Progressing)       Start:  01/04/24    Expected End:  02/07/24 01/25/24 at 4:06 PM - Cinthya Eng, PT

## 2024-01-25 NOTE — PROGRESS NOTES
"Speech-Language Pathology             Therapy Communication Note     Patient Name: Gopal Ness  MRN:  [id number]  Today's Date:  01/25/24   Missed Time: 1115     Missed Visit Reason:  SLP swallow re-eval attempted however not completed 2/2 poor participation. Per RN, pt not following commands this date. Verbal and tactile stim provided and pt only momentarily opened eyes and loudly said \"get out of here\". Education provided regarding purpose of visit including potential for PO intake and given max cues pt repeatedly stated \"no\" including grabbing SLP arm and pushing it away when attempting to complete oral care. Mentation continues to be impacting ability to participate with swallow, thus recommend pt remain NPO, may consider long-term alternative means nutrition/hydration. Will re-attempt when pt consistently alert/following commands.               "

## 2024-01-26 LAB
ALBUMIN SERPL BCP-MCNC: 3.1 G/DL (ref 3.4–5)
ANION GAP SERPL CALC-SCNC: 12 MMOL/L (ref 10–20)
BASOPHILS # BLD AUTO: 0.05 X10*3/UL (ref 0–0.1)
BASOPHILS NFR BLD AUTO: 0.9 %
BUN SERPL-MCNC: 20 MG/DL (ref 6–23)
CALCIUM SERPL-MCNC: 9.3 MG/DL (ref 8.6–10.6)
CHLORIDE SERPL-SCNC: 99 MMOL/L (ref 98–107)
CO2 SERPL-SCNC: 31 MMOL/L (ref 21–32)
CREAT SERPL-MCNC: 0.96 MG/DL (ref 0.5–1.3)
EGFRCR SERPLBLD CKD-EPI 2021: 84 ML/MIN/1.73M*2
EOSINOPHIL # BLD AUTO: 1.13 X10*3/UL (ref 0–0.4)
EOSINOPHIL NFR BLD AUTO: 21 %
ERYTHROCYTE [DISTWIDTH] IN BLOOD BY AUTOMATED COUNT: 13.3 % (ref 11.5–14.5)
GLUCOSE BLD MANUAL STRIP-MCNC: 144 MG/DL (ref 74–99)
GLUCOSE BLD MANUAL STRIP-MCNC: 154 MG/DL (ref 74–99)
GLUCOSE BLD MANUAL STRIP-MCNC: 155 MG/DL (ref 74–99)
GLUCOSE BLD MANUAL STRIP-MCNC: 156 MG/DL (ref 74–99)
GLUCOSE BLD MANUAL STRIP-MCNC: 163 MG/DL (ref 74–99)
GLUCOSE SERPL-MCNC: 129 MG/DL (ref 74–99)
HCT VFR BLD AUTO: 41.2 % (ref 41–52)
HGB BLD-MCNC: 13.2 G/DL (ref 13.5–17.5)
IMM GRANULOCYTES # BLD AUTO: 0.14 X10*3/UL (ref 0–0.5)
IMM GRANULOCYTES NFR BLD AUTO: 2.6 % (ref 0–0.9)
LYMPHOCYTES # BLD AUTO: 1.14 X10*3/UL (ref 0.8–3)
LYMPHOCYTES NFR BLD AUTO: 21.2 %
MAGNESIUM SERPL-MCNC: 1.99 MG/DL (ref 1.6–2.4)
MCH RBC QN AUTO: 31.7 PG (ref 26–34)
MCHC RBC AUTO-ENTMCNC: 32 G/DL (ref 32–36)
MCV RBC AUTO: 99 FL (ref 80–100)
MONOCYTES # BLD AUTO: 0.57 X10*3/UL (ref 0.05–0.8)
MONOCYTES NFR BLD AUTO: 10.6 %
NEUTROPHILS # BLD AUTO: 2.34 X10*3/UL (ref 1.6–5.5)
NEUTROPHILS NFR BLD AUTO: 43.7 %
NRBC BLD-RTO: 0 /100 WBCS (ref 0–0)
PHOSPHATE SERPL-MCNC: 3.6 MG/DL (ref 2.5–4.9)
PLATELET # BLD AUTO: 77 X10*3/UL (ref 150–450)
POTASSIUM SERPL-SCNC: 4 MMOL/L (ref 3.5–5.3)
RBC # BLD AUTO: 4.16 X10*6/UL (ref 4.5–5.9)
SODIUM SERPL-SCNC: 138 MMOL/L (ref 136–145)
WBC # BLD AUTO: 5.4 X10*3/UL (ref 4.4–11.3)

## 2024-01-26 PROCEDURE — 97110 THERAPEUTIC EXERCISES: CPT | Mod: GP

## 2024-01-26 PROCEDURE — 2500000004 HC RX 250 GENERAL PHARMACY W/ HCPCS (ALT 636 FOR OP/ED)

## 2024-01-26 PROCEDURE — 2500000001 HC RX 250 WO HCPCS SELF ADMINISTERED DRUGS (ALT 637 FOR MEDICARE OP)

## 2024-01-26 PROCEDURE — 80069 RENAL FUNCTION PANEL: CPT

## 2024-01-26 PROCEDURE — 99232 SBSQ HOSP IP/OBS MODERATE 35: CPT

## 2024-01-26 PROCEDURE — 2500000001 HC RX 250 WO HCPCS SELF ADMINISTERED DRUGS (ALT 637 FOR MEDICARE OP): Performed by: STUDENT IN AN ORGANIZED HEALTH CARE EDUCATION/TRAINING PROGRAM

## 2024-01-26 PROCEDURE — 85025 COMPLETE CBC W/AUTO DIFF WBC: CPT

## 2024-01-26 PROCEDURE — 82947 ASSAY GLUCOSE BLOOD QUANT: CPT

## 2024-01-26 PROCEDURE — 36415 COLL VENOUS BLD VENIPUNCTURE: CPT

## 2024-01-26 PROCEDURE — 1100000001 HC PRIVATE ROOM DAILY

## 2024-01-26 PROCEDURE — 83735 ASSAY OF MAGNESIUM: CPT

## 2024-01-26 PROCEDURE — 97530 THERAPEUTIC ACTIVITIES: CPT | Mod: GP

## 2024-01-26 PROCEDURE — 2500000001 HC RX 250 WO HCPCS SELF ADMINISTERED DRUGS (ALT 637 FOR MEDICARE OP): Performed by: INTERNAL MEDICINE

## 2024-01-26 RX ADMIN — INSULIN HUMAN 1 UNITS: 100 INJECTION, SOLUTION PARENTERAL at 23:46

## 2024-01-26 RX ADMIN — Medication 1 TABLET: at 08:33

## 2024-01-26 RX ADMIN — SERTRALINE HYDROCHLORIDE 50 MG: 50 TABLET ORAL at 08:33

## 2024-01-26 RX ADMIN — INSULIN HUMAN 1 UNITS: 100 INJECTION, SOLUTION PARENTERAL at 00:30

## 2024-01-26 RX ADMIN — INSULIN HUMAN 1 UNITS: 100 INJECTION, SOLUTION PARENTERAL at 17:45

## 2024-01-26 RX ADMIN — INSULIN HUMAN 1 UNITS: 100 INJECTION, SOLUTION PARENTERAL at 12:50

## 2024-01-26 RX ADMIN — METOPROLOL TARTRATE 25 MG: 25 TABLET, FILM COATED ORAL at 08:33

## 2024-01-26 RX ADMIN — INSULIN GLARGINE 8 UNITS: 100 INJECTION, SOLUTION SUBCUTANEOUS at 20:55

## 2024-01-26 RX ADMIN — LEVOTHYROXINE SODIUM 200 MCG: 75 TABLET ORAL at 05:07

## 2024-01-26 RX ADMIN — STANDARDIZED SENNA CONCENTRATE 17.2 MG: 8.6 TABLET ORAL at 20:55

## 2024-01-26 RX ADMIN — ENOXAPARIN SODIUM 40 MG: 100 INJECTION SUBCUTANEOUS at 08:33

## 2024-01-26 RX ADMIN — ESOMEPRAZOLE MAGNESIUM 20 MG: 40 FOR SUSPENSION ORAL at 06:12

## 2024-01-26 ASSESSMENT — COGNITIVE AND FUNCTIONAL STATUS - GENERAL
MOBILITY SCORE: 6
CLIMB 3 TO 5 STEPS WITH RAILING: TOTAL
STANDING UP FROM CHAIR USING ARMS: TOTAL
HELP NEEDED FOR BATHING: TOTAL
TOILETING: TOTAL
DAILY ACTIVITIY SCORE: 6
WALKING IN HOSPITAL ROOM: TOTAL
CLIMB 3 TO 5 STEPS WITH RAILING: TOTAL
MOVING FROM LYING ON BACK TO SITTING ON SIDE OF FLAT BED WITH BEDRAILS: TOTAL
MOVING TO AND FROM BED TO CHAIR: TOTAL
PERSONAL GROOMING: TOTAL
DRESSING REGULAR UPPER BODY CLOTHING: TOTAL
EATING MEALS: TOTAL
TURNING FROM BACK TO SIDE WHILE IN FLAT BAD: TOTAL
MOBILITY SCORE: 6
TURNING FROM BACK TO SIDE WHILE IN FLAT BAD: TOTAL
STANDING UP FROM CHAIR USING ARMS: TOTAL
MOVING FROM LYING ON BACK TO SITTING ON SIDE OF FLAT BED WITH BEDRAILS: TOTAL
WALKING IN HOSPITAL ROOM: TOTAL
MOVING TO AND FROM BED TO CHAIR: TOTAL
DRESSING REGULAR LOWER BODY CLOTHING: TOTAL

## 2024-01-26 ASSESSMENT — PAIN SCALES - GENERAL
PAINLEVEL_OUTOF10: 0 - NO PAIN

## 2024-01-26 ASSESSMENT — PAIN - FUNCTIONAL ASSESSMENT
PAIN_FUNCTIONAL_ASSESSMENT: 0-10

## 2024-01-26 NOTE — PROGRESS NOTES
Subjective   No acute events overnight. Exam similar to previously. Some mild improvement in engagement/interactiveness per PT/OT.    Objective     Vitals:  Vitals:    01/26/24 0751   BP: 98/54   Pulse: 72   Resp: 17   Temp: 36.5 °C (97.7 °F)   SpO2: (!) 89%       I/O last 3 completed shifts:  In: 1400 (14.2 mL/kg) [NG/GT:1400]  Out: 1875 (19 mL/kg) [Urine:1875 (0.5 mL/kg/hr)]  Weight: 98.7 kg   I/O this shift:  In: 0   Out: 350 [Urine:350]    Physical exam:  General: in and out of sleep, will awake to questions and answer with mostly one word responses, follows commands  HEENT: pupils equal and round, no scleral icterus  Skin: no suspect lesions or rashes noted on visible skin  Chest: On 4-5L NC O2, lung sounds coarse, no wheezing appreciated  Cardiac: regular rate, normal s1, s2, no M/R/G  Abdomen: soft, ND, NT, no involuntary guarding  : no flank pain or indwelling urinary catheter  EXT: no peripheral edema, no asymmetry noted  MSK: no focal joint swelling noted  Neuro: Will nod yes/no to question, but remains somnolent, follows simple instructions, moves all 4 extremities when prompted. AxO x1-2  Psych: Unable to assess    Medications:  enoxaparin, 40 mg, subcutaneous, Daily  esomeprazole, 20 mg, nasogastric tube, Daily before breakfast  insulin glargine, 8 Units, subcutaneous, Nightly  insulin regular, 0-5 Units, subcutaneous, q6h  levothyroxine, 200 mcg, oral, Daily  metoprolol tartrate, 25 mg, nasogastric tube, BID  multivitamin with minerals, 1 tablet, oral, Daily  polyethylene glycol, 17 g, oral, Daily  sennosides, 2 tablet, oral, BID  sertraline, 50 mg, nasogastric tube, Daily         PRN medications: albuterol, dextrose 10 % in water (D10W), dextrose, diclofenac sodium, glucagon, oxygen, phenoL    Labs:  Results for orders placed or performed during the hospital encounter of 01/01/24 (from the past 24 hour(s))   POCT GLUCOSE   Result Value Ref Range    POCT Glucose 148 (H) 74 - 99 mg/dL   POCT GLUCOSE    Result Value Ref Range    POCT Glucose 160 (H) 74 - 99 mg/dL   POCT GLUCOSE   Result Value Ref Range    POCT Glucose 163 (H) 74 - 99 mg/dL   POCT GLUCOSE   Result Value Ref Range    POCT Glucose 144 (H) 74 - 99 mg/dL       Imaging:  MR cervical spine wo IV contrast    Result Date: 1/9/2024  Interpreted By:  Joey Rizzo, STUDY: MR CERVICAL SPINE WO IV CONTRAST;  1/8/2024 10:18 pm   INDICATION: Signs/Symptoms:R arm weakness.   COMPARISON: None.   ACCESSION NUMBER(S): [id number]   ORDERING CLINICIAN: DIEGO ABARCA   TECHNIQUE: The cervical spine was studied in the sagittal and axial planes utilizing T1 and T2 weighted images. Examination is limited due to motion artifact   FINDINGS: The craniovertebral junction is normal. The cord is normal in size and signal. The marrow signal is normal. Serial axial images reveal the following: C2/C3 There is normal alignment and vertebral body height. The disc space is normal. There is no evidence of canal or foraminal narrowing. There is no evidence of bulging or herniated disc. C3/C4 There is normal alignment and vertebral body height. The disc space is normal. There is no evidence of canal or foraminal narrowing. There is no evidence of bulging or herniated disc. C4/C5 Right greater than left uncovertebral joint hypertrophy with focal right-sided foraminal narrowing. No measurable canal stenosis. C5/C6 Right greater than left uncovertebral joint hypertrophy without canal stenosis. Right-sided foraminal narrowing C6/C7 Bilateral facet hypertrophy without canal or foraminal narrowing C7/T1 Bilateral facet hypertrophy without canal or foraminal narrowing       * Right-sided foraminal narrowing due to uncovertebral joint hypertrophy at C4/C5 and C5/C6 *No measurable canal stenosis or cord compression.   MACRO: none   Signed by: Joey Rizzo 1/9/2024 9:02 AM Dictation workstation:   [id number]       Assessment/Plan   Mr. Gopal Ness is a 74 y/o man with h/o  pituitary adenoma s/p resection at [facility], DVT/PE on Eliquis, COPD (noncompliant with medications)/tobacco use, depresssion, DMII, HLD presenting after being found down i/s/o fall on 12/31 (LKN 1100 on 12/31). Brought to OSH, negative CTH, intubated for airway protection. EMS reported witnessing possible seizure activity. Also with persistent tachycardia, leukocytosis and infiltrate on chest CT, so treating presumed aspiration pneumonia with antibiotics. Transferred to [facility] for cvEEG which did not demonstrate seizures. MRI brain did not show acute stroke. Extubated 1/2. Course complicated by Covid pnuemonia, LEEANNE, SVT likely in the setting of infection, and RUE weakness in the setting of a fall. Currently admitted for continued altered mental status despite resolution of infection, correction of electrolytes, and lack of structural lesions that would explain his change in mentation. Code status was changed to DNR/DNI 1/17.      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 New small dural based enhancing masses in the R posterior fossa  - MRI Brain and MRI Sella - residual adenoma unchanged from prior examination  and similar to slightly increased size of small dural-based enhancing masses of small dural-based enhancing masses within the R posterior fossa favored to represent meningiomas.  - EEG - mod diffuse encephalopathy. Improvement in degree of encephalopathy compared to the previous day. Keppra DC.  - MRI cervical spine showed right-sided foraminal narrowing d/t uncovertebral joint hypertrophy at C4/C5 and C5/C6  - Repeat CT on 1/14 - atrophy, no acute intercranial pathology   Plan:  - Hold home trazadone 50mg at bedtime  - Continue home sertraline 50mg daily   - Outpatient neurosurgery/neuro-ophthalmology follow up  - EMG as an outpatient, follow up with neurology in 4 weeks  - Corrected underlying metabolic and infectious causes of encephalopathy. No structural causes seen on imaging.     #Acute hypoxic respiratory failure  #COVID pneumonia, CAP  #COPD  - Maintain O2- 88-92%, currently on 4-5L NC  - Cpap at night  - Elevated CRP, LDH, Ferritin, d-dimer  - Sputum culture - Strep pneumoniae  - s/p 1x cefepime 2g on 1/1, meropenem (1/1-1/2), Vanc 1.5g (1/1-1/2) then ceftriaxone 2g (1/2- 1/8)   - Finished remdesivir (1/6-1/10), dexamethasone 6mg daily (stopped 1/10)  - Pulmonology consulted: Will start albuterol nebs q6h, RT for BPH, chest vest therapy. Duonebs PRN. No abx at this time.     #Hypernatremia - resolved  #LEEANNE (baseline Cr 0.8-0.9) - resolved  #Hx of DI   - Hold home tamsulosin 0.4mg daily while NPO  - Hold further D5W, continue with 400ml q6h FWF     #SVT  - Several episodes of SVT, terminated spontaneously, some associated with hypotension down to 80/50s.  - Consulted cardiology, pending recs.         -Metoprolol tartrate 25mg BID from 12.5mg q6h        -If recurrent of persistent SVT, start with vagal maneuvers (carotid sinus massage), if unsuccessful, may consider Adenosine dose and/or IV Metop        -Please continue to monitor patient on telemetry        -Please obtain EKG if there's recurrence of SVT         -Optimal lytes: K>4, Mag>2    #panhypothyroidism s/p adenoma resection  #Cushing disease  #diabetes mellitus  - Holding home glimepiride 4mg BID, metformin 1000mg BID  - A1C 5.6  - AM free T4 0.92  Plan:  - Mild corrective SSI #1  - POCT glucose q6hrs while on tube feeds, Hypoglycemia protocol  - Continue home levothyroxine 200mcg daily  - Glargine 8u nightly  - follow up outpatient with endocrinologist Dr. Yvonne Lofton in 4 weeks (scheduled by endocrine)     #hx of DVT/PE  #thrombocytopenia  #Eosinophilia  - H/H: 16.9/51 -> 14.3/45.8  - PLT: consistently ~ while here. Over past two days have been 70s.  - Daily CBC    - Hold home Eliquis at this time   - Lovenox 40mg subcutaneous for prophylaxis  - Peripheral smear 1/25 - no acute findings/issues with morphology reported     F: FWF with TF  E: Replete PRN  N: Nepro tube feeds  A: PIVs    Lomeli: None  GI ppx: PPI  DVT ppx: lovenox     Code status: DNR/DNI  NOK/Surrogate: Donya Emerson (Daughter) [phone]      Ej Ventura MD  PGY-1 Internal Medicine

## 2024-01-26 NOTE — PROGRESS NOTES
Physical Therapy    Physical Therapy Treatment    Patient Name: oGpal Ness  MRN: [id number]  Today's Date: 1/26/2024  Time Calculation  Start Time: 1055  Stop Time: 1127  Time Calculation (min): 32 min       Assessment/Plan   PT Assessment  Medical Staff Made Aware: Yes  End of Session Communication: Bedside nurse  Assessment Comment: Continue to recommend MOD intensity PT after DC.  End of Session Patient Position: Bed, 3 rail up, Alarm on (chair position; pillow under L hip)  PT Plan  Inpatient/Swing Bed or Outpatient: Inpatient  PT Plan  Treatment/Interventions: Bed mobility, Transfer training, Gait training, Stair training, Balance training, Neuromuscular re-education, Strengthening, Endurance training, Therapeutic exercise, Therapeutic activity, Home exercise program, Positioning, Postural re-education  PT Plan: Skilled PT  PT Eval Only Reason:  (predict patient at or near new baseline function)  PT Frequency: 3 times per week  PT Discharge Recommendations: Moderate intensity level of continued care  Equipment Recommended upon Discharge: Wheeled walker  PT Recommended Transfer Status: Total assist  PT - OK to Discharge: Yes (PT eval complete and DC rec made)    General Visit Information:   PT  Visit  PT Received On: 01/26/24  Response to Previous Treatment: Patient unable to report, no changes reported from family or staff  General  Prior to Session Communication: Bedside nurse  Patient Position Received: Bed, 3 rail up, Alarm on  General Comment: Pt supine in bed upon entry to room. Pt pleasant, cooperative and willing to work with PT. Pt noted to be soiled. RN in room to assist with bedding change. noted tele, dobhoff, NC, external catheter.    Subjective   Precautions:  Precautions  Medical Precautions: Fall precautions, Oxygen therapy device and L/min  Vital Signs:  Vital Signs  Heart Rate: 74    Objective   Pain:  Pain Assessment  Pain Assessment: 0-10  Pain Score: 0 - No  pain  Cognition:  Cognition  Overall Cognitive Status: Impaired  Orientation Level: Disoriented to time, Disoriented to situation  Following Commands:  (pt following one step commands ~75% of the time with repetition and increased processing time)  Cognition Comments: pt with eyes closed on entry to room. Pt waving in response to verbal stimuli and talking to PT throughout session. Pt eyes open occasionally, with max verbal cuing and encouragement.    Treatments:  Therapeutic Exercise  Therapeutic Exercise Performed: Yes  Therapeutic Exercise Activity 1: x10 B ankle pumps with verbal and tacticle cuing to complete; pt needing constant cuing  Therapeutic Exercise Activity 2: x10 B SAQ; verbal and tacticle cuing to complete; pt requring AAROM to complete first few reps, with pt then able to complete rest of reps without manual assist    Therapeutic Activity  Therapeutic Activity Performed: Yes  Therapeutic Activity 1: pt dependently placed into chair position. Pt opening eyes upon sitting up right. Pt instructed to reach for target, with pt able to reach target ~100% of time with eyes open, and reaching to target with decreaed accuarcy when eyes closed. Constant verbal cuing provided for pt to keep eyes open. Pt talking to PT and following commands throughout session despite eyes being closed.    Bed Mobility  Bed Mobility: Yes  Bed Mobility 1  Bed Mobility 1: Rolling right, Rolling left  Level of Assistance 1: Maximum assistance, Maximum verbal cues, Maximum tactile cues (x2)  Bed Mobility Comments 1: pt reaching for bed rail    Ambulation/Gait Training  Ambulation/Gait Training Performed: No  Transfers  Transfer: No    Outcome Measures:  [facility] Basic Mobility  Turning from your back to your side while in a flat bed without using bedrails: Total  Moving from lying on your back to sitting on the side of a flat bed without using bedrails: Total  Moving to and from bed to chair (including a wheelchair): Total  Standing  up from a chair using your arms (e.g. wheelchair or bedside chair): Total  To walk in hospital room: Total  Climbing 3-5 steps with railing: Total  Basic Mobility - Total Score: 6    Education Documentation  Mobility Training, taught by Cinthya Eng PT at 1/26/2024  2:21 PM.  Learner: Patient  Readiness: Acceptance  Method: Explanation  Response: No Evidence of Learning, Needs Reinforcement    Education Comments  No comments found.        OP EDUCATION:       Encounter Problems       Encounter Problems (Active)       Balance       Patient will stand with UE support of LRD and MOD A x1 at least 5 min to improve balance required for self-care tasks.  (Progressing)       Start:  01/04/24    Expected End:  02/07/24               Mobility       Patient will ambulate at least 10 ft. with </= MAX A x1 and LRD to improve tolerance of household distances.  (Not Progressing)       Start:  01/04/24    Expected End:  02/07/24            Patient will perform bed mobility with </= MIN A x1 to reduce risk of developing decubitus ulcers.  (Progressing)       Start:  01/04/24    Expected End:  02/07/24             LLE >/= 4+/5, RLE >/= 3+/5  (Progressing)       Start:  01/04/24    Expected End:  02/07/24               Pain - Adult          Transfers       Patient will perform sit to stand and stand to sit transfers with </= MAX A x1 and LRD to increase functional strength.  (Progressing)       Start:  01/04/24    Expected End:  02/07/24 01/26/24 at 2:22 PM - Cinthya Eng, PT

## 2024-01-26 NOTE — CARE PLAN
The patient's goals for the shift include  pt remain stable     The clinical goals for the shift include Patient will tolerate weaning oxygen requirements and maintain o2 saturation >92%    PT is AOX1. Need safety reinforcement.       Problem: Diabetes  Goal: No changes in neurological exam by end of shift  Outcome: Progressing     Problem: Fall/Injury  Goal: Verbalize understanding of risk factor reduction measures to prevent injury from fall in the home  Outcome: Progressing  Goal: Pace activities to prevent fatigue by end of the shift  Outcome: Progressing     Problem: Skin  Goal: Promote skin healing  Outcome: Progressing     Problem: Pain - Adult  Goal: Verbalizes/displays adequate comfort level or baseline comfort level  Outcome: Progressing     Problem: Nutrition  Goal: Adequate PO fluid intake  Outcome: Progressing

## 2024-01-26 NOTE — PROGRESS NOTES
1/26/24 1531 Transitional Care Coordinator Notes:    [facility] can not accept. [facility] will check to see if any beds will become available for next week. [facility] sent updated notes for reconsideration of acceptance. Will monitor for updates.    1/27/24 4587 Both [facility] and [facility] is requesting the TCC to follow up with them on Monday. The facilities will inform the TCC if there are any open beds for acceptance.                Assessment/Plan   Principal Problem:    AMS (altered mental status)    Discharge Plans: discharge to SNF            Annalisa Whittaker RN

## 2024-01-27 LAB
ALBUMIN SERPL BCP-MCNC: 2.8 G/DL (ref 3.4–5)
ANION GAP SERPL CALC-SCNC: 12 MMOL/L (ref 10–20)
BASOPHILS # BLD AUTO: 0.04 X10*3/UL (ref 0–0.1)
BASOPHILS NFR BLD AUTO: 0.8 %
BUN SERPL-MCNC: 20 MG/DL (ref 6–23)
CALCIUM SERPL-MCNC: 9 MG/DL (ref 8.6–10.6)
CHLORIDE SERPL-SCNC: 99 MMOL/L (ref 98–107)
CO2 SERPL-SCNC: 28 MMOL/L (ref 21–32)
CREAT SERPL-MCNC: 0.91 MG/DL (ref 0.5–1.3)
EGFRCR SERPLBLD CKD-EPI 2021: 90 ML/MIN/1.73M*2
EOSINOPHIL # BLD AUTO: 0.95 X10*3/UL (ref 0–0.4)
EOSINOPHIL NFR BLD AUTO: 17.9 %
ERYTHROCYTE [DISTWIDTH] IN BLOOD BY AUTOMATED COUNT: 13.2 % (ref 11.5–14.5)
GLUCOSE BLD MANUAL STRIP-MCNC: 113 MG/DL (ref 74–99)
GLUCOSE BLD MANUAL STRIP-MCNC: 142 MG/DL (ref 74–99)
GLUCOSE BLD MANUAL STRIP-MCNC: 169 MG/DL (ref 74–99)
GLUCOSE BLD MANUAL STRIP-MCNC: 174 MG/DL (ref 74–99)
GLUCOSE SERPL-MCNC: 159 MG/DL (ref 74–99)
HCT VFR BLD AUTO: 38.4 % (ref 41–52)
HGB BLD-MCNC: 12.3 G/DL (ref 13.5–17.5)
IMM GRANULOCYTES # BLD AUTO: 0.16 X10*3/UL (ref 0–0.5)
IMM GRANULOCYTES NFR BLD AUTO: 3 % (ref 0–0.9)
LYMPHOCYTES # BLD AUTO: 1.11 X10*3/UL (ref 0.8–3)
LYMPHOCYTES NFR BLD AUTO: 20.9 %
MAGNESIUM SERPL-MCNC: 1.94 MG/DL (ref 1.6–2.4)
MCH RBC QN AUTO: 30.8 PG (ref 26–34)
MCHC RBC AUTO-ENTMCNC: 32 G/DL (ref 32–36)
MCV RBC AUTO: 96 FL (ref 80–100)
MONOCYTES # BLD AUTO: 0.57 X10*3/UL (ref 0.05–0.8)
MONOCYTES NFR BLD AUTO: 10.7 %
NEUTROPHILS # BLD AUTO: 2.48 X10*3/UL (ref 1.6–5.5)
NEUTROPHILS NFR BLD AUTO: 46.7 %
NRBC BLD-RTO: 0 /100 WBCS (ref 0–0)
PHOSPHATE SERPL-MCNC: 3.7 MG/DL (ref 2.5–4.9)
PLATELET # BLD AUTO: 78 X10*3/UL (ref 150–450)
POTASSIUM SERPL-SCNC: 4.2 MMOL/L (ref 3.5–5.3)
RBC # BLD AUTO: 4 X10*6/UL (ref 4.5–5.9)
SODIUM SERPL-SCNC: 135 MMOL/L (ref 136–145)
WBC # BLD AUTO: 5.3 X10*3/UL (ref 4.4–11.3)

## 2024-01-27 PROCEDURE — 2500000001 HC RX 250 WO HCPCS SELF ADMINISTERED DRUGS (ALT 637 FOR MEDICARE OP): Performed by: STUDENT IN AN ORGANIZED HEALTH CARE EDUCATION/TRAINING PROGRAM

## 2024-01-27 PROCEDURE — 2500000001 HC RX 250 WO HCPCS SELF ADMINISTERED DRUGS (ALT 637 FOR MEDICARE OP): Performed by: INTERNAL MEDICINE

## 2024-01-27 PROCEDURE — 85025 COMPLETE CBC W/AUTO DIFF WBC: CPT

## 2024-01-27 PROCEDURE — 36415 COLL VENOUS BLD VENIPUNCTURE: CPT

## 2024-01-27 PROCEDURE — 99232 SBSQ HOSP IP/OBS MODERATE 35: CPT

## 2024-01-27 PROCEDURE — 2500000001 HC RX 250 WO HCPCS SELF ADMINISTERED DRUGS (ALT 637 FOR MEDICARE OP)

## 2024-01-27 PROCEDURE — 80069 RENAL FUNCTION PANEL: CPT

## 2024-01-27 PROCEDURE — 1100000001 HC PRIVATE ROOM DAILY

## 2024-01-27 PROCEDURE — 83735 ASSAY OF MAGNESIUM: CPT

## 2024-01-27 PROCEDURE — 2500000004 HC RX 250 GENERAL PHARMACY W/ HCPCS (ALT 636 FOR OP/ED)

## 2024-01-27 PROCEDURE — 82947 ASSAY GLUCOSE BLOOD QUANT: CPT

## 2024-01-27 RX ADMIN — ENOXAPARIN SODIUM 40 MG: 100 INJECTION SUBCUTANEOUS at 08:25

## 2024-01-27 RX ADMIN — METOPROLOL TARTRATE 25 MG: 25 TABLET, FILM COATED ORAL at 08:25

## 2024-01-27 RX ADMIN — INSULIN HUMAN 1 UNITS: 100 INJECTION, SOLUTION PARENTERAL at 23:25

## 2024-01-27 RX ADMIN — ESOMEPRAZOLE MAGNESIUM 20 MG: 40 FOR SUSPENSION ORAL at 06:37

## 2024-01-27 RX ADMIN — SERTRALINE HYDROCHLORIDE 50 MG: 50 TABLET ORAL at 08:25

## 2024-01-27 RX ADMIN — Medication 1 TABLET: at 08:25

## 2024-01-27 RX ADMIN — INSULIN HUMAN 1 UNITS: 100 INJECTION, SOLUTION PARENTERAL at 06:29

## 2024-01-27 RX ADMIN — STANDARDIZED SENNA CONCENTRATE 17.2 MG: 8.6 TABLET ORAL at 20:33

## 2024-01-27 RX ADMIN — LEVOTHYROXINE SODIUM 200 MCG: 75 TABLET ORAL at 06:28

## 2024-01-27 RX ADMIN — INSULIN GLARGINE 8 UNITS: 100 INJECTION, SOLUTION SUBCUTANEOUS at 20:33

## 2024-01-27 ASSESSMENT — COGNITIVE AND FUNCTIONAL STATUS - GENERAL
HELP NEEDED FOR BATHING: TOTAL
MOVING FROM LYING ON BACK TO SITTING ON SIDE OF FLAT BED WITH BEDRAILS: TOTAL
DRESSING REGULAR LOWER BODY CLOTHING: TOTAL
CLIMB 3 TO 5 STEPS WITH RAILING: TOTAL
DRESSING REGULAR UPPER BODY CLOTHING: TOTAL
TURNING FROM BACK TO SIDE WHILE IN FLAT BAD: TOTAL
STANDING UP FROM CHAIR USING ARMS: TOTAL
MOVING TO AND FROM BED TO CHAIR: TOTAL
PERSONAL GROOMING: TOTAL
DAILY ACTIVITIY SCORE: 6
TOILETING: TOTAL
EATING MEALS: TOTAL
WALKING IN HOSPITAL ROOM: TOTAL
MOBILITY SCORE: 6

## 2024-01-27 ASSESSMENT — PAIN - FUNCTIONAL ASSESSMENT: PAIN_FUNCTIONAL_ASSESSMENT: 0-10

## 2024-01-27 ASSESSMENT — PAIN SCALES - GENERAL
PAINLEVEL_OUTOF10: 0 - NO PAIN
PAINLEVEL_OUTOF10: 0 - NO PAIN

## 2024-01-27 ASSESSMENT — PAIN SCALES - WONG BAKER: WONGBAKER_NUMERICALRESPONSE: NO HURT

## 2024-01-27 NOTE — CARE PLAN
The patient's goals for the shift include  patient will remain injuries free throughout day shift    The clinical goals for the shift include adhere to Q2 turning schedule, tolerate continous tube feeding, remain safe, keep skin clean and dry.    Over the shift, the patient did not make progress toward the following goals. Barriers to progression include patient not being able to stay alert and follow commands. Recommendations to address these barriers include turn patient side to side every two hours, monitor V/S.    Problem: Diabetes  Goal: Achieve decreasing blood glucose levels by end of shift  1/27/2024 1800 by Alexis Lopez RN  Outcome: Progressing  1/27/2024 1800 by Alexis Lopez RN  Outcome: Not Progressing  Goal: Increase stability of blood glucose readings by end of shift  1/27/2024 1800 by Alexis Lopez RN  Outcome: Progressing  1/27/2024 1800 by Alexis Lopez RN  Outcome: Not Progressing  Goal: No changes in neurological exam by end of shift  1/27/2024 1800 by Alexis Lopez RN  Outcome: Progressing  1/27/2024 1800 by Alexis Lopez RN  Outcome: Not Progressing  Goal: Learn about and adhere to nutrition recommendations by end of shift  1/27/2024 1800 by Alexis Lopez RN  Outcome: Progressing  1/27/2024 1800 by Alexis Lopez RN  Outcome: Not Progressing  Goal: Increase self care and/or family involovement by end of shift  1/27/2024 1800 by Alexis Lopez RN  Outcome: Progressing  1/27/2024 1800 by Alexis Lopez RN  Outcome: Not Progressing  Goal: Receive DSME education by end of shift  1/27/2024 1800 by Alexis Lopez RN  Outcome: Progressing  1/27/2024 1800 by Alexis Lopez RN  Outcome: Not Progressing     Problem: Fall/Injury  Goal: Verbalize understanding of risk factor reduction measures to prevent injury from fall in the home  1/27/2024 1800 by Alexis Lopez RN  Outcome: Progressing  1/27/2024 1800 by Alexis Lopez RN  Outcome: Not Progressing  Goal: Use assistive devices by end of the  shift  1/27/2024 1800 by Alexis Lopez RN  Outcome: Progressing  1/27/2024 1800 by Alexis Lopez RN  Outcome: Not Progressing  Goal: Pace activities to prevent fatigue by end of the shift  1/27/2024 1800 by Alexis Lopez RN  Outcome: Progressing  1/27/2024 1800 by Alexis Lopez RN  Outcome: Not Progressing     Problem: Skin  Goal: Promote skin healing  1/27/2024 1800 by Alexis Lopez RN  Outcome: Progressing  1/27/2024 1800 by Alexis Lopez RN  Outcome: Not Progressing     Problem: Pain - Adult  Goal: Verbalizes/displays adequate comfort level or baseline comfort level  1/27/2024 1800 by Alexis Lopez RN  Outcome: Progressing  1/27/2024 1800 by Alexis Lopez RN  Outcome: Not Progressing     Problem: Discharge Planning  Goal: Discharge to home or other facility with appropriate resources  1/27/2024 1800 by Alexis Lopez RN  Outcome: Progressing  1/27/2024 1800 by Alexis Lopez RN  Outcome: Not Progressing     Problem: Chronic Conditions and Co-morbidities  Goal: Patient's chronic conditions and co-morbidity symptoms are monitored and maintained or improved  1/27/2024 1800 by Alexis Lopez RN  Outcome: Progressing  1/27/2024 1800 by Alexis Lopez RN  Outcome: Not Progressing     Problem: Nutrition  Goal: Less than 5 days NPO/clear liquids  1/27/2024 1800 by Alexis Lopez RN  Outcome: Progressing  1/27/2024 1800 by Alexis Lopez RN  Outcome: Not Progressing  Goal: Oral intake greater than 50%  1/27/2024 1800 by Alexis Lopez RN  Outcome: Progressing  1/27/2024 1800 by Alexis Lopez RN  Outcome: Not Progressing  Goal: Oral intake greater 75%  1/27/2024 1800 by Alexis Lopez RN  Outcome: Progressing  1/27/2024 1800 by Alexis Lopez RN  Outcome: Not Progressing  Goal: Consume prescribed supplement  1/27/2024 1800 by Alexis Lopez RN  Outcome: Progressing  1/27/2024 1800 by Alexis Lopez RN  Outcome: Not Progressing  Goal: Adequate PO fluid intake  1/27/2024 1800 by Alexis Lopez RN  Outcome:  Progressing  1/27/2024 1800 by Alexis Lopez RN  Outcome: Not Progressing  Goal: Lab values WNL  1/27/2024 1800 by Alexis Lopez RN  Outcome: Progressing  1/27/2024 1800 by Alexis Lopez RN  Outcome: Not Progressing  Goal: Electrolytes WNL  1/27/2024 1800 by Alexis Lopez RN  Outcome: Progressing  1/27/2024 1800 by Alexis Lopez RN  Outcome: Not Progressing  Goal: Promote healing  1/27/2024 1800 by Alexis Lopez RN  Outcome: Progressing  1/27/2024 1800 by Alexis Lopez RN  Outcome: Not Progressing  Goal: Maintain stable weight  1/27/2024 1800 by Alexis Lopez RN  Outcome: Progressing  1/27/2024 1800 by Alexis Lopez RN  Outcome: Not Progressing  Goal: Reduce weight from edema/fluid  1/27/2024 1800 by Alexis Lopez RN  Outcome: Progressing  1/27/2024 1800 by Alexis Lopez RN  Outcome: Not Progressing  Goal: Gradual weight gain  1/27/2024 1800 by Alexis Lopez RN  Outcome: Progressing  1/27/2024 1800 by Alexis Lopez RN  Outcome: Not Progressing  Goal: Improve ostomy output  1/27/2024 1800 by Alexis Lopez RN  Outcome: Progressing  1/27/2024 1800 by Alexis Lopez RN  Outcome: Not Progressing     Problem: Knowledge Deficit  Goal: Patient/family/caregiver demonstrates understanding of disease process, treatment plan, medications, and discharge instructions  1/27/2024 1800 by Alexis Lopez RN  Outcome: Progressing  1/27/2024 1800 by Alexis Lopez RN  Outcome: Not Progressing     Problem: Mechanical Ventilation  Goal: Patient Will Maintain Patent Airway  1/27/2024 1800 by Alexis Lopez RN  Outcome: Progressing  1/27/2024 1800 by Alexis Lopez RN  Outcome: Not Progressing  Goal: Oral health is maintained or improved  1/27/2024 1800 by Alexis Lopez RN  Outcome: Progressing  1/27/2024 1800 by Alexis Lopez RN  Outcome: Not Progressing  Goal: Tracheostomy will be managed safely  1/27/2024 1800 by Alexis Lopez RN  Outcome: Progressing  1/27/2024 1800 by Alexis Lopez, RN  Outcome: Not Progressing  Goal:  ET tube will be managed safely  1/27/2024 1800 by Alexis Lopez RN  Outcome: Progressing  1/27/2024 1800 by Alexis Lopez RN  Outcome: Not Progressing  Goal: Ability to express needs and understand communication  1/27/2024 1800 by Alexis Lopez RN  Outcome: Progressing  1/27/2024 1800 by Alexis Lopez RN  Outcome: Not Progressing  Goal: Mobility/activity is maintained at optimum level for patient  1/27/2024 1800 by Alexis Lopez RN  Outcome: Progressing  1/27/2024 1800 by Alexis Lopez RN  Outcome: Not Progressing

## 2024-01-27 NOTE — CARE PLAN
The patient's goals for the shift include    Problem: Diabetes  Goal: Increase stability of blood glucose readings by end of shift  Outcome: Progressing     Problem: Nutrition  Goal: Lab values WNL  Outcome: Progressing  Goal: Electrolytes WNL  Outcome: Progressing       The clinical goals for the shift include keep skin clean and dry, adhere to Q2 turning schedule. Tolerate continuous tube feeding. Prevent injury; bed alarm activated, fall risk socks, call light within reach, frequent reorientation.

## 2024-01-27 NOTE — PROGRESS NOTES
Subjective   No acute events overnight. Exam similar to previously. Continues to engage with 1-2 word responses.    Objective     Vitals:  Vitals:    01/27/24 0820   BP: 112/65   Pulse: 79   Resp: 18   Temp: 36.6 °C (97.9 °F)   SpO2: 93%       I/O last 3 completed shifts:  In: 3835 (38 mL/kg) [NG/GT:3835]  Out: 2200 (21.8 mL/kg) [Urine:2200 (0.6 mL/kg/hr)]  Weight: 101 kg   No intake/output data recorded.    Physical exam:  General: in and out of sleep, will awake to questions and answer with mostly one word responses, follows commands  HEENT: pupils equal and round, no scleral icterus  Skin: no suspect lesions or rashes noted on visible skin  Chest: On 4-5L NC O2, lung sounds coarse, no wheezing appreciated  Cardiac: regular rate, normal s1, s2, no M/R/G  Abdomen: soft, ND, NT, no involuntary guarding  : no flank pain or indwelling urinary catheter  EXT: no peripheral edema, no asymmetry noted  MSK: no focal joint swelling noted  Neuro: Will nod yes/no to question, but remains somnolent, follows simple instructions, moves all 4 extremities when prompted. AxO x1-2  Psych: Unable to assess    Medications:  enoxaparin, 40 mg, subcutaneous, Daily  esomeprazole, 20 mg, nasogastric tube, Daily before breakfast  insulin glargine, 8 Units, subcutaneous, Nightly  insulin regular, 0-5 Units, subcutaneous, q6h  levothyroxine, 200 mcg, oral, Daily  metoprolol tartrate, 25 mg, nasogastric tube, BID  multivitamin with minerals, 1 tablet, oral, Daily  polyethylene glycol, 17 g, oral, Daily  sennosides, 2 tablet, oral, BID  sertraline, 50 mg, nasogastric tube, Daily         PRN medications: albuterol, dextrose 10 % in water (D10W), dextrose, diclofenac sodium, glucagon, oxygen, phenoL    Labs:  Results for orders placed or performed during the hospital encounter of 01/01/24 (from the past 24 hour(s))   POCT GLUCOSE   Result Value Ref Range    POCT Glucose 155 (H) 74 - 99 mg/dL   POCT GLUCOSE   Result Value Ref Range    POCT  Glucose 156 (H) 74 - 99 mg/dL   POCT GLUCOSE   Result Value Ref Range    POCT Glucose 154 (H) 74 - 99 mg/dL   Magnesium   Result Value Ref Range    Magnesium 1.94 1.60 - 2.40 mg/dL   Renal Function Panel   Result Value Ref Range    Glucose 159 (H) 74 - 99 mg/dL    Sodium 135 (L) 136 - 145 mmol/L    Potassium 4.2 3.5 - 5.3 mmol/L    Chloride 99 98 - 107 mmol/L    Bicarbonate 28 21 - 32 mmol/L    Anion Gap 12 10 - 20 mmol/L    Urea Nitrogen 20 6 - 23 mg/dL    Creatinine 0.91 0.50 - 1.30 mg/dL    eGFR 90 >60 mL/min/1.73m*2    Calcium 9.0 8.6 - 10.6 mg/dL    Phosphorus 3.7 2.5 - 4.9 mg/dL    Albumin 2.8 (L) 3.4 - 5.0 g/dL   CBC and Auto Differential   Result Value Ref Range    WBC 5.3 4.4 - 11.3 x10*3/uL    nRBC 0.0 0.0 - 0.0 /100 WBCs    RBC 4.00 (L) 4.50 - 5.90 x10*6/uL    Hemoglobin 12.3 (L) 13.5 - 17.5 g/dL    Hematocrit 38.4 (L) 41.0 - 52.0 %    MCV 96 80 - 100 fL    MCH 30.8 26.0 - 34.0 pg    MCHC 32.0 32.0 - 36.0 g/dL    RDW 13.2 11.5 - 14.5 %    Platelets 78 (L) 150 - 450 x10*3/uL    Neutrophils % 46.7 40.0 - 80.0 %    Immature Granulocytes %, Automated 3.0 (H) 0.0 - 0.9 %    Lymphocytes % 20.9 13.0 - 44.0 %    Monocytes % 10.7 2.0 - 10.0 %    Eosinophils % 17.9 0.0 - 6.0 %    Basophils % 0.8 0.0 - 2.0 %    Neutrophils Absolute 2.48 1.60 - 5.50 x10*3/uL    Immature Granulocytes Absolute, Automated 0.16 0.00 - 0.50 x10*3/uL    Lymphocytes Absolute 1.11 0.80 - 3.00 x10*3/uL    Monocytes Absolute 0.57 0.05 - 0.80 x10*3/uL    Eosinophils Absolute 0.95 (H) 0.00 - 0.40 x10*3/uL    Basophils Absolute 0.04 0.00 - 0.10 x10*3/uL   POCT GLUCOSE   Result Value Ref Range    POCT Glucose 174 (H) 74 - 99 mg/dL       Imaging:  MR cervical spine wo IV contrast    Result Date: 1/9/2024  Interpreted By:  Joey Rizzo, STUDY: MR CERVICAL SPINE WO IV CONTRAST;  1/8/2024 10:18 pm   INDICATION: Signs/Symptoms:R arm weakness.   COMPARISON: None.   ACCESSION NUMBER(S): [id number]   ORDERING CLINICIAN: DIEGO ABARCA    TECHNIQUE: The cervical spine was studied in the sagittal and axial planes utilizing T1 and T2 weighted images. Examination is limited due to motion artifact   FINDINGS: The craniovertebral junction is normal. The cord is normal in size and signal. The marrow signal is normal. Serial axial images reveal the following: C2/C3 There is normal alignment and vertebral body height. The disc space is normal. There is no evidence of canal or foraminal narrowing. There is no evidence of bulging or herniated disc. C3/C4 There is normal alignment and vertebral body height. The disc space is normal. There is no evidence of canal or foraminal narrowing. There is no evidence of bulging or herniated disc. C4/C5 Right greater than left uncovertebral joint hypertrophy with focal right-sided foraminal narrowing. No measurable canal stenosis. C5/C6 Right greater than left uncovertebral joint hypertrophy without canal stenosis. Right-sided foraminal narrowing C6/C7 Bilateral facet hypertrophy without canal or foraminal narrowing C7/T1 Bilateral facet hypertrophy without canal or foraminal narrowing       * Right-sided foraminal narrowing due to uncovertebral joint hypertrophy at C4/C5 and C5/C6 *No measurable canal stenosis or cord compression.   MACRO: none   Signed by: Joey Rizzo 1/9/2024 9:02 AM Dictation workstation:   [id number]       Assessment/Plan   Mr. Gopal Ness is a 74 y/o man with h/o pituitary adenoma s/p resection at [facility], DVT/PE on Eliquis, COPD (noncompliant with medications)/tobacco use, depresssion, DMII, HLD presenting after being found down i/s/o fall on 12/31 (LKN 1100 on 12/31). Brought to OSH, negative CTH, intubated for airway protection. EMS reported witnessing possible seizure activity. Also with persistent tachycardia, leukocytosis and infiltrate on chest CT, so treating presumed aspiration pneumonia with antibiotics. Transferred to [facility] for cvEEG which did not demonstrate seizures. MRI brain did not  show acute stroke. Extubated 1/2. Course complicated by Covid pnuemonia, LEEANNE, SVT likely in the setting of infection, and RUE weakness in the setting of a fall. Currently admitted for continued altered mental status despite resolution of infection, correction of electrolytes, and lack of structural lesions that would explain his change in mentation. Code status was changed to DNR/DNI 1/17.      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 New small dural based enhancing masses in the R posterior fossa  - MRI Brain and MRI Sella - residual adenoma unchanged from prior examination and similar to slightly increased size of small dural-based enhancing masses of small dural-based enhancing masses within the R posterior fossa favored to represent meningiomas.  - EEG - mod diffuse encephalopathy. Improvement in degree of encephalopathy compared to the previous day. Keppra DC.  - MRI cervical spine showed right-sided foraminal narrowing d/t uncovertebral joint hypertrophy at C4/C5 and C5/C6  - Repeat CT on 1/14 - atrophy, no acute intercranial pathology   Plan:  - Hold home trazadone 50mg at bedtime  - Continue home sertraline 50mg daily   - Outpatient neurosurgery/neuro-ophthalmology follow up  - EMG as an outpatient, follow up with neurology in 4 weeks  - Corrected underlying metabolic and infectious  causes of encephalopathy. No structural causes seen on imaging.     #Acute hypoxic respiratory failure  #COVID pneumonia, CAP  #COPD  - Maintain O2- 88-92%, currently on 4-5L NC  - Cpap at night  - Elevated CRP, LDH, Ferritin, d-dimer  - Sputum culture - Strep pneumoniae  - s/p 1x cefepime 2g on 1/1, meropenem (1/1-1/2), Vanc 1.5g (1/1-1/2) then ceftriaxone 2g (1/2- 1/8)   - Finished remdesivir (1/6-1/10), dexamethasone 6mg daily (stopped 1/10)  - Pulmonology consulted: Will start albuterol nebs q6h, RT for BPH, chest vest therapy. Duonebs PRN. No abx at this time.     #Hypernatremia - resolved  #LEEANNE (baseline Cr 0.8-0.9) - resolved  #Hx of DI   - Hold home tamsulosin 0.4mg daily while NPO  - Hold further D5W, continue with 400ml q6h FWF     #SVT  - Several episodes of SVT, terminated spontaneously, some associated with hypotension down to 80/50s.  - Consulted cardiology, pending recs.         -Metoprolol tartrate 25mg BID from 12.5mg q6h        -If recurrent of persistent SVT, start with vagal maneuvers (carotid sinus massage), if unsuccessful, may consider Adenosine dose and/or IV Metop        -Please continue to monitor patient on telemetry        -Please obtain EKG if there's recurrence of SVT        -Optimal lytes: K>4, Mag>2    #panhypothyroidism s/p adenoma resection  #Cushing disease  #diabetes mellitus  - Holding home glimepiride 4mg BID, metformin 1000mg BID  - A1C 5.6  - AM free T4 0.92  Plan:  - Mild corrective SSI #1  - POCT glucose q6hrs while on tube feeds, Hypoglycemia protocol  - Continue home levothyroxine 200mcg daily  - Glargine 8u nightly  - follow up outpatient with endocrinologist Dr. Yvonne Lofton in 4 weeks (scheduled by endocrine)     #hx of DVT/PE  #thrombocytopenia  #Eosinophilia  - H/H: 16.9/51 -> 14.3/45.8  - PLT: consistently ~ while here. Over past two days have been 70s.  - Daily CBC    - Hold home Eliquis at this time   - Lovenox 40mg subcutaneous for prophylaxis  - Peripheral  smear 1/25 - no acute findings/issues with morphology reported     F: FWF with TF  E: Replete PRN  N: Nepro tube feeds  A: PIVs    Lomeli: None  GI ppx: PPI  DVT ppx: lovenox     Code status: DNR/DNI  NOK/Surrogate: IdaniaDonya pratt (Daughter) [phone]      Ej Ventura MD  PGY-1 Internal Medicine

## 2024-01-28 LAB
GLUCOSE BLD MANUAL STRIP-MCNC: 137 MG/DL (ref 74–99)
GLUCOSE BLD MANUAL STRIP-MCNC: 143 MG/DL (ref 74–99)
GLUCOSE BLD MANUAL STRIP-MCNC: 166 MG/DL (ref 74–99)

## 2024-01-28 PROCEDURE — 2500000004 HC RX 250 GENERAL PHARMACY W/ HCPCS (ALT 636 FOR OP/ED)

## 2024-01-28 PROCEDURE — 2500000001 HC RX 250 WO HCPCS SELF ADMINISTERED DRUGS (ALT 637 FOR MEDICARE OP)

## 2024-01-28 PROCEDURE — 1100000001 HC PRIVATE ROOM DAILY

## 2024-01-28 PROCEDURE — 2500000001 HC RX 250 WO HCPCS SELF ADMINISTERED DRUGS (ALT 637 FOR MEDICARE OP): Performed by: STUDENT IN AN ORGANIZED HEALTH CARE EDUCATION/TRAINING PROGRAM

## 2024-01-28 PROCEDURE — 2500000001 HC RX 250 WO HCPCS SELF ADMINISTERED DRUGS (ALT 637 FOR MEDICARE OP): Performed by: INTERNAL MEDICINE

## 2024-01-28 PROCEDURE — 82947 ASSAY GLUCOSE BLOOD QUANT: CPT

## 2024-01-28 PROCEDURE — 2500000004 HC RX 250 GENERAL PHARMACY W/ HCPCS (ALT 636 FOR OP/ED): Performed by: STUDENT IN AN ORGANIZED HEALTH CARE EDUCATION/TRAINING PROGRAM

## 2024-01-28 PROCEDURE — 99232 SBSQ HOSP IP/OBS MODERATE 35: CPT

## 2024-01-28 RX ADMIN — ESOMEPRAZOLE MAGNESIUM 20 MG: 40 FOR SUSPENSION ORAL at 06:43

## 2024-01-28 RX ADMIN — METOPROLOL TARTRATE 25 MG: 25 TABLET, FILM COATED ORAL at 20:47

## 2024-01-28 RX ADMIN — LEVOTHYROXINE SODIUM 200 MCG: 75 TABLET ORAL at 05:32

## 2024-01-28 RX ADMIN — STANDARDIZED SENNA CONCENTRATE 17.2 MG: 8.6 TABLET ORAL at 20:47

## 2024-01-28 RX ADMIN — INSULIN HUMAN 1 UNITS: 100 INJECTION, SOLUTION PARENTERAL at 11:16

## 2024-01-28 RX ADMIN — METOPROLOL TARTRATE 25 MG: 25 TABLET, FILM COATED ORAL at 08:43

## 2024-01-28 RX ADMIN — POLYETHYLENE GLYCOL 3350 17 G: 17 POWDER, FOR SOLUTION ORAL at 08:42

## 2024-01-28 RX ADMIN — STANDARDIZED SENNA CONCENTRATE 17.2 MG: 8.6 TABLET ORAL at 08:43

## 2024-01-28 RX ADMIN — INSULIN GLARGINE 8 UNITS: 100 INJECTION, SOLUTION SUBCUTANEOUS at 20:47

## 2024-01-28 RX ADMIN — Medication 1 TABLET: at 08:43

## 2024-01-28 RX ADMIN — ENOXAPARIN SODIUM 40 MG: 100 INJECTION SUBCUTANEOUS at 08:43

## 2024-01-28 RX ADMIN — SERTRALINE HYDROCHLORIDE 50 MG: 50 TABLET ORAL at 08:43

## 2024-01-28 ASSESSMENT — PAIN SCALES - WONG BAKER: WONGBAKER_NUMERICALRESPONSE: NO HURT

## 2024-01-28 ASSESSMENT — PAIN SCALES - GENERAL: PAINLEVEL_OUTOF10: 0 - NO PAIN

## 2024-01-28 NOTE — CARE PLAN
The patient's goals for the shift include    Problem: Skin  Goal: Promote skin healing  Outcome: Progressing     Problem: Pain - Adult  Goal: Verbalizes/displays adequate comfort level or baseline comfort level  Outcome: Progressing       The clinical goals for the shift include adhere to Q2 turning schedule, tolerate continuous tube feed, keep skin clean and dry, remain safe and free of injury; bed alarm activated, fall risk socks on, frequent rounding, repositioning.

## 2024-01-28 NOTE — PROGRESS NOTES
Subjective   No acute events overnight. Exam similar to previously. Continues to engage with 1-2 word responses.    Objective     Vitals:  Vitals:    01/28/24 1130   BP: 102/57   Pulse: 83   Resp: 18   Temp: 36.8 °C (98.2 °F)   SpO2: 92%       I/O last 3 completed shifts:  In: 3280 (33.1 mL/kg) [NG/GT:3280]  Out: 3730 (37.6 mL/kg) [Urine:3730 (1 mL/kg/hr)]  Weight: 99.2 kg   No intake/output data recorded.    Physical exam:  General: in and out of sleep, will awake to questions and answer with mostly one word responses, follows commands  HEENT: pupils equal and round, no scleral icterus  Skin: no suspect lesions or rashes noted on visible skin  Chest: On 4-5L NC O2, lung sounds coarse, no wheezing appreciated  Cardiac: regular rate, normal s1, s2, no M/R/G  Abdomen: soft, ND, NT, no involuntary guarding  : no flank pain or indwelling urinary catheter  EXT: no peripheral edema, no asymmetry noted  MSK: no focal joint swelling noted  Neuro: Will nod yes/no to question, but remains somnolent, follows simple instructions, moves all 4 extremities when prompted. AxO x1-2  Psych: Unable to assess    Medications:  enoxaparin, 40 mg, subcutaneous, Daily  esomeprazole, 20 mg, nasogastric tube, Daily before breakfast  insulin glargine, 8 Units, subcutaneous, Nightly  insulin regular, 0-5 Units, subcutaneous, q6h  levothyroxine, 200 mcg, oral, Daily  metoprolol tartrate, 25 mg, nasogastric tube, BID  multivitamin with minerals, 1 tablet, oral, Daily  polyethylene glycol, 17 g, oral, Daily  sennosides, 2 tablet, oral, BID  sertraline, 50 mg, nasogastric tube, Daily         PRN medications: albuterol, dextrose 10 % in water (D10W), dextrose, diclofenac sodium, glucagon, oxygen, phenoL    Labs:  Results for orders placed or performed during the hospital encounter of 01/01/24 (from the past 24 hour(s))   POCT GLUCOSE   Result Value Ref Range    POCT Glucose 142 (H) 74 - 99 mg/dL   POCT GLUCOSE   Result Value Ref Range    POCT  Glucose 169 (H) 74 - 99 mg/dL   POCT GLUCOSE   Result Value Ref Range    POCT Glucose 137 (H) 74 - 99 mg/dL   POCT GLUCOSE   Result Value Ref Range    POCT Glucose 166 (H) 74 - 99 mg/dL       Imaging:  MR cervical spine wo IV contrast    Result Date: 1/9/2024  Interpreted By:  Joey Rizzo, STUDY: MR CERVICAL SPINE WO IV CONTRAST;  1/8/2024 10:18 pm   INDICATION: Signs/Symptoms:R arm weakness.   COMPARISON: None.   ACCESSION NUMBER(S): [id number]   ORDERING CLINICIAN: DIEGO ABARCA   TECHNIQUE: The cervical spine was studied in the sagittal and axial planes utilizing T1 and T2 weighted images. Examination is limited due to motion artifact   FINDINGS: The craniovertebral junction is normal. The cord is normal in size and signal. The marrow signal is normal. Serial axial images reveal the following: C2/C3 There is normal alignment and vertebral body height. The disc space is normal. There is no evidence of canal or foraminal narrowing. There is no evidence of bulging or herniated disc. C3/C4 There is normal alignment and vertebral body height. The disc space is normal. There is no evidence of canal or foraminal narrowing. There is no evidence of bulging or herniated disc. C4/C5 Right greater than left uncovertebral joint hypertrophy with focal right-sided foraminal narrowing. No measurable canal stenosis. C5/C6 Right greater than left uncovertebral joint hypertrophy without canal stenosis. Right-sided foraminal narrowing C6/C7 Bilateral facet hypertrophy without canal or foraminal narrowing C7/T1 Bilateral facet hypertrophy without canal or foraminal narrowing       * Right-sided foraminal narrowing due to uncovertebral joint hypertrophy at C4/C5 and C5/C6 *No measurable canal stenosis or cord compression.   MACRO: none   Signed by: Joey Rizzo 1/9/2024 9:02 AM Dictation workstation:   [id number]       Assessment/Plan   Mr. Gopal Ness is a 72 y/o man with h/o pituitary adenoma s/p resection at [facility],  DVT/PE on Eliquis, COPD (noncompliant with medications)/tobacco use, depresssion, DMII, HLD presenting after being found down i/s/o fall on 12/31 (LKN 1100 on 12/31). Brought to OSH, negative CTH, intubated for airway protection. EMS reported witnessing possible seizure activity. Also with persistent tachycardia, leukocytosis and infiltrate on chest CT, so treating presumed aspiration pneumonia with antibiotics. Transferred to [facility] for cvEEG which did not demonstrate seizures. MRI brain did not show acute stroke. Extubated 1/2. Course complicated by Covid pnuemonia, LEEANNE, SVT likely in the setting of infection, and RUE weakness in the setting of a fall. Currently admitted for continued altered mental status despite resolution of infection, correction of electrolytes, and lack of structural lesions that would explain his change in mentation. Code status was changed to DNR/DNI 1/17.      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 New small dural based enhancing masses in the R posterior fossa  - MRI Brain and MRI Sella - residual adenoma unchanged from prior examination and similar to slightly increased size of small dural-based enhancing masses of small dural-based enhancing masses within the R posterior fossa favored to represent meningiomas.  - EEG - mod diffuse encephalopathy. Improvement in degree of  encephalopathy compared to the previous day. Keppra DC.  - MRI cervical spine showed right-sided foraminal narrowing d/t uncovertebral joint hypertrophy at C4/C5 and C5/C6  - Repeat CT on 1/14 - atrophy, no acute intercranial pathology   Plan:  - Hold home trazadone 50mg at bedtime  - Continue home sertraline 50mg daily   - Outpatient neurosurgery/neuro-ophthalmology follow up  - EMG as an outpatient, follow up with neurology in 4 weeks  - Corrected underlying metabolic and infectious causes of encephalopathy. No structural causes seen on imaging.     #Acute hypoxic respiratory failure  #COVID pneumonia, CAP  #COPD  - Maintain O2- 88-92%, currently on 4-5L NC  - Cpap at night  - Elevated CRP, LDH, Ferritin, d-dimer  - Sputum culture - Strep pneumoniae  - s/p 1x cefepime 2g on 1/1, meropenem (1/1-1/2), Vanc 1.5g (1/1-1/2) then ceftriaxone 2g (1/2- 1/8)   - Finished remdesivir (1/6-1/10), dexamethasone 6mg daily (stopped 1/10)  - Pulmonology consulted: Will start albuterol nebs q6h, RT for BPH, chest vest therapy. Duonebs PRN. No abx at this time.     #Hypernatremia - resolved  #LEEANNE (baseline Cr 0.8-0.9) - resolved  #Hx of DI   - Hold home tamsulosin 0.4mg daily while NPO  - Hold further D5W, continue with 400ml q6h FWF     #SVT  - Several episodes of SVT, terminated spontaneously, some associated with hypotension down to 80/50s.  - Consulted cardiology, pending recs.         -Metoprolol tartrate 25mg BID from 12.5mg q6h        -If recurrent of persistent SVT, start with vagal maneuvers (carotid sinus massage), if unsuccessful, may consider Adenosine dose and/or IV Metop        -Please continue to monitor patient on telemetry        -Please obtain EKG if there's recurrence of SVT        -Optimal lytes: K>4, Mag>2    #panhypothyroidism s/p adenoma resection  #Cushing disease  #diabetes mellitus  - Holding home glimepiride 4mg BID, metformin 1000mg BID  - A1C 5.6  - AM free T4 0.92  Plan:  - Mild corrective SSI #1  -  POCT glucose q6hrs while on tube feeds, Hypoglycemia protocol  - Continue home levothyroxine 200mcg daily  - Glargine 8u nightly  - follow up outpatient with endocrinologist Dr. Yvonne Lofton in 4 weeks (scheduled by endocrine)     #hx of DVT/PE  #thrombocytopenia  #Eosinophilia  - H/H: 16.9/51 -> 14.3/45.8  - PLT: consistently ~ while here. Over past two days have been 70s.  - Daily CBC    - Hold home Eliquis at this time   - Lovenox 40mg subcutaneous for prophylaxis  - Peripheral smear 1/25 - no acute findings/issues with morphology reported     F: FWF with TF  E: Replete PRN  N: Nepro tube feeds  A: PIVs    Lomeli: None  GI ppx: PPI  DVT ppx: lovenox     Code status: DNR/DNI  NOK/Surrogate: Donya Emerson (Daughter) [phone]      Ej Ventura MD  PGY-1 Internal Medicine

## 2024-01-28 NOTE — CARE PLAN
The patient's goals for the shift include  patient will remain HDS throughout day shift    The clinical goals for the shift include adhere to Q2 turning schedule, tolerate continuous tube feed, remain safe and free of falls.    Over the shift, the patient did not make progress toward the following goals. Barriers to progression include patient has history of falls. Recommendations to address these barriers include have bed alarm on at all times, turn patient every two hours side to side, monitor V/S, report any changes to MD.

## 2024-01-29 LAB
ALBUMIN SERPL BCP-MCNC: 2.9 G/DL (ref 3.4–5)
ANION GAP SERPL CALC-SCNC: 11 MMOL/L (ref 10–20)
BASOPHILS # BLD AUTO: 0.06 X10*3/UL (ref 0–0.1)
BASOPHILS NFR BLD AUTO: 1.1 %
BUN SERPL-MCNC: 21 MG/DL (ref 6–23)
CALCIUM SERPL-MCNC: 9.7 MG/DL (ref 8.6–10.6)
CHLORIDE SERPL-SCNC: 96 MMOL/L (ref 98–107)
CO2 SERPL-SCNC: 32 MMOL/L (ref 21–32)
CREAT SERPL-MCNC: 1.04 MG/DL (ref 0.5–1.3)
EGFRCR SERPLBLD CKD-EPI 2021: 76 ML/MIN/1.73M*2
EOSINOPHIL # BLD AUTO: 0.91 X10*3/UL (ref 0–0.4)
EOSINOPHIL NFR BLD AUTO: 16 %
ERYTHROCYTE [DISTWIDTH] IN BLOOD BY AUTOMATED COUNT: 13.4 % (ref 11.5–14.5)
GLUCOSE BLD MANUAL STRIP-MCNC: 139 MG/DL (ref 74–99)
GLUCOSE BLD MANUAL STRIP-MCNC: 144 MG/DL (ref 74–99)
GLUCOSE BLD MANUAL STRIP-MCNC: 157 MG/DL (ref 74–99)
GLUCOSE BLD MANUAL STRIP-MCNC: 160 MG/DL (ref 74–99)
GLUCOSE BLD MANUAL STRIP-MCNC: 171 MG/DL (ref 74–99)
GLUCOSE BLD MANUAL STRIP-MCNC: 175 MG/DL (ref 74–99)
GLUCOSE SERPL-MCNC: 144 MG/DL (ref 74–99)
HCT VFR BLD AUTO: 39.1 % (ref 41–52)
HGB BLD-MCNC: 12.6 G/DL (ref 13.5–17.5)
IMM GRANULOCYTES # BLD AUTO: 0.19 X10*3/UL (ref 0–0.5)
IMM GRANULOCYTES NFR BLD AUTO: 3.3 % (ref 0–0.9)
LYMPHOCYTES # BLD AUTO: 1.07 X10*3/UL (ref 0.8–3)
LYMPHOCYTES NFR BLD AUTO: 18.8 %
MAGNESIUM SERPL-MCNC: 2.15 MG/DL (ref 1.6–2.4)
MCH RBC QN AUTO: 30.9 PG (ref 26–34)
MCHC RBC AUTO-ENTMCNC: 32.2 G/DL (ref 32–36)
MCV RBC AUTO: 96 FL (ref 80–100)
MONOCYTES # BLD AUTO: 0.59 X10*3/UL (ref 0.05–0.8)
MONOCYTES NFR BLD AUTO: 10.4 %
NEUTROPHILS # BLD AUTO: 2.88 X10*3/UL (ref 1.6–5.5)
NEUTROPHILS NFR BLD AUTO: 50.4 %
NRBC BLD-RTO: 0 /100 WBCS (ref 0–0)
PHOSPHATE SERPL-MCNC: 3.6 MG/DL (ref 2.5–4.9)
PLATELET # BLD AUTO: 110 X10*3/UL (ref 150–450)
POTASSIUM SERPL-SCNC: 3.9 MMOL/L (ref 3.5–5.3)
RBC # BLD AUTO: 4.08 X10*6/UL (ref 4.5–5.9)
SODIUM SERPL-SCNC: 135 MMOL/L (ref 136–145)
WBC # BLD AUTO: 5.7 X10*3/UL (ref 4.4–11.3)

## 2024-01-29 PROCEDURE — 85025 COMPLETE CBC W/AUTO DIFF WBC: CPT

## 2024-01-29 PROCEDURE — 83735 ASSAY OF MAGNESIUM: CPT

## 2024-01-29 PROCEDURE — 36415 COLL VENOUS BLD VENIPUNCTURE: CPT

## 2024-01-29 PROCEDURE — 82947 ASSAY GLUCOSE BLOOD QUANT: CPT

## 2024-01-29 PROCEDURE — 82947 ASSAY GLUCOSE BLOOD QUANT: CPT | Mod: MUE

## 2024-01-29 PROCEDURE — 80069 RENAL FUNCTION PANEL: CPT

## 2024-01-29 PROCEDURE — 2500000001 HC RX 250 WO HCPCS SELF ADMINISTERED DRUGS (ALT 637 FOR MEDICARE OP): Performed by: STUDENT IN AN ORGANIZED HEALTH CARE EDUCATION/TRAINING PROGRAM

## 2024-01-29 PROCEDURE — 97530 THERAPEUTIC ACTIVITIES: CPT | Mod: GO | Performed by: OCCUPATIONAL THERAPIST

## 2024-01-29 PROCEDURE — 2500000001 HC RX 250 WO HCPCS SELF ADMINISTERED DRUGS (ALT 637 FOR MEDICARE OP)

## 2024-01-29 PROCEDURE — 99232 SBSQ HOSP IP/OBS MODERATE 35: CPT

## 2024-01-29 PROCEDURE — 97535 SELF CARE MNGMENT TRAINING: CPT | Mod: GO,59 | Performed by: OCCUPATIONAL THERAPIST

## 2024-01-29 PROCEDURE — 1100000001 HC PRIVATE ROOM DAILY

## 2024-01-29 PROCEDURE — 2500000004 HC RX 250 GENERAL PHARMACY W/ HCPCS (ALT 636 FOR OP/ED): Performed by: STUDENT IN AN ORGANIZED HEALTH CARE EDUCATION/TRAINING PROGRAM

## 2024-01-29 PROCEDURE — 97530 THERAPEUTIC ACTIVITIES: CPT | Mod: GP

## 2024-01-29 PROCEDURE — 2500000004 HC RX 250 GENERAL PHARMACY W/ HCPCS (ALT 636 FOR OP/ED)

## 2024-01-29 PROCEDURE — 2500000001 HC RX 250 WO HCPCS SELF ADMINISTERED DRUGS (ALT 637 FOR MEDICARE OP): Performed by: INTERNAL MEDICINE

## 2024-01-29 PROCEDURE — 2500000002 HC RX 250 W HCPCS SELF ADMINISTERED DRUGS (ALT 637 FOR MEDICARE OP, ALT 636 FOR OP/ED): Performed by: INTERNAL MEDICINE

## 2024-01-29 RX ADMIN — INSULIN HUMAN 1 UNITS: 100 INJECTION, SOLUTION PARENTERAL at 05:48

## 2024-01-29 RX ADMIN — ESOMEPRAZOLE MAGNESIUM 20 MG: 40 FOR SUSPENSION ORAL at 08:22

## 2024-01-29 RX ADMIN — METOPROLOL TARTRATE 25 MG: 25 TABLET, FILM COATED ORAL at 20:55

## 2024-01-29 RX ADMIN — ENOXAPARIN SODIUM 40 MG: 100 INJECTION SUBCUTANEOUS at 08:24

## 2024-01-29 RX ADMIN — METOPROLOL TARTRATE 25 MG: 25 TABLET, FILM COATED ORAL at 08:22

## 2024-01-29 RX ADMIN — SERTRALINE HYDROCHLORIDE 50 MG: 50 TABLET ORAL at 08:23

## 2024-01-29 RX ADMIN — POLYETHYLENE GLYCOL 3350 17 G: 17 POWDER, FOR SOLUTION ORAL at 08:22

## 2024-01-29 RX ADMIN — Medication 1 TABLET: at 08:22

## 2024-01-29 RX ADMIN — INSULIN HUMAN 1 UNITS: 100 INJECTION, SOLUTION PARENTERAL at 00:00

## 2024-01-29 RX ADMIN — LEVOTHYROXINE SODIUM 200 MCG: 75 TABLET ORAL at 05:47

## 2024-01-29 RX ADMIN — INSULIN GLARGINE 8 UNITS: 100 INJECTION, SOLUTION SUBCUTANEOUS at 20:55

## 2024-01-29 RX ADMIN — INSULIN HUMAN 1 UNITS: 100 INJECTION, SOLUTION PARENTERAL at 18:00

## 2024-01-29 ASSESSMENT — COGNITIVE AND FUNCTIONAL STATUS - GENERAL
WALKING IN HOSPITAL ROOM: TOTAL
TOILETING: TOTAL
PERSONAL GROOMING: TOTAL
DRESSING REGULAR UPPER BODY CLOTHING: TOTAL
WALKING IN HOSPITAL ROOM: TOTAL
DAILY ACTIVITIY SCORE: 6
DRESSING REGULAR LOWER BODY CLOTHING: TOTAL
CLIMB 3 TO 5 STEPS WITH RAILING: TOTAL
EATING MEALS: TOTAL
STANDING UP FROM CHAIR USING ARMS: TOTAL
STANDING UP FROM CHAIR USING ARMS: TOTAL
HELP NEEDED FOR BATHING: TOTAL
MOBILITY SCORE: 6
HELP NEEDED FOR BATHING: TOTAL
DRESSING REGULAR LOWER BODY CLOTHING: TOTAL
TURNING FROM BACK TO SIDE WHILE IN FLAT BAD: TOTAL
TOILETING: TOTAL
MOVING TO AND FROM BED TO CHAIR: TOTAL
CLIMB 3 TO 5 STEPS WITH RAILING: TOTAL
EATING MEALS: TOTAL
MOBILITY SCORE: 6
MOVING TO AND FROM BED TO CHAIR: TOTAL
MOVING FROM LYING ON BACK TO SITTING ON SIDE OF FLAT BED WITH BEDRAILS: TOTAL
MOVING FROM LYING ON BACK TO SITTING ON SIDE OF FLAT BED WITH BEDRAILS: TOTAL
DRESSING REGULAR UPPER BODY CLOTHING: TOTAL
PERSONAL GROOMING: TOTAL
TURNING FROM BACK TO SIDE WHILE IN FLAT BAD: TOTAL
DAILY ACTIVITIY SCORE: 6

## 2024-01-29 ASSESSMENT — ACTIVITIES OF DAILY LIVING (ADL): HOME_MANAGEMENT_TIME_ENTRY: 13

## 2024-01-29 ASSESSMENT — PAIN SCALES - PAIN ASSESSMENT IN ADVANCED DEMENTIA (PAINAD)
BREATHING: NORMAL
TOTALSCORE: REPOSITIONED
NEGVOCALIZATION: OCCASIONAL MOAN/GROAN, LOW SPEECH, NEGATIVE/DISAPPROVING QUALITY
BODYLANGUAGE: RIGID, FISTS CLENCHED, KNEES UP, PUSHING/PULLING AWAY, STRIKES OUT
CONSOLABILITY: DISTRACTED OR REASSURED BY VOICE/TOUCH
FACIALEXPRESSION: SMILING OR INEXPRESSIVE
TOTALSCORE: IMPROVEMENT NOTED
TOTALSCORE: 4

## 2024-01-29 ASSESSMENT — PAIN SCALES - GENERAL
PAINLEVEL_OUTOF10: 0 - NO PAIN
PAINLEVEL_OUTOF10: 0 - NO PAIN

## 2024-01-29 ASSESSMENT — PAIN - FUNCTIONAL ASSESSMENT: PAIN_FUNCTIONAL_ASSESSMENT: 0-10

## 2024-01-29 NOTE — PROGRESS NOTES
Subjective   No acute events overnight. Exam similar to previously. Continues to engage with 1-2 word responses.    Objective     Vitals:  Vitals:    01/29/24 0534   BP: 99/58   Pulse: 76   Resp: 20   Temp: 36.3 °C (97.3 °F)   SpO2: 92%       I/O last 3 completed shifts:  In: 4384 (43.8 mL/kg) [P.O.:1200; NG/GT:3184]  Out: 3470 (34.7 mL/kg) [Urine:3470 (1 mL/kg/hr)]  Weight: 100.1 kg   No intake/output data recorded.    Physical exam:  General: in and out of sleep, will awake to questions and answer with mostly one word responses, follows commands  HEENT: pupils equal and round, no scleral icterus  Skin: no suspect lesions or rashes noted on visible skin  Chest: On 4-5L NC O2, lung sounds coarse, no wheezing appreciated  Cardiac: regular rate, normal s1, s2, no M/R/G  Abdomen: soft, ND, NT, no involuntary guarding  : no flank pain or indwelling urinary catheter  EXT: no peripheral edema, no asymmetry noted  MSK: no focal joint swelling noted  Neuro: Will nod yes/no to question, but remains somnolent, follows simple instructions, moves all 4 extremities when prompted. AxO x1-2  Psych: Unable to assess    Medications:  enoxaparin, 40 mg, subcutaneous, Daily  esomeprazole, 20 mg, nasogastric tube, Daily before breakfast  insulin glargine, 8 Units, subcutaneous, Nightly  insulin regular, 0-5 Units, subcutaneous, q6h  levothyroxine, 200 mcg, oral, Daily  metoprolol tartrate, 25 mg, nasogastric tube, BID  multivitamin with minerals, 1 tablet, oral, Daily  polyethylene glycol, 17 g, oral, Daily  sennosides, 2 tablet, oral, BID  sertraline, 50 mg, nasogastric tube, Daily         PRN medications: albuterol, dextrose 10 % in water (D10W), dextrose, diclofenac sodium, glucagon, oxygen, phenoL    Labs:  Results for orders placed or performed during the hospital encounter of 01/01/24 (from the past 24 hour(s))   POCT GLUCOSE   Result Value Ref Range    POCT Glucose 166 (H) 74 - 99 mg/dL   POCT GLUCOSE   Result Value Ref Range     POCT Glucose 143 (H) 74 - 99 mg/dL   POCT GLUCOSE   Result Value Ref Range    POCT Glucose 160 (H) 74 - 99 mg/dL   POCT GLUCOSE   Result Value Ref Range    POCT Glucose 171 (H) 74 - 99 mg/dL   Magnesium   Result Value Ref Range    Magnesium 2.15 1.60 - 2.40 mg/dL   Renal Function Panel   Result Value Ref Range    Glucose 144 (H) 74 - 99 mg/dL    Sodium 135 (L) 136 - 145 mmol/L    Potassium 3.9 3.5 - 5.3 mmol/L    Chloride 96 (L) 98 - 107 mmol/L    Bicarbonate 32 21 - 32 mmol/L    Anion Gap 11 10 - 20 mmol/L    Urea Nitrogen 21 6 - 23 mg/dL    Creatinine 1.04 0.50 - 1.30 mg/dL    eGFR 76 >60 mL/min/1.73m*2    Calcium 9.7 8.6 - 10.6 mg/dL    Phosphorus 3.6 2.5 - 4.9 mg/dL    Albumin 2.9 (L) 3.4 - 5.0 g/dL   CBC and Auto Differential   Result Value Ref Range    WBC 5.7 4.4 - 11.3 x10*3/uL    nRBC 0.0 0.0 - 0.0 /100 WBCs    RBC 4.08 (L) 4.50 - 5.90 x10*6/uL    Hemoglobin 12.6 (L) 13.5 - 17.5 g/dL    Hematocrit 39.1 (L) 41.0 - 52.0 %    MCV 96 80 - 100 fL    MCH 30.9 26.0 - 34.0 pg    MCHC 32.2 32.0 - 36.0 g/dL    RDW 13.4 11.5 - 14.5 %    Platelets 110 (L) 150 - 450 x10*3/uL    Neutrophils % 50.4 40.0 - 80.0 %    Immature Granulocytes %, Automated 3.3 (H) 0.0 - 0.9 %    Lymphocytes % 18.8 13.0 - 44.0 %    Monocytes % 10.4 2.0 - 10.0 %    Eosinophils % 16.0 0.0 - 6.0 %    Basophils % 1.1 0.0 - 2.0 %    Neutrophils Absolute 2.88 1.60 - 5.50 x10*3/uL    Immature Granulocytes Absolute, Automated 0.19 0.00 - 0.50 x10*3/uL    Lymphocytes Absolute 1.07 0.80 - 3.00 x10*3/uL    Monocytes Absolute 0.59 0.05 - 0.80 x10*3/uL    Eosinophils Absolute 0.91 (H) 0.00 - 0.40 x10*3/uL    Basophils Absolute 0.06 0.00 - 0.10 x10*3/uL       Imaging:  MR cervical spine wo IV contrast    Result Date: 1/9/2024  Interpreted By:  Joey Rizzo, STUDY: MR CERVICAL SPINE WO IV CONTRAST;  1/8/2024 10:18 pm   INDICATION: Signs/Symptoms:R arm weakness.   COMPARISON: None.   ACCESSION NUMBER(S): [id number]   ORDERING CLINICIAN: DIEGO  ABARCA   TECHNIQUE: The cervical spine was studied in the sagittal and axial planes utilizing T1 and T2 weighted images. Examination is limited due to motion artifact   FINDINGS: The craniovertebral junction is normal. The cord is normal in size and signal. The marrow signal is normal. Serial axial images reveal the following: C2/C3 There is normal alignment and vertebral body height. The disc space is normal. There is no evidence of canal or foraminal narrowing. There is no evidence of bulging or herniated disc. C3/C4 There is normal alignment and vertebral body height. The disc space is normal. There is no evidence of canal or foraminal narrowing. There is no evidence of bulging or herniated disc. C4/C5 Right greater than left uncovertebral joint hypertrophy with focal right-sided foraminal narrowing. No measurable canal stenosis. C5/C6 Right greater than left uncovertebral joint hypertrophy without canal stenosis. Right-sided foraminal narrowing C6/C7 Bilateral facet hypertrophy without canal or foraminal narrowing C7/T1 Bilateral facet hypertrophy without canal or foraminal narrowing       * Right-sided foraminal narrowing due to uncovertebral joint hypertrophy at C4/C5 and C5/C6 *No measurable canal stenosis or cord compression.   MACRO: none   Signed by: Joey Rizzo 1/9/2024 9:02 AM Dictation workstation:   [id number]       Assessment/Plan   Mr. Gopal Ness is a 72 y/o man with h/o pituitary adenoma s/p resection at [facility], DVT/PE on Eliquis, COPD (noncompliant with medications)/tobacco use, depresssion, DMII, HLD presenting after being found down i/s/o fall on 12/31 (LKN 1100 on 12/31). Brought to OSH, negative CTH, intubated for airway protection. EMS reported witnessing possible seizure activity. Also with persistent tachycardia, leukocytosis and infiltrate on chest CT, so treating presumed aspiration pneumonia with antibiotics. Transferred to [facility] for cvEEG which did not demonstrate seizures. MRI  brain did not show acute stroke. Extubated 1/2. Course complicated by Covid pnuemonia, LEEANNE, SVT likely in the setting of infection, and RUE weakness in the setting of a fall. Currently admitted for continued altered mental status despite resolution of infection, correction of electrolytes, and lack of structural lesions that would explain his change in mentation. Code status was changed to DNR/DNI 1/17.      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 New small dural based enhancing masses in the R posterior fossa  - MRI Brain and MRI Sella - residual adenoma unchanged from prior examination and similar to slightly increased size of small dural-based enhancing masses of small dural-based enhancing masses within the R posterior fossa favored to represent meningiomas.  - EEG - mod diffuse encephalopathy. Improvement in degree of encephalopathy compared to the previous day. Keppra DC.  - MRI cervical spine showed right-sided foraminal narrowing d/t uncovertebral joint hypertrophy at C4/C5 and C5/C6  - Repeat CT on 1/14 - atrophy, no acute intercranial pathology   Plan:  - Hold home trazadone 50mg at bedtime  - Continue home sertraline 50mg daily   - Outpatient neurosurgery/neuro-ophthalmology follow up  - EMG as an outpatient, follow up with neurology in 4 weeks  - Corrected underlying metabolic and  infectious causes of encephalopathy. No structural causes seen on imaging.     #Acute hypoxic respiratory failure  #COVID pneumonia, CAP  #COPD  - Maintain O2- 88-92%, currently on 4-5L NC  - Cpap at night  - Elevated CRP, LDH, Ferritin, d-dimer  - Sputum culture - Strep pneumoniae  - s/p 1x cefepime 2g on 1/1, meropenem (1/1-1/2), Vanc 1.5g (1/1-1/2) then ceftriaxone 2g (1/2- 1/8)   - Finished remdesivir (1/6-1/10), dexamethasone 6mg daily (stopped 1/10)  - Pulmonology consulted: Will start albuterol nebs q6h, RT for BPH, chest vest therapy. Duonebs PRN. No abx at this time.     #Hypernatremia - resolved  #LEEANNE (baseline Cr 0.8-0.9) - resolved  #Hx of DI   - Hold home tamsulosin 0.4mg daily while NPO  - Hold further D5W, continue with 400ml q6h FWF     #SVT - improved  - Several episodes of SVT, terminated spontaneously, some associated with hypotension down to 80/50s.  - Consulted cardiology, pending recs.         -Metoprolol tartrate 25mg BID from 12.5mg q6h        -If recurrent of persistent SVT, start with vagal maneuvers (carotid sinus massage), if unsuccessful, may consider Adenosine dose and/or IV Metop        -Please continue to monitor patient on telemetry        -Please obtain EKG if there's recurrence of SVT        -Optimal lytes: K>4, Mag>2    #panhypothyroidism s/p adenoma resection  #Cushing disease  #diabetes mellitus  - Holding home glimepiride 4mg BID, metformin 1000mg BID  - A1C 5.6  - AM free T4 0.92  Plan:  - Mild corrective SSI #1  - POCT glucose q6hrs while on tube feeds, Hypoglycemia protocol  - Continue home levothyroxine 200mcg daily  - Glargine 8u nightly  - follow up outpatient with endocrinologist Dr. Yvonne Lofton in 4 weeks (scheduled by endocrine)     #hx of DVT/PE  #thrombocytopenia  #Eosinophilia  - H/H: 16.9/51 -> 14.3/45.8  - PLT: consistently ~ while here. Over past two days have been 70s.  - Daily CBC    - Hold home Eliquis at this time   - Lovenox 40mg subcutaneous for  prophylaxis  - Peripheral smear 1/25 - no acute findings/issues with morphology reported     F: FWF with TF  E: Replete PRN  N: Nepro tube feeds  A: PIVs    Lomeli: None  GI ppx: PPI  DVT ppx: lovenox     Code status: DNR/DNI  NOK/Surrogate: Donya Emerson (Daughter) [phone]      Ej Ventura MD  PGY-1 Internal Medicine

## 2024-01-29 NOTE — CARE PLAN
The patient's goals for the shift include  Managemnt of oxygenation with tapering of nasal oxygen    The clinical goals for the shift include patient will be able to tolerate Nphro tube feeding throughout the day    Over the shift, the patient did not make progress toward the following goals. Barriers to progression     Problem: Diabetes  Goal: Learn about and adhere to nutrition recommendations by end of shift  Outcome: Not Progressing  Goal: Increase self care and/or family involovement by end of shift  Outcome: Not Progressing     Problem: Fall/Injury  Goal: Verbalize understanding of risk factor reduction measures to prevent injury from fall in the home  Outcome: Not Progressing  Goal: Use assistive devices by end of the shift  Outcome: Not Progressing  Goal: Pace activities to prevent fatigue by end of the shift  Outcome: Not Progressing

## 2024-01-29 NOTE — CARE PLAN
Palliative care consulted for advanced care planning and pt/family support. GOC discussions completed with pt's daughter, Donya, over the phone; agreeable to DNR/DNI at this time. Symptoms managed by primary team.     Palliative care will sign off at this time but palliative care SW and/or  will continue to follow peripherally.  Please don't hesitate to page us if any further questions arise.  Thank you for allowing us to participate in the care of this patient.     Aishwarya Hyatt, CHEMA-CNP

## 2024-01-29 NOTE — PROGRESS NOTES
1/29/24 1216 Transitional Care Coordinator Notes:    Patient is medically ready to discharge. [facility] should have beds available this week. Updated progress notes and MAR sent to facility. Will send updated therapy notes once available.    6558 Updated PT/OT notes sent to facility.                       Assessment/Plan   Principal Problem:    AMS (altered mental status)    Discharge Plans: discharge to SNF            Annalisa Whittaker RN

## 2024-01-29 NOTE — PROGRESS NOTES
Occupational Therapy    Occupational Therapy Treatment    Name: Gopal Ness  MRN: [id number]  : 1950  Date: 24  Time Calculation  Start Time: 1458  Stop Time: 1525  Time Calculation (min): 27 min    Assessment:  OT Assessment: Patient limited by cognitive deficits, impaired balance, impaired trunk control, impaired strength. He has excellent support and encouragement from daughter.  Prognosis: Good  Evaluation/Treatment Tolerance: Patient limited by fatigue  Medical Staff Made Aware: Yes  End of Session Communication: Bedside nurse  End of Session Patient Position: Bed, 3 rail up, Alarm off, not on at start of session  Plan:  Treatment Interventions: ADL retraining, Functional transfer training, UE strengthening/ROM, Endurance training, Cognitive reorientation, Patient/family training, Equipment evaluation/education, Compensatory technique education, Continued evaluation  OT Frequency: 3 times per week  OT Discharge Recommendations:  (Patient remains appropriate for moderate intensity, moderate frequency OT following discharge.)  Equipment Recommended upon Discharge: Wheeled walker  OT - OK to Discharge:  (OT Re-Evaluation completed.)    Subjective   Previous Visit Info:  OT Last Visit  OT Received On: 24  General:  General  Family/Caregiver Present: Yes  Caregiver Feedback: daughter present  Co-Treatment: PT  Co-Treatment Reason: to facilitate and progress safe mobility  Prior to Session Communication: Bedside nurse  Patient Position Received: Bed, 3 rail up, Alarm off, not on at start of session  Precautions:  Hearing/Visual Limitations: Hearing WFL, vision appears impaired, pt unable to read clock on wall. dysconjugate gaze  Medical Precautions: Fall precautions, Oxygen therapy device and L/min  Precautions Comment: Jose David villalobos present  Vitals:     Pain Assessment:        Objective   Activities of Daily Living: Grooming  Grooming Level of Assistance: Maximum assistance (Patient washed forehead  with poor quality. He required max cues and hand over hand initiation.  Patient refused oral care with toothette. Patient dependent to apply lotion.)    Functional Standing Tolerance:     Bed Mobility/Transfers: Bed Mobility  Bed Mobility: Yes  Bed Mobility 1  Bed Mobility 1: Rolling right, Rolling left  Level of Assistance 1: Maximum assistance (2 person assist)  Bed Mobility Comments 1: Patient assisted by grasping bedrail.  Bed Mobility 2  Bed Mobility  2: Scooting  Level of Assistance 2: Dependent (x2 person assist)  Bed Mobility 3  Bed Mobility 3: Supine sitting, Sitting to supine  Level of Assistance 3: Maximum assistance (2 person assist)  Bed Mobility Comments 3: with elevated HOB    Transfers  Transfer: Yes  Transfer 1  Transfer From 1: Sit to, Stand to  Transfer to 1: Stand, Sit  Transfer Level of Assistance 1:  (bilateral arm in arm assist, patient completed 3 trials, poor elevation on trials 1 and 2 with buttocks barely clearning bed, patient achieved approximately 75-80% stand on 3rd trial.)    Sitting Balance:  Static Sitting Balance  Static Sitting-Balance Support:  (bilateral and single UE support)  Static Sitting-Level of Assistance:  (fluctuated between CGA and Max assist)  Dynamic Sitting Balance  Dynamic Sitting-Balance:  (fluctuated between CGA and max assist)  Standing Balance:  Static Standing Balance  Static Standing-Level of Assistance: Maximum assistance (of 2 for partial stand)    Outcome Measures:  [facility] Daily Activity  Putting on and taking off regular lower body clothing: Total  Bathing (including washing, rinsing, drying): Total  Putting on and taking off regular upper body clothing: Total  Toileting, which includes using toilet, bedpan or urinal: Total  Taking care of personal grooming such as brushing teeth: Total  Eating Meals: Total  Daily Activity - Total Score: 6        Education Documentation  Precautions, taught by Jasmyn Kendall OT at 1/29/2024  3:41 PM.  Learner: Family,  Patient  Readiness: Acceptance  Method: Explanation  Response: Needs Reinforcement  Comment: Patient unable to grasp learning concepts. Daughter demonstrated good understanding.    ADL Training, taught by Jasmyn Kendall OT at 1/29/2024  3:41 PM.  Learner: Family, Patient  Readiness: Acceptance  Method: Explanation  Response: Needs Reinforcement  Comment: Patient unable to grasp learning concepts. Daughter demonstrated good understanding.    Education Comments  No comments found.      Goals:  Encounter Problems       Encounter Problems (Active)       ADLs       Patient will perform UB and LB bathing with Mod A using AE and adaptive tech as needed. (Progressing)       Start:  01/04/24    Expected End:  02/03/24            Patient with complete upper body dressing with Min A seated in B/S chair vs. EOB after setup with VC's. (Progressing)       Start:  01/04/24    Expected End:  02/03/24            Patient with complete lower body dressing with Mod A using AE and adaptive tech as needed. (Progressing)       Start:  01/04/24    Expected End:  02/03/24            Patient will complete daily grooming tasks with CGA and AE as needed after set up while seated on EOB. (Progressing)       Start:  01/04/24    Expected End:  02/03/24            Patient will complete toileting including hygiene clothing management/hygiene with Mod A using raised toilet vs. BSC pending progress with mobility/transfers (Progressing)       Start:  01/04/24    Expected End:  02/03/24               BALANCE       Pt will maintain tolerate sitting EOB >15 min with SBA without LOB during ADLs. (Progressing)       Start:  01/04/24    Expected End:  02/07/24                       COGNITION/SAFETY       Pt will be alert and oriented x 3 and follow one step simple commands 75-90% of time. (Progressing)       Start:  01/04/24    Expected End:  02/03/24                           TRANSFERS       Patient will perform bed mobility with Mod A. (Progressing)        Start:  01/04/24    Expected End:  02/07/24            Patient will complete functional transfers with Mod A using LRAD. (Progressing)       Start:  01/04/24    Expected End:  02/07/24

## 2024-01-29 NOTE — PROGRESS NOTES
Physical Therapy    Physical Therapy Treatment    Patient Name: Gopal Ness  MRN: [id number]  Today's Date: 1/29/2024  Time Calculation  Start Time: 1457  Stop Time: 1525  Time Calculation (min): 28 min       Assessment/Plan   PT Assessment  Assessment Comment: Continue to recommend MOD intensity PT after DC.  End of Session Patient Position: Bed, 3 rail up, Alarm on  PT Plan  Inpatient/Swing Bed or Outpatient: Inpatient  PT Plan  Treatment/Interventions: Bed mobility, Transfer training, Gait training, Stair training, Balance training, Neuromuscular re-education, Strengthening, Endurance training, Therapeutic exercise, Therapeutic activity, Home exercise program, Positioning, Postural re-education  PT Plan: Skilled PT  PT Eval Only Reason:  (predict patient at or near new baseline function)  PT Frequency: 3 times per week  PT Discharge Recommendations: Moderate intensity level of continued care  Equipment Recommended upon Discharge: Wheeled walker  PT Recommended Transfer Status: Total assist  PT - OK to Discharge: Yes (PT eval complete and DC rec made)      General Visit Information:   PT  Visit  PT Received On: 01/29/24  Response to Previous Treatment: Patient unable to report, no changes reported from family or staff  General  Family/Caregiver Present: Yes  Caregiver Feedback: daughter present during session  Co-Treatment: OT  Co-Treatment Reason: to safely progress functional mobility and expedite DC planning  Patient Position Received: Bed, 3 rail up, Alarm off, not on at start of session  General Comment: Pt supine in bed upon entry to room. Pt opening eyes to verbal stimuli, initially cooperative. Noted dobhoff, tele, external catheter, 3 L via NC.    Subjective   Precautions:  Precautions  Medical Precautions: Fall precautions, Oxygen therapy device and L/min  Vital Signs:       Objective   Pain:  Pain Assessment  Pain Assessment: 0-10  Pain Score: 0 - No pain  Cognition:  Cognition  Overall Cognitive  Status: Impaired  Arousal/Alertness: Delayed responses to stimuli  Orientation Level: Disoriented to time, Disoriented to situation  Postural Control:  Postural Control  Posture Comment: pt with R retro lean  Static Sitting Balance  Static Sitting-Comment/Number of Minutes: cga-max assist, with pt flucuating with level of assist required  Static Standing Balance  Static Standing-Comment/Number of Minutes: max x 2    Treatments:  Therapeutic Activity  Therapeutic Activity Performed: Yes  Therapeutic Activity 1: pt sitting EOB for~ 10 minutes with cga-max assist with noted R retro lean. Pt needing verbal cuing to stay engaged and alert during session.    Bed Mobility  Bed Mobility: Yes  Bed Mobility 1  Bed Mobility 1: Rolling right, Rolling left  Level of Assistance 1: Maximum assistance, Maximum verbal cues, Maximum tactile cues  Bed Mobility Comments 1: x2; x2 trials throughout session  Bed Mobility 2  Bed Mobility  2: Supine to sitting, Sitting to supine  Level of Assistance 2: Maximum assistance, Maximum verbal cues, Maximum tactile cues  Bed Mobility Comments 2: x2    Transfers  Transfer: Yes  Transfer 1  Technique 1: Sit to stand, Stand to sit  Transfer Device 1:  (B arm in arm assist)  Transfer Level of Assistance 1: Maximum assistance (x2)  Trials/Comments 1: 3 trials; poor effort first two trials; pt achieving ~75% off full stand on 3rd trial    Outcome Measures:  [facility] Basic Mobility  Turning from your back to your side while in a flat bed without using bedrails: Total  Moving from lying on your back to sitting on the side of a flat bed without using bedrails: Total  Moving to and from bed to chair (including a wheelchair): Total  Standing up from a chair using your arms (e.g. wheelchair or bedside chair): Total  To walk in hospital room: Total  Climbing 3-5 steps with railing: Total  Basic Mobility - Total Score: 6    Education Documentation  Mobility Training, taught by Cinthya Eng, PT at 1/29/2024   3:53 PM.  Learner: Patient  Readiness: Acceptance  Method: Explanation  Response: Needs Reinforcement    Education Comments  No comments found.        OP EDUCATION:       Encounter Problems       Encounter Problems (Active)       Balance       Patient will stand with UE support of LRD and MOD A x1 at least 5 min to improve balance required for self-care tasks.  (Not Progressing)       Start:  01/04/24    Expected End:  02/03/24               Mobility       Patient will ambulate at least 10 ft. with </= MAX A x1 and LRD to improve tolerance of household distances.  (Not Progressing)       Start:  01/04/24    Expected End:  02/03/24            Patient will perform bed mobility with </= MIN A x1 to reduce risk of developing decubitus ulcers.  (Progressing)       Start:  01/04/24    Expected End:  02/03/24             LLE >/= 4+/5, RLE >/= 3+/5  (Progressing)       Start:  01/04/24    Expected End:  02/03/24               Pain - Adult          Transfers       Patient will perform sit to stand and stand to sit transfers with </= MAX A x1 and LRD to increase functional strength.  (Not Progressing)       Start:  01/04/24    Expected End:  02/03/24 01/29/24 at 3:54 PM - Cinthya Eng, PT

## 2024-01-30 LAB
GLUCOSE BLD MANUAL STRIP-MCNC: 155 MG/DL (ref 74–99)
GLUCOSE BLD MANUAL STRIP-MCNC: 166 MG/DL (ref 74–99)
GLUCOSE BLD MANUAL STRIP-MCNC: 175 MG/DL (ref 74–99)

## 2024-01-30 PROCEDURE — 99232 SBSQ HOSP IP/OBS MODERATE 35: CPT

## 2024-01-30 PROCEDURE — 2500000004 HC RX 250 GENERAL PHARMACY W/ HCPCS (ALT 636 FOR OP/ED)

## 2024-01-30 PROCEDURE — 82947 ASSAY GLUCOSE BLOOD QUANT: CPT

## 2024-01-30 PROCEDURE — 2500000001 HC RX 250 WO HCPCS SELF ADMINISTERED DRUGS (ALT 637 FOR MEDICARE OP)

## 2024-01-30 PROCEDURE — 1100000001 HC PRIVATE ROOM DAILY

## 2024-01-30 PROCEDURE — 2500000001 HC RX 250 WO HCPCS SELF ADMINISTERED DRUGS (ALT 637 FOR MEDICARE OP): Performed by: STUDENT IN AN ORGANIZED HEALTH CARE EDUCATION/TRAINING PROGRAM

## 2024-01-30 RX ADMIN — INSULIN GLARGINE 8 UNITS: 100 INJECTION, SOLUTION SUBCUTANEOUS at 21:08

## 2024-01-30 RX ADMIN — INSULIN HUMAN 1 UNITS: 100 INJECTION, SOLUTION PARENTERAL at 19:50

## 2024-01-30 RX ADMIN — INSULIN HUMAN 1 UNITS: 100 INJECTION, SOLUTION PARENTERAL at 12:06

## 2024-01-30 RX ADMIN — Medication 1 TABLET: at 09:32

## 2024-01-30 RX ADMIN — INSULIN HUMAN 1 UNITS: 100 INJECTION, SOLUTION PARENTERAL at 06:05

## 2024-01-30 RX ADMIN — ENOXAPARIN SODIUM 40 MG: 100 INJECTION SUBCUTANEOUS at 09:31

## 2024-01-30 RX ADMIN — SERTRALINE HYDROCHLORIDE 50 MG: 50 TABLET ORAL at 09:32

## 2024-01-30 RX ADMIN — LEVOTHYROXINE SODIUM 200 MCG: 75 TABLET ORAL at 06:04

## 2024-01-30 RX ADMIN — METOPROLOL TARTRATE 25 MG: 25 TABLET, FILM COATED ORAL at 09:00

## 2024-01-30 RX ADMIN — METOPROLOL TARTRATE 25 MG: 25 TABLET, FILM COATED ORAL at 21:08

## 2024-01-30 ASSESSMENT — COGNITIVE AND FUNCTIONAL STATUS - GENERAL
EATING MEALS: TOTAL
TOILETING: TOTAL
DRESSING REGULAR LOWER BODY CLOTHING: TOTAL
CLIMB 3 TO 5 STEPS WITH RAILING: TOTAL
TOILETING: TOTAL
DRESSING REGULAR LOWER BODY CLOTHING: TOTAL
DAILY ACTIVITIY SCORE: 6
STANDING UP FROM CHAIR USING ARMS: TOTAL
CLIMB 3 TO 5 STEPS WITH RAILING: TOTAL
DAILY ACTIVITIY SCORE: 6
MOVING TO AND FROM BED TO CHAIR: TOTAL
MOVING FROM LYING ON BACK TO SITTING ON SIDE OF FLAT BED WITH BEDRAILS: TOTAL
MOVING TO AND FROM BED TO CHAIR: TOTAL
DAILY ACTIVITIY SCORE: 6
MOVING FROM LYING ON BACK TO SITTING ON SIDE OF FLAT BED WITH BEDRAILS: TOTAL
STANDING UP FROM CHAIR USING ARMS: TOTAL
DRESSING REGULAR UPPER BODY CLOTHING: TOTAL
HELP NEEDED FOR BATHING: TOTAL
DAILY ACTIVITIY SCORE: 6
TURNING FROM BACK TO SIDE WHILE IN FLAT BAD: TOTAL
MOBILITY SCORE: 6
WALKING IN HOSPITAL ROOM: TOTAL
MOVING FROM LYING ON BACK TO SITTING ON SIDE OF FLAT BED WITH BEDRAILS: TOTAL
TURNING FROM BACK TO SIDE WHILE IN FLAT BAD: TOTAL
HELP NEEDED FOR BATHING: TOTAL
CLIMB 3 TO 5 STEPS WITH RAILING: TOTAL
HELP NEEDED FOR BATHING: TOTAL
PERSONAL GROOMING: TOTAL
TURNING FROM BACK TO SIDE WHILE IN FLAT BAD: TOTAL
MOVING TO AND FROM BED TO CHAIR: TOTAL
DRESSING REGULAR UPPER BODY CLOTHING: TOTAL
WALKING IN HOSPITAL ROOM: TOTAL
MOBILITY SCORE: 6
WALKING IN HOSPITAL ROOM: TOTAL
MOBILITY SCORE: 6
EATING MEALS: TOTAL
DRESSING REGULAR LOWER BODY CLOTHING: TOTAL
DRESSING REGULAR LOWER BODY CLOTHING: TOTAL
TOILETING: TOTAL
EATING MEALS: TOTAL
PERSONAL GROOMING: TOTAL
TOILETING: TOTAL
PERSONAL GROOMING: TOTAL
TURNING FROM BACK TO SIDE WHILE IN FLAT BAD: TOTAL
DRESSING REGULAR UPPER BODY CLOTHING: TOTAL
PERSONAL GROOMING: TOTAL
MOBILITY SCORE: 6
EATING MEALS: TOTAL
WALKING IN HOSPITAL ROOM: TOTAL
STANDING UP FROM CHAIR USING ARMS: TOTAL
MOVING FROM LYING ON BACK TO SITTING ON SIDE OF FLAT BED WITH BEDRAILS: TOTAL
DRESSING REGULAR UPPER BODY CLOTHING: TOTAL
STANDING UP FROM CHAIR USING ARMS: TOTAL
MOVING TO AND FROM BED TO CHAIR: TOTAL
CLIMB 3 TO 5 STEPS WITH RAILING: TOTAL
HELP NEEDED FOR BATHING: TOTAL

## 2024-01-30 ASSESSMENT — PAIN - FUNCTIONAL ASSESSMENT
PAIN_FUNCTIONAL_ASSESSMENT: 0-10
PAIN_FUNCTIONAL_ASSESSMENT: 0-10

## 2024-01-30 ASSESSMENT — PAIN SCALES - GENERAL
PAINLEVEL_OUTOF10: 0 - NO PAIN
PAINLEVEL_OUTOF10: 0 - NO PAIN

## 2024-01-30 NOTE — PROGRESS NOTES
Subjective   No acute events overnight. Exam similar to previously. Continues to engage with 1-2 word responses.    Objective     Vitals:  Vitals:    01/30/24 1116   BP: 103/58   Pulse:    Resp: 16   Temp: 36.3 °C (97.3 °F)   SpO2: 90%       I/O last 3 completed shifts:  In: 4455 (45.1 mL/kg) [NG/GT:4455]  Out: 2750 (27.8 mL/kg) [Urine:2750 (0.8 mL/kg/hr)]  Weight: 98.8 kg   No intake/output data recorded.    Physical exam:  General: in and out of sleep, will awake to questions and answer with mostly one word responses, follows commands  HEENT: pupils equal and round, no scleral icterus  Skin: no suspect lesions or rashes noted on visible skin  Chest: On 4-5L NC O2, lung sounds coarse, no wheezing appreciated  Cardiac: regular rate, normal s1, s2, no M/R/G  Abdomen: soft, ND, NT, no involuntary guarding  : no flank pain or indwelling urinary catheter  EXT: no peripheral edema, no asymmetry noted  MSK: no focal joint swelling noted  Neuro: Will nod yes/no to question, but remains somnolent, follows simple instructions, moves all 4 extremities when prompted. AxO x1-2  Psych: Unable to assess    Medications:  enoxaparin, 40 mg, subcutaneous, Daily  esomeprazole, 20 mg, nasogastric tube, Daily before breakfast  insulin glargine, 8 Units, subcutaneous, Nightly  insulin regular, 0-5 Units, subcutaneous, q6h  levothyroxine, 200 mcg, oral, Daily  metoprolol tartrate, 25 mg, nasogastric tube, BID  multivitamin with minerals, 1 tablet, oral, Daily  polyethylene glycol, 17 g, oral, Daily  sennosides, 2 tablet, oral, BID  sertraline, 50 mg, nasogastric tube, Daily         PRN medications: albuterol, dextrose 10 % in water (D10W), dextrose, diclofenac sodium, glucagon, oxygen, phenoL    Labs:  Results for orders placed or performed during the hospital encounter of 01/01/24 (from the past 24 hour(s))   POCT GLUCOSE   Result Value Ref Range    POCT Glucose 144 (H) 74 - 99 mg/dL   POCT GLUCOSE   Result Value Ref Range    POCT  Glucose 157 (H) 74 - 99 mg/dL   POCT GLUCOSE   Result Value Ref Range    POCT Glucose 175 (H) 74 - 99 mg/dL   POCT GLUCOSE   Result Value Ref Range    POCT Glucose 139 (H) 74 - 99 mg/dL   POCT GLUCOSE   Result Value Ref Range    POCT Glucose 166 (H) 74 - 99 mg/dL   POCT GLUCOSE   Result Value Ref Range    POCT Glucose 155 (H) 74 - 99 mg/dL       Imaging:  MR cervical spine wo IV contrast    Result Date: 1/9/2024  Interpreted By:  Joey Rizzo, STUDY: MR CERVICAL SPINE WO IV CONTRAST;  1/8/2024 10:18 pm   INDICATION: Signs/Symptoms:R arm weakness.   COMPARISON: None.   ACCESSION NUMBER(S): [id number]   ORDERING CLINICIAN: DIEGO ABARCA   TECHNIQUE: The cervical spine was studied in the sagittal and axial planes utilizing T1 and T2 weighted images. Examination is limited due to motion artifact   FINDINGS: The craniovertebral junction is normal. The cord is normal in size and signal. The marrow signal is normal. Serial axial images reveal the following: C2/C3 There is normal alignment and vertebral body height. The disc space is normal. There is no evidence of canal or foraminal narrowing. There is no evidence of bulging or herniated disc. C3/C4 There is normal alignment and vertebral body height. The disc space is normal. There is no evidence of canal or foraminal narrowing. There is no evidence of bulging or herniated disc. C4/C5 Right greater than left uncovertebral joint hypertrophy with focal right-sided foraminal narrowing. No measurable canal stenosis. C5/C6 Right greater than left uncovertebral joint hypertrophy without canal stenosis. Right-sided foraminal narrowing C6/C7 Bilateral facet hypertrophy without canal or foraminal narrowing C7/T1 Bilateral facet hypertrophy without canal or foraminal narrowing       * Right-sided foraminal narrowing due to uncovertebral joint hypertrophy at C4/C5 and C5/C6 *No measurable canal stenosis or cord compression.   MACRO: none   Signed by: Joey Rizzo  1/9/2024 9:02 AM Dictation workstation:   [id number]       Assessment/Plan   Mr. Gopal Ness is a 74 y/o man with h/o pituitary adenoma s/p resection at [facility], DVT/PE on Eliquis, COPD (noncompliant with medications)/tobacco use, depresssion, DMII, HLD presenting after being found down i/s/o fall on 12/31 (LKN 1100 on 12/31). Brought to OSH, negative CTH, intubated for airway protection. EMS reported witnessing possible seizure activity. Also with persistent tachycardia, leukocytosis and infiltrate on chest CT, so treating presumed aspiration pneumonia with antibiotics. Transferred to [facility] for cvEEG which did not demonstrate seizures. MRI brain did not show acute stroke. Extubated 1/2. Course complicated by Covid pnuemonia, LEEANNE, SVT likely in the setting of infection, and RUE weakness in the setting of a fall. Currently admitted for continued altered mental status despite resolution of infection, correction of electrolytes, and lack of structural lesions that would explain his change in mentation. Code status was changed to DNR/DNI 1/17.      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 New small dural based enhancing masses in the R posterior fossa  - MRI Brain and MRI Sella - residual adenoma unchanged from prior examination and similar to slightly increased size of small dural-based enhancing masses of  small dural-based enhancing masses within the R posterior fossa favored to represent meningiomas.  - EEG - mod diffuse encephalopathy. Improvement in degree of encephalopathy compared to the previous day. Keppra DC.  - MRI cervical spine showed right-sided foraminal narrowing d/t uncovertebral joint hypertrophy at C4/C5 and C5/C6  - Repeat CT on 1/14 - atrophy, no acute intercranial pathology   Plan:  - Hold home trazadone 50mg at bedtime  - Continue home sertraline 50mg daily   - Outpatient neurosurgery/neuro-ophthalmology follow up  - EMG as an outpatient, follow up with neurology in 4 weeks  - Corrected underlying metabolic and infectious causes of encephalopathy. No structural causes seen on imaging.     #Acute hypoxic respiratory failure  #COVID pneumonia, CAP  #COPD  - Maintain O2- 88-92%, currently on 4-5L NC  - Cpap at night  - Elevated CRP, LDH, Ferritin, d-dimer  - Sputum culture - Strep pneumoniae  - s/p 1x cefepime 2g on 1/1, meropenem (1/1-1/2), Vanc 1.5g (1/1-1/2) then ceftriaxone 2g (1/2- 1/8)   - Finished remdesivir (1/6-1/10), dexamethasone 6mg daily (stopped 1/10)  - Pulmonology consulted: Will start albuterol nebs q6h, RT for BPH, chest vest therapy. Duonebs PRN. No abx at this time.     #Hypernatremia - resolved  #LEEANNE (baseline Cr 0.8-0.9) - resolved  #Hx of DI   - Hold home tamsulosin 0.4mg daily while NPO  - Hold further D5W, continue with 400ml q6h FWF     #SVT - improved  - Several episodes of SVT, terminated spontaneously, some associated with hypotension down to 80/50s.  - Consulted cardiology, pending recs.         -Metoprolol tartrate 25mg BID from 12.5mg q6h        -If recurrent of persistent SVT, start with vagal maneuvers (carotid sinus massage), if unsuccessful, may consider Adenosine dose and/or IV Metop        -Please continue to monitor patient on telemetry        -Please obtain EKG if there's recurrence of SVT        -Optimal lytes: K>4, Mag>2    #panhypothyroidism s/p adenoma  resection  #Cushing disease  #diabetes mellitus  - Holding home glimepiride 4mg BID, metformin 1000mg BID  - A1C 5.6  - AM free T4 0.92  Plan:  - Mild corrective SSI #1  - POCT glucose q6hrs while on tube feeds, Hypoglycemia protocol  - Continue home levothyroxine 200mcg daily  - Glargine 8u nightly  - follow up outpatient with endocrinologist Dr. Yvonne Lofton in 4 weeks (scheduled by endocrine)     #hx of DVT/PE  #thrombocytopenia  #Eosinophilia  - H/H: 16.9/51 -> 14.3/45.8  - PLT: consistently ~ while here. Over past two days have been 70s.  - Daily CBC    - Hold home Eliquis at this time   - Lovenox 40mg subcutaneous for prophylaxis  - Peripheral smear 1/25 - no acute findings/issues with morphology reported     F: FWF with TF  E: Replete PRN  N: Nepro tube feeds  A: PIVs    Lomeli: None  GI ppx: PPI  DVT ppx: lovenox     Code status: DNR/DNI  NOK/Surrogate: Donya Emerson (Daughter) [phone]      Ej Ventura MD  PGY-1 Internal Medicine

## 2024-01-30 NOTE — SIGNIFICANT EVENT
RAPID RESPONSE RN NOTE- RADAR 6     01/30/24 1116   Onset Documentation   Rapid Response Initiated By Radar auto page   Location/Room [facility]  ([facility] [id number])   Pager Time 1114   Arrival Time 1116   Event End Time 1135   Primary Reason for Call Radar auto page  (RADAR 6)     VS: 36.3, 86, 16, 103/58, 90%  Patient resting comfortably in bed, in no apparent distress.    Discussed VS and POC with bedside RN- per RN team ok with POX in the low 90's.  No acute concerns from Nursing at this time- bedside RN to contact Rapid Response Team with any further concerns or patient deterioration.

## 2024-01-31 VITALS
HEART RATE: 82 BPM | RESPIRATION RATE: 18 BRPM | WEIGHT: 217.81 LBS | OXYGEN SATURATION: 90 % | TEMPERATURE: 98.4 F | DIASTOLIC BLOOD PRESSURE: 58 MMHG | BODY MASS INDEX: 29.5 KG/M2 | SYSTOLIC BLOOD PRESSURE: 104 MMHG | HEIGHT: 72 IN

## 2024-01-31 LAB
ALBUMIN SERPL BCP-MCNC: 3 G/DL (ref 3.4–5)
ANION GAP SERPL CALC-SCNC: 13 MMOL/L (ref 10–20)
BASOPHILS # BLD AUTO: 0.07 X10*3/UL (ref 0–0.1)
BASOPHILS NFR BLD AUTO: 1 %
BUN SERPL-MCNC: 21 MG/DL (ref 6–23)
CALCIUM SERPL-MCNC: 9.7 MG/DL (ref 8.6–10.6)
CHLORIDE SERPL-SCNC: 98 MMOL/L (ref 98–107)
CO2 SERPL-SCNC: 31 MMOL/L (ref 21–32)
CREAT SERPL-MCNC: 0.92 MG/DL (ref 0.5–1.3)
EGFRCR SERPLBLD CKD-EPI 2021: 88 ML/MIN/1.73M*2
EOSINOPHIL # BLD AUTO: 0.74 X10*3/UL (ref 0–0.4)
EOSINOPHIL NFR BLD AUTO: 10.5 %
ERYTHROCYTE [DISTWIDTH] IN BLOOD BY AUTOMATED COUNT: 13.5 % (ref 11.5–14.5)
GLUCOSE BLD MANUAL STRIP-MCNC: 131 MG/DL (ref 74–99)
GLUCOSE BLD MANUAL STRIP-MCNC: 154 MG/DL (ref 74–99)
GLUCOSE BLD MANUAL STRIP-MCNC: 155 MG/DL (ref 74–99)
GLUCOSE BLD MANUAL STRIP-MCNC: 185 MG/DL (ref 74–99)
GLUCOSE SERPL-MCNC: 130 MG/DL (ref 74–99)
HCT VFR BLD AUTO: 40.5 % (ref 41–52)
HGB BLD-MCNC: 13.3 G/DL (ref 13.5–17.5)
IMM GRANULOCYTES # BLD AUTO: 0.25 X10*3/UL (ref 0–0.5)
IMM GRANULOCYTES NFR BLD AUTO: 3.5 % (ref 0–0.9)
LYMPHOCYTES # BLD AUTO: 1.43 X10*3/UL (ref 0.8–3)
LYMPHOCYTES NFR BLD AUTO: 20.3 %
MAGNESIUM SERPL-MCNC: 2.09 MG/DL (ref 1.6–2.4)
MCH RBC QN AUTO: 31.5 PG (ref 26–34)
MCHC RBC AUTO-ENTMCNC: 32.8 G/DL (ref 32–36)
MCV RBC AUTO: 96 FL (ref 80–100)
MONOCYTES # BLD AUTO: 0.72 X10*3/UL (ref 0.05–0.8)
MONOCYTES NFR BLD AUTO: 10.2 %
NEUTROPHILS # BLD AUTO: 3.84 X10*3/UL (ref 1.6–5.5)
NEUTROPHILS NFR BLD AUTO: 54.5 %
NRBC BLD-RTO: 0 /100 WBCS (ref 0–0)
PHOSPHATE SERPL-MCNC: 4.1 MG/DL (ref 2.5–4.9)
PLATELET # BLD AUTO: 143 X10*3/UL (ref 150–450)
POTASSIUM SERPL-SCNC: 3.9 MMOL/L (ref 3.5–5.3)
RBC # BLD AUTO: 4.22 X10*6/UL (ref 4.5–5.9)
SODIUM SERPL-SCNC: 138 MMOL/L (ref 136–145)
WBC # BLD AUTO: 7.1 X10*3/UL (ref 4.4–11.3)

## 2024-01-31 PROCEDURE — 36415 COLL VENOUS BLD VENIPUNCTURE: CPT

## 2024-01-31 PROCEDURE — 83735 ASSAY OF MAGNESIUM: CPT

## 2024-01-31 PROCEDURE — 2500000001 HC RX 250 WO HCPCS SELF ADMINISTERED DRUGS (ALT 637 FOR MEDICARE OP): Performed by: STUDENT IN AN ORGANIZED HEALTH CARE EDUCATION/TRAINING PROGRAM

## 2024-01-31 PROCEDURE — 84100 ASSAY OF PHOSPHORUS: CPT

## 2024-01-31 PROCEDURE — 2500000001 HC RX 250 WO HCPCS SELF ADMINISTERED DRUGS (ALT 637 FOR MEDICARE OP)

## 2024-01-31 PROCEDURE — 85025 COMPLETE CBC W/AUTO DIFF WBC: CPT

## 2024-01-31 PROCEDURE — 99239 HOSP IP/OBS DSCHRG MGMT >30: CPT

## 2024-01-31 PROCEDURE — 2500000001 HC RX 250 WO HCPCS SELF ADMINISTERED DRUGS (ALT 637 FOR MEDICARE OP): Performed by: INTERNAL MEDICINE

## 2024-01-31 PROCEDURE — 2500000004 HC RX 250 GENERAL PHARMACY W/ HCPCS (ALT 636 FOR OP/ED)

## 2024-01-31 PROCEDURE — 82947 ASSAY GLUCOSE BLOOD QUANT: CPT

## 2024-01-31 RX ORDER — ALBUTEROL SULFATE 0.83 MG/ML
2.5 SOLUTION RESPIRATORY (INHALATION) EVERY 2 HOUR PRN
Qty: 75 ML | Refills: 11 | Status: SHIPPED | OUTPATIENT
Start: 2024-01-31

## 2024-01-31 RX ORDER — DICLOFENAC SODIUM 10 MG/G
4 GEL TOPICAL 2 TIMES DAILY PRN
Start: 2024-01-31

## 2024-01-31 RX ORDER — MULTIVIT-MIN/IRON FUM/FOLIC AC 7.5 MG-4
1 TABLET ORAL DAILY
Start: 2024-02-01

## 2024-01-31 RX ORDER — METOPROLOL TARTRATE 25 MG/1
25 TABLET, FILM COATED ORAL 2 TIMES DAILY
Start: 2024-01-31

## 2024-01-31 RX ORDER — LEVOTHYROXINE SODIUM 200 UG/1
200 TABLET ORAL DAILY
Start: 2024-02-01

## 2024-01-31 RX ORDER — ESOMEPRAZOLE MAGNESIUM 20 MG/1
20 GRANULE, DELAYED RELEASE ORAL
Start: 2024-02-01

## 2024-01-31 RX ORDER — POLYETHYLENE GLYCOL 3350 17 G/17G
17 POWDER, FOR SOLUTION ORAL DAILY
Start: 2024-02-01

## 2024-01-31 RX ORDER — SENNOSIDES 8.6 MG/1
2 TABLET ORAL 2 TIMES DAILY
Start: 2024-01-31

## 2024-01-31 RX ORDER — SERTRALINE HYDROCHLORIDE 50 MG/1
50 TABLET, FILM COATED ORAL DAILY
Start: 2024-02-01

## 2024-01-31 RX ADMIN — SERTRALINE HYDROCHLORIDE 50 MG: 50 TABLET ORAL at 09:50

## 2024-01-31 RX ADMIN — INSULIN HUMAN 1 UNITS: 100 INJECTION, SOLUTION PARENTERAL at 17:46

## 2024-01-31 RX ADMIN — LEVOTHYROXINE SODIUM 200 MCG: 75 TABLET ORAL at 06:34

## 2024-01-31 RX ADMIN — INSULIN HUMAN 1 UNITS: 100 INJECTION, SOLUTION PARENTERAL at 12:13

## 2024-01-31 RX ADMIN — ESOMEPRAZOLE MAGNESIUM 20 MG: 40 FOR SUSPENSION ORAL at 06:34

## 2024-01-31 RX ADMIN — ENOXAPARIN SODIUM 40 MG: 100 INJECTION SUBCUTANEOUS at 09:49

## 2024-01-31 RX ADMIN — METOPROLOL TARTRATE 25 MG: 25 TABLET, FILM COATED ORAL at 09:49

## 2024-01-31 RX ADMIN — Medication 1 TABLET: at 09:50

## 2024-01-31 ASSESSMENT — COGNITIVE AND FUNCTIONAL STATUS - GENERAL
WALKING IN HOSPITAL ROOM: TOTAL
TURNING FROM BACK TO SIDE WHILE IN FLAT BAD: TOTAL
EATING MEALS: TOTAL
HELP NEEDED FOR BATHING: TOTAL
DRESSING REGULAR LOWER BODY CLOTHING: TOTAL
MOVING FROM LYING ON BACK TO SITTING ON SIDE OF FLAT BED WITH BEDRAILS: TOTAL
DAILY ACTIVITIY SCORE: 6
CLIMB 3 TO 5 STEPS WITH RAILING: TOTAL
TURNING FROM BACK TO SIDE WHILE IN FLAT BAD: TOTAL
STANDING UP FROM CHAIR USING ARMS: TOTAL
DRESSING REGULAR UPPER BODY CLOTHING: TOTAL
DAILY ACTIVITIY SCORE: 6
MOVING FROM LYING ON BACK TO SITTING ON SIDE OF FLAT BED WITH BEDRAILS: TOTAL
CLIMB 3 TO 5 STEPS WITH RAILING: TOTAL
MOVING TO AND FROM BED TO CHAIR: TOTAL
WALKING IN HOSPITAL ROOM: TOTAL
PERSONAL GROOMING: TOTAL
DRESSING REGULAR UPPER BODY CLOTHING: TOTAL
MOBILITY SCORE: 6
TOILETING: TOTAL
TOILETING: TOTAL
STANDING UP FROM CHAIR USING ARMS: TOTAL
HELP NEEDED FOR BATHING: TOTAL
CLIMB 3 TO 5 STEPS WITH RAILING: TOTAL
MOBILITY SCORE: 6
MOVING TO AND FROM BED TO CHAIR: TOTAL
MOBILITY SCORE: 6
PERSONAL GROOMING: TOTAL
EATING MEALS: TOTAL
HELP NEEDED FOR BATHING: TOTAL
PERSONAL GROOMING: TOTAL
MOVING FROM LYING ON BACK TO SITTING ON SIDE OF FLAT BED WITH BEDRAILS: TOTAL
TURNING FROM BACK TO SIDE WHILE IN FLAT BAD: TOTAL
DRESSING REGULAR LOWER BODY CLOTHING: TOTAL
TOILETING: TOTAL
MOVING TO AND FROM BED TO CHAIR: TOTAL
DRESSING REGULAR UPPER BODY CLOTHING: TOTAL
STANDING UP FROM CHAIR USING ARMS: TOTAL
WALKING IN HOSPITAL ROOM: TOTAL
DRESSING REGULAR LOWER BODY CLOTHING: TOTAL
DAILY ACTIVITIY SCORE: 6
EATING MEALS: TOTAL

## 2024-01-31 ASSESSMENT — PAIN SCALES - WONG BAKER: WONGBAKER_NUMERICALRESPONSE: NO HURT

## 2024-01-31 ASSESSMENT — PAIN SCALES - GENERAL: PAINLEVEL_OUTOF10: 0 - NO PAIN

## 2024-01-31 NOTE — DISCHARGE INSTRUCTIONS
Dear Mr. Ness,    You were admitted to [facility] for changes in mental status. While here, you underwent an extensive workup of your mental status including imaging and labs, and were found to show some mild improvement in your mentation. An NG tube was placed to assist you with receiving nutrition. Please follow up with neurology, a primary care provider, and endocrinology after your discharge.    Sincerely,   Your  Internal Medicine Team    Follow Up:  - PCP in 1-2 weeks  - Endocrinology in 2-4 weeks  - Neurology in 2-4 weeks  - Palliative care in 2-4 weeks

## 2024-01-31 NOTE — NURSING NOTE
Discharge note.patient iv removed ng in place . Transport arrived and patient belongings with patient

## 2024-01-31 NOTE — CONSULTS
"Nutrition Follow Up Assessment  Nutrition Assessment         Patient is a 73 y.o. male presenting with AMS.      Nutrition History:  Food and Nutrient History: Pt resting with NG tube in place. Tube feed running at goal of 55mL/hr per pump. Pt reports he is tolerating tube feed and denies any nausea, vomiting, diarrhea, constipation, abdominal pain. Per chart review - PEG tube placement to be considered in future, likely at LTAC, per patient's family's wishes. Last SLP eval 1/25 recommending NPO.    Anthropometrics:  Height: 182.9 cm (6' 0.01\")   Weight: 98.8 kg (217 lb 13 oz)   BMI (Calculated): 29.53  IBW/kg (Dietitian Calculated): 80.9 kg  Percent of IBW: 122 %       Admission Weight Trend:  Date/Time Weight   01/30/24 0559 98.8 kg (217 lb 13 oz)   01/29/24 0544 100 kg (220 lb 10.9 oz)   01/28/24 0539 99.2 kg (218 lb 11.1 oz)   01/27/24 0600 101 kg (222 lb 10.6 oz)   01/26/24 0518 98.7 kg (217 lb 9.5 oz)   01/25/24 0600 99.5 kg (219 lb 5.7 oz)   01/23/24 0600 100 kg (220 lb 7.4 oz)   01/21/24 0558 99.5 kg (219 lb 5.7 oz)   01/20/24 0548 97.3 kg (214 lb 8.1 oz)   01/19/24 0600 98 kg (216 lb 0.8 oz)   01/18/24 0600 98 kg (216 lb 0.8 oz)   01/17/24 0600 98.4 kg (216 lb 14.9 oz)     Weight Change %:  Weight History / % Weight Change: Weight stable.    Nutrition Focused Physical Exam Findings:  defer: completed on initial assessment.  Edema:  Edema:  (non-pitting)  Edema Location: generalized  Physical Findings:  Skin: Positive (Deep tissue PI R hip, skin tear L elbow)    Nutrition Significant Labs:  BMP Trend:   Results from last 7 days   Lab Units 01/31/24  0500 01/29/24  0837 01/27/24  0506 01/26/24  0816   GLUCOSE mg/dL 130* 144* 159* 129*   CALCIUM mg/dL 9.7 9.7 9.0 9.3   SODIUM mmol/L 138 135* 135* 138   POTASSIUM mmol/L 3.9 3.9 4.2 4.0   CO2 mmol/L 31 32 28 31   CHLORIDE mmol/L 98 96* 99 99   BUN mg/dL 21 21 20 20   CREATININE mg/dL 0.92 1.04 0.91 0.96    , BG POCT trend:   Results from last 7 days   Lab Units " 01/31/24  1156 01/31/24  0644 01/31/24  0002 01/30/24  1714 01/30/24  1120   POCT GLUCOSE mg/dL 185* 131* 154* 175* 155*    , Renal Lab Trend:   Results from last 7 days   Lab Units 01/31/24  0500 01/29/24  0837 01/27/24  0506 01/26/24  0816   POTASSIUM mmol/L 3.9 3.9 4.2 4.0   PHOSPHORUS mg/dL 4.1 3.6 3.7 3.6   SODIUM mmol/L 138 135* 135* 138   MAGNESIUM mg/dL 2.09 2.15 1.94 1.99   EGFR mL/min/1.73m*2 88 76 90 84   BUN mg/dL 21 21 20 20   CREATININE mg/dL 0.92 1.04 0.91 0.96        Nutrition Specific Medications:  Scheduled medications  esomeprazole, 20 mg, nasogastric tube, Daily before breakfast  insulin glargine, 8 Units, subcutaneous, Nightly  insulin regular, 0-5 Units, subcutaneous, q6h  levothyroxine, 200 mcg, oral, Daily  multivitamin with minerals, 1 tablet, oral, Daily  polyethylene glycol, 17 g, oral, Daily  sennosides, 2 tablet, oral, BID      I/O:   Last BM Date: 01/30/24; Stool Appearance: Soft (01/30/24 1450)        Dietary Orders (From admission, onward)       Start     Ordered    01/17/24 1413  Enteral feeding with NPO Nepro; ND (nasoduodenal tube); 20; 400; Water; Every 6 hours  Diet effective now        Comments: Please up titrate 10 ml/hr q8h until goal 55 ml/hr   Question Answer Comment   Tube feeding formula: Nepro    Feeding route: ND (nasoduodenal tube)    Tube feeding cyclic rate (mL/hr): 20    Tube feeding flush (mL): 400    Flush type: Water    Flush frequency: Every 6 hours        01/17/24 1413                     Estimated Needs:   Total Energy Estimated Needs (kCal): 2150 kCal  Method for Estimating Needs: TTW=1553 using 99.5kg (SF 1.2)  Total Protein Estimated Needs (g): 105 g  Method for Estimating Needs: 1.3 g/kg (IBW 80.9kg)  Total Fluid Estimated Needs (mL):  (1mL/kcal or per team)           Nutrition Diagnosis   Malnutrition Diagnosis  Patient has Malnutrition Diagnosis: No    Nutrition Diagnosis  Patient has Nutrition Diagnosis: Yes  Diagnosis Status (1): Ongoing  Nutrition  Diagnosis 1: Increased nutrient needs  Related to (1): increased metabolic demand  As Evidenced by (1): critical illness s/p being found down + PNA infection.       Nutrition Interventions/Recommendations         Nutrition Prescription:  Recommend switch to Glucerna 1.5 tube feed formula - renal labs WNL and Nepro tube feed is not needed at this time.  Recommend Glucerna 1.5 at 65mL/hr over 22 hours (provides 1430mL total volume, 2145kcal, 118g protein, 1085mL free water.  Initiate Glucerna 1.5 at 10mL/hr increasing rate by 10mL/hr q4h or as tolerated to goal of 65mL/hr.  Additional water flushes per team.  Hold tube feed 1 hour pre/post synthroid dose.        Nutrition Interventions:   Food and/or Nutrient Delivery Interventions  Interventions: Enteral intake  Goal: meet tube feed goal rate, tube feed tolerance, BG 80-180mg/dl, electrolytes WNL.            Nutrition Monitoring and Evaluation   Food/Nutrient Related History Monitoring  Enteral and Parenteral Nutrition Intake: Enteral nutrition formula/solution, Enteral nutrition intake    Body Composition/Growth/Weight History  Monitoring and Evaluation Plan: Weight    Biochemical Data, Medical Tests and Procedures  Monitoring and Evaluation Plan: Electrolyte/renal panel, Glucose/endocrine profile    Nutrition Focused Physical Findings  Monitoring and Evaluation Plan: Digestive System  Other: GI tolerance       Time Spent/Follow-up Reminder:   Time Spent (min): 45 minutes  Last Date of Nutrition Visit: 01/31/24  Nutrition Follow-Up Needed?: Dietitian to reassess per policy

## 2024-01-31 NOTE — DISCHARGE SUMMARY
Discharge Diagnosis  AMS (altered mental status)    Issues Requiring Follow-Up  - Swallow evaluation with SLP at LTAC within 1-2 weeks  - Follow up with PCP in 1-2 weeks  - Follow up with endocrinology in 2-4 weeks for management of hypopituitarism, further diabetes management  - Follow up with neurology in 2-4 weeks for persistent encephalopathy and EMG   - Follow up palliative care for further conversations on goals of care, discussion of PEG tube placement in 2-4 weeks    Test Results Pending At Discharge  Pending Labs       No current pending labs.          Hospital Course  Gopal Ness is a 73 y.o. male with a h/o pituitary adenoma s/p resection at [facility] (2016), DVT/PE on Eliquis, COPD (noncompliant with medications)/tobacco use, depresssion, DMII, HLD presenting after being found down unresponsive i/s/o fall on 12/31 (LKN 1100 on 12/31) and concern for seizure. At OSH, CTH negative and patient intubated for airway protection. Admitted to [facility] on 1/1/2024 with AMS (altered mental status) for cvEEG monitoring and further care. Upon arrival, patient noted to have fixed and dilated R pupil and started on mannitol. Preliminary EEG read showed severe diffuse encephalopathy. No epileptiform discharges or seizures. CTH negative, CTA H&N negative for LVO, dissection, or aneurysm. He was extubated to 6L NC, episodes of tachypnea (30s)/desaturation (88-90%), 40 mg IV lasix given, CPAP 10/60%. Was started on hydrocortisone in the NSU. Given his history of pituitary adenoma s/p resection endocrinology was consulted on need for steroid replacement therapy. Was found to have active cushing disease and steroids were stopped with plan for outpatient follow up with Dr. Yvonne Lofton in 4 weeks. Dobhoff was placed on 1/4 by ENT due to prior transsphenoidal surgery. Was found to be COVID + on 1/4. Was started on decadron 6mg (1/5 -1/10) and remdesivir. Over his course, had several episodes of SVT, and thus cardiology was consulted  and metoprolol 25 mg BID was recommended. RFP also showed gradually worsening hypernatremia, which was gradually corrected. Given his continuous RUE weakness that is worse distally, a MRI of the cervical spine was ordered which showed right-sided foraminal narrowing d/t uncovertebral joint hypertrophy at C4-C5 and C5-C6.  Will set up EMG as an outpatient. Throughout his hospital stay, he has as been persistently Aox1-2. Repeat CT head did not demonstate any acute processes. Extensive conversations were held with the family due to concern that this level of lethargy is his new baseline given that all other causes of encephalopathy were corrected. Concern that because he was found down, he may have suffered anoxic brain injury, although exact underlying etiology for his encephalopathy has been uncertain. Code status was changed to DNR/DNI on 1/17 and palliative care was consulted. He remains with a dobhoff tube for nutrition. PEG was discussed with family, however, they wish to keep the dobhoff and re-evaluate need for PEG later on, likely while at LTAC in hopes of further neurologic recovery.       Pertinent Physical Exam At Time of Discharge  Physical Exam  General: in and out of sleep, will awake to questions and answer with mostly one word responses, follows commands  HEENT: pupils equal and round, no scleral icterus  Skin: no suspect lesions or rashes noted on visible skin  Chest: On 4-5L NC O2, lung sounds coarse, no wheezing appreciated  Cardiac: regular rate, normal s1, s2, no M/R/G  Abdomen: soft, ND, NT, no involuntary guarding  : no flank pain or indwelling urinary catheter  EXT: no peripheral edema, no asymmetry noted  MSK: no focal joint swelling noted  Neuro: Will nod yes/no to question, but remains somnolent, follows simple instructions, moves all 4 extremities when prompted. AxO x1-2  Psych: Unable to assess    Home Medications     Medication List      START taking these medications     albuterol 2.5  mg /3 mL (0.083 %) nebulizer solution; Take 3 mL (2.5 mg)   by nebulization every 2 hours if needed for wheezing.   diclofenac sodium 1 % gel; Commonly known as: Voltaren; Apply 4.5 inches   (4 g) topically 2 times a day as needed (For right shoulder/arm pain).   esomeprazole 20 mg packet; Commonly known as: NexIUM; Take 20 mg by   mouth once daily in the morning. Take before meals. Add 15 mL water to   catheter-tipped syringe, add granules from packet. Shake and let thicken.   Administer through NG tube within 30 minutes. Refill with 15 mL of water,   shake and flush NG tube. Do not start before February 1, 2024.; Start   taking on: February 1, 2024   insulin regular 100 unit/mL injection; Commonly known as: HumuLIN R;   Inject 0-5 Units under the skin every 6 hours. Take as directed per   insulin instructions.   metoprolol tartrate 25 mg tablet; Commonly known as: Lopressor; 1 tablet   (25 mg) by nasogastric tube route 2 times a day.   multivitamin with minerals tablet; Take 1 tablet by mouth once daily. Do   not start before February 1, 2024.; Start taking on: February 1, 2024   oxygen gas therapy; Commonly known as: O2; Inhale 1 each once every 24   hours.   polyethylene glycol 17 gram packet; Commonly known as: Glycolax,   Miralax; Take 17 g by mouth once daily. Do not start before February 1, 2024.; Start taking on: February 1, 2024   sennosides 8.6 mg tablet; Commonly known as: Senokot; Take 2 tablets   (17.2 mg) by mouth 2 times a day.     CHANGE how you take these medications     levothyroxine 200 mcg tablet; Commonly known as: Synthroid, Levoxyl;   Take 1 tablet (200 mcg) by mouth early in the morning.. Do not start   before February 1, 2024.; Start taking on: February 1, 2024; What changed:   when to take this     CONTINUE taking these medications     Eliquis 5 mg tablet; Generic drug: apixaban   glimepiride 4 mg tablet; Commonly known as: AmaryL; Take 1 tablet (4 mg)   by mouth 2 times a day.    metFORMIN 1,000 mg tablet; Commonly known as: Glucophage; Take 1 tablet   (1,000 mg) by mouth 2 times a day.   sertraline 50 mg tablet; Commonly known as: Zoloft; Take 1 tablet (50   mg) by mouth once daily. Do not start before February 1, 2024.; Start   taking on: February 1, 2024   tamsulosin 0.4 mg 24 hr capsule; Commonly known as: Flomax; Take 1   capsule (0.4 mg) by mouth once daily.     STOP taking these medications     traZODone 50 mg tablet; Commonly known as: Desyrel       Outpatient Follow-Up  Future Appointments   Date Time Provider Department Center   2/7/2024  9:20 AM Yvonne Lofton MD [id number] Academic     Ej Ventura MD  PGY-1 Internal Medicine

## 2024-01-31 NOTE — PROGRESS NOTES
1/31/24 Transitional Care Coordinator Notes:    Auth was approved to [facility]. Final goldenrod sent and the CNC is working on the 0770 form. Transportation is set for 6 pm via [facility]. Nurse given number for report and family notified of discharge.                       Assessment/Plan   Principal Problem:    AMS (altered mental status)    Discharge Plans: discharge to SNF            Annalisa Whittaker RN

## 2024-01-31 NOTE — PROGRESS NOTES
1/31/24 1211 Transitional Care Coordinator Notes:    Spoke with daughter and she has chosen [facility] due to the other facilities being unable to accept. [facility] initiated precert, will continue to monitor for approval.                 Assessment/Plan   Principal Problem:    AMS (altered mental status)    Discharge Plans: discharge to SNF            Annalisa Whittaker RN

## 2024-01-31 NOTE — CARE PLAN
Problem: Skin  Goal: Prevent/manage excess moisture  Outcome: Progressing     Problem: Skin  Goal: Prevent/minimize sheer/friction injuries  Outcome: Progressing     Problem: Nutrition  Goal: Tube feed tolerance  Outcome: Progressing     Problem: Nutrition  Goal: BG  mg/dL  Outcome: Progressing       The clinical goals for the shift include Maintain Dophoff, Tube Feed, and flushes per orders throughout shift.

## 2024-05-01 ENCOUNTER — TELEPHONE (OUTPATIENT)
Dept: NEUROSURGERY | Facility: HOSPITAL | Age: 74
End: 2024-05-01
Payer: MEDICARE

## 2024-05-01 NOTE — TELEPHONE ENCOUNTER
Neurosurgery Note: I spoke with a representative from [facility] to let them know we reviewed patients imaging and would recommend follow-up imaging in one year. Patient currently a DNR-cc and was recently discharged back to facility from hospital.    Jordyn Nicole R.N.

## 2024-11-18 ENCOUNTER — NURSING HOME VISIT (OUTPATIENT)
Dept: POST ACUTE CARE | Facility: EXTERNAL LOCATION | Age: 74
End: 2024-11-18
Payer: MEDICARE

## 2024-11-18 DIAGNOSIS — H49.883 EXTERNAL OPHTHALMOPLEGIA OF BOTH EYES: ICD-10-CM

## 2024-11-18 DIAGNOSIS — G93.40 ACUTE ENCEPHALOPATHY: ICD-10-CM

## 2024-11-18 DIAGNOSIS — R53.1 RIGHT SIDED WEAKNESS: ICD-10-CM

## 2024-11-18 DIAGNOSIS — J44.9 CHRONIC OBSTRUCTIVE PULMONARY DISEASE, UNSPECIFIED COPD TYPE (MULTI): ICD-10-CM

## 2024-11-18 DIAGNOSIS — E03.9 HYPOTHYROIDISM, UNSPECIFIED TYPE: ICD-10-CM

## 2024-11-18 DIAGNOSIS — D72.829 LEUKOCYTOSIS, UNSPECIFIED TYPE: ICD-10-CM

## 2024-11-18 DIAGNOSIS — Z86.711 HISTORY OF PULMONARY EMBOLUS (PE): ICD-10-CM

## 2024-11-18 DIAGNOSIS — R29.90 STROKE-LIKE EPISODE: Primary | ICD-10-CM

## 2024-11-18 DIAGNOSIS — F32.9 MAJOR DEPRESSIVE DISORDER, REMISSION STATUS UNSPECIFIED, UNSPECIFIED WHETHER RECURRENT: ICD-10-CM

## 2024-11-18 DIAGNOSIS — E11.9 CONTROLLED TYPE 2 DIABETES MELLITUS WITHOUT COMPLICATION, WITHOUT LONG-TERM CURRENT USE OF INSULIN (MULTI): ICD-10-CM

## 2024-11-18 DIAGNOSIS — Z86.79 HX OF SUPRAVENTRICULAR TACHYCARDIA: ICD-10-CM

## 2024-11-18 DIAGNOSIS — M62.81 GENERALIZED MUSCLE WEAKNESS: ICD-10-CM

## 2024-11-18 DIAGNOSIS — R52 PAIN: ICD-10-CM

## 2024-11-18 DIAGNOSIS — H02.401 PTOSIS OF RIGHT EYELID: ICD-10-CM

## 2024-11-18 DIAGNOSIS — N40.0 BENIGN PROSTATIC HYPERPLASIA, UNSPECIFIED WHETHER LOWER URINARY TRACT SYMPTOMS PRESENT: ICD-10-CM

## 2024-11-18 DIAGNOSIS — I48.91 ATRIAL FIBRILLATION, UNSPECIFIED TYPE (MULTI): ICD-10-CM

## 2024-11-18 DIAGNOSIS — D32.9 MENINGIOMA (MULTI): ICD-10-CM

## 2024-11-18 DIAGNOSIS — R26.89 IMPAIRED GAIT AND MOBILITY: ICD-10-CM

## 2024-11-18 DIAGNOSIS — D49.6: ICD-10-CM

## 2024-11-18 DIAGNOSIS — I95.9 HYPOTENSION, UNSPECIFIED HYPOTENSION TYPE: ICD-10-CM

## 2024-11-18 NOTE — LETTER
Patient: Gopal Ness  : 1950    Encounter Date: 2024    Name: Gopal Ness    YOB: 1950    Code Status: DNRcc-arrest    Chief Complaint:  Initial visit; new patient;   Readmit after hospitalization:  Stroke-like episode; etc.....      HPI     74 year old male, history of paroxysmal atrial fibrillation, on long-term oral anticoagulation with Eliquis, COPD, home O2, hypothyroidism, type 2 diabetes mellitus, diabetes insipidus, Cushing syndrome, pituitary adenoma on 10/13/2016, pulmonary embolism, tobacco abuse, and kidney stone.    HOSPITAL COURSE:  Patient was transferred to [facility] ER with suspected stroke with right-sided weakness and right facial numbness.   Patient presented with weakness/confusion.  He knew why he was in the hospital but he is unable to say why.  He followed all simple commands but appeared drowsy.    No complaints of pain.   ##CT of brain was negative for acute findings.###  ##MRI: IMPRESSION: No acute infarct. Extensive chronic changes.   Findings suggesting interval growth of residual right-sided sellar pituitary adenoma as above.   New right-sided posterior fossa meningiomas. ####  While the patient was in ER, patient had a heart rate of 167 beats per minute.   Cardiology was consulted.   EKG suggestive of atrial flutter.   Patient was found to be in a-fib with RVR.  At the time of examination, patient was converted back in sinus rhythm.   Patient did not have any chest pain. Patient did not have any syncope.           Patient was recently admitted to [facility] from 11/10/24 to 24    Patient re-admitted to [facility] on 24 for skilled and long term care.    Hospital and chronic diagnoses as follows:    Stroke-like episode; right eye ptosis:   Right sided weakness.  CT of the brain was performed with no acute findings.  Neurology was consulted in the hospital.   The right eye ptosis was evaluated in the hospital.   Nursing reports that he  developed the right eye ptosis prior to going to the hospital.   Patient seen today.  He is in bed.  Calm, cooperative, follows commands.  No headaches.  No eye pain.  No chest pain.  No SOB.    Meningioma:  New meningioma noted on MRI and possible increased in size of residual pituitary macroadenoma.  He had pituitary adenoma s/p resection at [facility] in 2016.  MRI: IMPRESSION: No acute infarct. Extensive chronic changes.   Findings suggesting interval growth of residual right-sided sellar pituitary adenoma as above.   New right-sided posterior fossa meningiomas.       COPD (chronic obstructive pulmonary disease):  Patient did not have an exacerbation.  On albuterol sats.     Leukocytosis:   UA & C & S and chest x-ray performed.  On 11/12/24 WBC 9.84     A-fib; Hx SVT (supraventricular tachycardia):  Has a-fib.  Possible a-fib with RVR in the ambulance.  Cardiology and EP were consulted.  On Eliquis and metoprolol.  No complaints of chest pain or palpitations today.  No reports of tachycardia.      History of pulmonary embolus (PE)  On Eliquis.    Controlled type 2 diabetes mellitus without complication, without long-term current use of insulin:  On metformin and sliding scale.     Hypothyroidism:  Endocrine was consulted.  On Synthroid.    Hypotension:  On midodrine.    Acute encephalopathy:  Improved.   Patient was evaluated by neurology in the hospital.    Right sided weakness:  Patient is able to move right side.  Just slightly weak.    BPH:   On tamsulosin.  No complaints of urinary symptoms.     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     Major depressive disorder:  On Sertraline.     Generalized muscle weakness; impaired gait and mobility:  Patient is in bed.  At [facility] in long term care.  States he uses a wheelchair or can ambulate short distances when supervised  "with a walker.     Pain:  On acetaminophen PRN.                  Reviewed [facility] records from recent admission.  Reviewed  EMR  Reviewed medical, social, surgical and family history.  Reviewed all current medications and performed medication reconciliation.  Performed prescription drug management.  Reviewed vital signs AND lab results  Reviewed [employer] documentation.  Discussed patient with nursing and .  Spent greater than 50 minutes on patient visit.                      ROS: 10 point ROS performed; Negative unless noted in HPI.    MEDICAL HISTORY:  Arthritis   Cataracts, bilateral   COPD (chronic obstructive pulmonary disease) ([facility])   Cushing's disease ([facility]) 2016   s/p TSA   H/O diabetes insipidus   s/p TSA   Hypothyroidism   Kidney stones   Pituitary adenoma ([facility]) 01/13/2016   ACTH secreting   Pulmonary emboli ([facility]) 10/2016   saddle and multiple additional PE     SURGICAL HISTORY:   Neck surgery  Resection of pituitary adenoma   PICC line insertion.  TOTAL HIP REPLACEMENT   XCAPSL CTRC RMVL INSJ IO LENS   PROSTH W/O ECP Right   Cataract Extraction with PC IOL     SOCIAL HISTORY:  Former smoker.  1/2 PPD for 40 years  Denies alcohol use.  Denies drug use.    FAMILY HISTORY:  Diabetes--father  Cancer--Multiple myeloma--mother      ALLERGIES   Codeine   Penicillins                    Lab Results   Component Value Date    WBC 7.1 01/31/2024    HGB 13.3 (L) 01/31/2024    HCT 40.5 (L) 01/31/2024     (L) 01/31/2024    CHOL 166 07/18/2023    TRIG 124 07/18/2023    HDL 39.0 (A) 07/18/2023    ALT 22 01/22/2024    AST 24 01/22/2024     01/31/2024    K 3.9 01/31/2024    CL 98 01/31/2024    CREATININE 0.92 01/31/2024    BUN 21 01/31/2024    CO2 31 01/31/2024    TSH <0.01 (L) 01/12/2024    PSA <0.10 08/18/2021    INR 1.1 01/07/2024    HGBA1C 5.6 01/01/2024             /68   Pulse 80   Temp 36.7 °C (98 °F)   Resp 18   Ht 1.702 m (5' 7\")   Wt 82.6 kg (182 lb 3.2 oz)   " SpO2 98%   BMI 28.54 kg/m²      Physical Exam  Vitals and nursing note reviewed.   Constitutional:       Appearance: He is ill-appearing.   HENT:      Head: Normocephalic.      Right Ear: External ear normal.      Left Ear: External ear normal.      Nose: Nose normal.      Mouth/Throat:      Mouth: Mucous membranes are moist.      Pharynx: Oropharynx is clear.   Eyes:      Extraocular Movements: Extraocular movements intact.      Conjunctiva/sclera: Conjunctivae normal.      Pupils: Pupils are equal, round, and reactive to light.      Comments: Right eye pitosis   Cardiovascular:      Rate and Rhythm: Normal rate and regular rhythm.      Pulses: Normal pulses.      Heart sounds: Normal heart sounds.   Pulmonary:      Effort: Pulmonary effort is normal.      Breath sounds: Normal breath sounds.   Abdominal:      General: Bowel sounds are normal.      Palpations: Abdomen is soft.   Musculoskeletal:         General: Normal range of motion.      Cervical back: Normal range of motion and neck supple.   Skin:     General: Skin is warm and dry.   Neurological:      General: No focal deficit present.      Mental Status: He is alert and oriented to person, place, and time. Mental status is at baseline.      Sensory: Sensation is intact.      Motor: Weakness present.      Coordination: Coordination abnormal.      Gait: Gait abnormal.   Psychiatric:         Attention and Perception: Attention normal.         Mood and Affect: Mood normal.         Behavior: Behavior normal. Behavior is cooperative.          Assessment/Plan   Ordered CMP, CBC with diff. , TSH and Ha1c For tomorrow.     Stroke-like episode; right eye ptosis:   Right sided weakness.  CT of the brain was performed with no acute findings.  Neurology was consulted in the hospital.   The right eye ptosis was evaluated in the hospital.   Follow up with neurology.  Monitor for s/s of stroke.  PT/OT treat if indicated.    Meningioma:  New meningioma noted on MRI and  possible increased in size of residual pituitary macroadenoma.  He had pituitary adenoma s/p resection at [facility] in 2016.  MRI: IMPRESSION: No acute infarct. Extensive chronic changes.   Findings suggesting interval growth of residual right-sided sellar pituitary adenoma as above.   New right-sided posterior fossa meningiomas.   Follow up with neurology and endocrine.    COPD (chronic obstructive pulmonary disease):  Monitor oxygen sats.  Continue albuterol sats.    Leukocytosis:   Monitor WBC.  Monitor for s/s of infection.    A-fib; Hx SVT (supraventricular tachycardia):  Cardiology and EP were consulted in the hospital.  Continue Eliquis and metoprolol succinate.    History of pulmonary embolus (PE)  Continue Eliquis.    Controlled type 2 diabetes mellitus without complication, without long-term current use of insulin:  Continue metformin and sliding scale.   Monitor accuchecks.    Hypothyroidism:  Endocrine was consulted in the hospital.   Continue Synthroid.    Hypotension:  Continue midodrine.  Monitor Bps.     Acute encephalopathy:  Monitor for confusion and s/s of infection.   Patient was evaluated by neurology in the hospital.    Right sided weakness:  Continue PT/OT as indicated.  Fall prevention.    BPH:   Continue tamsulosin.  Monitor for urinary symptoms.     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   Follow up with neurology.  Order ophthalmology consult.    Major depressive disorder:  Continue Sertraline.     Generalized muscle weakness; impaired gait and mobility:  Patient is in bed.  Continue at [facility] with PT/OT / long term care.  Continue to use wheelchair ambulate short distances when supervised with a walker.   Fall prevention strategies.    Pain:  Continue acetaminophen PRN.  Monitor for pain.                Problem List Items Addressed This Visit        BPH (benign prostatic hyperplasia)    Controlled type 2 diabetes mellitus without complication, without long-term current use of insulin (Multi)    COPD (chronic obstructive pulmonary disease) (Multi)    Depression    History of pulmonary embolus (PE)    Hypothyroidism    Ptosis of right eyelid     Other Visit Diagnoses       Stroke-like episode    -  Primary    Meningioma (Multi)        Leukocytosis, unspecified type        Atrial fibrillation, unspecified type (Multi)        Hx of supraventricular tachycardia        Hypotension, unspecified hypotension type        Acute encephalopathy        Right sided weakness        External ophthalmoplegia of both eyes        Cavernous sinus tumor (Multi)        Generalized muscle weakness        Impaired gait and mobility        Pain                CHANTAL Delgado       Electronically Signed By: CHANTAL Delgado   11/21/24 10:31 PM

## 2024-11-21 VITALS
SYSTOLIC BLOOD PRESSURE: 101 MMHG | WEIGHT: 182.2 LBS | TEMPERATURE: 98 F | RESPIRATION RATE: 18 BRPM | DIASTOLIC BLOOD PRESSURE: 68 MMHG | HEART RATE: 80 BPM | HEIGHT: 67 IN | OXYGEN SATURATION: 98 % | BODY MASS INDEX: 28.6 KG/M2

## 2024-11-22 NOTE — PROGRESS NOTES
Name: Gopal Ness    YOB: 1950    Code Status: DNRcc-arrest    Chief Complaint:  Initial visit; new patient;   Readmit after hospitalization:  Stroke-like episode; etc.....      HPI     74 year old male, history of paroxysmal atrial fibrillation, on long-term oral anticoagulation with Eliquis, COPD, home O2, hypothyroidism, type 2 diabetes mellitus, diabetes insipidus, Cushing syndrome, pituitary adenoma on 10/13/2016, pulmonary embolism, tobacco abuse, and kidney stone.    HOSPITAL COURSE:  Patient was transferred to [facility] ER with suspected stroke with right-sided weakness and right facial numbness.   Patient presented with weakness/confusion.  He knew why he was in the hospital but he is unable to say why.  He followed all simple commands but appeared drowsy.    No complaints of pain.   ##CT of brain was negative for acute findings.###  ##MRI: IMPRESSION: No acute infarct. Extensive chronic changes.   Findings suggesting interval growth of residual right-sided sellar pituitary adenoma as above.   New right-sided posterior fossa meningiomas. ####  While the patient was in ER, patient had a heart rate of 167 beats per minute.   Cardiology was consulted.   EKG suggestive of atrial flutter.   Patient was found to be in a-fib with RVR.  At the time of examination, patient was converted back in sinus rhythm.   Patient did not have any chest pain. Patient did not have any syncope.           Patient was recently admitted to [facility] from 11/10/24 to 11/14/24    Patient re-admitted to [facility] on 11/14/24 for skilled and long term care.    Hospital and chronic diagnoses as follows:    Stroke-like episode; right eye ptosis:   Right sided weakness.  CT of the brain was performed with no acute findings.  Neurology was consulted in the hospital.   The right eye ptosis was evaluated in the hospital.   Nursing reports that he developed the right eye ptosis prior to going to the hospital.    Patient seen today.  He is in bed.  Calm, cooperative, follows commands.  No headaches.  No eye pain.  No chest pain.  No SOB.    Meningioma:  New meningioma noted on MRI and possible increased in size of residual pituitary macroadenoma.  He had pituitary adenoma s/p resection at [facility] in 2016.  MRI: IMPRESSION: No acute infarct. Extensive chronic changes.   Findings suggesting interval growth of residual right-sided sellar pituitary adenoma as above.   New right-sided posterior fossa meningiomas.       COPD (chronic obstructive pulmonary disease):  Patient did not have an exacerbation.  On albuterol sats.     Leukocytosis:   UA & C & S and chest x-ray performed.  On 11/12/24 WBC 9.84     A-fib; Hx SVT (supraventricular tachycardia):  Has a-fib.  Possible a-fib with RVR in the ambulance.  Cardiology and EP were consulted.  On Eliquis and metoprolol.  No complaints of chest pain or palpitations today.  No reports of tachycardia.      History of pulmonary embolus (PE)  On Eliquis.    Controlled type 2 diabetes mellitus without complication, without long-term current use of insulin:  On metformin and sliding scale.     Hypothyroidism:  Endocrine was consulted.  On Synthroid.    Hypotension:  On midodrine.    Acute encephalopathy:  Improved.   Patient was evaluated by neurology in the hospital.    Right sided weakness:  Patient is able to move right side.  Just slightly weak.    BPH:   On tamsulosin.  No complaints of urinary symptoms.     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     Major depressive disorder:  On Sertraline.     Generalized muscle weakness; impaired gait and mobility:  Patient is in bed.  At [facility] in long term care.  States he uses a wheelchair or can ambulate short distances when supervised with a walker.     Pain:  On acetaminophen  "PRN.                  Reviewed [facility] records from recent admission.  Reviewed  EMR  Reviewed medical, social, surgical and family history.  Reviewed all current medications and performed medication reconciliation.  Performed prescription drug management.  Reviewed vital signs AND lab results  Reviewed [employer] documentation.  Discussed patient with nursing and .  Spent greater than 50 minutes on patient visit.                      ROS: 10 point ROS performed; Negative unless noted in HPI.    MEDICAL HISTORY:  Arthritis   Cataracts, bilateral   COPD (chronic obstructive pulmonary disease) ([facility])   Cushing's disease ([facility]) 2016   s/p TSA   H/O diabetes insipidus   s/p TSA   Hypothyroidism   Kidney stones   Pituitary adenoma ([facility]) 01/13/2016   ACTH secreting   Pulmonary emboli ([facility]) 10/2016   saddle and multiple additional PE     SURGICAL HISTORY:   Neck surgery  Resection of pituitary adenoma   PICC line insertion.  TOTAL HIP REPLACEMENT   XCAPSL CTRC RMVL INSJ IO LENS   PROSTH W/O ECP Right   Cataract Extraction with PC IOL     SOCIAL HISTORY:  Former smoker.  1/2 PPD for 40 years  Denies alcohol use.  Denies drug use.    FAMILY HISTORY:  Diabetes--father  Cancer--Multiple myeloma--mother      ALLERGIES   Codeine   Penicillins                    Lab Results   Component Value Date    WBC 7.1 01/31/2024    HGB 13.3 (L) 01/31/2024    HCT 40.5 (L) 01/31/2024     (L) 01/31/2024    CHOL 166 07/18/2023    TRIG 124 07/18/2023    HDL 39.0 (A) 07/18/2023    ALT 22 01/22/2024    AST 24 01/22/2024     01/31/2024    K 3.9 01/31/2024    CL 98 01/31/2024    CREATININE 0.92 01/31/2024    BUN 21 01/31/2024    CO2 31 01/31/2024    TSH <0.01 (L) 01/12/2024    PSA <0.10 08/18/2021    INR 1.1 01/07/2024    HGBA1C 5.6 01/01/2024             /68   Pulse 80   Temp 36.7 °C (98 °F)   Resp 18   Ht 1.702 m (5' 7\")   Wt 82.6 kg (182 lb 3.2 oz)   SpO2 98%   BMI 28.54 kg/m²      Physical " Exam  Vitals and nursing note reviewed.   Constitutional:       Appearance: He is ill-appearing.   HENT:      Head: Normocephalic.      Right Ear: External ear normal.      Left Ear: External ear normal.      Nose: Nose normal.      Mouth/Throat:      Mouth: Mucous membranes are moist.      Pharynx: Oropharynx is clear.   Eyes:      Extraocular Movements: Extraocular movements intact.      Conjunctiva/sclera: Conjunctivae normal.      Pupils: Pupils are equal, round, and reactive to light.      Comments: Right eye pitosis   Cardiovascular:      Rate and Rhythm: Normal rate and regular rhythm.      Pulses: Normal pulses.      Heart sounds: Normal heart sounds.   Pulmonary:      Effort: Pulmonary effort is normal.      Breath sounds: Normal breath sounds.   Abdominal:      General: Bowel sounds are normal.      Palpations: Abdomen is soft.   Musculoskeletal:         General: Normal range of motion.      Cervical back: Normal range of motion and neck supple.   Skin:     General: Skin is warm and dry.   Neurological:      General: No focal deficit present.      Mental Status: He is alert and oriented to person, place, and time. Mental status is at baseline.      Sensory: Sensation is intact.      Motor: Weakness present.      Coordination: Coordination abnormal.      Gait: Gait abnormal.   Psychiatric:         Attention and Perception: Attention normal.         Mood and Affect: Mood normal.         Behavior: Behavior normal. Behavior is cooperative.          Assessment/Plan    Ordered CMP, CBC with diff. , TSH and Ha1c For tomorrow.     Stroke-like episode; right eye ptosis:   Right sided weakness.  CT of the brain was performed with no acute findings.  Neurology was consulted in the hospital.   The right eye ptosis was evaluated in the hospital.   Follow up with neurology.  Monitor for s/s of stroke.  PT/OT treat if indicated.    Meningioma:  New meningioma noted on MRI and possible increased in size of residual  pituitary macroadenoma.  He had pituitary adenoma s/p resection at [facility] in 2016.  MRI: IMPRESSION: No acute infarct. Extensive chronic changes.   Findings suggesting interval growth of residual right-sided sellar pituitary adenoma as above.   New right-sided posterior fossa meningiomas.   Follow up with neurology and endocrine.    COPD (chronic obstructive pulmonary disease):  Monitor oxygen sats.  Continue albuterol sats.    Leukocytosis:   Monitor WBC.  Monitor for s/s of infection.    A-fib; Hx SVT (supraventricular tachycardia):  Cardiology and EP were consulted in the hospital.  Continue Eliquis and metoprolol succinate.    History of pulmonary embolus (PE)  Continue Eliquis.    Controlled type 2 diabetes mellitus without complication, without long-term current use of insulin:  Continue metformin and sliding scale.   Monitor accuchecks.    Hypothyroidism:  Endocrine was consulted in the hospital.   Continue Synthroid.    Hypotension:  Continue midodrine.  Monitor Bps.     Acute encephalopathy:  Monitor for confusion and s/s of infection.   Patient was evaluated by neurology in the hospital.    Right sided weakness:  Continue PT/OT as indicated.  Fall prevention.    BPH:   Continue tamsulosin.  Monitor for urinary symptoms.     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   Follow up with neurology.  Order ophthalmology consult.    Major depressive disorder:  Continue Sertraline.     Generalized muscle weakness; impaired gait and mobility:  Patient is in bed.  Continue at [facility] with PT/OT / long term care.  Continue to use wheelchair ambulate short distances when supervised with a walker.   Fall prevention strategies.    Pain:  Continue acetaminophen PRN.  Monitor for pain.                Problem List Items Addressed This Visit       BPH (benign prostatic hyperplasia)     Controlled type 2 diabetes mellitus without complication, without long-term current use of insulin (Multi)    COPD (chronic obstructive pulmonary disease) (Multi)    Depression    History of pulmonary embolus (PE)    Hypothyroidism    Ptosis of right eyelid     Other Visit Diagnoses       Stroke-like episode    -  Primary    Meningioma (Multi)        Leukocytosis, unspecified type        Atrial fibrillation, unspecified type (Multi)        Hx of supraventricular tachycardia        Hypotension, unspecified hypotension type        Acute encephalopathy        Right sided weakness        External ophthalmoplegia of both eyes        Cavernous sinus tumor (Multi)        Generalized muscle weakness        Impaired gait and mobility        Pain                Madina Lugo, APRN-CNP

## 2024-12-02 ENCOUNTER — NURSING HOME VISIT (OUTPATIENT)
Dept: POST ACUTE CARE | Facility: EXTERNAL LOCATION | Age: 74
End: 2024-12-02
Payer: MEDICARE

## 2024-12-02 DIAGNOSIS — R26.89 IMPAIRED GAIT AND MOBILITY: ICD-10-CM

## 2024-12-02 DIAGNOSIS — D49.6: ICD-10-CM

## 2024-12-02 DIAGNOSIS — Z86.711 HISTORY OF PULMONARY EMBOLUS (PE): ICD-10-CM

## 2024-12-02 DIAGNOSIS — H49.883 EXTERNAL OPHTHALMOPLEGIA OF BOTH EYES: ICD-10-CM

## 2024-12-02 DIAGNOSIS — R29.90 STROKE-LIKE EPISODE: ICD-10-CM

## 2024-12-02 DIAGNOSIS — I48.91 ATRIAL FIBRILLATION, UNSPECIFIED TYPE (MULTI): ICD-10-CM

## 2024-12-02 DIAGNOSIS — Z86.79 HX OF SUPRAVENTRICULAR TACHYCARDIA: ICD-10-CM

## 2024-12-02 DIAGNOSIS — M62.81 GENERALIZED MUSCLE WEAKNESS: ICD-10-CM

## 2024-12-02 DIAGNOSIS — H02.401 PTOSIS OF RIGHT EYELID: Primary | ICD-10-CM

## 2024-12-02 DIAGNOSIS — I95.9 HYPOTENSION, UNSPECIFIED HYPOTENSION TYPE: ICD-10-CM

## 2024-12-02 DIAGNOSIS — D32.9 MENINGIOMA (MULTI): ICD-10-CM

## 2024-12-02 DIAGNOSIS — E11.9 CONTROLLED TYPE 2 DIABETES MELLITUS WITHOUT COMPLICATION, WITHOUT LONG-TERM CURRENT USE OF INSULIN (MULTI): ICD-10-CM

## 2024-12-02 DIAGNOSIS — E03.9 HYPOTHYROIDISM, UNSPECIFIED TYPE: ICD-10-CM

## 2024-12-02 DIAGNOSIS — R53.1 RIGHT SIDED WEAKNESS: ICD-10-CM

## 2024-12-02 DIAGNOSIS — D72.829 LEUKOCYTOSIS, UNSPECIFIED TYPE: ICD-10-CM

## 2024-12-02 DIAGNOSIS — J44.9 CHRONIC OBSTRUCTIVE PULMONARY DISEASE, UNSPECIFIED COPD TYPE (MULTI): ICD-10-CM

## 2024-12-02 PROCEDURE — 99309 SBSQ NF CARE MODERATE MDM 30: CPT | Performed by: NURSE PRACTITIONER

## 2024-12-02 NOTE — LETTER
Patient: Gopal Ness  : 1950    Encounter Date: 2024    Name: Gopal Ness    YOB: 1950    Code Status: DNRcc-arrest    Chief Complaint:  Follow up on Ptosis of right eyelid; etc.....      HPI     74 year old male, history of paroxysmal atrial fibrillation, on long-term oral anticoagulation with Eliquis, COPD, home O2, hypothyroidism, type 2 diabetes mellitus, diabetes insipidus, Cushing syndrome, pituitary adenoma on 10/13/2016, pulmonary embolism, tobacco abuse, and kidney stone.    Patient was recently admitted to [facility] from 11/10/24 to 24    Patient re-admitted to [facility] on 24 for skilled and long term care.    Diagnoses as follows:    Ptosis of right eyelid; External ophthalmoplegia of both eyes; Stroke-like episode; right eye ptosis:   Right sided weakness.  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   CT of the brain was performed during recent hospitalization with no acute findings.  Neurology was consulted in the hospital.   The right eye ptosis was evaluated in the hospital.   Patient states he has not seen an ophthalmologist for his eye ptosis.   He is able to move right side, just slightly weak.  Patient seen today.  He is in bed.  Calm, cooperative, follows commands.  No headaches.  No eye pain.  No chest pain.  No SOB.    Meningioma:  New meningioma noted on MRI and possible increased in size of residual pituitary macroadenoma.  He had pituitary adenoma s/p resection at [facility] in .  MRI: IMPRESSION: No acute infarct. Extensive chronic changes.   Findings suggesting interval growth of residual right-sided sellar pituitary adenoma as above.   New right-sided posterior fossa meningiomas.     COPD (chronic obstructive pulmonary disease):  Patient did not have an exacerbation.  On albuterol sats.     Leukocytosis:  Improved.  Recent WBC results:    24 WBC 9.84    11/21/2024 WBC 8.1    A-fib; Hx of pulmonary embolus (PE) and SVT (supraventricular tachycardia):  Has a-fib.  Possible a-fib with RVR in the ambulance.  Cardiology and EP were consulted.  On Eliquis and metoprolol.  No complaints of chest pain or palpitations today.  No reports of tachycardia.      Controlled type 2 diabetes mellitus without complication, without long-term current use of insulin:  On metformin and sliding scale.     Hypothyroidism:  Endocrine was consulted in the hospital.  On Synthroid.  Needs TSH level.    Hypotension:  On midodrine.  Recent /68.    Impaired gait and mobility; Generalized muscle weakness; :  Patient is in bed.  At [facility] in long term care.  States he uses a wheelchair or can ambulate short distances when supervised with a walker.                   Reviewed  EMR  Reviewed medical, social, surgical and family history.  Reviewed all current medications and performed medication reconciliation.  Performed prescription drug management.  Reviewed vital signs AND lab results  Reviewed Matrix documentation.  Discussed patient with nursing and .                      ROS: 10 point ROS performed; Negative unless noted in HPI.    MEDICAL HISTORY:  Arthritis   Cataracts, bilateral   COPD (chronic obstructive pulmonary disease) (HCC)   Cushing's disease (HCC) 2016   s/p TSA   H/O diabetes insipidus   s/p TSA   Hypothyroidism   Kidney stones   Pituitary adenoma ([facility]) 01/13/2016   ACTH secreting   Pulmonary emboli ([facility]) 10/2016   saddle and multiple additional PE     SURGICAL HISTORY:   Neck surgery  Resection of pituitary adenoma   PICC line insertion.  TOTAL HIP REPLACEMENT   XCAPSL CTRC RMVL INSJ IO LENS   PROSTH W/O ECP Right   Cataract Extraction with PC IOL     SOCIAL HISTORY:  Former smoker.  1/2 PPD for 40 years  Denies alcohol use.  Denies drug use.    FAMILY HISTORY:  Diabetes--father  Cancer--Multiple myeloma--mother      ALLERGIES   Codeine  "  Penicillins     LABS:  BMP: Date: 11/7/2024 Na 142 K 4.3 Cr 1 BUN 21 glucose 133 Ca 6.3 GFR 73  CBC: Date: 11/7/2024 WBC 5.2 Hgb 10.1 hematocrit 30.5 platelets 135  Liver function: Date: 11/7/2024 ALT 13 AST 13 alkaline phos.  46 bilirubin 0.3 albumin 1.2 protein 6.3    BMP: Date: 11/8/2024 Na 138 K 4.2 Cr 0.9 BUN 20 glucose 106 Ca 9.3 GFR 83  CBC: Date: 11/8/2024 WBC 10.6 Hgb 11 hematocrit 33.4 platelets 147    BMP: Date: 11/21/2024 Na 140 K 4.8 Cr 1.3 BUN 18 glucose 70 Ca 8.4 GFR 54  CBC: Date: 11/21/2024 WBC 8.1 Hgb 11.1 hematocrit 35.1 platelets 175    11/26/2024 lipid panel: cholesterol 136; triglycerides 201; HDL 34; LDL 62; VLDL 40    11/26/2024 magnesium level 1.9             Lab Results   Component Value Date    WBC 7.1 01/31/2024    HGB 13.3 (L) 01/31/2024    HCT 40.5 (L) 01/31/2024     (L) 01/31/2024    CHOL 166 07/18/2023    TRIG 124 07/18/2023    HDL 39.0 (A) 07/18/2023    ALT 22 01/22/2024    AST 24 01/22/2024     01/31/2024    K 3.9 01/31/2024    CL 98 01/31/2024    CREATININE 0.92 01/31/2024    BUN 21 01/31/2024    CO2 31 01/31/2024    TSH <0.01 (L) 01/12/2024    PSA <0.10 08/18/2021    INR 1.1 01/07/2024    HGBA1C 5.6 01/01/2024             /68   Pulse 62   Temp 36.2 °C (97.2 °F)   Resp 18   Ht 1.702 m (5' 7\")   Wt 82.6 kg (182 lb 3.2 oz)   SpO2 96%   BMI 28.54 kg/m²      Physical Exam  Vitals and nursing note reviewed.   Constitutional:       Appearance: He is ill-appearing.   HENT:      Head: Normocephalic.      Right Ear: External ear normal.      Left Ear: External ear normal.      Nose: Nose normal.      Mouth/Throat:      Mouth: Mucous membranes are moist.      Pharynx: Oropharynx is clear.   Eyes:      Extraocular Movements: Extraocular movements intact.      Conjunctiva/sclera: Conjunctivae normal.      Pupils: Pupils are equal, round, and reactive to light.      Comments: Right eye pitosis   Cardiovascular:      Rate and Rhythm: Normal rate and regular rhythm.    "   Pulses: Normal pulses.      Heart sounds: Normal heart sounds.   Pulmonary:      Effort: Pulmonary effort is normal.      Breath sounds: Normal breath sounds.   Abdominal:      General: Bowel sounds are normal.      Palpations: Abdomen is soft.   Musculoskeletal:         General: Normal range of motion.      Cervical back: Normal range of motion and neck supple.   Skin:     General: Skin is warm and dry.   Neurological:      General: No focal deficit present.      Mental Status: He is alert and oriented to person, place, and time. Mental status is at baseline.      Sensory: Sensation is intact.      Motor: Weakness present.      Coordination: Coordination abnormal.      Gait: Gait abnormal.   Psychiatric:         Attention and Perception: Attention normal.         Mood and Affect: Mood normal.         Behavior: Behavior normal. Behavior is cooperative.          Assessment/Plan   Ordered CMP, CBC with diff.  For tomorrow.     Ptosis of right eyelid; External ophthalmoplegia of both eyes; Stroke-like episode:   Right sided weakness.  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   CT of the brain was performed during recent hospitalization with no acute findings.  Neurology was consulted in the hospital.   Ordered  ophthalmologist appointment ---staff to scheduled.  Ordered in house ophthalmologist consult---ASAP.  Patient will need follow up with neurology.      Meningioma:  New meningioma noted on MRI and possible increased in size of residual pituitary macroadenoma.  He had pituitary adenoma s/p resection at [facility] in 2016.  MRI: IMPRESSION: No acute infarct. Extensive chronic changes.   Findings suggesting interval growth of residual right-sided sellar pituitary adenoma as above.   New right-sided posterior fossa meningiomas.   Patient needs follow up with neurology.     COPD (chronic obstructive pulmonary disease):  Monitor oxygen  sats.  Continue albuterol nebs.     Leukocytosis:  Monitor WBC.    A-fib; Hx of pulmonary embolus (PE) and SVT (supraventricular tachycardia):  Cardiology and EP were consulted in the hospital.  Continue Eliquis and metoprolol.  Monitor for chest pain or palpitations today.    Controlled type 2 diabetes mellitus without complication, without long-term current use of insulin:  Continue metformin and sliding scale.     Hypothyroidism:  Continue Synthroid.  Order TSH.  Follow up with endocrinology.    Hypotension:  Continue midodrine.  Monitor Bps.    Right sided weakness:  Follow up with neurology.  Fall prevention.   In house PT/OT if indicated.     Impaired gait and mobility; Generalized muscle weakness; :  Continue at [facility] in long term care.  States he uses a wheelchair or can ambulate short distances when supervised with a walker.   Fall prevention.         Problem List Items Addressed This Visit       Controlled type 2 diabetes mellitus without complication, without long-term current use of insulin (Multi)    COPD (chronic obstructive pulmonary disease) (Multi)    History of pulmonary embolus (PE)    Hypothyroidism    Ptosis of right eyelid - Primary     Other Visit Diagnoses       External ophthalmoplegia of both eyes        Stroke-like episode        Right sided weakness        Cavernous sinus tumor (Multi)        Meningioma (Multi)        Leukocytosis, unspecified type        Atrial fibrillation, unspecified type (Multi)        Hx of supraventricular tachycardia        Hypotension, unspecified hypotension type        Impaired gait and mobility        Generalized muscle weakness                  CHANTAL Delgado       Electronically Signed By: CHANTAL Delgado   12/4/24 10:02 PM

## 2024-12-04 VITALS
WEIGHT: 182.2 LBS | OXYGEN SATURATION: 96 % | HEIGHT: 67 IN | SYSTOLIC BLOOD PRESSURE: 105 MMHG | DIASTOLIC BLOOD PRESSURE: 68 MMHG | HEART RATE: 62 BPM | TEMPERATURE: 97.2 F | RESPIRATION RATE: 18 BRPM | BODY MASS INDEX: 28.6 KG/M2

## 2024-12-05 NOTE — PROGRESS NOTES
Name: Gopal Ness    YOB: 1950    Code Status: DNRcc-arrest    Chief Complaint:  Follow up on Ptosis of right eyelid; etc.....      HPI     74 year old male, history of paroxysmal atrial fibrillation, on long-term oral anticoagulation with Eliquis, COPD, home O2, hypothyroidism, type 2 diabetes mellitus, diabetes insipidus, Cushing syndrome, pituitary adenoma on 10/13/2016, pulmonary embolism, tobacco abuse, and kidney stone.    Patient was recently admitted to [facility] from 11/10/24 to 11/14/24    Patient re-admitted to [facility] on 11/14/24 for skilled and long term care.    Diagnoses as follows:    Ptosis of right eyelid; External ophthalmoplegia of both eyes; Stroke-like episode; right eye ptosis:   Right sided weakness.  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   CT of the brain was performed during recent hospitalization with no acute findings.  Neurology was consulted in the hospital.   The right eye ptosis was evaluated in the hospital.   Patient states he has not seen an ophthalmologist for his eye ptosis.   He is able to move right side, just slightly weak.  Patient seen today.  He is in bed.  Calm, cooperative, follows commands.  No headaches.  No eye pain.  No chest pain.  No SOB.    Meningioma:  New meningioma noted on MRI and possible increased in size of residual pituitary macroadenoma.  He had pituitary adenoma s/p resection at [facility] in 2016.  MRI: IMPRESSION: No acute infarct. Extensive chronic changes.   Findings suggesting interval growth of residual right-sided sellar pituitary adenoma as above.   New right-sided posterior fossa meningiomas.     COPD (chronic obstructive pulmonary disease):  Patient did not have an exacerbation.  On albuterol sats.     Leukocytosis:  Improved.  Recent WBC results:    11/12/24 WBC 9.84   11/21/2024 WBC 8.1    A-fib; Hx of pulmonary embolus (PE) and SVT  (supraventricular tachycardia):  Has a-fib.  Possible a-fib with RVR in the ambulance.  Cardiology and EP were consulted.  On Eliquis and metoprolol.  No complaints of chest pain or palpitations today.  No reports of tachycardia.      Controlled type 2 diabetes mellitus without complication, without long-term current use of insulin:  On metformin and sliding scale.     Hypothyroidism:  Endocrine was consulted in the hospital.  On Synthroid.  Needs TSH level.    Hypotension:  On midodrine.  Recent /68.    Impaired gait and mobility; Generalized muscle weakness; :  Patient is in bed.  At [facility] in long term care.  States he uses a wheelchair or can ambulate short distances when supervised with a walker.                   Reviewed  EMR  Reviewed medical, social, surgical and family history.  Reviewed all current medications and performed medication reconciliation.  Performed prescription drug management.  Reviewed vital signs AND lab results  Reviewed [employer] documentation.  Discussed patient with nursing and .                      ROS: 10 point ROS performed; Negative unless noted in HPI.    MEDICAL HISTORY:  Arthritis   Cataracts, bilateral   COPD (chronic obstructive pulmonary disease) ([facility])   Cushing's disease ([facility]) 2016   s/p TSA   H/O diabetes insipidus   s/p TSA   Hypothyroidism   Kidney stones   Pituitary adenoma ([facility]) 01/13/2016   ACTH secreting   Pulmonary emboli ([facility]) 10/2016   saddle and multiple additional PE     SURGICAL HISTORY:   Neck surgery  Resection of pituitary adenoma   PICC line insertion.  TOTAL HIP REPLACEMENT   XCAPSL CTRC RMVL INSJ IO LENS   PROSTH W/O ECP Right   Cataract Extraction with PC IOL     SOCIAL HISTORY:  Former smoker.  1/2 PPD for 40 years  Denies alcohol use.  Denies drug use.    FAMILY HISTORY:  Diabetes--father  Cancer--Multiple myeloma--mother      ALLERGIES   Codeine   Penicillins     LABS:  BMP: Date: 11/7/2024 Na 142 K 4.3 Cr 1 BUN 21 glucose  "133 Ca 6.3 GFR 73  CBC: Date: 11/7/2024 WBC 5.2 Hgb 10.1 hematocrit 30.5 platelets 135  Liver function: Date: 11/7/2024 ALT 13 AST 13 alkaline phos.  46 bilirubin 0.3 albumin 1.2 protein 6.3    BMP: Date: 11/8/2024 Na 138 K 4.2 Cr 0.9 BUN 20 glucose 106 Ca 9.3 GFR 83  CBC: Date: 11/8/2024 WBC 10.6 Hgb 11 hematocrit 33.4 platelets 147    BMP: Date: 11/21/2024 Na 140 K 4.8 Cr 1.3 BUN 18 glucose 70 Ca 8.4 GFR 54  CBC: Date: 11/21/2024 WBC 8.1 Hgb 11.1 hematocrit 35.1 platelets 175    11/26/2024 lipid panel: cholesterol 136; triglycerides 201; HDL 34; LDL 62; VLDL 40    11/26/2024 magnesium level 1.9             Lab Results   Component Value Date    WBC 7.1 01/31/2024    HGB 13.3 (L) 01/31/2024    HCT 40.5 (L) 01/31/2024     (L) 01/31/2024    CHOL 166 07/18/2023    TRIG 124 07/18/2023    HDL 39.0 (A) 07/18/2023    ALT 22 01/22/2024    AST 24 01/22/2024     01/31/2024    K 3.9 01/31/2024    CL 98 01/31/2024    CREATININE 0.92 01/31/2024    BUN 21 01/31/2024    CO2 31 01/31/2024    TSH <0.01 (L) 01/12/2024    PSA <0.10 08/18/2021    INR 1.1 01/07/2024    HGBA1C 5.6 01/01/2024             /68   Pulse 62   Temp 36.2 °C (97.2 °F)   Resp 18   Ht 1.702 m (5' 7\")   Wt 82.6 kg (182 lb 3.2 oz)   SpO2 96%   BMI 28.54 kg/m²      Physical Exam  Vitals and nursing note reviewed.   Constitutional:       Appearance: He is ill-appearing.   HENT:      Head: Normocephalic.      Right Ear: External ear normal.      Left Ear: External ear normal.      Nose: Nose normal.      Mouth/Throat:      Mouth: Mucous membranes are moist.      Pharynx: Oropharynx is clear.   Eyes:      Extraocular Movements: Extraocular movements intact.      Conjunctiva/sclera: Conjunctivae normal.      Pupils: Pupils are equal, round, and reactive to light.      Comments: Right eye pitosis   Cardiovascular:      Rate and Rhythm: Normal rate and regular rhythm.      Pulses: Normal pulses.      Heart sounds: Normal heart sounds.   Pulmonary: "      Effort: Pulmonary effort is normal.      Breath sounds: Normal breath sounds.   Abdominal:      General: Bowel sounds are normal.      Palpations: Abdomen is soft.   Musculoskeletal:         General: Normal range of motion.      Cervical back: Normal range of motion and neck supple.   Skin:     General: Skin is warm and dry.   Neurological:      General: No focal deficit present.      Mental Status: He is alert and oriented to person, place, and time. Mental status is at baseline.      Sensory: Sensation is intact.      Motor: Weakness present.      Coordination: Coordination abnormal.      Gait: Gait abnormal.   Psychiatric:         Attention and Perception: Attention normal.         Mood and Affect: Mood normal.         Behavior: Behavior normal. Behavior is cooperative.          Assessment/Plan    Ordered CMP, CBC with diff.  For tomorrow.     Ptosis of right eyelid; External ophthalmoplegia of both eyes; Stroke-like episode:   Right sided weakness.  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   CT of the brain was performed during recent hospitalization with no acute findings.  Neurology was consulted in the hospital.   Ordered  ophthalmologist appointment ---staff to scheduled.  Ordered in house ophthalmologist consult---ASAP.  Patient will need follow up with neurology.      Meningioma:  New meningioma noted on MRI and possible increased in size of residual pituitary macroadenoma.  He had pituitary adenoma s/p resection at [facility] in 2016.  MRI: IMPRESSION: No acute infarct. Extensive chronic changes.   Findings suggesting interval growth of residual right-sided sellar pituitary adenoma as above.   New right-sided posterior fossa meningiomas.   Patient needs follow up with neurology.     COPD (chronic obstructive pulmonary disease):  Monitor oxygen sats.  Continue albuterol nebs.     Leukocytosis:  Monitor WBC.    A-fib; Hx of  pulmonary embolus (PE) and SVT (supraventricular tachycardia):  Cardiology and EP were consulted in the hospital.  Continue Eliquis and metoprolol.  Monitor for chest pain or palpitations today.    Controlled type 2 diabetes mellitus without complication, without long-term current use of insulin:  Continue metformin and sliding scale.     Hypothyroidism:  Continue Synthroid.  Order TSH.  Follow up with endocrinology.    Hypotension:  Continue midodrine.  Monitor Bps.    Right sided weakness:  Follow up with neurology.  Fall prevention.   In house PT/OT if indicated.     Impaired gait and mobility; Generalized muscle weakness; :  Continue at [facility] in long term care.  States he uses a wheelchair or can ambulate short distances when supervised with a walker.   Fall prevention.         Problem List Items Addressed This Visit       Controlled type 2 diabetes mellitus without complication, without long-term current use of insulin (Multi)    COPD (chronic obstructive pulmonary disease) (Multi)    History of pulmonary embolus (PE)    Hypothyroidism    Ptosis of right eyelid - Primary     Other Visit Diagnoses       External ophthalmoplegia of both eyes        Stroke-like episode        Right sided weakness        Cavernous sinus tumor (Multi)        Meningioma (Multi)        Leukocytosis, unspecified type        Atrial fibrillation, unspecified type (Multi)        Hx of supraventricular tachycardia        Hypotension, unspecified hypotension type        Impaired gait and mobility        Generalized muscle weakness                  Madina Lugo, APRN-CNP

## 2024-12-23 ENCOUNTER — NURSING HOME VISIT (OUTPATIENT)
Dept: POST ACUTE CARE | Facility: EXTERNAL LOCATION | Age: 74
End: 2024-12-23
Payer: MEDICARE

## 2024-12-23 DIAGNOSIS — R53.1 RIGHT SIDED WEAKNESS: ICD-10-CM

## 2024-12-23 DIAGNOSIS — R09.02 HYPOXIA: ICD-10-CM

## 2024-12-23 DIAGNOSIS — R26.89 IMPAIRED GAIT AND MOBILITY: ICD-10-CM

## 2024-12-23 DIAGNOSIS — Z86.711 HISTORY OF PULMONARY EMBOLUS (PE): ICD-10-CM

## 2024-12-23 DIAGNOSIS — I95.1 ORTHOSTATIC HYPOTENSION: ICD-10-CM

## 2024-12-23 DIAGNOSIS — M62.81 GENERALIZED MUSCLE WEAKNESS: ICD-10-CM

## 2024-12-23 DIAGNOSIS — D32.9 MENINGIOMA (MULTI): ICD-10-CM

## 2024-12-23 DIAGNOSIS — I48.91 ATRIAL FIBRILLATION, UNSPECIFIED TYPE (MULTI): ICD-10-CM

## 2024-12-23 DIAGNOSIS — E11.9 CONTROLLED TYPE 2 DIABETES MELLITUS WITHOUT COMPLICATION, WITHOUT LONG-TERM CURRENT USE OF INSULIN (MULTI): ICD-10-CM

## 2024-12-23 DIAGNOSIS — U07.1 COVID-19: Primary | ICD-10-CM

## 2024-12-23 DIAGNOSIS — F32.9 MAJOR DEPRESSIVE DISORDER, REMISSION STATUS UNSPECIFIED, UNSPECIFIED WHETHER RECURRENT: ICD-10-CM

## 2024-12-23 DIAGNOSIS — E03.9 HYPOTHYROIDISM, UNSPECIFIED TYPE: ICD-10-CM

## 2024-12-23 DIAGNOSIS — J44.9 CHRONIC OBSTRUCTIVE PULMONARY DISEASE, UNSPECIFIED COPD TYPE (MULTI): ICD-10-CM

## 2024-12-23 PROCEDURE — 99310 SBSQ NF CARE HIGH MDM 45: CPT | Performed by: NURSE PRACTITIONER

## 2024-12-23 NOTE — LETTER
Patient: Gopal Ness  : 1950    Encounter Date: 2024    Name: Gopal Ness    YOB: 1950    Code Status: DNRcc-arrest    Chief Complaint:  Readmit after hospitalization;   Covid-19; etc.....      HPI     74 year old male, history of paroxysmal atrial fibrillation, on long-term oral anticoagulation with Eliquis, COPD, home O2, hypothyroidism, type 2 diabetes mellitus, diabetes insipidus, Cushing syndrome, pituitary adenoma on 10/13/2016, pulmonary embolism, tobacco abuse, and kidney stone.    RECENT HOSPITAL COURSE:  74 year old male with pituitary macroadenoma and cushing's s/p TSS (10/2016) and RT (completed 10/2018 for recurrence), secondary hypogonadism, secondary hypothyroidism, Hx of DI (resolved), DM type 2, COPD and Hx of PE.   He was also admitted last month for stroke like symptoms.  He was discharged to [facility].   #####  Per ED note he went to see his PCP, and he had a low BP and weakness, so he was sent to [facility] ER on 24.   In the ED, he was given Midodrine with good response.   He tested Positive for Covid, so he was admitted.   He was very weak, started on supplemental oxygen via NC.  Treated with Remdesivir.  Infectious disease was following patient during his hospitalization.  Pulmonary was also consulted.  He was started on Decadron PO and treated with aerosols.   Patient was evaluated for stroke-like episode during his prior admission.  On 24 and MRI of the brain was performed, no acute infarct was noted. Chronic changes were found along with interval growth of his  Pituitary adenoma.   He had elevated TSH on synthroid.   Endocrine was consulted.   Cardiology was also consulted.   He was hypotensive. On midodrine.      Patient was previously admitted to [facility] from 11/10/24 to 24    Patient re-admitted to [facility] on 24 for skilled and long term care.    Patient was recently admitted to [facility] from 24 to today  12/23/24.     Patient re-admitted to [facility] today 12/23/24.     Hospital and chronic diagnoses as follows:    Covid-19; hypoxia:  Treated in the hospital.  Was on Covid isolation in the hospital and continues on isolation.   Treated with Remdesivir and Decadron.  Discharged on decadron.  On supplemental oxygen 2 L O2 via NC.   Treated with albuterol nebs and guaifenesin.  Pulmonary consult and ID consult were performed in the hospital.  He was on telemetry in the hospital.  Patient seen today.  He is in bed.  Calm, cooperative, follows commands.  No headaches.  No chest pain.  No SOB.    COPD (chronic obstructive pulmonary disease):  On albuterol nebs and supplemental oxygen.     Orthostatic hypotension:  On Midodrine 5mg TID    A-fib; Hx of pulmonary embolus (PE) and SVT (supraventricular tachycardia):  Has a-fib.  On Eliquis and metoprolol.  No complaints of chest pain or palpitations today.  No reports of tachycardia.      DM 2:   Was on sliding scale insulin.  Discharged on metformin.    Hypothyroidism:  Endocrine was consulted in the hospital.  On Synthroid.    Meningioma:  New meningioma noted on MRI and possible increased in size of residual pituitary macroadenoma.  He had pituitary adenoma s/p resection at [facility] in 2016.  MRI: IMPRESSION: No acute infarct. Extensive chronic changes.   Findings suggesting interval growth of residual right-sided sellar pituitary adenoma as above.   New right-sided posterior fossa meningiomas.     Ptosis of right eyelid; External ophthalmoplegia of both eyes; Hx Stroke-like episode:   Right sided weakness.  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   CT of the brain was performed during recent hospitalization with no acute findings.  Neurology was consulted in the hospital during a previous admission.  The right eye ptosis was evaluated in the hospital.   Needs follow up with an ophthalmologist  for his eye ptosis.   He is able to move right side, just slightly weak.    Major depressive disorder:  On sertraline.    Impaired gait and mobility; Generalized muscle weakness :  Patient is in bed.  At [facility] in long term care.  Very weak, unable to ambulate.                        Reviewed [facility] records from recent hospitalization.  Reviewed  EMR  Reviewed medical, social, surgical and family history.  Reviewed all current medications and performed medication reconciliation.  Performed prescription drug management.  Reviewed vital signs AND lab results  Reviewed [employer] documentation.  Discussed patient with nursing,  and [employer] NP.  Spent greater than 50 minutes on patient visit.                      ROS: 10 point ROS performed; Negative unless noted in HPI.    MEDICAL HISTORY:  Arthritis   Cataracts, bilateral   COPD (chronic obstructive pulmonary disease) ([facility])   Cushing's disease ([facility]) 2016   s/p TSA   H/O diabetes insipidus   s/p TSA   Hypothyroidism   Kidney stones   Pituitary adenoma ([facility]) 01/13/2016   ACTH secreting   Pulmonary emboli ([facility]) 10/2016   saddle and multiple additional PE     SURGICAL HISTORY:   Neck surgery  Resection of pituitary adenoma   PICC line insertion.  TOTAL HIP REPLACEMENT   XCAPSL CTRC RMVL INSJ IO LENS   PROSTH W/O ECP Right   Cataract Extraction with PC IOL     SOCIAL HISTORY:  Former smoker.  1/2 PPD for 40 years  Denies alcohol use.  Denies drug use.    FAMILY HISTORY:  Diabetes--father  Cancer--Multiple myeloma--mother      ALLERGIES   Codeine   Penicillins     LABS:  BMP: Date: 11/7/2024 Na 142 K 4.3 Cr 1 BUN 21 glucose 133 Ca 6.3 GFR 73  CBC: Date: 11/7/2024 WBC 5.2 Hgb 10.1 hematocrit 30.5 platelets 135  Liver function: Date: 11/7/2024 ALT 13 AST 13 alkaline phos.  46 bilirubin 0.3 albumin 1.2 protein 6.3    BMP: Date: 11/8/2024 Na 138 K 4.2 Cr 0.9 BUN 20 glucose 106 Ca 9.3 GFR 83  CBC: Date: 11/8/2024 WBC 10.6 Hgb 11 hematocrit  "33.4 platelets 147    BMP: Date: 11/21/2024 Na 140 K 4.8 Cr 1.3 BUN 18 glucose 70 Ca 8.4 GFR 54  CBC: Date: 11/21/2024 WBC 8.1 Hgb 11.1 hematocrit 35.1 platelets 175    11/26/2024 lipid panel: cholesterol 136; triglycerides 201; HDL 34; LDL 62; VLDL 40    11/26/2024 magnesium level 1.9             Lab Results   Component Value Date    WBC 7.1 01/31/2024    HGB 13.3 (L) 01/31/2024    HCT 40.5 (L) 01/31/2024     (L) 01/31/2024    CHOL 166 07/18/2023    TRIG 124 07/18/2023    HDL 39.0 (A) 07/18/2023    ALT 22 01/22/2024    AST 24 01/22/2024     01/31/2024    K 3.9 01/31/2024    CL 98 01/31/2024    CREATININE 0.92 01/31/2024    BUN 21 01/31/2024    CO2 31 01/31/2024    TSH <0.01 (L) 01/12/2024    PSA <0.10 08/18/2021    INR 1.1 01/07/2024    HGBA1C 5.6 01/01/2024             /61   Pulse 68   Temp 37.1 °C (98.8 °F)   Resp 18   Ht 1.702 m (5' 7\")   Wt 83 kg (183 lb)   SpO2 95%   BMI 28.66 kg/m²      Physical Exam  Vitals and nursing note reviewed.   Constitutional:       Appearance: He is ill-appearing.   HENT:      Head: Normocephalic.      Right Ear: External ear normal.      Left Ear: External ear normal.      Nose: Nose normal.      Mouth/Throat:      Mouth: Mucous membranes are moist.      Pharynx: Oropharynx is clear.   Eyes:      Extraocular Movements: Extraocular movements intact.      Conjunctiva/sclera: Conjunctivae normal.      Pupils: Pupils are equal, round, and reactive to light.      Comments: Right eye pitosis   Cardiovascular:      Rate and Rhythm: Normal rate and regular rhythm.      Pulses: Normal pulses.      Heart sounds: Normal heart sounds.   Pulmonary:      Effort: Pulmonary effort is normal.      Breath sounds: Normal breath sounds.   Abdominal:      General: Bowel sounds are normal.      Palpations: Abdomen is soft.   Musculoskeletal:         General: Normal range of motion.      Cervical back: Normal range of motion and neck supple.   Skin:     General: Skin is warm and " dry.   Neurological:      General: No focal deficit present.      Mental Status: He is alert and oriented to person, place, and time. Mental status is at baseline.      Sensory: Sensation is intact.      Motor: Weakness present.      Coordination: Coordination abnormal.      Gait: Gait abnormal.   Psychiatric:         Attention and Perception: Attention normal.         Mood and Affect: Mood normal.         Behavior: Behavior normal. Behavior is cooperative.          Assessment/Plan   Ordered CMP, CBC with diff.  For tomorrow.     Covid-19; hypoxia:   Treated in the hospital.  Was on Covid isolation in the hospital.  Continue Covid isolation.  Treated with Remdesivir in the hospital.  Discharged on Decadron.  Continue Decadron.   Continue supplemental oxygen 2 L O2 via NC.   Monitor oxygen sats.  Continue albuterol nebs and guaifenesin.  Pulmonary consult and ID consult were performed in the hospital.    COPD (chronic obstructive pulmonary disease):  Continue albuterol nebs and supplemental oxygen.  Monitor oxygen sats.    Orthostatic hypotension:  Continue Midodrine 5mg TID    A-fib; Hx of pulmonary embolus (PE) and SVT (supraventricular tachycardia):  Continue Eliquis and metoprolol.  Follow up with cardiology.     DM 2:   Continue metformin.    Hypothyroidism:  Endocrine was consulted in the hospital.  Continue Synthroid.    Meningioma:  New meningioma noted on MRI and possible increased in size of residual pituitary macroadenoma.  He had pituitary adenoma s/p resection at [facility] in 2016.  MRI: IMPRESSION: No acute infarct. Extensive chronic changes.   Findings suggesting interval growth of residual right-sided sellar pituitary adenoma as above.   New right-sided posterior fossa meningiomas.   Follow up with endocrine and neurology.     Ptosis of right eyelid; External ophthalmoplegia of both eyes; Hx Stroke-like episode:   Right sided weakness.  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   CT of the brain was performed during recent hospitalization with no acute findings.  Neurology was consulted in the hospital during a previous admission.  The right eye ptosis was evaluated in the hospital.   Needs follow up with an ophthalmologist for his eye ptosis.     Major depressive disorder:  Continue sertraline.    Impaired gait and mobility; Generalized muscle weakness; :  Patient is in bed.  Continue [facility] in long term care.  Fall prevention strategies.        Problem List Items Addressed This Visit       Controlled type 2 diabetes mellitus without complication, without long-term current use of insulin (Multi)    COPD (chronic obstructive pulmonary disease) (Multi)    Depression    History of pulmonary embolus (PE)    Hypothyroidism    Hypoxia     Other Visit Diagnoses       COVID-19    -  Primary    Orthostatic hypotension        Atrial fibrillation, unspecified type (Multi)        Meningioma (Multi)        Right sided weakness        Impaired gait and mobility        Generalized muscle weakness                    CHANTAL Delgado       Electronically Signed By: CHANTAL Delgado   12/28/24  5:48 PM

## 2024-12-28 VITALS
SYSTOLIC BLOOD PRESSURE: 117 MMHG | RESPIRATION RATE: 18 BRPM | OXYGEN SATURATION: 95 % | HEART RATE: 68 BPM | BODY MASS INDEX: 28.72 KG/M2 | TEMPERATURE: 98.8 F | HEIGHT: 67 IN | DIASTOLIC BLOOD PRESSURE: 61 MMHG | WEIGHT: 183 LBS

## 2024-12-28 NOTE — PROGRESS NOTES
Name: Gopal Ness    YOB: 1950    Code Status: DNRcc-arrest    Chief Complaint:  Readmit after hospitalization;   Covid-19; etc.....      HPI     74 year old male, history of paroxysmal atrial fibrillation, on long-term oral anticoagulation with Eliquis, COPD, home O2, hypothyroidism, type 2 diabetes mellitus, diabetes insipidus, Cushing syndrome, pituitary adenoma on 10/13/2016, pulmonary embolism, tobacco abuse, and kidney stone.    RECENT HOSPITAL COURSE:  74 year old male with pituitary macroadenoma and cushing's s/p TSS (10/2016) and RT (completed 10/2018 for recurrence), secondary hypogonadism, secondary hypothyroidism, Hx of DI (resolved), DM type 2, COPD and Hx of PE.   He was also admitted last month for stroke like symptoms.  He was discharged to [facility].   #####  Per ED note he went to see his PCP, and he had a low BP and weakness, so he was sent to [facility] ER on 12/17/24.   In the ED, he was given Midodrine with good response.   He tested Positive for Covid, so he was admitted.   He was very weak, started on supplemental oxygen via NC.  Treated with Remdesivir.  Infectious disease was following patient during his hospitalization.  Pulmonary was also consulted.  He was started on Decadron PO and treated with aerosols.   Patient was evaluated for stroke-like episode during his prior admission.  On 11/11/24 and MRI of the brain was performed, no acute infarct was noted. Chronic changes were found along with interval growth of his  Pituitary adenoma.   He had elevated TSH on synthroid.   Endocrine was consulted.   Cardiology was also consulted.   He was hypotensive. On midodrine.      Patient was previously admitted to [facility] from 11/10/24 to 11/14/24    Patient re-admitted to [facility] on 11/14/24 for skilled and long term care.    Patient was recently admitted to [facility] from 12/16/24 to today 12/23/24.     Patient re-admitted to [facility] today 12/23/24.      Hospital and chronic diagnoses as follows:    Covid-19; hypoxia:  Treated in the hospital.  Was on Covid isolation in the hospital and continues on isolation.   Treated with Remdesivir and Decadron.  Discharged on decadron.  On supplemental oxygen 2 L O2 via NC.   Treated with albuterol nebs and guaifenesin.  Pulmonary consult and ID consult were performed in the hospital.  He was on telemetry in the hospital.  Patient seen today.  He is in bed.  Calm, cooperative, follows commands.  No headaches.  No chest pain.  No SOB.    COPD (chronic obstructive pulmonary disease):  On albuterol nebs and supplemental oxygen.     Orthostatic hypotension:  On Midodrine 5mg TID    A-fib; Hx of pulmonary embolus (PE) and SVT (supraventricular tachycardia):  Has a-fib.  On Eliquis and metoprolol.  No complaints of chest pain or palpitations today.  No reports of tachycardia.      DM 2:   Was on sliding scale insulin.  Discharged on metformin.    Hypothyroidism:  Endocrine was consulted in the hospital.  On Synthroid.    Meningioma:  New meningioma noted on MRI and possible increased in size of residual pituitary macroadenoma.  He had pituitary adenoma s/p resection at [facility] in 2016.  MRI: IMPRESSION: No acute infarct. Extensive chronic changes.   Findings suggesting interval growth of residual right-sided sellar pituitary adenoma as above.   New right-sided posterior fossa meningiomas.     Ptosis of right eyelid; External ophthalmoplegia of both eyes; Hx Stroke-like episode:   Right sided weakness.  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   CT of the brain was performed during recent hospitalization with no acute findings.  Neurology was consulted in the hospital during a previous admission.  The right eye ptosis was evaluated in the hospital.   Needs follow up with an ophthalmologist for his eye ptosis.   He is able to move right side, just slightly  weak.    Major depressive disorder:  On sertraline.    Impaired gait and mobility; Generalized muscle weakness :  Patient is in bed.  At [facility] in long term care.  Very weak, unable to ambulate.                        Reviewed [facility] records from recent hospitalization.  Reviewed  EMR  Reviewed medical, social, surgical and family history.  Reviewed all current medications and performed medication reconciliation.  Performed prescription drug management.  Reviewed vital signs AND lab results  Reviewed [employer] documentation.  Discussed patient with nursing,  and [employer] NP.  Spent greater than 50 minutes on patient visit.                      ROS: 10 point ROS performed; Negative unless noted in HPI.    MEDICAL HISTORY:  Arthritis   Cataracts, bilateral   COPD (chronic obstructive pulmonary disease) ([facility])   Cushing's disease ([facility]) 2016   s/p TSA   H/O diabetes insipidus   s/p TSA   Hypothyroidism   Kidney stones   Pituitary adenoma ([facility]) 01/13/2016   ACTH secreting   Pulmonary emboli ([facility]) 10/2016   saddle and multiple additional PE     SURGICAL HISTORY:   Neck surgery  Resection of pituitary adenoma   PICC line insertion.  TOTAL HIP REPLACEMENT   XCAPSL CTRC RMVL INSJ IO LENS   PROSTH W/O ECP Right   Cataract Extraction with PC IOL     SOCIAL HISTORY:  Former smoker.  1/2 PPD for 40 years  Denies alcohol use.  Denies drug use.    FAMILY HISTORY:  Diabetes--father  Cancer--Multiple myeloma--mother      ALLERGIES   Codeine   Penicillins     LABS:  BMP: Date: 11/7/2024 Na 142 K 4.3 Cr 1 BUN 21 glucose 133 Ca 6.3 GFR 73  CBC: Date: 11/7/2024 WBC 5.2 Hgb 10.1 hematocrit 30.5 platelets 135  Liver function: Date: 11/7/2024 ALT 13 AST 13 alkaline phos.  46 bilirubin 0.3 albumin 1.2 protein 6.3    BMP: Date: 11/8/2024 Na 138 K 4.2 Cr 0.9 BUN 20 glucose 106 Ca 9.3 GFR 83  CBC: Date: 11/8/2024 WBC 10.6 Hgb 11 hematocrit 33.4 platelets 147    BMP: Date: 11/21/2024 Na 140 K 4.8 Cr 1.3 BUN 18  "glucose 70 Ca 8.4 GFR 54  CBC: Date: 11/21/2024 WBC 8.1 Hgb 11.1 hematocrit 35.1 platelets 175    11/26/2024 lipid panel: cholesterol 136; triglycerides 201; HDL 34; LDL 62; VLDL 40    11/26/2024 magnesium level 1.9             Lab Results   Component Value Date    WBC 7.1 01/31/2024    HGB 13.3 (L) 01/31/2024    HCT 40.5 (L) 01/31/2024     (L) 01/31/2024    CHOL 166 07/18/2023    TRIG 124 07/18/2023    HDL 39.0 (A) 07/18/2023    ALT 22 01/22/2024    AST 24 01/22/2024     01/31/2024    K 3.9 01/31/2024    CL 98 01/31/2024    CREATININE 0.92 01/31/2024    BUN 21 01/31/2024    CO2 31 01/31/2024    TSH <0.01 (L) 01/12/2024    PSA <0.10 08/18/2021    INR 1.1 01/07/2024    HGBA1C 5.6 01/01/2024             /61   Pulse 68   Temp 37.1 °C (98.8 °F)   Resp 18   Ht 1.702 m (5' 7\")   Wt 83 kg (183 lb)   SpO2 95%   BMI 28.66 kg/m²      Physical Exam  Vitals and nursing note reviewed.   Constitutional:       Appearance: He is ill-appearing.   HENT:      Head: Normocephalic.      Right Ear: External ear normal.      Left Ear: External ear normal.      Nose: Nose normal.      Mouth/Throat:      Mouth: Mucous membranes are moist.      Pharynx: Oropharynx is clear.   Eyes:      Extraocular Movements: Extraocular movements intact.      Conjunctiva/sclera: Conjunctivae normal.      Pupils: Pupils are equal, round, and reactive to light.      Comments: Right eye pitosis   Cardiovascular:      Rate and Rhythm: Normal rate and regular rhythm.      Pulses: Normal pulses.      Heart sounds: Normal heart sounds.   Pulmonary:      Effort: Pulmonary effort is normal.      Breath sounds: Normal breath sounds.   Abdominal:      General: Bowel sounds are normal.      Palpations: Abdomen is soft.   Musculoskeletal:         General: Normal range of motion.      Cervical back: Normal range of motion and neck supple.   Skin:     General: Skin is warm and dry.   Neurological:      General: No focal deficit present.      " Mental Status: He is alert and oriented to person, place, and time. Mental status is at baseline.      Sensory: Sensation is intact.      Motor: Weakness present.      Coordination: Coordination abnormal.      Gait: Gait abnormal.   Psychiatric:         Attention and Perception: Attention normal.         Mood and Affect: Mood normal.         Behavior: Behavior normal. Behavior is cooperative.          Assessment/Plan    Ordered CMP, CBC with diff.  For tomorrow.     Covid-19; hypoxia:   Treated in the hospital.  Was on Covid isolation in the hospital.  Continue Covid isolation.  Treated with Remdesivir in the hospital.  Discharged on Decadron.  Continue Decadron.   Continue supplemental oxygen 2 L O2 via NC.   Monitor oxygen sats.  Continue albuterol nebs and guaifenesin.  Pulmonary consult and ID consult were performed in the hospital.    COPD (chronic obstructive pulmonary disease):  Continue albuterol nebs and supplemental oxygen.  Monitor oxygen sats.    Orthostatic hypotension:  Continue Midodrine 5mg TID    A-fib; Hx of pulmonary embolus (PE) and SVT (supraventricular tachycardia):  Continue Eliquis and metoprolol.  Follow up with cardiology.     DM 2:   Continue metformin.    Hypothyroidism:  Endocrine was consulted in the hospital.  Continue Synthroid.    Meningioma:  New meningioma noted on MRI and possible increased in size of residual pituitary macroadenoma.  He had pituitary adenoma s/p resection at [facility] in 2016.  MRI: IMPRESSION: No acute infarct. Extensive chronic changes.   Findings suggesting interval growth of residual right-sided sellar pituitary adenoma as above.   New right-sided posterior fossa meningiomas.   Follow up with endocrine and neurology.     Ptosis of right eyelid; External ophthalmoplegia of both eyes; Hx Stroke-like episode:   Right sided weakness.  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   CT of the brain  was performed during recent hospitalization with no acute findings.  Neurology was consulted in the hospital during a previous admission.  The right eye ptosis was evaluated in the hospital.   Needs follow up with an ophthalmologist for his eye ptosis.     Major depressive disorder:  Continue sertraline.    Impaired gait and mobility; Generalized muscle weakness; :  Patient is in bed.  Continue [facility] in [facility].  Fall prevention strategies.        Problem List Items Addressed This Visit       Controlled type 2 diabetes mellitus without complication, without long-term current use of insulin (Multi)    COPD (chronic obstructive pulmonary disease) (Multi)    Depression    History of pulmonary embolus (PE)    Hypothyroidism    Hypoxia     Other Visit Diagnoses       COVID-19    -  Primary    Orthostatic hypotension        Atrial fibrillation, unspecified type (Multi)        Meningioma (Multi)        Right sided weakness        Impaired gait and mobility        Generalized muscle weakness                    Madina Lugo, APRN-CNP

## 2024-12-30 ENCOUNTER — NURSING HOME VISIT (OUTPATIENT)
Dept: POST ACUTE CARE | Facility: EXTERNAL LOCATION | Age: 74
End: 2024-12-30
Payer: MEDICARE

## 2024-12-30 DIAGNOSIS — U07.1 COVID-19: ICD-10-CM

## 2024-12-30 DIAGNOSIS — R09.02 HYPOXIA: ICD-10-CM

## 2024-12-30 DIAGNOSIS — J44.9 CHRONIC OBSTRUCTIVE PULMONARY DISEASE, UNSPECIFIED COPD TYPE (MULTI): ICD-10-CM

## 2024-12-30 DIAGNOSIS — R26.89 IMPAIRED GAIT AND MOBILITY: ICD-10-CM

## 2024-12-30 DIAGNOSIS — I48.91 ATRIAL FIBRILLATION, UNSPECIFIED TYPE (MULTI): ICD-10-CM

## 2024-12-30 DIAGNOSIS — E03.9 HYPOTHYROIDISM, UNSPECIFIED TYPE: ICD-10-CM

## 2024-12-30 DIAGNOSIS — I95.1 ORTHOSTATIC HYPOTENSION: ICD-10-CM

## 2024-12-30 DIAGNOSIS — D32.9 MENINGIOMA (MULTI): ICD-10-CM

## 2024-12-30 DIAGNOSIS — E11.9 CONTROLLED TYPE 2 DIABETES MELLITUS WITHOUT COMPLICATION, WITHOUT LONG-TERM CURRENT USE OF INSULIN (MULTI): ICD-10-CM

## 2024-12-30 DIAGNOSIS — D72.829 LEUKOCYTOSIS, UNSPECIFIED TYPE: Primary | ICD-10-CM

## 2024-12-30 DIAGNOSIS — M62.81 GENERALIZED MUSCLE WEAKNESS: ICD-10-CM

## 2024-12-30 PROCEDURE — 99309 SBSQ NF CARE MODERATE MDM 30: CPT | Performed by: NURSE PRACTITIONER

## 2024-12-30 NOTE — LETTER
Patient: Gopal Ness  : 1950    Encounter Date: 2024    Name: Gopal Ness    YOB: 1950    Code Status: DNRcc-arrest    Chief Complaint:  Leukocytosis; etc.....      HPI     74 year old male, history of paroxysmal atrial fibrillation, on long-term oral anticoagulation with Eliquis, COPD, home O2, hypothyroidism, type 2 diabetes mellitus, diabetes insipidus, Cushing syndrome, pituitary adenoma on 10/13/2016, pulmonary embolism, tobacco abuse, and kidney stone.      Patient was previously admitted to [facility] from 11/10/24 to 24    Patient re-admitted to [facility] on 24 for skilled and long term care.    Patient was recently admitted to [facility] from 24 to today 24.     Patient re-admitted to [facility] on 24.     Chronic diagnoses as follows:    Leukocytosis:  On 24 WBC elevated at 18.3 H.  No fevers reported at that time.   UA C & S ordered on 24 and patient was started on doxycycline.  Today 24 WBC improved to 7.5.   Patient seen today.  No fevers reported.   UA C & S results pending.  He is in bed.  Calm, cooperative, follows commands.  No headaches.  No dizziness.  No dysuria.  No chest pain.    Covid-19; hypoxia:  Treated in the hospital.  Was on Covid isolation in the hospital.  He is finished with his Covid isolation now.   Treated with Remdesivir and Decadron.  Discharged on decadron, has completed decadron now.   On supplemental oxygen 2 L O2 via NC.   Treated with albuterol nebs and guaifenesin.  Pulmonary consult and ID consult were performed in the hospital.    COPD (chronic obstructive pulmonary disease):  On albuterol nebs and supplemental oxygen.     Orthostatic hypotension:  On Midodrine 5mg TID  Recent /50.     A-fib; Hx of pulmonary embolus (PE) and SVT (supraventricular tachycardia):  Has a-fib.  On Eliquis and metoprolol.  No complaints of chest pain or palpitations today.  No reports of  tachycardia.      Hypothyroidism:  Endocrine was consulted in the hospital.  On Synthroid.    DM 2:   Was on sliding scale insulin.  Discharged on metformin.    Meningioma:  New meningioma noted on MRI and possible increased in size of residual pituitary macroadenoma.  He had pituitary adenoma s/p resection at [facility] in 2016.  MRI: IMPRESSION: No acute infarct. Extensive chronic changes.   Findings suggesting interval growth of residual right-sided sellar pituitary adenoma as above.   New right-sided posterior fossa meningiomas.   Needs follow up with neurology/endocrinology.     Impaired gait and mobility; Generalized muscle weakness :  Patient is in bed.  At [facility] in long term care.  Very weak, unable to ambulate.                        Reviewed  EMR  Reviewed medical, social, surgical and family history.  Reviewed all current medications and performed medication reconciliation.  Performed prescription drug management.  Reviewed vital signs AND lab results  Reviewed [employer] documentation.  Discussed patient with nursing,  and [employer] NP.                      ROS: 10 point ROS performed; Negative unless noted in HPI.    MEDICAL HISTORY:  Arthritis   Cataracts, bilateral   COPD (chronic obstructive pulmonary disease) ([facility])   Cushing's disease ([facility]) 2016   s/p TSA   H/O diabetes insipidus   s/p TSA   Hypothyroidism   Kidney stones   Pituitary adenoma ([facility]) 01/13/2016   ACTH secreting   Pulmonary emboli ([facility]) 10/2016   saddle and multiple additional PE     SURGICAL HISTORY:   Neck surgery  Resection of pituitary adenoma   PICC line insertion.  TOTAL HIP REPLACEMENT   XCAPSL CTRC RMVL INSJ IO LENS   PROSTH W/O ECP Right   Cataract Extraction with PC IOL     SOCIAL HISTORY:  Former smoker.  1/2 PPD for 40 years  Denies alcohol use.  Denies drug use.    FAMILY HISTORY:  Diabetes--father  Cancer--Multiple myeloma--mother      ALLERGIES   Codeine   Penicillins     LABS:  BMP: Date: 11/7/2024 Na 142 K  4.3 Cr 1 BUN 21 glucose 133 Ca 6.3 GFR 73  CBC: Date: 11/7/2024 WBC 5.2 Hgb 10.1 hematocrit 30.5 platelets 135  Liver function: Date: 11/7/2024 ALT 13 AST 13 alkaline phos.  46 bilirubin 0.3 albumin 1.2 protein 6.3    BMP: Date: 11/8/2024 Na 138 K 4.2 Cr 0.9 BUN 20 glucose 106 Ca 9.3 GFR 83  CBC: Date: 11/8/2024 WBC 10.6 Hgb 11 hematocrit 33.4 platelets 147    BMP: Date: 11/21/2024 Na 140 K 4.8 Cr 1.3 BUN 18 glucose 70 Ca 8.4 GFR 54  CBC: Date: 11/21/2024 WBC 8.1 Hgb 11.1 hematocrit 35.1 platelets 175    11/26/2024 lipid panel: cholesterol 136; triglycerides 201; HDL 34; LDL 62; VLDL 40    11/26/2024 magnesium level 1.9    BMP: Date: 12/3/2024 Na 140 K 4.3 Cr 1.1 BUN 12 glucose 73 Ca 8.5 GFR 66  CBC: Date: 12/3/2024 WBC 6.2 Hgb 11.5 hematocrit 35.1 platelets 185  Liver function: Date: 12/3/2024 ALT 10 AST 15 alkaline phos.  54 bilirubin 0.6 albumin 3.5 protein 6.3    BMP: Date: 12/11/2024 Na 136 K 4.3 Cr 1.2 BUN 14 glucose 119 Ca 9.1 GFR 59  CBC: Date: 12/11/2024 WBC 7.2 Hgb 12.4 hematocrit 38.2 platelets 156  Liver function: Date: 12/11/2024 ALT 13 AST 22 alkaline phos.  43 bilirubin 0.5 albumin 3.2 protein 6    BMP: Date: 12/26/2024 Na 133 K 4.7 Cr 0.9 BUN 29 glucose 107  Ca 8.4 GFR 83  CBC: Date: 12/26/2024 WBC 18.3 Hgb 12.7 hematocrit 39.2 platelets 205  Liver function: Date: 12/26/2024 ALT 40 AST 22 alkaline phos.  50 bilirubin 0.7 albumin 3.4 protein 6.1    BMP: Date: 12/30/2024 Na 138 K 4.9 Cr 0.8 BUN 29 glucose 105 Ca 7.9 GFR 95  CBC: Date: 12/30/2024 WBC 7.5 Hgb 11.5 hematocrit 36.5 platelets 136         Lab Results   Component Value Date    WBC 7.1 01/31/2024    HGB 13.3 (L) 01/31/2024    HCT 40.5 (L) 01/31/2024     (L) 01/31/2024    CHOL 166 07/18/2023    TRIG 124 07/18/2023    HDL 39.0 (A) 07/18/2023    ALT 22 01/22/2024    AST 24 01/22/2024     01/31/2024    K 3.9 01/31/2024    CL 98 01/31/2024    CREATININE 0.92 01/31/2024    BUN 21 01/31/2024    CO2 31 01/31/2024    TSH <0.01 (L)  "01/12/2024    PSA <0.10 08/18/2021    INR 1.1 01/07/2024    HGBA1C 5.6 01/01/2024             /50   Pulse 75   Temp 36.7 °C (98 °F)   Resp 18   Ht 1.702 m (5' 7\")   Wt 83 kg (183 lb)   SpO2 98%   BMI 28.66 kg/m²      Physical Exam  Vitals and nursing note reviewed.   Constitutional:       General: He is awake.      Appearance: He is ill-appearing.      Interventions: Nasal cannula in place.   HENT:      Head: Normocephalic.      Right Ear: External ear normal.      Left Ear: External ear normal.      Nose: Nose normal.      Mouth/Throat:      Mouth: Mucous membranes are moist.      Pharynx: Oropharynx is clear.   Eyes:      Extraocular Movements: Extraocular movements intact.      Conjunctiva/sclera: Conjunctivae normal.      Pupils: Pupils are equal, round, and reactive to light.      Comments: Right eye pitosis   Cardiovascular:      Rate and Rhythm: Normal rate and regular rhythm.      Pulses: Normal pulses.      Heart sounds: Normal heart sounds.   Pulmonary:      Effort: Pulmonary effort is normal.      Breath sounds: Normal breath sounds.   Abdominal:      General: Bowel sounds are normal.      Palpations: Abdomen is soft.   Musculoskeletal:         General: Normal range of motion.      Cervical back: Normal range of motion and neck supple.   Skin:     General: Skin is warm and dry.   Neurological:      General: No focal deficit present.      Mental Status: He is alert and oriented to person, place, and time. Mental status is at baseline.      Sensory: Sensation is intact.      Motor: Weakness present.      Coordination: Coordination abnormal.      Gait: Gait abnormal.   Psychiatric:         Attention and Perception: Attention normal.         Mood and Affect: Mood normal.         Behavior: Behavior normal. Behavior is cooperative.          Assessment/Plan   Recheck BMP, CBC with diff.  On Thursday.    Leukocytosis:  Monitor WBC.  Continue doxycycline as ordered.  Monitor for fevers.  Follow up on " UA C & S when available.    Covid-19; hypoxia:  Treated in the hospital.  Was on Covid isolation in the hospital.  He is finished with his Covid isolation now.   Treated with Remdesivir and Decadron.  Discharged on decadron, has completed decadron now.   Continue supplemental oxygen 2 L O2 via NC.   Monitor oxygen sats.  Continue albuterol nebs and guaifenesin.    COPD (chronic obstructive pulmonary disease):  Continue albuterol nebs and supplemental oxygen.     Orthostatic hypotension:  Continue Midodrine 5mg TID  Monitor Bps.    A-fib; Hx of pulmonary embolus (PE) and SVT (supraventricular tachycardia):  Continue Eliquis and metoprolol.    Hypothyroidism:  Continue Synthroid.  Monitor TSH.    DM 2:   Discharged on metformin.  Order Ha1c.    Meningioma:  New meningioma noted on MRI and possible increased in size of residual pituitary macroadenoma.  He had pituitary adenoma s/p resection at [facility] in 2016.  MRI: IMPRESSION: No acute infarct. Extensive chronic changes.   Findings suggesting interval growth of residual right-sided sellar pituitary adenoma as above.   New right-sided posterior fossa meningiomas.   Needs follow up with neurology/endocrinology.    Impaired gait and mobility; Generalized muscle weakness :  Patient is in bed.  Continue at [facility] in long term care.  Fall prevention strategies.       Problem List Items Addressed This Visit       Controlled type 2 diabetes mellitus without complication, without long-term current use of insulin (Multi)    COPD (chronic obstructive pulmonary disease) (Multi)    Hypothyroidism    Hypoxia     Other Visit Diagnoses       Leukocytosis, unspecified type    -  Primary    COVID-19        Orthostatic hypotension        Atrial fibrillation, unspecified type (Multi)        Meningioma (Multi)        Impaired gait and mobility        Generalized muscle weakness                      CHEMA Delgado-THERESA       Electronically Signed By: CHEMA Delgado-THERESA    12/31/24  8:30 PM

## 2024-12-31 VITALS
SYSTOLIC BLOOD PRESSURE: 117 MMHG | BODY MASS INDEX: 28.72 KG/M2 | WEIGHT: 183 LBS | HEIGHT: 67 IN | RESPIRATION RATE: 18 BRPM | HEART RATE: 75 BPM | DIASTOLIC BLOOD PRESSURE: 50 MMHG | TEMPERATURE: 98 F | OXYGEN SATURATION: 98 %

## 2025-01-01 NOTE — PROGRESS NOTES
Name: Gopal Ness    YOB: 1950    Code Status: DNRcc-arrest    Chief Complaint:  Leukocytosis; etc.....      HPI     74 year old male, history of paroxysmal atrial fibrillation, on long-term oral anticoagulation with Eliquis, COPD, home O2, hypothyroidism, type 2 diabetes mellitus, diabetes insipidus, Cushing syndrome, pituitary adenoma on 10/13/2016, pulmonary embolism, tobacco abuse, and kidney stone.      Patient was previously admitted to [facility] from 11/10/24 to 11/14/24    Patient re-admitted to [facility] on 11/14/24 for skilled and long term care.    Patient was recently admitted to [facility] from 12/16/24 to today 12/23/24.     Patient re-admitted to [facility] on 12/23/24.     Chronic diagnoses as follows:    Leukocytosis:  On 12/26/24 WBC elevated at 18.3 H.  No fevers reported at that time.   UA C & S ordered on 12/26/24 and patient was started on doxycycline.  Today 12/30/24 WBC improved to 7.5.   Patient seen today.  No fevers reported.   UA C & S results pending.  He is in bed.  Calm, cooperative, follows commands.  No headaches.  No dizziness.  No dysuria.  No chest pain.    Covid-19; hypoxia:  Treated in the hospital.  Was on Covid isolation in the hospital.  He is finished with his Covid isolation now.   Treated with Remdesivir and Decadron.  Discharged on decadron, has completed decadron now.   On supplemental oxygen 2 L O2 via NC.   Treated with albuterol nebs and guaifenesin.  Pulmonary consult and ID consult were performed in the hospital.    COPD (chronic obstructive pulmonary disease):  On albuterol nebs and supplemental oxygen.     Orthostatic hypotension:  On Midodrine 5mg TID  Recent /50.     A-fib; Hx of pulmonary embolus (PE) and SVT (supraventricular tachycardia):  Has a-fib.  On Eliquis and metoprolol.  No complaints of chest pain or palpitations today.  No reports of tachycardia.      Hypothyroidism:  Endocrine was consulted in the  hospital.  On Synthroid.    DM 2:   Was on sliding scale insulin.  Discharged on metformin.    Meningioma:  New meningioma noted on MRI and possible increased in size of residual pituitary macroadenoma.  He had pituitary adenoma s/p resection at [facility] in 2016.  MRI: IMPRESSION: No acute infarct. Extensive chronic changes.   Findings suggesting interval growth of residual right-sided sellar pituitary adenoma as above.   New right-sided posterior fossa meningiomas.   Needs follow up with neurology/endocrinology.     Impaired gait and mobility; Generalized muscle weakness :  Patient is in bed.  At [facility] in long term care.  Very weak, unable to ambulate.                        Reviewed  EMR  Reviewed medical, social, surgical and family history.  Reviewed all current medications and performed medication reconciliation.  Performed prescription drug management.  Reviewed vital signs AND lab results  Reviewed [employer] documentation.  Discussed patient with nursing,  and [employer] NP.                      ROS: 10 point ROS performed; Negative unless noted in HPI.    MEDICAL HISTORY:  Arthritis   Cataracts, bilateral   COPD (chronic obstructive pulmonary disease) ([facility])   Cushing's disease ([facility]) 2016   s/p TSA   H/O diabetes insipidus   s/p TSA   Hypothyroidism   Kidney stones   Pituitary adenoma ([facility]) 01/13/2016   ACTH secreting   Pulmonary emboli ([facility]) 10/2016   saddle and multiple additional PE     SURGICAL HISTORY:   Neck surgery  Resection of pituitary adenoma   PICC line insertion.  TOTAL HIP REPLACEMENT   XCAPSL CTRC RMVL INSJ IO LENS   PROSTH W/O ECP Right   Cataract Extraction with PC IOL     SOCIAL HISTORY:  Former smoker.  1/2 PPD for 40 years  Denies alcohol use.  Denies drug use.    FAMILY HISTORY:  Diabetes--father  Cancer--Multiple myeloma--mother      ALLERGIES   Codeine   Penicillins     LABS:  BMP: Date: 11/7/2024 Na 142 K 4.3 Cr 1 BUN 21 glucose 133 Ca 6.3 GFR 73  CBC: Date: 11/7/2024 WBC  5.2 Hgb 10.1 hematocrit 30.5 platelets 135  Liver function: Date: 11/7/2024 ALT 13 AST 13 alkaline phos.  46 bilirubin 0.3 albumin 1.2 protein 6.3    BMP: Date: 11/8/2024 Na 138 K 4.2 Cr 0.9 BUN 20 glucose 106 Ca 9.3 GFR 83  CBC: Date: 11/8/2024 WBC 10.6 Hgb 11 hematocrit 33.4 platelets 147    BMP: Date: 11/21/2024 Na 140 K 4.8 Cr 1.3 BUN 18 glucose 70 Ca 8.4 GFR 54  CBC: Date: 11/21/2024 WBC 8.1 Hgb 11.1 hematocrit 35.1 platelets 175    11/26/2024 lipid panel: cholesterol 136; triglycerides 201; HDL 34; LDL 62; VLDL 40    11/26/2024 magnesium level 1.9    BMP: Date: 12/3/2024 Na 140 K 4.3 Cr 1.1 BUN 12 glucose 73 Ca 8.5 GFR 66  CBC: Date: 12/3/2024 WBC 6.2 Hgb 11.5 hematocrit 35.1 platelets 185  Liver function: Date: 12/3/2024 ALT 10 AST 15 alkaline phos.  54 bilirubin 0.6 albumin 3.5 protein 6.3    BMP: Date: 12/11/2024 Na 136 K 4.3 Cr 1.2 BUN 14 glucose 119 Ca 9.1 GFR 59  CBC: Date: 12/11/2024 WBC 7.2 Hgb 12.4 hematocrit 38.2 platelets 156  Liver function: Date: 12/11/2024 ALT 13 AST 22 alkaline phos.  43 bilirubin 0.5 albumin 3.2 protein 6    BMP: Date: 12/26/2024 Na 133 K 4.7 Cr 0.9 BUN 29 glucose 107  Ca 8.4 GFR 83  CBC: Date: 12/26/2024 WBC 18.3 Hgb 12.7 hematocrit 39.2 platelets 205  Liver function: Date: 12/26/2024 ALT 40 AST 22 alkaline phos.  50 bilirubin 0.7 albumin 3.4 protein 6.1    BMP: Date: 12/30/2024 Na 138 K 4.9 Cr 0.8 BUN 29 glucose 105 Ca 7.9 GFR 95  CBC: Date: 12/30/2024 WBC 7.5 Hgb 11.5 hematocrit 36.5 platelets 136         Lab Results   Component Value Date    WBC 7.1 01/31/2024    HGB 13.3 (L) 01/31/2024    HCT 40.5 (L) 01/31/2024     (L) 01/31/2024    CHOL 166 07/18/2023    TRIG 124 07/18/2023    HDL 39.0 (A) 07/18/2023    ALT 22 01/22/2024    AST 24 01/22/2024     01/31/2024    K 3.9 01/31/2024    CL 98 01/31/2024    CREATININE 0.92 01/31/2024    BUN 21 01/31/2024    CO2 31 01/31/2024    TSH <0.01 (L) 01/12/2024    PSA <0.10 08/18/2021    INR 1.1 01/07/2024    HGBA1C 5.6  "01/01/2024             /50   Pulse 75   Temp 36.7 °C (98 °F)   Resp 18   Ht 1.702 m (5' 7\")   Wt 83 kg (183 lb)   SpO2 98%   BMI 28.66 kg/m²      Physical Exam  Vitals and nursing note reviewed.   Constitutional:       General: He is awake.      Appearance: He is ill-appearing.      Interventions: Nasal cannula in place.   HENT:      Head: Normocephalic.      Right Ear: External ear normal.      Left Ear: External ear normal.      Nose: Nose normal.      Mouth/Throat:      Mouth: Mucous membranes are moist.      Pharynx: Oropharynx is clear.   Eyes:      Extraocular Movements: Extraocular movements intact.      Conjunctiva/sclera: Conjunctivae normal.      Pupils: Pupils are equal, round, and reactive to light.      Comments: Right eye pitosis   Cardiovascular:      Rate and Rhythm: Normal rate and regular rhythm.      Pulses: Normal pulses.      Heart sounds: Normal heart sounds.   Pulmonary:      Effort: Pulmonary effort is normal.      Breath sounds: Normal breath sounds.   Abdominal:      General: Bowel sounds are normal.      Palpations: Abdomen is soft.   Musculoskeletal:         General: Normal range of motion.      Cervical back: Normal range of motion and neck supple.   Skin:     General: Skin is warm and dry.   Neurological:      General: No focal deficit present.      Mental Status: He is alert and oriented to person, place, and time. Mental status is at baseline.      Sensory: Sensation is intact.      Motor: Weakness present.      Coordination: Coordination abnormal.      Gait: Gait abnormal.   Psychiatric:         Attention and Perception: Attention normal.         Mood and Affect: Mood normal.         Behavior: Behavior normal. Behavior is cooperative.          Assessment/Plan    Recheck BMP, CBC with diff.  On Thursday.    Leukocytosis:  Monitor WBC.  Continue doxycycline as ordered.  Monitor for fevers.  Follow up on UA C & S when available.    Covid-19; hypoxia:  Treated in the " hospital.  Was on Covid isolation in the hospital.  He is finished with his Covid isolation now.   Treated with Remdesivir and Decadron.  Discharged on decadron, has completed decadron now.   Continue supplemental oxygen 2 L O2 via NC.   Monitor oxygen sats.  Continue albuterol nebs and guaifenesin.    COPD (chronic obstructive pulmonary disease):  Continue albuterol nebs and supplemental oxygen.     Orthostatic hypotension:  Continue Midodrine 5mg TID  Monitor Bps.    A-fib; Hx of pulmonary embolus (PE) and SVT (supraventricular tachycardia):  Continue Eliquis and metoprolol.    Hypothyroidism:  Continue Synthroid.  Monitor TSH.    DM 2:   Discharged on metformin.  Order Ha1c.    Meningioma:  New meningioma noted on MRI and possible increased in size of residual pituitary macroadenoma.  He had pituitary adenoma s/p resection at [facility] in 2016.  MRI: IMPRESSION: No acute infarct. Extensive chronic changes.   Findings suggesting interval growth of residual right-sided sellar pituitary adenoma as above.   New right-sided posterior fossa meningiomas.   Needs follow up with neurology/endocrinology.    Impaired gait and mobility; Generalized muscle weakness :  Patient is in bed.  Continue at [facility] in long term care.  Fall prevention strategies.       Problem List Items Addressed This Visit       Controlled type 2 diabetes mellitus without complication, without long-term current use of insulin (Multi)    COPD (chronic obstructive pulmonary disease) (Multi)    Hypothyroidism    Hypoxia     Other Visit Diagnoses       Leukocytosis, unspecified type    -  Primary    COVID-19        Orthostatic hypotension        Atrial fibrillation, unspecified type (Multi)        Meningioma (Multi)        Impaired gait and mobility        Generalized muscle weakness                      Madina Lugo, APRN-CNP

## 2025-01-06 ENCOUNTER — NURSING HOME VISIT (OUTPATIENT)
Dept: POST ACUTE CARE | Facility: EXTERNAL LOCATION | Age: 75
End: 2025-01-06
Payer: MEDICARE

## 2025-01-06 DIAGNOSIS — Z86.16 HISTORY OF COVID-19: ICD-10-CM

## 2025-01-06 DIAGNOSIS — D35.2 PITUITARY ADENOMA (MULTI): Primary | ICD-10-CM

## 2025-01-06 DIAGNOSIS — Z86.79 HX OF SUPRAVENTRICULAR TACHYCARDIA: ICD-10-CM

## 2025-01-06 DIAGNOSIS — D72.829 LEUKOCYTOSIS, UNSPECIFIED TYPE: ICD-10-CM

## 2025-01-06 DIAGNOSIS — J44.9 CHRONIC OBSTRUCTIVE PULMONARY DISEASE, UNSPECIFIED COPD TYPE (MULTI): ICD-10-CM

## 2025-01-06 DIAGNOSIS — E22.9 HYPERPITUITARISM (MULTI): ICD-10-CM

## 2025-01-06 DIAGNOSIS — D32.9 MENINGIOMA (MULTI): ICD-10-CM

## 2025-01-06 DIAGNOSIS — R26.89 IMPAIRED GAIT AND MOBILITY: ICD-10-CM

## 2025-01-06 DIAGNOSIS — I48.91 ATRIAL FIBRILLATION, UNSPECIFIED TYPE (MULTI): ICD-10-CM

## 2025-01-06 DIAGNOSIS — I95.1 ORTHOSTATIC HYPOTENSION: ICD-10-CM

## 2025-01-06 DIAGNOSIS — R09.02 HYPOXIA: ICD-10-CM

## 2025-01-06 DIAGNOSIS — M62.81 GENERALIZED MUSCLE WEAKNESS: ICD-10-CM

## 2025-01-06 DIAGNOSIS — E11.9 TYPE 2 DIABETES MELLITUS WITHOUT COMPLICATION, WITHOUT LONG-TERM CURRENT USE OF INSULIN (MULTI): ICD-10-CM

## 2025-01-06 DIAGNOSIS — E03.9 HYPOTHYROIDISM, UNSPECIFIED TYPE: ICD-10-CM

## 2025-01-06 DIAGNOSIS — Z86.711 HISTORY OF PULMONARY EMBOLUS (PE): ICD-10-CM

## 2025-01-06 PROCEDURE — 99309 SBSQ NF CARE MODERATE MDM 30: CPT | Performed by: NURSE PRACTITIONER

## 2025-01-06 NOTE — LETTER
Patient: Gopal Ness  : 1950    Encounter Date: 2025    Name: Gopal Ness    YOB: 1950    Code Status: DNRcc-arrest    Chief Complaint:  Follow up on pituitary adenoma; pan hyperpituitarism; etc.....      HPI     74 year old male, history of paroxysmal atrial fibrillation, on long-term oral anticoagulation with Eliquis, COPD, home O2, hypothyroidism, type 2 diabetes mellitus, diabetes insipidus, Cushing syndrome, pituitary adenoma on 10/13/2016, pulmonary embolism, tobacco abuse, and kidney stone.      Patient was previously admitted to [facility] from 11/10/24 to 24    Patient re-admitted to [facility] on 24 for skilled and long term care.    Patient was recently admitted to [facility] from 24 to today 24.     Patient re-admitted to [facility] on 24.     Chronic diagnoses as follows:    Pituitary adenoma; pan-hyperpituitarism; Meningioma; hypothyroidism:   Patient has a pituitary adenoma; pan-hyperpituitarism.  Patient was previously evaluated at [facility].  New meningioma was noted on MRI and possible increased in size of residual pituitary macroadenoma.  He had pituitary adenoma s/p resection at [facility] in .  MRI: IMPRESSION: No acute infarct. Extensive chronic changes.   Findings suggesting interval growth of residual right-sided sellar pituitary adenoma as above.   New right-sided posterior fossa meningiomas.   On Synthroid for hypothyroidism.   Patient needs to follow-up with endocrinologist Dr. Adan.  Staff needs to schedule appointment.   Patient seen today, he is in bed.   No related complaints.  No headaches or dizziness.  No fevers.   Patient is slightly irritable today.   Follows commands.     COPD (chronic obstructive pulmonary disease):  On albuterol nebs and supplemental oxygen 4 L O2 via NC.   Patient frequently is non-compliant with the oxygen use.   No reports of oxygen desaturations.   Occasional  cough.    Leukocytosis:  On 12/26/24 WBC elevated at 18.3 H.  No fevers reported at that time.   UA C & S ordered on 12/26/24 and patient was started on doxycycline.  On 12/30/24 WBC improved to 7.5.   Patient seen today.  No fevers reported.   UA C & S results pending.  No headaches.  No dizziness.  No dysuria.  No chest pain.    Covid-19; hypoxia:  Treated in the hospital.  Was on Covid isolation in the hospital.  He is finished with his Covid isolation now.   Treated with Remdesivir and Decadron.  Discharged on decadron, has completed decadron now.   On supplemental oxygen 2 L O2 via NC.   Treated with albuterol nebs and guaifenesin.  Pulmonary consult and ID consult were performed in the hospital.    Orthostatic hypotension:  On Midodrine 5mg TID  Recent /72.   No episodes of orthostatic hypotension reported.     A-fib; Hx of pulmonary embolus (PE) and SVT (supraventricular tachycardia):  Has a-fib.  On Eliquis and metoprolol.  No complaints of chest pain or palpitations today.  No reports of tachycardia.      DM 2:   Was on sliding scale insulin in the hospital.  Discharged on metformin.    Generalized muscle weakness; Impaired gait and mobility :  Patient is in bed.  At [facility] in long term care.  Very weak, unable to ambulate.                        Reviewed  EMR  Reviewed medical, social, surgical and family history.  Reviewed all current medications and performed medication reconciliation.  Performed prescription drug management.  Reviewed vital signs AND lab results  Reviewed [employer] documentation.  Discussed patient with nursing,  and [employer] NP.                      ROS: 10 point ROS performed; Negative unless noted in HPI.    MEDICAL HISTORY:  Arthritis   Cataracts, bilateral   COPD (chronic obstructive pulmonary disease) ([facility])   Cushing's disease ([facility]) 2016   s/p TSA   H/O diabetes insipidus   s/p TSA   Hypothyroidism   Kidney stones   Pituitary adenoma ([facility]) 01/13/2016    ACTH secreting   Pulmonary emboli ([facility]) 10/2016   saddle and multiple additional PE     SURGICAL HISTORY:   Neck surgery  Resection of pituitary adenoma   PICC line insertion.  TOTAL HIP REPLACEMENT   XCAPSL CTRC RMVL INSJ IO LENS   PROSTH W/O ECP Right   Cataract Extraction with PC IOL     SOCIAL HISTORY:  Former smoker.  1/2 PPD for 40 years  Denies alcohol use.  Denies drug use.    FAMILY HISTORY:  Diabetes--father  Cancer--Multiple myeloma--mother      ALLERGIES   Codeine   Penicillins     LABS:  BMP: Date: 11/7/2024 Na 142 K 4.3 Cr 1 BUN 21 glucose 133 Ca 6.3 GFR 73  CBC: Date: 11/7/2024 WBC 5.2 Hgb 10.1 hematocrit 30.5 platelets 135  Liver function: Date: 11/7/2024 ALT 13 AST 13 alkaline phos.  46 bilirubin 0.3 albumin 1.2 protein 6.3    BMP: Date: 11/8/2024 Na 138 K 4.2 Cr 0.9 BUN 20 glucose 106 Ca 9.3 GFR 83  CBC: Date: 11/8/2024 WBC 10.6 Hgb 11 hematocrit 33.4 platelets 147    BMP: Date: 11/21/2024 Na 140 K 4.8 Cr 1.3 BUN 18 glucose 70 Ca 8.4 GFR 54  CBC: Date: 11/21/2024 WBC 8.1 Hgb 11.1 hematocrit 35.1 platelets 175    11/26/2024 lipid panel: cholesterol 136; triglycerides 201; HDL 34; LDL 62; VLDL 40    11/26/2024 magnesium level 1.9    BMP: Date: 12/3/2024 Na 140 K 4.3 Cr 1.1 BUN 12 glucose 73 Ca 8.5 GFR 66  CBC: Date: 12/3/2024 WBC 6.2 Hgb 11.5 hematocrit 35.1 platelets 185  Liver function: Date: 12/3/2024 ALT 10 AST 15 alkaline phos.  54 bilirubin 0.6 albumin 3.5 protein 6.3    BMP: Date: 12/11/2024 Na 136 K 4.3 Cr 1.2 BUN 14 glucose 119 Ca 9.1 GFR 59  CBC: Date: 12/11/2024 WBC 7.2 Hgb 12.4 hematocrit 38.2 platelets 156  Liver function: Date: 12/11/2024 ALT 13 AST 22 alkaline phos.  43 bilirubin 0.5 albumin 3.2 protein 6    BMP: Date: 12/26/2024 Na 133 K 4.7 Cr 0.9 BUN 29 glucose 107  Ca 8.4 GFR 83  CBC: Date: 12/26/2024 WBC 18.3 Hgb 12.7 hematocrit 39.2 platelets 205  Liver function: Date: 12/26/2024 ALT 40 AST 22 alkaline phos.  50 bilirubin 0.7 albumin 3.4 protein 6.1    BMP: Date: 12/30/2024 Na  "138 K 4.9 Cr 0.8 BUN 29 glucose 105 Ca 7.9 GFR 95  CBC: Date: 12/30/2024 WBC 7.5 Hgb 11.5 hematocrit 36.5 platelets 136         Lab Results   Component Value Date    WBC 7.1 01/31/2024    HGB 13.3 (L) 01/31/2024    HCT 40.5 (L) 01/31/2024     (L) 01/31/2024    CHOL 166 07/18/2023    TRIG 124 07/18/2023    HDL 39.0 (A) 07/18/2023    ALT 22 01/22/2024    AST 24 01/22/2024     01/31/2024    K 3.9 01/31/2024    CL 98 01/31/2024    CREATININE 0.92 01/31/2024    BUN 21 01/31/2024    CO2 31 01/31/2024    TSH <0.01 (L) 01/12/2024    PSA <0.10 08/18/2021    INR 1.1 01/07/2024    HGBA1C 5.6 01/01/2024             /72   Pulse 92   Temp 36.4 °C (97.6 °F)   Resp 18   Ht 1.702 m (5' 7\")   Wt 79.4 kg (175 lb)   SpO2 98%   BMI 27.41 kg/m²      Physical Exam  Vitals and nursing note reviewed.   Constitutional:       General: He is awake.      Appearance: He is ill-appearing.      Interventions: Nasal cannula in place.   HENT:      Head: Normocephalic.      Right Ear: External ear normal.      Left Ear: External ear normal.      Nose: Nose normal.      Mouth/Throat:      Mouth: Mucous membranes are moist.      Pharynx: Oropharynx is clear.   Eyes:      Extraocular Movements: Extraocular movements intact.      Conjunctiva/sclera: Conjunctivae normal.      Pupils: Pupils are equal, round, and reactive to light.      Comments: Right eye pitosis   Cardiovascular:      Rate and Rhythm: Normal rate and regular rhythm.      Pulses: Normal pulses.      Heart sounds: Normal heart sounds.   Pulmonary:      Effort: Pulmonary effort is normal.      Breath sounds: Normal breath sounds.   Abdominal:      General: Bowel sounds are normal.      Palpations: Abdomen is soft.   Musculoskeletal:         General: Normal range of motion.      Cervical back: Normal range of motion and neck supple.   Skin:     General: Skin is warm and dry.   Neurological:      General: No focal deficit present.      Mental Status: He is alert and " oriented to person, place, and time. Mental status is at baseline.      Sensory: Sensation is intact.      Motor: Weakness present.      Coordination: Coordination abnormal.      Gait: Gait abnormal.   Psychiatric:         Attention and Perception: Attention normal.         Mood and Affect: Mood normal.         Behavior: Behavior normal. Behavior is cooperative.          Assessment/Plan   Ordered BMP and CBC with diff. For tomorrow.    Pituitary adenoma; pan-hyperpituitarism; Meningioma; hypothyroidism:   Patient has a pituitary adenoma; pan-hyperpituitarism.  Patient was previously evaluated at [facility].  New meningioma was noted on MRI and possible increased in size of residual pituitary macroadenoma.  He had pituitary adenoma s/p resection at [facility] in 2016.  Continue Synthroid for hypothyroidism.   Patient needs to follow-up with endocrinologist Dr. Adan.  Staff needs to schedule appointment.     COPD (chronic obstructive pulmonary disease):  Continue albuterol nebs and supplemental oxygen 4 L O2 via NC.     Leukocytosis:  Monitor WBC.  Monitor for s/s of infection.    Recent Covid-19; hypoxia:  Treated in the hospital.  He is finished with his Covid isolation now.   Treated with Remdesivir and Decadron.  Continue supplemental oxygen 2 L O2 via NC.     Orthostatic hypotension:  Continue Midodrine 5mg TID  Monitor Bps.     A-fib; Hx of pulmonary embolus (PE) and SVT (supraventricular tachycardia):  Has a-fib.  Continue Eliquis and metoprolol.  Monitor for chest pain or palpitations today.     DM 2:   Continue metformin.    Generalized muscle weakness; Impaired gait and mobility :  At [facility] in long term care.  Very weak, unable to ambulate.        Problem List Items Addressed This Visit       COPD (chronic obstructive pulmonary disease) (Multi)    Diabetes mellitus (Multi)    History of pulmonary embolus (PE)    Hypothyroidism    Hypoxia    Pituitary adenoma (Multi) - Primary     Other Visit  Diagnoses       Hyperpituitarism (Multi)        Meningioma (Multi)        Leukocytosis, unspecified type        History of COVID-19        Orthostatic hypotension        Atrial fibrillation, unspecified type (Multi)        Hx of supraventricular tachycardia        Generalized muscle weakness        Impaired gait and mobility                        CHANTAL Delgado       Electronically Signed By: CHANTAL Delgado   1/8/25  6:35 PM

## 2025-01-08 VITALS
DIASTOLIC BLOOD PRESSURE: 72 MMHG | SYSTOLIC BLOOD PRESSURE: 121 MMHG | HEART RATE: 92 BPM | HEIGHT: 67 IN | TEMPERATURE: 97.6 F | BODY MASS INDEX: 27.47 KG/M2 | RESPIRATION RATE: 18 BRPM | WEIGHT: 175 LBS | OXYGEN SATURATION: 98 %

## 2025-01-08 NOTE — PROGRESS NOTES
Name: Gopal Ness    YOB: 1950    Code Status: DNRcc-arrest    Chief Complaint:  Follow up on pituitary adenoma; pan hyperpituitarism; etc.....      HPI     74 year old male, history of paroxysmal atrial fibrillation, on long-term oral anticoagulation with Eliquis, COPD, home O2, hypothyroidism, type 2 diabetes mellitus, diabetes insipidus, Cushing syndrome, pituitary adenoma on 10/13/2016, pulmonary embolism, tobacco abuse, and kidney stone.      Patient was previously admitted to [facility] from 11/10/24 to 11/14/24    Patient re-admitted to [facility] on 11/14/24 for skilled and long term care.    Patient was recently admitted to [facility] from 12/16/24 to today 12/23/24.     Patient re-admitted to [facility] on 12/23/24.     Chronic diagnoses as follows:    Pituitary adenoma; pan-hyperpituitarism; Meningioma; hypothyroidism:   Patient has a pituitary adenoma; pan-hyperpituitarism.  Patient was previously evaluated at [facility].  New meningioma was noted on MRI and possible increased in size of residual pituitary macroadenoma.  He had pituitary adenoma s/p resection at [facility] in 2016.  MRI: IMPRESSION: No acute infarct. Extensive chronic changes.   Findings suggesting interval growth of residual right-sided sellar pituitary adenoma as above.   New right-sided posterior fossa meningiomas.   On Synthroid for hypothyroidism.   Patient needs to follow-up with endocrinologist Dr. Adan.  Staff needs to schedule appointment.   Patient seen today, he is in bed.   No related complaints.  No headaches or dizziness.  No fevers.   Patient is slightly irritable today.   Follows commands.     COPD (chronic obstructive pulmonary disease):  On albuterol nebs and supplemental oxygen 4 L O2 via NC.   Patient frequently is non-compliant with the oxygen use.   No reports of oxygen desaturations.   Occasional cough.    Leukocytosis:  On 12/26/24 WBC elevated at 18.3 H.  No fevers reported at  that time.   UA C & S ordered on 12/26/24 and patient was started on doxycycline.  On 12/30/24 WBC improved to 7.5.   Patient seen today.  No fevers reported.   UA C & S results pending.  No headaches.  No dizziness.  No dysuria.  No chest pain.    Covid-19; hypoxia:  Treated in the hospital.  Was on Covid isolation in the hospital.  He is finished with his Covid isolation now.   Treated with Remdesivir and Decadron.  Discharged on decadron, has completed decadron now.   On supplemental oxygen 2 L O2 via NC.   Treated with albuterol nebs and guaifenesin.  Pulmonary consult and ID consult were performed in the hospital.    Orthostatic hypotension:  On Midodrine 5mg TID  Recent /72.   No episodes of orthostatic hypotension reported.     A-fib; Hx of pulmonary embolus (PE) and SVT (supraventricular tachycardia):  Has a-fib.  On Eliquis and metoprolol.  No complaints of chest pain or palpitations today.  No reports of tachycardia.      DM 2:   Was on sliding scale insulin in the hospital.  Discharged on metformin.    Generalized muscle weakness; Impaired gait and mobility :  Patient is in bed.  At [facility] in long term care.  Very weak, unable to ambulate.                        Reviewed  EMR  Reviewed medical, social, surgical and family history.  Reviewed all current medications and performed medication reconciliation.  Performed prescription drug management.  Reviewed vital signs AND lab results  Reviewed [employer] documentation.  Discussed patient with nursing,  and [employer] NP.                      ROS: 10 point ROS performed; Negative unless noted in HPI.    MEDICAL HISTORY:  Arthritis   Cataracts, bilateral   COPD (chronic obstructive pulmonary disease) ([facility])   Cushing's disease ([facility]) 2016   s/p TSA   H/O diabetes insipidus   s/p TSA   Hypothyroidism   Kidney stones   Pituitary adenoma ([facility]) 01/13/2016   ACTH secreting   Pulmonary emboli ([facility]) 10/2016   saddle and multiple additional PE      SURGICAL HISTORY:   Neck surgery  Resection of pituitary adenoma   PICC line insertion.  TOTAL HIP REPLACEMENT   XCAPSL CTRC RMVL INSJ IO LENS   PROSTH W/O ECP Right   Cataract Extraction with PC IOL     SOCIAL HISTORY:  Former smoker.  1/2 PPD for 40 years  Denies alcohol use.  Denies drug use.    FAMILY HISTORY:  Diabetes--father  Cancer--Multiple myeloma--mother      ALLERGIES   Codeine   Penicillins     LABS:  BMP: Date: 11/7/2024 Na 142 K 4.3 Cr 1 BUN 21 glucose 133 Ca 6.3 GFR 73  CBC: Date: 11/7/2024 WBC 5.2 Hgb 10.1 hematocrit 30.5 platelets 135  Liver function: Date: 11/7/2024 ALT 13 AST 13 alkaline phos.  46 bilirubin 0.3 albumin 1.2 protein 6.3    BMP: Date: 11/8/2024 Na 138 K 4.2 Cr 0.9 BUN 20 glucose 106 Ca 9.3 GFR 83  CBC: Date: 11/8/2024 WBC 10.6 Hgb 11 hematocrit 33.4 platelets 147    BMP: Date: 11/21/2024 Na 140 K 4.8 Cr 1.3 BUN 18 glucose 70 Ca 8.4 GFR 54  CBC: Date: 11/21/2024 WBC 8.1 Hgb 11.1 hematocrit 35.1 platelets 175    11/26/2024 lipid panel: cholesterol 136; triglycerides 201; HDL 34; LDL 62; VLDL 40    11/26/2024 magnesium level 1.9    BMP: Date: 12/3/2024 Na 140 K 4.3 Cr 1.1 BUN 12 glucose 73 Ca 8.5 GFR 66  CBC: Date: 12/3/2024 WBC 6.2 Hgb 11.5 hematocrit 35.1 platelets 185  Liver function: Date: 12/3/2024 ALT 10 AST 15 alkaline phos.  54 bilirubin 0.6 albumin 3.5 protein 6.3    BMP: Date: 12/11/2024 Na 136 K 4.3 Cr 1.2 BUN 14 glucose 119 Ca 9.1 GFR 59  CBC: Date: 12/11/2024 WBC 7.2 Hgb 12.4 hematocrit 38.2 platelets 156  Liver function: Date: 12/11/2024 ALT 13 AST 22 alkaline phos.  43 bilirubin 0.5 albumin 3.2 protein 6    BMP: Date: 12/26/2024 Na 133 K 4.7 Cr 0.9 BUN 29 glucose 107  Ca 8.4 GFR 83  CBC: Date: 12/26/2024 WBC 18.3 Hgb 12.7 hematocrit 39.2 platelets 205  Liver function: Date: 12/26/2024 ALT 40 AST 22 alkaline phos.  50 bilirubin 0.7 albumin 3.4 protein 6.1    BMP: Date: 12/30/2024 Na 138 K 4.9 Cr 0.8 BUN 29 glucose 105 Ca 7.9 GFR 95  CBC: Date: 12/30/2024 WBC 7.5 Hgb  "11.5 hematocrit 36.5 platelets 136         Lab Results   Component Value Date    WBC 7.1 01/31/2024    HGB 13.3 (L) 01/31/2024    HCT 40.5 (L) 01/31/2024     (L) 01/31/2024    CHOL 166 07/18/2023    TRIG 124 07/18/2023    HDL 39.0 (A) 07/18/2023    ALT 22 01/22/2024    AST 24 01/22/2024     01/31/2024    K 3.9 01/31/2024    CL 98 01/31/2024    CREATININE 0.92 01/31/2024    BUN 21 01/31/2024    CO2 31 01/31/2024    TSH <0.01 (L) 01/12/2024    PSA <0.10 08/18/2021    INR 1.1 01/07/2024    HGBA1C 5.6 01/01/2024             /72   Pulse 92   Temp 36.4 °C (97.6 °F)   Resp 18   Ht 1.702 m (5' 7\")   Wt 79.4 kg (175 lb)   SpO2 98%   BMI 27.41 kg/m²      Physical Exam  Vitals and nursing note reviewed.   Constitutional:       General: He is awake.      Appearance: He is ill-appearing.      Interventions: Nasal cannula in place.   HENT:      Head: Normocephalic.      Right Ear: External ear normal.      Left Ear: External ear normal.      Nose: Nose normal.      Mouth/Throat:      Mouth: Mucous membranes are moist.      Pharynx: Oropharynx is clear.   Eyes:      Extraocular Movements: Extraocular movements intact.      Conjunctiva/sclera: Conjunctivae normal.      Pupils: Pupils are equal, round, and reactive to light.      Comments: Right eye pitosis   Cardiovascular:      Rate and Rhythm: Normal rate and regular rhythm.      Pulses: Normal pulses.      Heart sounds: Normal heart sounds.   Pulmonary:      Effort: Pulmonary effort is normal.      Breath sounds: Normal breath sounds.   Abdominal:      General: Bowel sounds are normal.      Palpations: Abdomen is soft.   Musculoskeletal:         General: Normal range of motion.      Cervical back: Normal range of motion and neck supple.   Skin:     General: Skin is warm and dry.   Neurological:      General: No focal deficit present.      Mental Status: He is alert and oriented to person, place, and time. Mental status is at baseline.      Sensory: " Sensation is intact.      Motor: Weakness present.      Coordination: Coordination abnormal.      Gait: Gait abnormal.   Psychiatric:         Attention and Perception: Attention normal.         Mood and Affect: Mood normal.         Behavior: Behavior normal. Behavior is cooperative.          Assessment/Plan    Ordered BMP and CBC with diff. For tomorrow.    Pituitary adenoma; pan-hyperpituitarism; Meningioma; hypothyroidism:   Patient has a pituitary adenoma; pan-hyperpituitarism.  Patient was previously evaluated at [facility].  New meningioma was noted on MRI and possible increased in size of residual pituitary macroadenoma.  He had pituitary adenoma s/p resection at [facility] in 2016.  Continue Synthroid for hypothyroidism.   Patient needs to follow-up with endocrinologist Dr. Adan.  Staff needs to schedule appointment.     COPD (chronic obstructive pulmonary disease):  Continue albuterol nebs and supplemental oxygen 4 L O2 via NC.     Leukocytosis:  Monitor WBC.  Monitor for s/s of infection.    Recent Covid-19; hypoxia:  Treated in the hospital.  He is finished with his Covid isolation now.   Treated with Remdesivir and Decadron.  Continue supplemental oxygen 2 L O2 via NC.     Orthostatic hypotension:  Continue Midodrine 5mg TID  Monitor Bps.     A-fib; Hx of pulmonary embolus (PE) and SVT (supraventricular tachycardia):  Has a-fib.  Continue Eliquis and metoprolol.  Monitor for chest pain or palpitations today.     DM 2:   Continue metformin.    Generalized muscle weakness; Impaired gait and mobility :  At [facility] in long term care.  Very weak, unable to ambulate.        Problem List Items Addressed This Visit       COPD (chronic obstructive pulmonary disease) (Multi)    Diabetes mellitus (Multi)    History of pulmonary embolus (PE)    Hypothyroidism    Hypoxia    Pituitary adenoma (Multi) - Primary     Other Visit Diagnoses       Hyperpituitarism (Multi)        Meningioma (Multi)         Leukocytosis, unspecified type        History of COVID-19        Orthostatic hypotension        Atrial fibrillation, unspecified type (Multi)        Hx of supraventricular tachycardia        Generalized muscle weakness        Impaired gait and mobility                        Madina Lugo, APRN-CNP

## 2025-01-18 DIAGNOSIS — D35.2 PITUITARY ADENOMA (MULTI): Primary | ICD-10-CM

## 2025-01-25 ENCOUNTER — HOSPITAL ENCOUNTER (OUTPATIENT)
Dept: RADIOLOGY | Facility: EXTERNAL LOCATION | Age: 75
Discharge: HOME | End: 2025-01-25
Payer: MEDICARE